# Patient Record
Sex: MALE | Race: WHITE | NOT HISPANIC OR LATINO | Employment: OTHER | URBAN - METROPOLITAN AREA
[De-identification: names, ages, dates, MRNs, and addresses within clinical notes are randomized per-mention and may not be internally consistent; named-entity substitution may affect disease eponyms.]

---

## 2017-01-03 ENCOUNTER — OFFICE VISIT (OUTPATIENT)
Dept: SLEEP MEDICINE | Facility: CLINIC | Age: 37
End: 2017-01-03
Payer: MEDICARE

## 2017-01-03 VITALS
DIASTOLIC BLOOD PRESSURE: 75 MMHG | OXYGEN SATURATION: 93 % | WEIGHT: 226 LBS | SYSTOLIC BLOOD PRESSURE: 113 MMHG | BODY MASS INDEX: 35.47 KG/M2 | HEIGHT: 67 IN | HEART RATE: 80 BPM

## 2017-01-03 DIAGNOSIS — R06.00 DYSPNEA AND RESPIRATORY ABNORMALITY: Primary | ICD-10-CM

## 2017-01-03 DIAGNOSIS — E66.09 OBESITY DUE TO EXCESS CALORIES, UNSPECIFIED OBESITY SEVERITY: ICD-10-CM

## 2017-01-03 DIAGNOSIS — G47.19 EXCESSIVE DAYTIME SLEEPINESS: ICD-10-CM

## 2017-01-03 DIAGNOSIS — Z72.820 LACK OF ADEQUATE SLEEP: ICD-10-CM

## 2017-01-03 DIAGNOSIS — R06.89 DYSPNEA AND RESPIRATORY ABNORMALITY: Primary | ICD-10-CM

## 2017-01-03 PROCEDURE — 99205 OFFICE O/P NEW HI 60 MIN: CPT | Performed by: PHYSICIAN ASSISTANT

## 2017-01-03 NOTE — PROGRESS NOTES
Sleep Consultation:    Date on this visit: 1/3/2017    Humberto Estrella  is sent by Carla Meyer for a sleep consultation.     Primary Physician: Carla Meyer     Chief Complaint   Patient presents with     Sleep Problem     Consult Carla Meyer CNP, snoring, witnessed apneas, daytime sleepiness       HPI: Humberto Estrella is a 36 year old male with medical history remarkable for trisomy 21, PTSD and obesity. He presents in clinic today for possible disordered breathing. He is here today with his caregiver Radha. History was obtained from both.     Humberto goes to sleep at 6:00 PM during the week. He wakes up at 5:30 AM with an alarm. He falls asleep in 10 minutes.  Humberto denies difficulty falling asleep.  He wakes up 1 times a night for 5 minutes before falling back to sleep.  Humberto wakes up to go to the bathroom.  On weekends, Humberto goes to sleep at 6:00 PM.  He wakes up at 10:00 AM with an alarm. He falls asleep in 10 minutes.  Patient gets an average of 12 hours of sleep per night. He does not feel refreshed.     Patient does not use electronics in bed, watch TV in bed and read in bed.     Hubmerto does not do shift work.      Humberto does snore every night and snoring is very loud. Patient does not have a regular bed partner. There is report of snoring and gasping.  He does not have witnessed apneas. Patient sleeps on his back and side. He has frequent morning dry mouth, denies morning headaches, morning confusion and restless legs. Humberto denies any bruxism, sleep walking, sleep talking, dream enactment, sleep paralysis, cataplexy and hypnogogic/hypnopompic hallucinations.    Humberto denies difficulty breathing through his nose, claustrophobia and reflux at night.      Humberto has gained 30 pounds in the last 2 years.  Patient describes themself as a morning person.  He would prefer to go to sleep at 8:00 PM and wake up at 5:00 AM.  Patient's [place] Sleepiness score 13/24 consistent with some daytime  sleepiness.      Humberto naps 7 times per week for 30 minutes, feels unrefreshed after naps. He takes some inadvertant naps.  He does not drive.  He uses 2 cups/day of coffee, 1 sodas/day. Last caffeine intake is usually before 3 PM.      Allergies:    Allergies   Allergen Reactions     Nkda [No Known Drug Allergies]        Medications:    Current Outpatient Prescriptions   Medication Sig Dispense Refill     levothyroxine (SYNTHROID, LEVOTHROID) 50 MCG tablet Take 1 tablet (50 mcg) by mouth daily 90 tablet 3     levocetirizine (XYZAL) 5 MG tablet Take 1 tablet (5 mg) by mouth every evening 30 tablet 3     esomeprazole (NEXIUM) 40 MG capsule Take 1 capsule (40 mg) by mouth every morning (before breakfast) Take 30-60 minutes before a eating. 30 capsule 1     OMEPRAZOLE PO Take 40 mg by mouth every morning       albuterol (ALBUTEROL) 108 (90 BASE) MCG/ACT inhaler Inhale 2 puffs into the lungs every 6 hours as needed for shortness of breath / dyspnea or wheezing or cough 1 Inhaler 3     UNABLE TO FIND MEDICATION NAME: pseudefed PE PRN not to exceed 10 capsules in 24 hours       divalproex (DEPAKOTE ER) 500 MG 24 hr tablet Take 1,500 mg by mouth every morning       venlafaxine (EFFEXOR-XR) 37.5 MG 24 hr capsule Take 37.5 mg by mouth daily 3 caps daily to equal 112.5mg       OLANZapine (ZYPREXA) 2.5 MG tablet Take 5 mg by mouth 3 times daily as needed  30 tablet 1     fluticasone (FLONASE) 50 MCG/ACT nasal spray Spray 1-2 sprays into both nostrils daily       alum & mag hydroxide-simethicone (MYLANTA/MAALOX) 200-200-20 MG/5ML SUSP Take 30 mLs by mouth every 4 hours as needed for indigestion (2 tsp for upset stomach)  0     magnesium hydroxide (MILK OF MAGNESIA) 400 MG/5ML suspension Take 30-60 mLs by mouth daily as needed for constipation or heartburn (If No Bowel Movement in 2 days.) 360 mL 1     acetaminophen (TYLENOL) 325 MG tablet Take 1-2 tablets (325-650 mg) by mouth every 4 hours as needed 100 tablet       risperiDONE (RISPERDAL) 1 MG tablet Take 1 tablet in the morning and 2 tablets at 5 pm 60 tablet      Phenylephrine-APAP-Guaifenesin 5-325-200 MG TABS Take 2 caplets every 4 hours as needed for decongestant 168 tablet      guaiFENesin-codeine (ROBITUSSIN AC) 100-10 MG/5ML SOLN Take 10 mLs by mouth every 4 hours as needed for cough 120 mL 0     ibuprofen (ADVIL,MOTRIN) 600 MG tablet Take 1 tablet (600 mg) by mouth every 6 hours as needed for moderate pain 60 tablet 0     triamcinolone (KENALOG) 0.1 % cream Apply  topically 3 times daily. Apply sparingly to affected area. (Patient taking differently: Apply topically 3 times daily as needed Apply sparingly to affected area.) 30 g 1       Problem List:  Patient Active Problem List    Diagnosis Date Noted     Subclinical hypothyroidism 12/12/2016     Priority: Medium     Obesity due to excess calories, unspecified obesity severity 06/13/2016     Priority: Medium     Nonallergic rhinitis      Priority: Medium     9/30/15 IgE tests all NEGATIVE for environmental allergens.        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 Provider to review       MYOPIA 06/21/2006     LATENT NYSTAGMUS 06/21/2006     Headache 06/21/2006     Problem list name updated by automated process. Provider to review       INTERMITT EXPLOSIVE DIS 06/21/2006     post traumatic stress disorder 06/21/2006        Past Medical/Surgical History:  Past Medical History   Diagnosis Date     Diagnostic skin and sensitization tests (aka ALLERGENS) 9/30/15 IgE tests all NEGATIVE for environmental allergens.      Nonallergic rhinitis      9/30/15 IgE tests all NEGATIVE for environmental allergens.      Past Surgical History   Procedure Laterality Date     Pe  tubes       Left     Phacoemulsification with standard intraocular lens implant  10/3/2013     Procedure: PHACOEMULSIFICATION WITH STANDARD INTRAOCULAR LENS IMPLANT;  Left [place] Phacoemulsification with Intraocular Lens Implant;  Surgeon: Luis Barajas MD;  Location: [facility]     Phacoemulsification with standard intraocular lens implant  5/1/2014     Procedure: PHACOEMULSIFICATION WITH STANDARD INTRAOCULAR LENS IMPLANT;  Surgeon: Luis Barajas MD;  Location: [facility]       Social History:  Social History     Social History     Marital Status: Single     Spouse Name: N/A     Number of Children: 0     Years of Education: 12     Occupational History     Cleaning Services      [employer]      Unemployed      Disabled     Social History Main Topics     Smoking status: Former Smoker     Quit date: 08/16/1998     Smokeless tobacco: Never Used     Alcohol Use: No     Drug Use: No     Sexual Activity: No     Other Topics Concern     Parent/Sibling W/ Cabg, Mi Or Angioplasty Before 65f 55m? No     Social History Narrative       Family History:  Family History   Problem Relation Age of Onset     Unknown/Adopted Mother        Review of Systems:  A complete review of systems reviewed by me is negative with the exeption of what has been mentioned in the history of present illness.  CONSTITUTIONAL:  POSITIVE for  weight gain  EYES: NEGATIVE for changes in vision, blind spots, double vision.  ENT: NEGATIVE for ear pain, sore throat, sinus pain, post-nasal drip, runny nose, bloody nose  CARDIAC:  POSITIVE for  chest pressure and breathlessness when lying flat  NEUROLOGIC:  POSITIVE for  headaches  DERMATOLOGIC: NEGATIVE for rashes, new moles or change in mole(s)  PULMONARY:  POSITIVE for  SOB at rest, SOB with activity and wheezing   GASTROINTESTINAL: NEGATIVE for nausea or vomitting, loose or watery stools, fat or grease in stools, constipation, abdominal pain, bowel movements black in color or blood  "noted.  GENITOURINARY: NEGATIVE for pain during urination, blood in urine, urinating more frequently than usual, irregular menstrual periods.  MUSCULOSKELETAL: NEGATIVE for muscle pain, bone or joint pain, swollen joints.  ENDOCRINE: NEGATIVE for increased thirst or urination, diabetes.  LYMPHATIC: NEGATIVE for swollen lymph nodes, lumps or bumps in the breasts or nipple discharge.    Physical Examination:  Vitals: Ht 1.702 m (5' 7\")  Wt 102.513 kg (226 lb)  BMI 35.39 kg/m2  BMI= Body mass index is 35.39 kg/(m^2).         [place] Total Score 1/3/2017   Total score - [place] 13       GENERAL APPEARANCE: alert and no distress  EYES: Eyes grossly normal to inspection, PERRL and conjunctivae and sclerae normal  HENT: ear canals and TM's normal, nose and mouth without ulcers or lesions, oropharynx crowded and tongue base enlarged  NECK: no adenopathy, no asymmetry, masses, or scars and thyroid normal to palpation  RESP: lungs clear to auscultation - no rales, rhonchi or wheezes  CV: regular rates and rhythm and normal S1 S2, no S3 or S4  LYMPHATICS: normal ant/post cervical and supraclavicular nodes  ABDOMEN: bowel sounds normal  MS: extremities normal- no gross deformities noted  NEURO: Normal strength and tone  PSYCH: mentation appears normal and affect normal/bright  Mallampati Class: IV.  Tonsillar Stage:     Last Basic Metabolic Panel:  NA      138   9/22/2016   POTASSIUM      4.3   9/22/2016  CHLORIDE      105   9/22/2016  CHELSEA      8.4   9/22/2016  CO2       29   9/22/2016  BUN       21   9/22/2016  CR     1.19   9/22/2016  GLC       95   9/22/2016    TSH     4.27   12/6/2016      ASSESSMENT:   Humberto Estrella is a 36 year old male with medical history remarkable for trisomy 21, PTSD and obesity. He presents in clinic today for possible disordered breathing    Pottential obstructive sleep apnea with coexisting hypoventilation based on BMI of 35, loud snoring, non-refreshing sleep, excessive daytime " "sleepiness(ESS 13), crowded oropharynx, neck circumference of 18\" and room air oxygen saturation of 93%.     Other potential reason for his daytime sleepiness is multiple sedating medications.     PLAN:    Recommend an in-lab polysomnogram with transcutaneous C02 monitoring and pre-study ABG to evaluate for obstructive sleep apnea, hypoventilation and hypoxemia.    Recommend weight management.     Literature provided regarding sleep apnea and sleep hygiene.      He will follow up with me in approximately two weeks after his sleep study has been competed to review the results and discuss plan of care.       Polysomnography reviewed.  Obstructive sleep apnea reviewed.  Complications of untreated sleep apnea were reviewed.    I have spent 60 minutes with this patient today in which greater than 50% of this time was spent in the counseling / coordination of care regarding VIDAL.     Jonny Rossi PA-C      CC: Carla Meyer          "

## 2017-01-03 NOTE — NURSING NOTE
"Chief Complaint   Patient presents with     Sleep Problem     Consult Carla Meyer CNP, snoring, witnessed apneas, daytime sleepiness       Initial /75 mmHg  Pulse 80  Ht 1.702 m (5' 7\")  Wt 102.513 kg (226 lb)  BMI 35.39 kg/m2  SpO2 93% Estimated body mass index is 35.39 kg/(m^2) as calculated from the following:    Height as of this encounter: 1.702 m (5' 7\").    Weight as of this encounter: 102.513 kg (226 lb).  BP completed using cuff size: large    "

## 2017-01-03 NOTE — MR AVS SNAPSHOT
After Visit Summary   1/3/2017    Humberto Estrella    MRN: [id number]           Patient Information     Date Of Birth          1980        Visit Information        Provider Department      1/3/2017 3:30 PM Jonny Rossi PA [facility]        Today's Diagnoses     Dyspnea and respiratory abnormality    -  1     Excessive daytime sleepiness         Obesity due to excess calories, unspecified obesity severity [E66.09]         Lack of adequate sleep           Care Instructions      Your BMI is Body mass index is 35.39 kg/(m^2).  Weight management is a personal decision.  If you are interested in exploring weight loss strategies, the following discussion covers the approaches that may be successful. Body mass index (BMI) is one way to tell whether you are at a healthy weight, overweight, or obese. It measures your weight in relation to your height.  A BMI of 18.5 to 24.9 is in the healthy range. A person with a BMI of 25 to 29.9 is considered overweight, and someone with a BMI of 30 or greater is considered obese. More than two-thirds of American adults are considered overweight or obese.  Being overweight or obese increases the risk for further weight gain. Excess weight may lead to heart disease and diabetes.  Creating and following plans for healthy eating and physical activity may help you improve your health.  Weight control is part of healthy lifestyle and includes exercise, emotional health, and healthy eating habits. Careful eating habits lifelong are the mainstay of weight control. Though there are significant health benefits from weight loss, long-term weight loss with diet alone may be very difficult to achieve- studies show long-term success with dietary management in less than 10% of people. Attaining a healthy weight may be especially difficult to achieve in those with severe obesity. In some cases, medications, devices and surgical management might be considered.  What  "can you do?  If you are overweight or obese and are interested in methods for weight loss, you should discuss this with your provider.     Consider reducing daily calorie intake by 500 calories.     Keep a food journal.     Avoiding skipping meals, consider cutting portions instead.    Diet combined with exercise helps maintain muscle while optimizing fat loss. Strength training is particularly important for building and maintaining muscle mass. Exercise helps reduce stress, increase energy, and improves fitness. Increasing exercise without diet control, however, may not burn enough calories to loose weight.       Start walking three days a week 10-20 minutes at a time    Work towards walking thirty minutes five days a week     Eventually, increase the speed of your walking for 1-2 minutes at time    In addition, we recommend that you review healthy lifestyles and methods for weight loss available through the National Institutes of Health patient information sites:  http://win.niddk.nih.gov/publications/index.htm    And look into health and wellness programs that may be available through your health insurance provider, employer, local community center, or pricilla club.    Weight management plan: Patient was referred to their PCP to discuss a diet and exercise plan.  Provider : Jonny Rossi  Contact Information: [facility] [phone]    Merritt Points:  1. What is Obstructive Sleep Apnea (VIDAL)? VIDAL is the most common type of sleep apnea. Apnea literally means, \"without breath.\" It is characterized by repetitive pauses in breathing, despite continued effort to breathe, and is usually associated with a reduction in blood oxygen saturation. Apneas can last 10 to over 60 seconds. It is caused by narrowing or collapse of the upper airway as muscles relax during sleep. A number of things can make apnea worse, including: sleeping on your back, having alcohol in the evening, smoking, asthma, allergies, nasal " congestion, and weight gain.  2. What are the consequences of VIDAL? Symptoms include: daytime sleepiness- possibly increasing the risk of falling asleep while driving, unrefreshing/restless sleep, snoring, insomnia, waking frequently to urinate, waking with heartburn or reflux, reduced concentration and memory, and morning headaches. Other health consequences may include development of high blood pressure. Untreated VIDAL also can contribute to heart disease, stroke and diabetes.   3. What are the treatment options? In most situations, sleep apnea is a lifelong disease that must be managed with daily therapy. Continuous Positive Airway (CPAP) is the most reliable treatment. A mouthguard to hold your jaw forward is usually the next most reliable option. Other options include postioning devices (to keep you off your back), nasal valves, tongue retaining device, weight loss, surgery. There is more detail about these options toward the end of this document.  4. What are the most important things to remember about using CPAP?     WHERE CAN I FIND MORE INFORMATION?    American Academy of Sleep Medicine Patient information on sleep disorders:  http://yoursleep.aasmnet.org    CPAP-  WHY AND HOW?                                 Continuous positive airway pressure, or CPAP, is the most effective treatment for obstructive sleep apnea. It works by using air pressure to hold your throat open. A decision to use CPAP is a major step forward in the pursuit of a healthier life. The successful use of CPAP will help you breathe easier, sleep better and live healthier. Using CPAP can be a positive experience if you keep these talbot points in mind:  1. Commitment  CPAP is not a quick fix for your problem. It involves a long-term commitment to improve your sleep and your health.    2. Communication  Stay in close communication with both your sleep doctor and your CPAP supplier. Ask lots of questions and seek help when you need  "it.    3. Consistency  Use CPAP all night, every night and for every nap. You will receive the maximum health benefits from CPAP when you use it every time that you sleep. This will also make it easier for your body to adjust to the treatment.    4. Correction  The first machine and mask that you try may not be the best ones for you. Work with your sleep doctor and your CPAP supplier to make corrections to your equipment selection. Ask about trying a different type of machine or mask if you have ongoing problems. Make sure that your mask is a good fit and learn to use your equipment properly.    5. Challenge  Tell a family member or close friend to ask you each morning if you used your CPAP the previous night. Have someone to challenge you to give it your best effort.    6. Connection   Your adjustment to CPAP will be easier if you are able to connect with others who use the same treatment. Ask your sleep doctor if there is a support group in your area for people who have sleep apnea, or look for one on the Internet.  7. Comfort   Increase your level of comfort by using a saline spray, decongestant or heated humidifier if CPAP irritates your nose, mouth or throat. Use your unit's \"ramp\" setting to slowly get used to the air pressure level. There may be soft pads you can buy that will fit over your mask straps. Look on www.CPAP.com for accessories such as these straps, a pillow contoured for side-sleeping with CPAP, longer hoses, hose covers to reduce condensation, or stands to keep the hose out of your way.                   8. Cleaning   Clean your mask, tubing and headgear on a regular basis. Put this time in your schedule so that you don't forget to do it. Check and replace the filters for your CPAP unit and humidifier.    9. Completion   Although you are never finished with CPAP therapy, you should reward yourself by celebrating the completion of your first month of treatment. Expect this first month to be your " hardest period of adjustment. It will involve some trial and error as you find the machine, mask and pressure settings that are right for you.    10. Continuation  After your first month of treatment, continue to make a daily commitment to use your CPAP all night, every night and for every nap.    CPAP-Tips to starting with success:  Begin using your CPAP for short periods of time during the day while you watch TV or read. This eliminates the pressure of trying to fall asleep with it when it is still a new sensation.    Use CPAP every night and for every nap. Using it less often reduces the health benefits and makes it harder for your body to get used to it.    Newer CPAP models are virtually silent; however, if you find the sound of your CPAP machine to be bothersome, place the unit under your bed to dampen the sound.     Make small adjustments to your mask, tubing, straps and headgear until you get the right fit. Tightening the mask may actually worsen the leak.  If it leaves significant marks on your face or irritates the bridge of your nose, it may not be the best mask for you.  Speak with the person who supplied the mask and consider trying other masks. Insurances will allow you to try different masks during the first month of starting CPAP.  Insurance also covers a new mask, hose and filter about every 3-6 months.    Use a saline nasal spray to ease mild nasal congestion. Neti-Pot or saline nasal rinses may also help. Nasal gel sprays can help reduce nasal dryness.  Biotene mouthwash can be helpful to protect your teeth if you experience frequent dry mouth.  Dry mouth may be a sign of air escaping out of your mouth or out of the mask in the case of a full face mask.  Speak with your provider if you expect that is the case.     Take a nasal decongestant to relieve more severe nasal or sinus congestion.  Do not use Afrin (oxymetazoline) nasal spray more than 3 days in a row.  Speak with your sleep doctor if your  nasal congestion is chronic.    Use a heated humidifier that fits your CPAP model to enhance your breathing comfort. Adjust the heat setting up if you get a dry nose or throat, down if you get condensation in the hose or mask.  Position the CPAP lower than you so that any condensation in the hose drains back into the machine rather than towards the mask.    Try a system that uses nasal pillows if traditional masks give you problems.    Clean your mask, tubing and headgear once a week. Make sure the equipment dries fully.    Regularly check and replace the filters for your CPAP unit and humidifier.    Work closely with your sleep doctor and your CPAP supplier to make sure that you have the machine, mask and air pressure setting that works best for you.    BESIDES CPAP, WHAT OTHER THERAPIES ARE THERE?    Postioning devices if you only have the snoring or apnea while on your back    Dental devices if your condition is mild    Nasal valves may be effective though experience is limited    Weight loss if you are overweight    Surgery in limited cases where devices are not acceptable or there are problems with structures in the nose and throat  If treated with one of these alternative options, further evaluation is necessary to ensure that the therapy is effective. This may require some form of repeat testing.    Healthy Lifestyle:  Healthy diet, exercise and limit alcohol: Not only will excessive alcohol increase your weight over time, but it irritates the throat tissues and make them swell, shrinking the airway and causing snoring. Drinking alcohol should be limited and stopped within 3-4 hours before going to bed.   Stop smoking: (Red swollen throat, heat, nicotine), also irritates and swells the airway, among numerous other negative health consequences.  Positioning Device  This example shows a pillow that straps around the waist. It may be appropriate for those whose sleep study shows milder sleep apnea that occurs  primarily when lying flat on one's back. Preliminary studies have shown benefit but effectiveness at home should be verified.                      Oral Appliance  These are examples of two of many custom-made devices that are more likely to work in mild sleep apnea                                                Oral appliances are dental mouth pieces that fit very much like a sports mouth guards or removable orthodontic retainers. They are used to treat snoring and obstructive sleep apnea . The device prevents the airway from collapsing by either holding the tongue or supporting the jaw in a forward position. Since oral appliances are non-invasive and easy to use, they may be considered as an early treatment option. Oral appliance therapy (OAT) involves the customization, selection, fabrication, fitting, adjustments and long-term follow-up care of specially designed oral devices, worn during sleep, which reposition the lower jaw and tongue base forward to maintain an open airway.  Custom made oral appliances are proven to be more effective than over-the-counter devices. Therefore, the over-the-counter devices are recommended not to be used as a screening tool nor as a therapeutic option.     Who gets a dental device?  Oral appliance therapy can be used as an alternative to CPAP therapy for the treatment of mild to moderate sleep apnea and for those patients who prefer OAT to CPAP. Oral appliance therapy is a first line therapy for the treatment of primary snoring. Additionally, OAT is an option for those that cannot tolerate CPAP as therapy or who have experienced insufficient surgical results.                 Possible side effects?  Frequent but minor side effects include: excessive salivation, dry mouth, discomfort of teeth and jaw and temporary changes in the patient s bite.  Potential complications include: jaw pain, permanent occlusal changes and TMJ symptoms.  The above mentioned side effects and complications  can be recognized and managed by dentists trained in dental sleep medicine.   Finding a dentist that practices dental sleep medicine  Specific training is available through the American Academy of Dental Sleep Medicine for dentists interested in working in the field of sleep. To find a dentist who is educated in the field of sleep and the use of oral appliances, near you, visit the Web site of the American Academy of Dental Sleep Medicine; also see http://www.accpstorage.org/newOrganization/patients/oralAppliances.pdf   To search for a dentist certified in these practices:  Http://aadsm.org/FindADentist.aspx?1  Http://www.accpstorage.org/newOrganization/patients/oralAppliances.pdf    Weight Loss:    Some patients may experience reduction or elimination of sleep apnea with weight loss.  Though there are significant health benefits from weight loss, long-term weight loss is very difficult to achieve- studies show success with dietary management in less that 10% of people.     If you are interested in dietary weight loss, you should review the options discussed at the National Institutes of Health patient information site:     Http:/www.health.nih.gov/topic/WeightLossDieting    Bariatric programs offer counseling in all methods of weight loss:    Http:/www.uofedicalcenter.org/Specialties/WeightLossSurgeryandMedicalMgmt/htm    Surgery:  There are a number of surgeries that have been attempted to treat apnea. In general, surgical options are usually reserved for cases in which there is a physical abnormality contributing to obstruction or other treatment options are ineffective or not tolerated. Most surgical options are either unreliable or quite invasive. One of the more common procedures is:  Uvulopalatopharyngoplasty: In this procedure, the uvula (the finger-like tissue that hangs in the back of the throat), part of the soft palate (the tissue that the uvula is attached to), and sometimes the tonsils or adenoids  "are removed. The efficacy of this surgery is around 30-50% .  After surgery, complications may include:  Sleepiness and sleep apnea related to post-surgery medication   Swelling, infection and bleeding   A sore throat and/or difficulty swallowing   Drainage of secretions into the nose and a nasal quality to the voice. English language speech does not seem to be affected by this surgery.   Narrowing of the airway in the nose and throat (hence constricting breathing) snoring and even iatrogenically caused sleep apnea. By cutting the tissues, excess scar tissue can \"tighten\" the airway and make it even smaller than it was before UPPP.  Patients who have had the uvula removed will become unable to correctly speak [place]serr[place]ian or any other language that has a uvular 'r' phoneme.    Surgeries to help resolve nasal congestion may help reduce the severity of apnea slightly. Nasal congestion does not cause apnea on its own, so these surgeries are usually not performed just for VIDAL.  They may be worth considering if the nasal congestion is significantly bothersome independent of apnea.            Follow-ups after your visit        Your next 10 appointments already scheduled     Jan 17, 2017  8:00 PM   Psg Split W/Tcm with SLEEP LAB, BED ONE   [facility] ([facility])    [address] [address]   [phone]            Jan 25, 2017  3:00 PM   Return Sleep Patient with DYLON Burger   [facility] ([facility])    [address] [address]   [phone]            Feb 20, 2017  1:00 PM   New Visit with Stanley Vega MD   [facility] ([facility]    [id number] [place] [id number] [phone]              Future tests that were ordered for you today     Open Future Orders        Priority Expected Expires Ordered    ARTERIAL PUNCTURE Routine  2/17/2017 1/3/2017    Blood " "gas arterial Routine  1/3/2018 1/3/2017    Comprehensive Sleep Study Routine  2017 1/3/2017            Who to contact     If you have questions or need follow up information about today's clinic visit or your schedule please contact [facility] directly at [phone].  Normal or non-critical lab and imaging results will be communicated to you by MyChart, letter or phone within 4 business days after the clinic has received the results. If you do not hear from us within 7 days, please contact the clinic through Flashnoteshart or phone. If you have a critical or abnormal lab result, we will notify you by phone as soon as possible.  Submit refill requests through [facility] or call your pharmacy and they will forward the refill request to us. Please allow 3 business days for your refill to be completed.          Additional Information About Your Visit        MyChart Information     [facility] lets you send messages to your doctor, view your test results, renew your prescriptions, schedule appointments and more. To sign up, go to [email or web address] . Click on \"Log in\" on the left side of the screen, which will take you to the Welcome page. Then click on \"Sign up Now\" on the right side of the page.     You will be asked to enter the access code listed below, as well as some personal information. Please follow the directions to create your username and password.     Your access code is: [email or web address]  Expires: 3/6/2017  4:03 PM     Your access code will  in 90 days. If you need help or a new code, please call your [facility] or [phone].        Care EveryWhere ID     This is your Care EveryWhere ID. This could be used by other organizations to access your [facility] medical records  [id number]        Your Vitals Were     Pulse Height BMI (Body Mass Index) Pulse Oximetry          80 1.702 m (5' 7\") 35.39 kg/m2 93%         Blood Pressure from Last 3 Encounters:   17 113/75   16 " 102/70   10/20/16 107/71    Weight from Last 3 Encounters:   01/03/17 102.513 kg (226 lb)   12/06/16 102.513 kg (226 lb)   11/08/16 103.59 kg (228 lb 6 oz)                 Today's Medication Changes          These changes are accurate as of: 1/3/17 11:59 PM.  If you have any questions, ask your nurse or doctor.               These medicines have changed or have updated prescriptions.        Dose/Directions    triamcinolone 0.1 % cream   Commonly known as:  KENALOG   This may have changed:    - when to take this  - reasons to take this  - additional instructions   Used for:  Eczema        Apply  topically 3 times daily. Apply sparingly to affected area.   Quantity:  30 g   Refills:  1                Primary Care Provider Office Phone # Fax #    Carla GM [facility] [phone]       [facility] [address] [place] [id number]        Thank you!     Thank you for choosing [facility]  for your care. Our goal is always to provide you with excellent care. Hearing back from our patients is one way we can continue to improve our services. Please take a few minutes to complete the written survey that you may receive in the mail after your visit with us. Thank you!             Your Updated Medication List - Protect others around you: Learn how to safely use, store and throw away your medicines at www.disposemymeds.org.          This list is accurate as of: 1/3/17 11:59 PM.  Always use your most recent med list.                   Brand Name Dispense Instructions for use    albuterol 108 (90 BASE) MCG/ACT Inhaler    albuterol    1 Inhaler    Inhale 2 puffs into the lungs every 6 hours as needed for shortness of breath / dyspnea or wheezing or cough       alum & mag hydroxide-simethicone 200-200-20 MG/5ML Susp suspension    MYLANTA/MAALOX     Take 30 mLs by mouth every 4 hours as needed for indigestion (2 tsp for upset stomach)       divalproex 500 MG 24 hr tablet    DEPAKOTE ER      Take 1,500 mg by mouth every morning       esomeprazole 40 MG CR capsule    nexIUM    30 capsule    Take 1 capsule (40 mg) by mouth every morning (before breakfast) Take 30-60 minutes before a eating.       fluticasone 50 MCG/ACT spray    FLONASE     Spray 1-2 sprays into both nostrils daily       guaiFENesin-codeine 100-10 MG/5ML Soln solution    ROBITUSSIN AC    120 mL    Take 10 mLs by mouth every 4 hours as needed for cough       ibuprofen 600 MG tablet    ADVIL/MOTRIN    60 tablet    Take 1 tablet (600 mg) by mouth every 6 hours as needed for moderate pain       levocetirizine 5 MG tablet    XYZAL    30 tablet    Take 1 tablet (5 mg) by mouth every evening       levothyroxine 50 MCG tablet    SYNTHROID/LEVOTHROID    90 tablet    Take 1 tablet (50 mcg) by mouth daily       MILK OF MAGNESIA 400 MG/5ML suspension   Generic drug:  magnesium hydroxide     360 mL    Take 30-60 mLs by mouth daily as needed for constipation or heartburn (If No Bowel Movement in 2 days.)       OLANZapine 2.5 MG tablet    zyPREXA    30 tablet    Take 5 mg by mouth 3 times daily as needed       OMEPRAZOLE PO      Take 40 mg by mouth every morning       Phenylephrine-APAP-Guaifenesin 5-325-200 MG Tabs     168 tablet    Take 2 caplets every 4 hours as needed for decongestant       risperDAL 1 MG tablet   Generic drug:  risperiDONE     60 tablet    Take 1 tablet in the morning and 2 tablets at 5 pm       triamcinolone 0.1 % cream    KENALOG    30 g    Apply  topically 3 times daily. Apply sparingly to affected area.       TYLENOL 325 MG tablet   Generic drug:  acetaminophen     100 tablet    Take 1-2 tablets (325-650 mg) by mouth every 4 hours as needed       UNABLE TO FIND      MEDICATION NAME: pseudefed PE PRN not to exceed 10 capsules in 24 hours       venlafaxine 37.5 MG 24 hr capsule    EFFEXOR-XR     Take 37.5 mg by mouth daily 3 caps daily to equal 112.5mg

## 2017-01-03 NOTE — PATIENT INSTRUCTIONS
Your BMI is Body mass index is 35.39 kg/(m^2).  Weight management is a personal decision.  If you are interested in exploring weight loss strategies, the following discussion covers the approaches that may be successful. Body mass index (BMI) is one way to tell whether you are at a healthy weight, overweight, or obese. It measures your weight in relation to your height.  A BMI of 18.5 to 24.9 is in the healthy range. A person with a BMI of 25 to 29.9 is considered overweight, and someone with a BMI of 30 or greater is considered obese. More than two-thirds of American adults are considered overweight or obese.  Being overweight or obese increases the risk for further weight gain. Excess weight may lead to heart disease and diabetes.  Creating and following plans for healthy eating and physical activity may help you improve your health.  Weight control is part of healthy lifestyle and includes exercise, emotional health, and healthy eating habits. Careful eating habits lifelong are the mainstay of weight control. Though there are significant health benefits from weight loss, long-term weight loss with diet alone may be very difficult to achieve- studies show long-term success with dietary management in less than 10% of people. Attaining a healthy weight may be especially difficult to achieve in those with severe obesity. In some cases, medications, devices and surgical management might be considered.  What can you do?  If you are overweight or obese and are interested in methods for weight loss, you should discuss this with your provider.     Consider reducing daily calorie intake by 500 calories.     Keep a food journal.     Avoiding skipping meals, consider cutting portions instead.    Diet combined with exercise helps maintain muscle while optimizing fat loss. Strength training is particularly important for building and maintaining muscle mass. Exercise helps reduce stress, increase energy, and improves fitness.  "Increasing exercise without diet control, however, may not burn enough calories to loose weight.       Start walking three days a week 10-20 minutes at a time    Work towards walking thirty minutes five days a week     Eventually, increase the speed of your walking for 1-2 minutes at time    In addition, we recommend that you review healthy lifestyles and methods for weight loss available through the National Institutes of Health patient information sites:  http://win.niddk.nih.gov/publications/index.htm    And look into health and wellness programs that may be available through your health insurance provider, employer, local community center, or pricilla club.    Weight management plan: Patient was referred to their PCP to discuss a diet and exercise plan.  Provider : Jonny Rossi  Contact Information: [facility] [phone]    Merritt Points:  1. What is Obstructive Sleep Apnea (VIDAL)? VIDAL is the most common type of sleep apnea. Apnea literally means, \"without breath.\" It is characterized by repetitive pauses in breathing, despite continued effort to breathe, and is usually associated with a reduction in blood oxygen saturation. Apneas can last 10 to over 60 seconds. It is caused by narrowing or collapse of the upper airway as muscles relax during sleep. A number of things can make apnea worse, including: sleeping on your back, having alcohol in the evening, smoking, asthma, allergies, nasal congestion, and weight gain.  2. What are the consequences of VIDAL? Symptoms include: daytime sleepiness- possibly increasing the risk of falling asleep while driving, unrefreshing/restless sleep, snoring, insomnia, waking frequently to urinate, waking with heartburn or reflux, reduced concentration and memory, and morning headaches. Other health consequences may include development of high blood pressure. Untreated VIDAL also can contribute to heart disease, stroke and diabetes.   3. What are the treatment options? " In most situations, sleep apnea is a lifelong disease that must be managed with daily therapy. Continuous Positive Airway (CPAP) is the most reliable treatment. A mouthguard to hold your jaw forward is usually the next most reliable option. Other options include postioning devices (to keep you off your back), nasal valves, tongue retaining device, weight loss, surgery. There is more detail about these options toward the end of this document.  4. What are the most important things to remember about using CPAP?     WHERE CAN I FIND MORE INFORMATION?    American Academy of Sleep Medicine Patient information on sleep disorders:  http://yoursleep.aasmnet.org    CPAP-  WHY AND HOW?                                 Continuous positive airway pressure, or CPAP, is the most effective treatment for obstructive sleep apnea. It works by using air pressure to hold your throat open. A decision to use CPAP is a major step forward in the pursuit of a healthier life. The successful use of CPAP will help you breathe easier, sleep better and live healthier. Using CPAP can be a positive experience if you keep these talbot points in mind:  1. Commitment  CPAP is not a quick fix for your problem. It involves a long-term commitment to improve your sleep and your health.    2. Communication  Stay in close communication with both your sleep doctor and your CPAP supplier. Ask lots of questions and seek help when you need it.    3. Consistency  Use CPAP all night, every night and for every nap. You will receive the maximum health benefits from CPAP when you use it every time that you sleep. This will also make it easier for your body to adjust to the treatment.    4. Correction  The first machine and mask that you try may not be the best ones for you. Work with your sleep doctor and your CPAP supplier to make corrections to your equipment selection. Ask about trying a different type of machine or mask if you have ongoing problems. Make sure that  "your mask is a good fit and learn to use your equipment properly.    5. Challenge  Tell a family member or close friend to ask you each morning if you used your CPAP the previous night. Have someone to challenge you to give it your best effort.    6. Connection   Your adjustment to CPAP will be easier if you are able to connect with others who use the same treatment. Ask your sleep doctor if there is a support group in your area for people who have sleep apnea, or look for one on the Internet.  7. Comfort   Increase your level of comfort by using a saline spray, decongestant or heated humidifier if CPAP irritates your nose, mouth or throat. Use your unit's \"ramp\" setting to slowly get used to the air pressure level. There may be soft pads you can buy that will fit over your mask straps. Look on www.CPAP.com for accessories such as these straps, a pillow contoured for side-sleeping with CPAP, longer hoses, hose covers to reduce condensation, or stands to keep the hose out of your way.                   8. Cleaning   Clean your mask, tubing and headgear on a regular basis. Put this time in your schedule so that you don't forget to do it. Check and replace the filters for your CPAP unit and humidifier.    9. Completion   Although you are never finished with CPAP therapy, you should reward yourself by celebrating the completion of your first month of treatment. Expect this first month to be your hardest period of adjustment. It will involve some trial and error as you find the machine, mask and pressure settings that are right for you.    10. Continuation  After your first month of treatment, continue to make a daily commitment to use your CPAP all night, every night and for every nap.    CPAP-Tips to starting with success:  Begin using your CPAP for short periods of time during the day while you watch TV or read. This eliminates the pressure of trying to fall asleep with it when it is still a new sensation.    Use CPAP " every night and for every nap. Using it less often reduces the health benefits and makes it harder for your body to get used to it.    Newer CPAP models are virtually silent; however, if you find the sound of your CPAP machine to be bothersome, place the unit under your bed to dampen the sound.     Make small adjustments to your mask, tubing, straps and headgear until you get the right fit. Tightening the mask may actually worsen the leak.  If it leaves significant marks on your face or irritates the bridge of your nose, it may not be the best mask for you.  Speak with the person who supplied the mask and consider trying other masks. Insurances will allow you to try different masks during the first month of starting CPAP.  Insurance also covers a new mask, hose and filter about every 3-6 months.    Use a saline nasal spray to ease mild nasal congestion. Neti-Pot or saline nasal rinses may also help. Nasal gel sprays can help reduce nasal dryness.  Biotene mouthwash can be helpful to protect your teeth if you experience frequent dry mouth.  Dry mouth may be a sign of air escaping out of your mouth or out of the mask in the case of a full face mask.  Speak with your provider if you expect that is the case.     Take a nasal decongestant to relieve more severe nasal or sinus congestion.  Do not use Afrin (oxymetazoline) nasal spray more than 3 days in a row.  Speak with your sleep doctor if your nasal congestion is chronic.    Use a heated humidifier that fits your CPAP model to enhance your breathing comfort. Adjust the heat setting up if you get a dry nose or throat, down if you get condensation in the hose or mask.  Position the CPAP lower than you so that any condensation in the hose drains back into the machine rather than towards the mask.    Try a system that uses nasal pillows if traditional masks give you problems.    Clean your mask, tubing and headgear once a week. Make sure the equipment dries  fully.    Regularly check and replace the filters for your CPAP unit and humidifier.    Work closely with your sleep doctor and your CPAP supplier to make sure that you have the machine, mask and air pressure setting that works best for you.    BESIDES CPAP, WHAT OTHER THERAPIES ARE THERE?    Postioning devices if you only have the snoring or apnea while on your back    Dental devices if your condition is mild    Nasal valves may be effective though experience is limited    Weight loss if you are overweight    Surgery in limited cases where devices are not acceptable or there are problems with structures in the nose and throat  If treated with one of these alternative options, further evaluation is necessary to ensure that the therapy is effective. This may require some form of repeat testing.    Healthy Lifestyle:  Healthy diet, exercise and limit alcohol: Not only will excessive alcohol increase your weight over time, but it irritates the throat tissues and make them swell, shrinking the airway and causing snoring. Drinking alcohol should be limited and stopped within 3-4 hours before going to bed.   Stop smoking: (Red swollen throat, heat, nicotine), also irritates and swells the airway, among numerous other negative health consequences.  Positioning Device  This example shows a pillow that straps around the waist. It may be appropriate for those whose sleep study shows milder sleep apnea that occurs primarily when lying flat on one's back. Preliminary studies have shown benefit but effectiveness at home should be verified.                      Oral Appliance  These are examples of two of many custom-made devices that are more likely to work in mild sleep apnea                                                Oral appliances are dental mouth pieces that fit very much like a sports mouth guards or removable orthodontic retainers. They are used to treat snoring and obstructive sleep apnea . The device prevents the  airway from collapsing by either holding the tongue or supporting the jaw in a forward position. Since oral appliances are non-invasive and easy to use, they may be considered as an early treatment option. Oral appliance therapy (OAT) involves the customization, selection, fabrication, fitting, adjustments and long-term follow-up care of specially designed oral devices, worn during sleep, which reposition the lower jaw and tongue base forward to maintain an open airway.  Custom made oral appliances are proven to be more effective than over-the-counter devices. Therefore, the over-the-counter devices are recommended not to be used as a screening tool nor as a therapeutic option.     Who gets a dental device?  Oral appliance therapy can be used as an alternative to CPAP therapy for the treatment of mild to moderate sleep apnea and for those patients who prefer OAT to CPAP. Oral appliance therapy is a first line therapy for the treatment of primary snoring. Additionally, OAT is an option for those that cannot tolerate CPAP as therapy or who have experienced insufficient surgical results.                 Possible side effects?  Frequent but minor side effects include: excessive salivation, dry mouth, discomfort of teeth and jaw and temporary changes in the patient s bite.  Potential complications include: jaw pain, permanent occlusal changes and TMJ symptoms.  The above mentioned side effects and complications can be recognized and managed by dentists trained in dental sleep medicine.   Finding a dentist that practices dental sleep medicine  Specific training is available through the American Academy of Dental Sleep Medicine for dentists interested in working in the field of sleep. To find a dentist who is educated in the field of sleep and the use of oral appliances, near you, visit the Web site of the American Academy of Dental Sleep Medicine; also see  http://www.accpstorage.org/newOrganization/patients/oralAppliances.pdf   To search for a dentist certified in these practices:  Http://aadsm.org/FindADentist.aspx?1  Http://www.accpstorage.org/newOrganization/patients/oralAppliances.pdf    Weight Loss:    Some patients may experience reduction or elimination of sleep apnea with weight loss.  Though there are significant health benefits from weight loss, long-term weight loss is very difficult to achieve- studies show success with dietary management in less that 10% of people.     If you are interested in dietary weight loss, you should review the options discussed at the National Institutes of Health patient information site:     Http:/www.health.nih.gov/topic/WeightLossDieting    Bariatric programs offer counseling in all methods of weight loss:    Http:/www.[facility]icalcenter.org/Specialties/WeightLossSurgeryandMedicalMgmt/htm    Surgery:  There are a number of surgeries that have been attempted to treat apnea. In general, surgical options are usually reserved for cases in which there is a physical abnormality contributing to obstruction or other treatment options are ineffective or not tolerated. Most surgical options are either unreliable or quite invasive. One of the more common procedures is:  Uvulopalatopharyngoplasty: In this procedure, the uvula (the finger-like tissue that hangs in the back of the throat), part of the soft palate (the tissue that the uvula is attached to), and sometimes the tonsils or adenoids are removed. The efficacy of this surgery is around 30-50% .  After surgery, complications may include:  Sleepiness and sleep apnea related to post-surgery medication   Swelling, infection and bleeding   A sore throat and/or difficulty swallowing   Drainage of secretions into the nose and a nasal quality to the voice. English language speech does not seem to be affected by this surgery.   Narrowing of the airway in the nose and throat (hence  "constricting breathing) snoring and even iatrogenically caused sleep apnea. By cutting the tissues, excess scar tissue can \"tighten\" the airway and make it even smaller than it was before UPPP.  Patients who have had the uvula removed will become unable to correctly speak [place] or any other language that has a uvular 'r' phoneme.    Surgeries to help resolve nasal congestion may help reduce the severity of apnea slightly. Nasal congestion does not cause apnea on its own, so these surgeries are usually not performed just for VIDAL.  They may be worth considering if the nasal congestion is significantly bothersome independent of apnea.      "

## 2017-01-16 ENCOUNTER — HOSPITAL ENCOUNTER (OUTPATIENT)
Dept: RESPIRATORY THERAPY | Facility: CLINIC | Age: 37
Discharge: HOME | End: 2017-01-16
Attending: INTERNAL MEDICINE | Admitting: INTERNAL MEDICINE
Payer: MEDICARE

## 2017-01-16 DIAGNOSIS — R06.89 DYSPNEA AND RESPIRATORY ABNORMALITY: ICD-10-CM

## 2017-01-16 DIAGNOSIS — E66.09 OBESITY DUE TO EXCESS CALORIES, UNSPECIFIED OBESITY SEVERITY: ICD-10-CM

## 2017-01-16 DIAGNOSIS — Z72.820 LACK OF ADEQUATE SLEEP: ICD-10-CM

## 2017-01-16 DIAGNOSIS — G47.19 EXCESSIVE DAYTIME SLEEPINESS: ICD-10-CM

## 2017-01-16 DIAGNOSIS — R06.00 DYSPNEA AND RESPIRATORY ABNORMALITY: ICD-10-CM

## 2017-01-16 LAB
BASE EXCESS BLDA CALC-SCNC: 2 MMOL/L
HCO3 BLD-SCNC: 27 MMOL/L (ref 21–28)
O2/TOTAL GAS SETTING VFR VENT: ABNORMAL %
PCO2 BLD: 43 MM HG (ref 35–45)
PH BLD: 7.41 PH (ref 7.35–7.45)
PO2 BLD: 63 MM HG (ref 80–105)

## 2017-01-16 PROCEDURE — 82803 BLOOD GASES ANY COMBINATION: CPT | Performed by: PHYSICIAN ASSISTANT

## 2017-01-16 PROCEDURE — 36600 WITHDRAWAL OF ARTERIAL BLOOD: CPT

## 2017-01-17 ENCOUNTER — THERAPY VISIT (OUTPATIENT)
Dept: SLEEP MEDICINE | Facility: CLINIC | Age: 37
End: 2017-01-17
Payer: MEDICARE

## 2017-01-17 ENCOUNTER — TELEPHONE (OUTPATIENT)
Dept: PULMONOLOGY | Facility: CLINIC | Age: 37
End: 2017-01-17

## 2017-01-17 DIAGNOSIS — R06.89 DYSPNEA AND RESPIRATORY ABNORMALITY: ICD-10-CM

## 2017-01-17 DIAGNOSIS — G47.19 EXCESSIVE DAYTIME SLEEPINESS: ICD-10-CM

## 2017-01-17 DIAGNOSIS — E66.09 OBESITY DUE TO EXCESS CALORIES, UNSPECIFIED OBESITY SEVERITY: ICD-10-CM

## 2017-01-17 DIAGNOSIS — Z72.820 LACK OF ADEQUATE SLEEP: ICD-10-CM

## 2017-01-17 DIAGNOSIS — R06.00 DYSPNEA AND RESPIRATORY ABNORMALITY: ICD-10-CM

## 2017-01-17 PROCEDURE — 95811 POLYSOM 6/>YRS CPAP 4/> PARM: CPT | Performed by: FAMILY MEDICINE

## 2017-01-17 NOTE — TELEPHONE ENCOUNTER
PULMONARY NEW PATIENT PRE-VISIT ASSESSMENT    Date Patient Contacted: 1/18/17- I spoke to the patient's group home as far as they know he has never seen a pulmonologist and has not had images outside the [facility] system       1. Patient is scheduled to see Dr Vega on 2/20/17  2. Reason for visit: Shortness of breath [R06.02]  - Primary   3. Referring provider Tavia Roy MD    4. Has patient seen previous specialist? No      5. Previous Imaging: In [facility]: Last CXR done: 9/22/16, Last Chest CT done: 7/15/16      Outside Imaging: Location/Date/Type/Status (Requested, Received, Patient will hand carry disc, Pushed to iSite, Disc will be mailed) no        6.  Pulmonary Function Tests: Scheduled at  None       Outside PFT Lab:  Location/Date/Status (Requested, Received, Patient will hand carry) no     Previsit review complete.  Patient will see provider at future scheduled appointment.     Patient Reminders Given:  Please, make sure you bring an updated list of your medications.   If you need to cancel or reschedule, please call [phone].

## 2017-01-18 NOTE — PROGRESS NOTES
Pt arrived at [facility] lab for sleep study.  Completed a split night PSG per provider order.    Preliminary AHI=31.  A final therapeutic PAP pressure  achieved.    Supine REM was seen on therapeutic pressure.    Patient reports feeling refreshed in AM.    MEDICATIONS: levothyroxine (SYNTHROID, LEVOTHROID) 50 MCG tablet     levocetirizine (XYZAL) 5 MG tablet    esomeprazole (NEXIUM) 40 MG capsule    OMEPRAZOLE PO    albuterol (ALBUTEROL) 108 (90 BASE) MCG/ACT inhaler    UNABLE TO FIND    divalproex (DEPAKOTE ER) 500 MG 24 hr tablet   venlafaxine (EFFEXOR-XR) 37.5 MG 24 hr capsule   OLANZapine (ZYPREXA) 2.5 MG tablet    fluticasone (FLONASE) 50 MCG/ACT nasal spray    alum & mag hydroxide-simethicone (MYLANTA/MAALOX) 200-200-20 MG/5ML SUSP    magnesium hydroxide (MILK OF MAGNESIA) 400 MG/5ML suspension    acetaminophen (TYLENOL) 325 MG tablet    risperiDONE (RISPERDAL) 1 MG tablet    Phenylephrine-APAP-Guaifenesin 5-325-200 MG TABS    guaiFENesin-codeine (ROBITUSSIN AC) 100-10 MG/5ML SOLN    ibuprofen (ADVIL,MOTRIN) 600 MG tablet   triamcinolone (KENALOG) 0.1 % cream    STUDY TYPE: NPSG  SLEEP AID: n/a  PAP ACCLIMATION: good, trialed wisp and ff f-10 medium (preferred)  RESPIRATORY EVENTS: obstructive apneas, hypopneas, central apneas, hypopneas and mixed apneas resulting an AHI=34  ECG: Baseline Heart Rate= 66 BPM/NSR  SPO2=baseline= 89%, Clifford=71%  SNORING: loud congested snore with snorts  TCO2 MONITORING: ABG=43mmHg, Baseline=48 mmHg, Zenith=52 mmHg, Treated=50mmHg  SUPPLEMENTAL 02=n/a   HOB ELEVATION=flat  TITRATION: starting with the ff f-10 medium mask at 4cm H20 and increasing for hypopneas, obstructions to 12cm H20. Rem supine observed. Humidity used. Centrals emerging out of rem at 12cm H20.   LEAK RATE=0-1  SLEEP STAGES: NREM prior to cpap. Rem supine with cpap  MOVEMENT: wnl

## 2017-01-19 ENCOUNTER — DOCUMENTATION ONLY (OUTPATIENT)
Dept: SLEEP MEDICINE | Facility: CLINIC | Age: 37
End: 2017-01-19

## 2017-01-19 NOTE — PROCEDURES
"SLEEP STUDY INTERPRETATION  SPLIT-NIGHT STUDY      Patient: NAT ELISE  YOB: 1980  Study Date: 1/17/2017  MRN: [id number]  Referring Provider: Carla Meyer CNP  Ordering Provider: Jonny Rossi PA-C    Indications for Polysomnography: The patient is a 36 y old Male who is 5'7  and weighs 226 lbs.  His BMI is 35.4, [place] sleepiness scale is 13.0 and neck size is 46 cm.  Relevant medical history includes trisomy 21, PTSD and obesity.  A diagnostic polysomnogram was performed to evaluate for potential obstructive sleep apnea with coexisting hypoventilation based on BMI of 35, loud snoring, non-refreshing sleep, excessive daytime sleepiness(ESS 13), crowded oropharynx, neck circumference of 18\" and room air oxygen saturation of 93%.  After 129.5 minutes of sleep time the patient exhibited sufficient respiratory events qualifying him for a CPAP trial which was then initiated.      Polysomnogram Data:  A full night polysomnogram recorded the standard physiologic parameters including EEG, EOG, EMG, ECG, nasal and oral airflow.  Respiratory parameters of chest and abdominal movements were recorded with respiratory inductance plethysmography.  Oxygen saturation was recorded by pulse oximetry.      Diagnostic PSG  Sleep Architecture:   The total recording time of the diagnostic portion of the study was 154.5 minutes.  The total sleep time was 129.5 minutes.  During the diagnostic portion of the study the sleep latency was decreased at 6.5 minutes without the use of a sleep aid.  REM latency was - minutes.  Arousal index was increased at 35.7 arousals per hour.  Sleep efficiency was normal at 83.8%.  Wake after sleep onset was 18.5 minutes.   The patient spent 5.4% of total sleep time in Stage N1, 42.5% in Stage N2, 52.1% in Stage N3 and 0.0% in REM.       Respiration: Moderate VIDAL with sustained hypoxemia (possibly artifact) and significant sleep-associated hypoxemia.  Pre-study ABG was consistent with " daytime hypoxemia and TCM was not suggestive of hypoventilation.    Events - During the diagnostic portion of the study, the polysomnogram revealed a presence of 5 obstructive, 10 central, and 6 mixed apneas resulting in an apnea index of 9.7 events per hour.  There were 36 hypopneas resulting in a hypopnea index of 16.7 events per hour.  The combined apnea/hypopnea index was 26.4 events per hour.  The REM AHI was - events per hour.  The supine AHI was 62.8 events per hour.  The RERA index was 9.3 events per hour.  The RDI was 35.7 events per hour.     Snoring - was reported as loud and strangulated.    Respiratory rate and pattern - was notable for normal respiratory rate and pattern.    Sustained Sleep Associated Hypoventilation - Transcutaneous carbon dioxide monitoring was used, however significant hypoventilation was not present with a maximum change of ~5 mmHg.  Pre-study ABG consistent with hypoxemia (pH 7.41, pCO2 43, pO2 63, HCO3 27).    Sleep Associated Hypoxemia - (Greater than 5 minutes O2 sat below 89%) was present.  Baseline oxygen saturation was 87.6%, with sustained hypoxemia in the mid-80 s in lateral NREM, though with some question of sensor placement given improvement with slight change of body position. Lowest oxygen saturation was 78.4%.  Time spent less than or equal to 88% was 128.9 minutes.  Time spent less than or equal to 89% was 153.2 minutes.  35.7 9.3 26.4     Treatment PSG  Sleep Architecture:   At 12:44:13 AM the patient was placed on CPAP treatment and was titrated at pressures ranging from 4 cmH2O up to 13 cmH2O.  The total recording time of the treatment portion of the study was 353.4 minutes.  The total sleep time was 345.5 minutes.  During the treatment portion of the study the sleep latency was 0.0 minutes.  REM latency was 46.0 minutes.  Arousal index was improved at 19.3 arousals per hour.  Sleep efficiency was normal at 97.8%.  Wake after sleep onset was 7.5 minutes.  The  patient spent 3.5% of total sleep time in Stage N1, 63.5% in Stage N2, 18.2% in Stage N3 and 14.8% in REM.       Respiration: CPAP at 11 and 12 cm H2O appeared effective in supine NREM and REM.  Initially seen to have some post-arousal centrals on these settings, have emergence of more frequent central events towards end of study on 12-13 cm H2O, potentially linked to some mild increase in mask leak.  Overall, the central events appear to be secondary to PAP treatment.  TCM remained stable, baseline SpO2 normalized.  The optimal pressure was 11 cmH2O with an AHI of 81.2 events per hour.  Time in REM supine on final pressure was 6.5 minutes.   This titration was considered adequate (residual AHII with 75% decrease, or above constraints without REM-supine sleep at final pressure).    Movement Activity: Nothing of note    Periodic Limb Movements  o During the diagnostic portion of the study, there were - PLMs recorded. The PLM index was - movements per hour.  The PLM Arousal Index was - per hour.  o During the treatment portion of the study, there were - PLMs recorded. The PLM index was - movements per hour.  The PLM Arousal Index was - per hour.    REM EMG Activity - Excessive transient / sustained muscle activity was not present.    Nocturnal Behavior - Abnormal sleep related behaviors were not noted during / arising out of NREM / REM sleep.    Bruxism - None apparent.    Cardiac Summary: Appears NSR  During the diagnostic portion of the study, the average pulse rate was 70.2 bpm.  The minimum pulse rate was 46.9 bpm while the maximum pulse rate was 118.0 bpm.    During the treatment portion of the study, the average pulse rate was 72.8 bpm.  The minimum pulse rate was 47.1 bpm while the maximum pulse rate was 115.1 bpm.     Arrhythmias were not noted.    Assessment:     Moderate VIDAL with sustained hypoxemia (possibly artifact) and significant sleep-associated hypoxemia.  Pre-study ABG was consistent with daytime  hypoxemia and TCM was not suggestive of hypoventilation.    CPAP at 11 and 12 cm H2O appeared effective in supine NREM and REM.  Initially seen to have some post-arousal centrals on these settings, have emergence of more frequent central events towards end of study on 12-13 cm H2O, potentially linked to some mild increase in mask leak.  Overall, the central events appear to be secondary to PAP treatment.  TCM remained stable, baseline SpO2 normalized.    Recommendations:    Treatment of VIDAL with CPAP at 11 cm H2O.  Recommend clinical follow up with sleep management team, for coaching and review of effectiveness and measures.    Could consider all-night PAP titration.    Patient may be a candidate for dental appliance through referral to Sleep Dentistry for the treatment of obstructive sleep apnea and/or socially disruptive snoring.    Weight management (if BMI > 30).    Diagnostic Codes:    Obstructive Sleep Apnea G47.33    Sleep Hypoxemia/Hypoventilation G47.36                  Table of Oximetry Distribution    Range(%) Time in range (min) Time in range (%) Time in or below range (min) Time in or below range (%)   0.0 - 88.0 128.9 25.4% 128.9 25.4%   0.0 - 89.0 153.2 30.2% 153.2 30.2%

## 2017-01-25 ENCOUNTER — OFFICE VISIT (OUTPATIENT)
Dept: SLEEP MEDICINE | Facility: CLINIC | Age: 37
End: 2017-01-25
Payer: MEDICARE

## 2017-01-25 VITALS
BODY MASS INDEX: 35.47 KG/M2 | WEIGHT: 226 LBS | OXYGEN SATURATION: 94 % | DIASTOLIC BLOOD PRESSURE: 68 MMHG | HEIGHT: 67 IN | SYSTOLIC BLOOD PRESSURE: 113 MMHG | HEART RATE: 62 BPM

## 2017-01-25 DIAGNOSIS — R09.02 HYPOXEMIA: ICD-10-CM

## 2017-01-25 DIAGNOSIS — G47.33 OBSTRUCTIVE SLEEP APNEA: Primary | ICD-10-CM

## 2017-01-25 DIAGNOSIS — R53.81 MALAISE AND FATIGUE: ICD-10-CM

## 2017-01-25 DIAGNOSIS — R53.83 MALAISE AND FATIGUE: ICD-10-CM

## 2017-01-25 PROCEDURE — 99214 OFFICE O/P EST MOD 30 MIN: CPT | Performed by: PHYSICIAN ASSISTANT

## 2017-01-25 NOTE — PROGRESS NOTES
"  Sleep Study Follow-Up Visit:    Date on this visit: 1/25/2017    Humberto Estrella comes in today for follow-up of his sleep study done on 1/17/2017 at the [facility] [place] for loud snoring, non-refreshing sleep, excessive daytime sleepiness(ESS 13), crowded oropharynx, neck circumference of 18\" and room air oxygen saturation of 93%. Sleep study was interpreted by Dr. Peralta.     Due to presence of respiratory events, this study was done in two parts (baseline followed by CPAP titration).  Diagnostic PSG: Sleep latency 6.5 minutes without Ambien.  REM not achieved.   REM latency - minutes.  Sleep efficiency 83.8%. Total sleep time 129.5 minutes.  Sleep architecture:  Stage 1, 5.4% (5%), stage 2, 42.5% (45-55%), stage 3, 52.1% (15-20%), stage REM, 0.0% (20-25%).  AHI was 26. Supine AHI was 62, RDI 35.  Periodic Limb Movement Index -/hour.       Sustained Sleep Associated Hypoventilation - Transcutaneous carbon dioxide monitoring was used, however significant hypoventilation was not present with a maximum change of ~5 mmHg. Pre-study ABG consistent with hypoxemia (pH 7.41, pCO2 43, pO2 63, HCO3 27).  Sleep Associated Hypoxemia - (Greater than 5 minutes O2 sat below 89%) was present.  Baseline oxygen saturation was 87.6%, with sustained hypoxemia in the mid-80 s in lateral NREM, though   with some question of sensor placement given improvement with slight change of body position. Lowest oxygen saturation was 78.4%.  Time spent less than or equal to 88% was 128.9 minutes.  Time spent less than or equal to 89% was 153.2 minutes.    CPAP titration:  Sleep latency 0.0 minutes.  REM latency 46 minutes.  Sleep efficiency 97.8%. Total sleep time 345.5 minutes. Sleep architecture:  Stage 1, 3.5% (5%), stage 2, 63.5% (45-55%), stage 3, 18.2% (15-20%), stage REM, 14.8% (20-25%).  CPAP was titrated to 11-12 cm.   Periodic Limb Movement Index -/hour.         Past medical/surgical history, family history, social " "history, medications and allergies were reviewed.      Problem List:  Patient Active Problem List    Diagnosis Date Noted     VIDAL (obstructive sleep apnea)-AHI 26 01/25/2017     Priority: Medium     1/17/2017(226#)-AHI 26, RDI 35, lowest oxygen saturation was 78%, CPAP 11 cm/H20.        Subclinical hypothyroidism 12/12/2016     Priority: Medium     Obesity due to excess calories, unspecified obesity severity 06/13/2016     Priority: Medium     Nonallergic rhinitis      Priority: Medium     9/30/15 IgE tests all NEGATIVE for environmental allergens.        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 Provider to review       MYOPIA 06/21/2006     Priority: Medium     LATENT NYSTAGMUS 06/21/2006     Priority: Medium     Headache 06/21/2006     Priority: Medium     Problem list name updated by automated process. Provider to review       INTERMITT EXPLOSIVE DIS 06/21/2006     Priority: Medium     post traumatic stress disorder 06/21/2006     Priority: Medium      /68 mmHg  Pulse 62  Ht 1.702 m (5' 7.01\")  Wt 102.513 kg (226 lb)  BMI 35.39 kg/m2  SpO2 94%    Impression/Plan:    1. Moderate VIDAL with sustained hypoxemia (possibly artifact) and significant sleep-associated hypoxemia.  Pre-study ABG was consistent with daytime hypoxemia and TCM was not suggestive of hypoventilation.  2. CPAP at 11 and 12 cm H2O appeared effective in supine NREM and REM.  Initially seen to have some post-arousal centrals on these settings, have emergence of more frequent central events towards end of study on 12-13 cm H2O, potentially linked to some mild increase in mask leak.  Overall, the central events appear to " be secondary to PAP treatment.  TCM remained stable, baseline SpO2 normalized    - Overnight polysomnogram was reviewed in detail with the patient and his caregiver Radha today and a copy given to him for his records. Treatment options reviewed.   - Will arrange treatment with CPAP 11 cm/H20  - 6 minute walk test to evaluate for possible need for supplemental oxygen during the day.     He will follow up with me in about 7 weeks, sooner if questions/concerns.     Twenty-five minutes spent with patient, all of which were spent face-to-face counseling, consulting, coordinating plan of care.      Jonny Rossi PA-C      CC: Carla Meyer

## 2017-01-25 NOTE — MR AVS SNAPSHOT
After Visit Summary   1/25/2017    Humberto Estrella    MRN: [id number]           Patient Information     Date Of Birth          1980        Visit Information        Provider Department      1/25/2017 3:00 PM Jonny Rossi PA [facility]        Today's Diagnoses     Obstructive sleep apnea    -  1     Malaise and fatigue         Hypoxemia           Care Instructions      Your BMI is Body mass index is 35.39 kg/(m^2).  Weight management is a personal decision.  If you are interested in exploring weight loss strategies, the following discussion covers the approaches that may be successful. Body mass index (BMI) is one way to tell whether you are at a healthy weight, overweight, or obese. It measures your weight in relation to your height.  A BMI of 18.5 to 24.9 is in the healthy range. A person with a BMI of 25 to 29.9 is considered overweight, and someone with a BMI of 30 or greater is considered obese. More than two-thirds of American adults are considered overweight or obese.  Being overweight or obese increases the risk for further weight gain. Excess weight may lead to heart disease and diabetes.  Creating and following plans for healthy eating and physical activity may help you improve your health.  Weight control is part of healthy lifestyle and includes exercise, emotional health, and healthy eating habits. Careful eating habits lifelong are the mainstay of weight control. Though there are significant health benefits from weight loss, long-term weight loss with diet alone may be very difficult to achieve- studies show long-term success with dietary management in less than 10% of people. Attaining a healthy weight may be especially difficult to achieve in those with severe obesity. In some cases, medications, devices and surgical management might be considered.  What can you do?  If you are overweight or obese and are interested in methods for weight loss, you should discuss  this with your provider.     Consider reducing daily calorie intake by 500 calories.     Keep a food journal.     Avoiding skipping meals, consider cutting portions instead.    Diet combined with exercise helps maintain muscle while optimizing fat loss. Strength training is particularly important for building and maintaining muscle mass. Exercise helps reduce stress, increase energy, and improves fitness. Increasing exercise without diet control, however, may not burn enough calories to loose weight.       Start walking three days a week 10-20 minutes at a time    Work towards walking thirty minutes five days a week     Eventually, increase the speed of your walking for 1-2 minutes at time    In addition, we recommend that you review healthy lifestyles and methods for weight loss available through the National Institutes of Health patient information sites:  http://win.niddk.nih.gov/publications/index.htm    And look into health and wellness programs that may be available through your health insurance provider, employer, local community center, or pricilla club.    Weight management plan: Patient was referred to their PCP to discuss a diet and exercise plan.  You will be provided with an auto-titrating CPAP with a pressure of 11 cm with heated humidity to limit nasal congestion. Adjust the heat level on humidifier to find a setting that prevents dry nose but does not cause condensation in the hose or mask. Use distilled water in the humidifier.  The CPAP has a ramp function that starts the pressure lower than your prescribed pressure and gradually increases it over a number of minutes.  This may make it easier to fall asleep.    Try to use the CPAP every-night, all night (minimum of 4 hours). Many insurances require that we prove you are using the CPAP at least 4 hours on at least 70% of nights over a 30 day period. We have 90 days to meet those criteria.            Discussed weight management and the impact of weight  gain on sleep apnea.  Let me know if you snore or feel the pressure is too high.    You can get new supplies (mask, hose and filter) for your CPAP every 3-6 months, covered by insurance. You do not need to get supplies that often, but they are available if you would like them.  You may exchange the mask once within the first month if you feel the initial mask does not fit well.  Contact your medical equipment provider for equipment issues.  Please let me know if you have any return of snoring, daytime sleepiness or poor sleep quality. We will want to make sure your CPAP is adequately treating your apnea.  There is a website called CPAP.com that has accessories that may make CPAP use easier. Please visit it at your convenience.  Our phone number is [phone]    Follow-up 1 month.  Bring your CPAP machine with you to the follow up appointment.        Follow-ups after your visit        Your next 10 appointments already scheduled     Feb 20, 2017  1:00 PM   New Visit with Stanley Vega MD   [facility]    [address] [place] [id number] [phone]              Future tests that were ordered for you today     Open Future Orders        Priority Expected Expires Ordered    6 minute walk test Routine  1/25/2018 1/25/2017            Who to contact     If you have questions or need follow up information about today's clinic visit or your schedule please contact [facility] directly at [phone].  Normal or non-critical lab and imaging results will be communicated to you by MyChart, letter or phone within 4 business days after the clinic has received the results. If you do not hear from us within 7 days, please contact the clinic through MyChart or phone. If you have a critical or abnormal lab result, we will notify you by phone as soon as possible.  Submit refill requests through [facility] or call your pharmacy and they will  "forward the refill request to us. Please allow 3 business days for your refill to be completed.          Additional Information About Your Visit        [employer] Information     [employer] lets you send messages to your doctor, view your test results, renew your prescriptions, schedule appointments and more. To sign up, go to [email or web address] . Click on \"Log in\" on the left side of the screen, which will take you to the Welcome page. Then click on \"Sign up Now\" on the right side of the page.     You will be asked to enter the access code listed below, as well as some personal information. Please follow the directions to create your username and password.     Your access code is: [email or web address]  Expires: 3/6/2017  4:03 PM     Your access code will  in 90 days. If you need help or a new code, please call your [place] clinic or [phone].        Care EveryWhere ID     This is your Care EveryWhere ID. This could be used by other organizations to access your [place] medical records  [id number]        Your Vitals Were     Pulse Height BMI (Body Mass Index) Pulse Oximetry          62 1.702 m (5' 7.01\") 35.39 kg/m2 94%         Blood Pressure from Last 3 Encounters:   17 113/68   17 113/75   16 102/70    Weight from Last 3 Encounters:   17 102.513 kg (226 lb)   17 102.513 kg (226 lb)   16 102.513 kg (226 lb)              We Performed the Following     Comprehensive DME          Today's Medication Changes          These changes are accurate as of: 17  3:19 PM.  If you have any questions, ask your nurse or doctor.               These medicines have changed or have updated prescriptions.        Dose/Directions    triamcinolone 0.1 % cream   Commonly known as:  KENALOG   This may have changed:    - when to take this  - reasons to take this  - additional instructions   Used for:  Eczema        Apply  topically 3 times daily. Apply sparingly to affected area.   Quantity:  30 g "   Refills:  1                Primary Care Provider Office Phone # Fax #    [facility]shaw [facility] [phone]       [facility] [address] [place] [phone]        Thank you!     Thank you for choosing [facility]  for your care. Our goal is always to provide you with excellent care. Hearing back from our patients is one way we can continue to improve our services. Please take a few minutes to complete the written survey that you may receive in the mail after your visit with us. Thank you!             Your Updated Medication List - Protect others around you: Learn how to safely use, store and throw away your medicines at www.disposemymeds.org.          This list is accurate as of: 1/25/17  3:19 PM.  Always use your most recent med list.                   Brand Name Dispense Instructions for use    albuterol 108 (90 BASE) MCG/ACT Inhaler    albuterol    1 Inhaler    Inhale 2 puffs into the lungs every 6 hours as needed for shortness of breath / dyspnea or wheezing or cough       alum & mag hydroxide-simethicone 200-200-20 MG/5ML Susp suspension    MYLANTA/MAALOX     Take 30 mLs by mouth every 4 hours as needed for indigestion (2 tsp for upset stomach)       divalproex 500 MG 24 hr tablet    DEPAKOTE ER     Take 1,500 mg by mouth every morning       esomeprazole 40 MG CR capsule    nexIUM    30 capsule    Take 1 capsule (40 mg) by mouth every morning (before breakfast) Take 30-60 minutes before a eating.       fluticasone 50 MCG/ACT spray    FLONASE     Spray 1-2 sprays into both nostrils daily       guaiFENesin-codeine 100-10 MG/5ML Soln solution    ROBITUSSIN AC    120 mL    Take 10 mLs by mouth every 4 hours as needed for cough       ibuprofen 600 MG tablet    ADVIL/MOTRIN    60 tablet    Take 1 tablet (600 mg) by mouth every 6 hours as needed for moderate pain       levocetirizine 5 MG tablet    XYZAL    30 tablet    Take 1 tablet (5 mg) by mouth every evening        levothyroxine 50 MCG tablet    SYNTHROID/LEVOTHROID    90 tablet    Take 1 tablet (50 mcg) by mouth daily       MILK OF MAGNESIA 400 MG/5ML suspension   Generic drug:  magnesium hydroxide     360 mL    Take 30-60 mLs by mouth daily as needed for constipation or heartburn (If No Bowel Movement in 2 days.)       OLANZapine 2.5 MG tablet    zyPREXA    30 tablet    Take 5 mg by mouth 3 times daily as needed       OMEPRAZOLE PO      Take 40 mg by mouth every morning       Phenylephrine-APAP-Guaifenesin 5-325-200 MG Tabs     168 tablet    Take 2 caplets every 4 hours as needed for decongestant       risperDAL 1 MG tablet   Generic drug:  risperiDONE     60 tablet    Take 1 tablet in the morning and 2 tablets at 5 pm       triamcinolone 0.1 % cream    KENALOG    30 g    Apply  topically 3 times daily. Apply sparingly to affected area.       TYLENOL 325 MG tablet   Generic drug:  acetaminophen     100 tablet    Take 1-2 tablets (325-650 mg) by mouth every 4 hours as needed       UNABLE TO FIND      MEDICATION NAME: pseudefed PE PRN not to exceed 10 capsules in 24 hours       venlafaxine 37.5 MG 24 hr capsule    EFFEXOR-XR     Take 37.5 mg by mouth daily 3 caps daily to equal 112.5mg

## 2017-01-25 NOTE — PATIENT INSTRUCTIONS
Your BMI is Body mass index is 35.39 kg/(m^2).  Weight management is a personal decision.  If you are interested in exploring weight loss strategies, the following discussion covers the approaches that may be successful. Body mass index (BMI) is one way to tell whether you are at a healthy weight, overweight, or obese. It measures your weight in relation to your height.  A BMI of 18.5 to 24.9 is in the healthy range. A person with a BMI of 25 to 29.9 is considered overweight, and someone with a BMI of 30 or greater is considered obese. More than two-thirds of American adults are considered overweight or obese.  Being overweight or obese increases the risk for further weight gain. Excess weight may lead to heart disease and diabetes.  Creating and following plans for healthy eating and physical activity may help you improve your health.  Weight control is part of healthy lifestyle and includes exercise, emotional health, and healthy eating habits. Careful eating habits lifelong are the mainstay of weight control. Though there are significant health benefits from weight loss, long-term weight loss with diet alone may be very difficult to achieve- studies show long-term success with dietary management in less than 10% of people. Attaining a healthy weight may be especially difficult to achieve in those with severe obesity. In some cases, medications, devices and surgical management might be considered.  What can you do?  If you are overweight or obese and are interested in methods for weight loss, you should discuss this with your provider.     Consider reducing daily calorie intake by 500 calories.     Keep a food journal.     Avoiding skipping meals, consider cutting portions instead.    Diet combined with exercise helps maintain muscle while optimizing fat loss. Strength training is particularly important for building and maintaining muscle mass. Exercise helps reduce stress, increase energy, and improves fitness.  Increasing exercise without diet control, however, may not burn enough calories to loose weight.       Start walking three days a week 10-20 minutes at a time    Work towards walking thirty minutes five days a week     Eventually, increase the speed of your walking for 1-2 minutes at time    In addition, we recommend that you review healthy lifestyles and methods for weight loss available through the National Institutes of Health patient information sites:  http://win.niddk.nih.gov/publications/index.htm    And look into health and wellness programs that may be available through your health insurance provider, employer, local community center, or pricilla club.    Weight management plan: Patient was referred to their PCP to discuss a diet and exercise plan.  You will be provided with an auto-titrating CPAP with a pressure of 11 cm with heated humidity to limit nasal congestion. Adjust the heat level on humidifier to find a setting that prevents dry nose but does not cause condensation in the hose or mask. Use distilled water in the humidifier.  The CPAP has a ramp function that starts the pressure lower than your prescribed pressure and gradually increases it over a number of minutes.  This may make it easier to fall asleep.    Try to use the CPAP every-night, all night (minimum of 4 hours). Many insurances require that we prove you are using the CPAP at least 4 hours on at least 70% of nights over a 30 day period. We have 90 days to meet those criteria.            Discussed weight management and the impact of weight gain on sleep apnea.  Let me know if you snore or feel the pressure is too high.    You can get new supplies (mask, hose and filter) for your CPAP every 3-6 months, covered by insurance. You do not need to get supplies that often, but they are available if you would like them.  You may exchange the mask once within the first month if you feel the initial mask does not fit well.  Contact your medical  equipment provider for equipment issues.  Please let me know if you have any return of snoring, daytime sleepiness or poor sleep quality. We will want to make sure your CPAP is adequately treating your apnea.  There is a website called CPAP.com that has accessories that may make CPAP use easier. Please visit it at your convenience.  Our phone number is [phone]    Follow-up 1 month.  Bring your CPAP machine with you to the follow up appointment.

## 2017-02-02 ENCOUNTER — HOSPITAL ENCOUNTER (OUTPATIENT)
Dept: RESPIRATORY THERAPY | Facility: CLINIC | Age: 37
Discharge: HOME | End: 2017-02-02
Attending: INTERNAL MEDICINE | Admitting: INTERNAL MEDICINE
Payer: MEDICARE

## 2017-02-02 DIAGNOSIS — R09.02 HYPOXEMIA: ICD-10-CM

## 2017-02-02 PROCEDURE — 94620 ZZHC SIX MINUTE WALK TEST: CPT

## 2017-02-06 ENCOUNTER — TELEPHONE (OUTPATIENT)
Dept: SLEEP MEDICINE | Facility: CLINIC | Age: 37
End: 2017-02-06

## 2017-02-06 ENCOUNTER — DOCUMENTATION ONLY (OUTPATIENT)
Dept: SLEEP MEDICINE | Facility: CLINIC | Age: 37
End: 2017-02-06

## 2017-02-06 DIAGNOSIS — R53.81 MALAISE AND FATIGUE: ICD-10-CM

## 2017-02-06 DIAGNOSIS — R09.02 HYPOXEMIA: ICD-10-CM

## 2017-02-06 DIAGNOSIS — G47.33 OBSTRUCTIVE SLEEP APNEA (ADULT) (PEDIATRIC): Primary | ICD-10-CM

## 2017-02-06 DIAGNOSIS — R53.83 MALAISE AND FATIGUE: ICD-10-CM

## 2017-02-06 NOTE — PROGRESS NOTES
Patient was offered choice of vendor and chose [facility].  Patient Humberto Estrella was set up at [employer]  on February 6, 2017. Patient received a Resmed AirSense 10 Auto. Pressures were set at 11 cm H2O.   Patient s ramp is 5 cm H2O for Auto and FLEX/EPR is 2.  Patient received a Resmed Mask name: AIRFIT F20  Full Face mask Size Medium, heated tubing and heated humidifier.  Patient is enrolled in the STM Program and does need to meet compliance. Patient has a follow up on 4/4/2017 with DYLON Figueroa.    Leanna Rosa

## 2017-02-07 ENCOUNTER — MEDICAL CORRESPONDENCE (OUTPATIENT)
Dept: HEALTH INFORMATION MANAGEMENT | Facility: CLINIC | Age: 37
End: 2017-02-07

## 2017-02-07 ENCOUNTER — TELEPHONE (OUTPATIENT)
Dept: FAMILY MEDICINE | Facility: CLINIC | Age: 37
End: 2017-02-07

## 2017-02-07 DIAGNOSIS — J30.1 SEASONAL ALLERGIC RHINITIS DUE TO POLLEN: Primary | ICD-10-CM

## 2017-02-07 RX ORDER — LEVOCETIRIZINE DIHYDROCHLORIDE 5 MG/1
5 TABLET, FILM COATED ORAL EVERY EVENING
Qty: 31 TABLET | Refills: 11 | Status: SHIPPED | OUTPATIENT
Start: 2017-02-07 | End: 2018-04-09

## 2017-02-07 NOTE — TELEPHONE ENCOUNTER
PCP signed paper reorder but will have RN send electronically for tracking   And send paper to scan     Maddison Burgess  [facility] [place]

## 2017-02-07 NOTE — TELEPHONE ENCOUNTER
PCP standing orders for group home were   Faxed back to [facility] and faxed to Janey at [phone]  Sent to scan     Maddison Burgess  [facility] [place]

## 2017-02-08 ENCOUNTER — MEDICAL CORRESPONDENCE (OUTPATIENT)
Dept: HEALTH INFORMATION MANAGEMENT | Facility: CLINIC | Age: 37
End: 2017-02-08

## 2017-02-09 ENCOUNTER — DOCUMENTATION ONLY (OUTPATIENT)
Dept: SLEEP MEDICINE | Facility: CLINIC | Age: 37
End: 2017-02-09

## 2017-02-09 NOTE — PROGRESS NOTES
3 DAY STM VISIT    Patient contacted for 3 day STM visit  Subjective measures:  Spoke with Janey from the group home. She stated things are going very good with CPAP. Patient wakes up on his own now in the morning.    Current settings:  EPAP Min Auto CPAP: 11 (The minimum allowable pressure in cmH2O)       EPAP Max Auto CPAP: 11 (The maximum allowable pressure in cmH2O)       Assessment:  Patient has 3 days of use.  Action plan: Pt to have f/u 14 day  STM visit.

## 2017-02-20 ENCOUNTER — OFFICE VISIT (OUTPATIENT)
Dept: PULMONOLOGY | Facility: CLINIC | Age: 37
End: 2017-02-20
Payer: MEDICARE

## 2017-02-20 VITALS
DIASTOLIC BLOOD PRESSURE: 72 MMHG | WEIGHT: 228.7 LBS | OXYGEN SATURATION: 96 % | BODY MASS INDEX: 35.81 KG/M2 | SYSTOLIC BLOOD PRESSURE: 108 MMHG | HEART RATE: 73 BPM

## 2017-02-20 DIAGNOSIS — R06.00 DYSPNEA, UNSPECIFIED TYPE: Primary | ICD-10-CM

## 2017-02-20 PROCEDURE — 99214 OFFICE O/P EST MOD 30 MIN: CPT | Performed by: INTERNAL MEDICINE

## 2017-02-20 NOTE — NURSING NOTE
"Humberto Estrella's goals for this visit include:   Chief Complaint   Patient presents with     Consult       He requests these members of his care team be copied on today's visit information: Yes    PCP: Carla Meyer    Referring Provider:  Tavia Roy MD  [facility]  [address] [place] [address]    Chief Complaint   Patient presents with     Consult       Initial /72 (BP Location: Left arm, Patient Position: Chair, Cuff Size: Adult Large)  Pulse 73  Wt 103.7 kg (228 lb 11.2 oz)  SpO2 96%  BMI 35.81 kg/m2 Estimated body mass index is 35.81 kg/(m^2) as calculated from the following:    Height as of 1/25/17: 1.702 m (5' 7.01\").    Weight as of this encounter: 103.7 kg (228 lb 11.2 oz).  Medication Reconciliation: complete    "

## 2017-02-20 NOTE — MR AVS SNAPSHOT
After Visit Summary   2/20/2017    Humberto Estrella    MRN: [id number]           Patient Information     Date Of Birth          1980        Visit Information        Provider Department      2/20/2017 1:00 PM Stanley Vega MD [facility]        Today's Diagnoses     Dyspnea, unspecified type    -  1       Follow-ups after your visit        Your next 10 appointments already scheduled     Feb 22, 2017  1:00 PM CST   Ech Complete with [id number] [facility] ([facility])    [address]  [facility] [id number] [phone]           1.  Please bring or wear a comfortable two-piece outfit. 2.  You may eat, drink and take your normal medicines. 3.  For any questions that cannot be answered, please contact the ordering physician            Apr 04, 2017  2:30 PM CDT   Return Sleep Patient with DYLON Burger   [facility] ([facility])    [address]  [facility] [id number] [phone]              Future tests that were ordered for you today     Open Future Orders        Priority Expected Expires Ordered    Echocardiogram Complete Routine  2/20/2018 2/20/2017            Who to contact     If you have questions or need follow up information about today's clinic visit or your schedule please contact [facility] directly at [phone].  Normal or non-critical lab and imaging results will be communicated to you by MyChart, letter or phone within 4 business days after the clinic has received the results. If you do not hear from us within 7 days, please contact the clinic through MyChart or phone. If you have a critical or abnormal lab result, we will notify you by phone as soon as possible.  Submit refill requests through [employer] or call your pharmacy and they will forward the refill request to us. Please allow 3 business days for your refill to be completed.           Additional Information About Your Visit        OM Latam Information     OM Latam is an electronic gateway that provides easy, online access to your medical records. With OM Latam, you can request a clinic appointment, read your test results, renew a prescription or communicate with your care team.     To sign up for OM Latam visit the website at [email or web address]   You will be asked to enter the access code listed below, as well as some personal information. Please follow the directions to create your username and password.     Your access code is: [email or web address]  Expires: 3/6/2017  4:03 PM     Your access code will  in 90 days. If you need help or a new code, please contact your [facility] or call [phone] for assistance.        Care EveryWhere ID     This is your Care EveryWhere ID. This could be used by other organizations to access your [facility] medical records  [id number]        Your Vitals Were     Pulse Pulse Oximetry BMI (Body Mass Index)             73 96% 35.81 kg/m2          Blood Pressure from Last 3 Encounters:   17 108/72   17 113/68   17 113/75    Weight from Last 3 Encounters:   17 103.7 kg (228 lb 11.2 oz)   17 102.5 kg (226 lb)   17 102.5 kg (226 lb)                 Today's Medication Changes          These changes are accurate as of: 17  1:32 PM.  If you have any questions, ask your nurse or doctor.               These medicines have changed or have updated prescriptions.        Dose/Directions    triamcinolone 0.1 % cream   Commonly known as:  KENALOG   This may have changed:    - when to take this  - reasons to take this  - additional instructions   Used for:  Eczema        Apply  topically 3 times daily. Apply sparingly to affected area.   Quantity:  30 g   Refills:  1                Primary Care Provider Office Phone # Fax #    CarlaGM Cunningham [phone]       [facility]  [address] [facility] [address]        Thank you!     Thank you for choosing [facility]  for your care. Our goal is always to provide you with excellent care. Hearing back from our patients is one way we can continue to improve our services. Please take a few minutes to complete the written survey that you may receive in the mail after your visit with us. Thank you!             Your Updated Medication List - Protect others around you: Learn how to safely use, store and throw away your medicines at www.disposemymeds.org.          This list is accurate as of: 2/20/17  1:32 PM.  Always use your most recent med list.                   Brand Name Dispense Instructions for use    albuterol 108 (90 BASE) MCG/ACT Inhaler    albuterol    1 Inhaler    Inhale 2 puffs into the lungs every 6 hours as needed for shortness of breath / dyspnea or wheezing or cough       alum & mag hydroxide-simethicone 200-200-20 MG/5ML Susp suspension    MYLANTA/MAALOX     Take 30 mLs by mouth every 4 hours as needed for indigestion (2 tsp for upset stomach)       divalproex 500 MG 24 hr tablet    DEPAKOTE ER     Take 1,500 mg by mouth every morning       esomeprazole 40 MG CR capsule    nexIUM    30 capsule    Take 1 capsule (40 mg) by mouth every morning (before breakfast) Take 30-60 minutes before a eating.       fluticasone 50 MCG/ACT spray    FLONASE     Spray 1-2 sprays into both nostrils daily       guaiFENesin-codeine 100-10 MG/5ML Soln solution    ROBITUSSIN AC    120 mL    Take 10 mLs by mouth every 4 hours as needed for cough       ibuprofen 600 MG tablet    ADVIL/MOTRIN    60 tablet    Take 1 tablet (600 mg) by mouth every 6 hours as needed for moderate pain       levocetirizine 5 MG tablet    XYZAL    31 tablet    Take 1 tablet (5 mg) by mouth every evening       levothyroxine 50 MCG tablet    SYNTHROID/LEVOTHROID    90 tablet    Take 1 tablet (50 mcg) by mouth daily       MILK OF MAGNESIA 400 MG/5ML suspension    Generic drug:  magnesium hydroxide     360 mL    Take 30-60 mLs by mouth daily as needed for constipation or heartburn (If No Bowel Movement in 2 days.)       OLANZapine 2.5 MG tablet    zyPREXA    30 tablet    Take 5 mg by mouth 3 times daily as needed       order for DME      Equipment being ordered: CPAP AIRSENSE 10 11 CM H20 AIRFIT F20 MEDIUM SN# [id number]  DN# [id number]       Phenylephrine-APAP-Guaifenesin 5-325-200 MG Tabs     168 tablet    Take 2 caplets every 4 hours as needed for decongestant       risperDAL 1 MG tablet   Generic drug:  risperiDONE     60 tablet    Take 1 tablet in the morning and 2 tablets at 5 pm       triamcinolone 0.1 % cream    KENALOG    30 g    Apply  topically 3 times daily. Apply sparingly to affected area.       TYLENOL 325 MG tablet   Generic drug:  acetaminophen     100 tablet    Take 1-2 tablets (325-650 mg) by mouth every 4 hours as needed       UNABLE TO FIND      MEDICATION NAME: pseudefed PE PRN not to exceed 10 capsules in 24 hours       venlafaxine 37.5 MG 24 hr capsule    EFFEXOR-XR     Take 37.5 mg by mouth daily 3 caps daily to equal 112.5mg

## 2017-02-21 ENCOUNTER — DOCUMENTATION ONLY (OUTPATIENT)
Dept: SLEEP MEDICINE | Facility: CLINIC | Age: 37
End: 2017-02-21

## 2017-02-21 NOTE — PROGRESS NOTES
14 DAY STM VISIT    Subjective measures:  Spoke with Janey from pt's group home and she states that things are going pretty well.  He is benefiting from therapy.  He is having a skin reaction from the mask and they've ordered mask liners for him to use.  If those don't work, they will let us know and we can try a different mask.    Assessment: Pt meeting objective benchmarks.  Patient meeting subjective benchmarks.   Action plan: Pt to have 30 day STM visit.   Device settings:    CPAP: [id number]      Objective measures: 14 day rolling measure      Compliance   (Goal >70%)  --% compliance greater than four hours rolling average 14 days: 100 %      Leak  (Goal < 24 lpm)  --95% OF Leak in litres Rolling Average 14 Days: 13.8 lpm last data upload         AHI  (Goal < 5)  --AHI Rolling Average 14 Day: 5.86  last data upload         Usage  (Goal >240)  --Time mask on face 14 day average: 691 min

## 2017-02-21 NOTE — PROGRESS NOTES
REFERRING PROVIDER:  Carla Meyer NP.      REASON FOR CONSULTATION:  Shortness of breath.      HISTORY OF PRESENT ILLNESS:  Humberto Estrella is a 36-year-old gentleman with trisomy 21 accompanied by group home representative today.  He has dyspnea on exertion since 2016.  He also describes a choking sensation at night and apneas.  He did have an overnight sleep study recently revealing AHI of 26 (total) and supine AHI of 62 events per hour.  He was also titrated for CPAP same night split-night study.  He has been using a full facemask since then with good response.  His recent CT scan of the chest revealed a 3 mm pulmonary nodule as well as Bochdalek hernia on the right side.  A 6-minute walk test revealed decline in pulse oximetry from 97% to 93%, but no desaturation.  He has normal heart problems in the past.  He does not have any other associated symptoms with his shortness of breath such as chest pain, palpitations.  He has a 30 pounds weight gain within the last year.  He has been seen by a nutritionist recently, but he did not want to follow up.  He is trying to exercise with treadmill.        SOCIAL HISTORY:  Quit smoking in 1998.      ALLERGIES:  No known medical allergies.      PAST SURGICAL HISTORY:  Intraocular lens implant.      FAMILY HISTORY:  Father with MI and  at age of 60s.      PAST MEDICAL HISTORY:  Trisomy 21, PTSD, obesity, hearing loss, myopia, headache, intermittent explosive disorder, subclinical hypothyroidism.      CURRENT MEDICATIONS:  levothyroxine, albuterol, Depakote, Effexor, Flonase, magnesium, risperidone.        IMMUNIZATIONS:  Influenza 2016.      REVIEW OF SYSTEMS:  Unremarkable except as mentioned in HPI.      PHYSICAL EXAMINATION:   GENERAL:  Alert, awake, oriented, not in distress.   VITAL SIGNS:  Weight 228 pounds, blood pressure 108/72, pulse 73, pulse ox 96% on room air at rest.   HEENT:  Neck supple, symmetrical, Mallampati score of 4.   LUNGS:  Clear to  auscultation.   CARDIOVASCULAR:  Normal S1, S2, no murmur.   ABDOMEN:  Bowel sounds present, obese.   EXTREMITIES:  No clubbing, cyanosis or edema.   NEUROLOGIC:  Alert, awake, oriented x3.   PSYCHIATRIC:  Mood and affect are appropriate during this clinic visit.      IMPRESSION AND PLAN:   1.  Severe obstructive sleep apnea (supine) well treated with full face mask and CPAP which he has been using.  I emphasized the importance of using CPAP night and daytime whenever he sleeps.   2.  Dyspnea on exertion, no clinically evident pulmonary disease except for restrictive lung disease due to his obesity which is mild, based on PFTs as well as right Bochdalek hernia which is small and would not cause any dyspnea.  We also discussed possible other etiologies of dyspnea on exertion such as heart problems which I will order an echocardiogram, but probably the most likely contributor in his case is recent weight gain (30 pounds within 1 year).  I indicated to him that weight loss will be very important in his care, especially as he has moderate to severe sleep apnea.  I will not see him on a regular basis, but he should followed by his primary care provider after echocardiogram is done.      Thank you for this consultation.         Torey ESCUDERO MD             D: 2017 13:30   T: 2017 08:21   MT:       Name:     NAT ELISE   MRN:      [id number]        Account:      [id number]   :      1980           Visit Date:   2017      Document: [id number]       cc: Carla Meyer NP

## 2017-02-22 ENCOUNTER — HOSPITAL ENCOUNTER (OUTPATIENT)
Dept: CARDIOLOGY | Facility: CLINIC | Age: 37
Discharge: HOME | End: 2017-02-22
Attending: INTERNAL MEDICINE | Admitting: INTERNAL MEDICINE
Payer: MEDICARE

## 2017-02-22 DIAGNOSIS — R06.00 DYSPNEA, UNSPECIFIED TYPE: ICD-10-CM

## 2017-02-22 PROCEDURE — 93306 TTE W/DOPPLER COMPLETE: CPT | Mod: 26 | Performed by: INTERNAL MEDICINE

## 2017-02-22 PROCEDURE — 93306 TTE W/DOPPLER COMPLETE: CPT

## 2017-02-28 ENCOUNTER — OFFICE VISIT (OUTPATIENT)
Dept: FAMILY MEDICINE | Facility: CLINIC | Age: 37
End: 2017-02-28
Payer: MEDICARE

## 2017-02-28 ENCOUNTER — TELEPHONE (OUTPATIENT)
Dept: FAMILY MEDICINE | Facility: CLINIC | Age: 37
End: 2017-02-28

## 2017-02-28 VITALS
HEART RATE: 77 BPM | BODY MASS INDEX: 36.17 KG/M2 | DIASTOLIC BLOOD PRESSURE: 72 MMHG | TEMPERATURE: 97.1 F | SYSTOLIC BLOOD PRESSURE: 120 MMHG | WEIGHT: 231 LBS

## 2017-02-28 DIAGNOSIS — R06.02 SOB (SHORTNESS OF BREATH) ON EXERTION: ICD-10-CM

## 2017-02-28 DIAGNOSIS — E66.09 OBESITY DUE TO EXCESS CALORIES, UNSPECIFIED OBESITY SEVERITY: Primary | Chronic | ICD-10-CM

## 2017-02-28 DIAGNOSIS — G47.33 OSA (OBSTRUCTIVE SLEEP APNEA): ICD-10-CM

## 2017-02-28 PROCEDURE — 99214 OFFICE O/P EST MOD 30 MIN: CPT | Performed by: NURSE PRACTITIONER

## 2017-02-28 NOTE — TELEPHONE ENCOUNTER
I called the group home and they have a standing order but he is not needing it currently. They checked with another staff, both didn't know why it was requested. [facility].  Kelsea Duncan RN

## 2017-02-28 NOTE — MR AVS SNAPSHOT
After Visit Summary   2/28/2017    Humberto Estrella    MRN: [id number]           Patient Information     Date Of Birth          1980        Visit Information        Provider Department      2/28/2017 3:40 PM Carla Meyer APRN CNP [facility]        Care Instructions          Thank you for choosing [facility].  You may be receiving a survey in the mail from Tan Sterling regarding your visit today.  Please take a few minutes to complete and return the survey to let us know how we are doing.      If you have questions or concerns, please contact us via [employer] or you can contact your care team at [phone].    Our Clinic hours are:  Monday 6:40 am  to 7:00 pm  Tuesday -Friday 6:40 am to 5:00 pm    The [place] outpatient lab hours are:  Monday - Friday 6:10 am to 4:45 pm  Saturdays 7:00 am to 11:00 am  Appointments are required, call [phone]    If you have clinical questions after hours or would like to schedule an appointment,  call the clinic at [phone].        Follow-ups after your visit        Your next 10 appointments already scheduled     Apr 04, 2017  2:30 PM CDT   [facility] DYLON Burger   [facility]    [address] [place] [address] [id number] [phone]              Who to contact     If you have questions or need follow up information about today's clinic visit or your schedule please contact [facility] directly at [phone].  Normal or non-critical lab and imaging results will be communicated to you by MyChart, letter or phone within 4 business days after the clinic has received the results. If you do not hear from us within 7 days, please contact the clinic through Timelyt or phone. If you have a critical or abnormal lab result, we will notify you by phone as soon as possible.  Submit refill requests through [employer] or call your pharmacy and they will forward the  "refill request to us. Please allow 3 business days for your refill to be completed.          Additional Information About Your Visit        [employer] Information     [facility] lets you send messages to your doctor, view your test results, renew your prescriptions, schedule appointments and more. To sign up, go to [email or web address] . Click on \"Log in\" on the left side of the screen, which will take you to the Welcome page. Then click on \"Sign up Now\" on the right side of the page.     You will be asked to enter the access code listed below, as well as some personal information. Please follow the directions to create your username and password.     Your access code is: [email or web address]  Expires: 3/6/2017  4:03 PM     Your access code will  in 90 days. If you need help or a new code, please call your [place] clinic or [phone].        Care EveryWhere ID     This is your Care EveryWhere ID. This could be used by other organizations to access your [place] medical records  [id number]        Your Vitals Were     Pulse Temperature BMI (Body Mass Index)             77 97.1  F (36.2  C) (Tympanic) 36.17 kg/m2          Blood Pressure from Last 3 Encounters:   17 120/72   17 108/72   17 113/68    Weight from Last 3 Encounters:   17 231 lb (104.8 kg)   17 228 lb 11.2 oz (103.7 kg)   17 226 lb (102.5 kg)              Today, you had the following     No orders found for display         Today's Medication Changes          These changes are accurate as of: 17  4:18 PM.  If you have any questions, ask your nurse or doctor.               These medicines have changed or have updated prescriptions.        Dose/Directions    triamcinolone 0.1 % cream   Commonly known as:  KENALOG   This may have changed:    - when to take this  - reasons to take this  - additional instructions   Used for:  Eczema        Apply  topically 3 times daily. Apply sparingly to affected area.   Quantity:  30 g "   Refills:  1                Primary Care Provider Office Phone # Fax #    GM Bradshaw [facility] [phone]       [facility] [address] [facility] [address]        Thank you!     Thank you for choosing [facility]  for your care. Our goal is always to provide you with excellent care. Hearing back from our patients is one way we can continue to improve our services. Please take a few minutes to complete the written survey that you may receive in the mail after your visit with us. Thank you!             Your Updated Medication List - Protect others around you: Learn how to safely use, store and throw away your medicines at www.disposemymeds.org.          This list is accurate as of: 2/28/17  4:18 PM.  Always use your most recent med list.                   Brand Name Dispense Instructions for use    albuterol 108 (90 BASE) MCG/ACT Inhaler    albuterol    1 Inhaler    Inhale 2 puffs into the lungs every 6 hours as needed for shortness of breath / dyspnea or wheezing or cough       alum & mag hydroxide-simethicone 200-200-20 MG/5ML Susp suspension    MYLANTA/MAALOX     Take 30 mLs by mouth every 4 hours as needed for indigestion (2 tsp for upset stomach)       divalproex 500 MG 24 hr tablet    DEPAKOTE ER     Take 1,500 mg by mouth every morning       esomeprazole 40 MG CR capsule    nexIUM    30 capsule    Take 1 capsule (40 mg) by mouth every morning (before breakfast) Take 30-60 minutes before a eating.       fluticasone 50 MCG/ACT spray    FLONASE     Spray 1-2 sprays into both nostrils daily       guaiFENesin-codeine 100-10 MG/5ML Soln solution    ROBITUSSIN AC    120 mL    Take 10 mLs by mouth every 4 hours as needed for cough       ibuprofen 600 MG tablet    ADVIL/MOTRIN    60 tablet    Take 1 tablet (600 mg) by mouth every 6 hours as needed for moderate pain       levocetirizine 5 MG tablet    XYZAL    31 tablet    Take 1 tablet (5 mg) by mouth every evening        levothyroxine 50 MCG tablet    SYNTHROID/LEVOTHROID    90 tablet    Take 1 tablet (50 mcg) by mouth daily       MILK OF MAGNESIA 400 MG/5ML suspension   Generic drug:  magnesium hydroxide     360 mL    Take 30-60 mLs by mouth daily as needed for constipation or heartburn (If No Bowel Movement in 2 days.)       OLANZapine 2.5 MG tablet    zyPREXA    30 tablet    Take 5 mg by mouth 3 times daily as needed       order for DME      Equipment being ordered: CPAP AIRSENSE 10 11 CM H20 AIRFIT F20 MEDIUM # [id number]  DN# [id number]       Phenylephrine-APAP-Guaifenesin 5-325-200 MG Tabs     168 tablet    Take 2 caplets every 4 hours as needed for decongestant       risperDAL 1 MG tablet   Generic drug:  risperiDONE     60 tablet    Take 1 tablet in the morning and 2 tablets at 5 pm       triamcinolone 0.1 % cream    KENALOG    30 g    Apply  topically 3 times daily. Apply sparingly to affected area.       TYLENOL 325 MG tablet   Generic drug:  acetaminophen     100 tablet    Take 1-2 tablets (325-650 mg) by mouth every 4 hours as needed       UNABLE TO FIND      MEDICATION NAME: pseudefed PE PRN not to exceed 10 capsules in 24 hours       venlafaxine 37.5 MG 24 hr capsule    EFFEXOR-XR     Take 37.5 mg by mouth daily 3 caps daily to equal 112.5mg

## 2017-02-28 NOTE — PATIENT INSTRUCTIONS
Thank you for choosing [facility].  You may be receiving a survey in the mail from Tan Sterling regarding your visit today.  Please take a few minutes to complete and return the survey to let us know how we are doing.      If you have questions or concerns, please contact us via Vizimax or you can contact your care team at [phone].    Our Clinic hours are:  Monday 6:40 am  to 7:00 pm  Tuesday -Friday 6:40 am to 5:00 pm    The [place] outpatient lab hours are:  Monday - Friday 6:10 am to 4:45 pm  Saturdays 7:00 am to 11:00 am  Appointments are required, call [phone]    If you have clinical questions after hours or would like to schedule an appointment,  call the clinic at [phone].

## 2017-02-28 NOTE — NURSING NOTE
"Chief Complaint   Patient presents with     RECHECK     go over breathing test results, testing done on 2/2/17       Initial /72 (BP Location: Left arm, Patient Position: Chair, Cuff Size: Adult Large)  Pulse 77  Temp 97.1  F (36.2  C) (Tympanic)  Wt 231 lb (104.8 kg)  BMI 36.17 kg/m2 Estimated body mass index is 36.17 kg/(m^2) as calculated from the following:    Height as of 1/25/17: 5' 7.01\" (1.702 m).    Weight as of this encounter: 231 lb (104.8 kg).  Medication Reconciliation: complete    "

## 2017-02-28 NOTE — PROGRESS NOTES
SUBJECTIVE:                                                    Humberto Estrella is a 36 year old male who presents to clinic today for the following health issues: presents with care taker    Follow up after Respiratory Evaluation- patient was seen for evaluation of shortness of breath on exertion. PFT's were reviewed.       Duration: Since September 2016, had Respiratory Evaluation done on 2/2/17    Description (location/character/radiation): shortness of breath    Intensity:  Depends on the environment and activity    Accompanying signs and symptoms: no    History (similar episodes/previous evaluation): None    Precipitating or alleviating factors: strong fragrance and environment, fresh air and air purifier help symptoms      Therapies tried and outcome: fresh air and air purifier     VIDAL; newly diagnosed- feels like CPAP is working well.       -------------------------------------    Problem list and histories reviewed & adjusted, as indicated.  Additional history: as documented    Patient Active Problem List   Diagnosis     TRISOMY 21 (DOWN SYNDROME)     Hearing loss     MYOPIA     LATENT NYSTAGMUS     Headache     INTERMITT EXPLOSIVE DIS     post traumatic stress disorder     CARDIOVASCULAR SCREENING; LDL GOAL LESS THAN 160     Cortical, lamellar, or zonular cataract, nonsenile     Nonallergic rhinitis     Obesity due to excess calories, unspecified obesity severity     Subclinical hypothyroidism     VIDAL (obstructive sleep apnea)-AHI 26     Past Surgical History   Procedure Laterality Date     Pe tubes       Left     Phacoemulsification with standard intraocular lens implant  10/3/2013     Procedure: PHACOEMULSIFICATION WITH STANDARD INTRAOCULAR LENS IMPLANT;  Left Kelman Phacoemulsification with Intraocular Lens Implant;  Surgeon: Luis Barajas MD;  Location: [facility]     Phacoemulsification with standard intraocular lens implant  5/1/2014     Procedure: PHACOEMULSIFICATION WITH STANDARD INTRAOCULAR LENS  IMPLANT;  Surgeon: Luis Barajas MD;  Location: [facility] OR       Social History   Substance Use Topics     Smoking status: Former Smoker     Quit date: 8/16/1998     Smokeless tobacco: Never Used     Alcohol use No     Family History   Problem Relation Age of Onset     Unknown/Adopted Mother            Reviewed and updated as needed this visit by clinical staff  Tobacco  Allergies  Med Hx  Surg Hx  Fam Hx  Soc Hx      Reviewed and updated as needed this visit by Provider         ROS:  Constitutional, HEENT, cardiovascular, pulmonary, GI, , musculoskeletal, neuro, skin, endocrine and psych systems are negative, except as otherwise noted.    OBJECTIVE:                                                    /72 (BP Location: Left arm, Patient Position: Chair, Cuff Size: Adult Large)  Pulse 77  Temp 97.1  F (36.2  C) (Tympanic)  Wt 231 lb (104.8 kg)  BMI 36.17 kg/m2  Body mass index is 36.17 kg/(m^2).  GENERAL: alert, no distress and over weight  NECK: no adenopathy, no asymmetry, masses, or scars and thyroid normal to palpation  RESP: lungs clear to auscultation - no rales, rhonchi or wheezes  CV: regular rate and rhythm, normal S1 S2, no S3 or S4, no murmur, click or rub, no peripheral edema  MS: no gross musculoskeletal defects noted, no edema    Diagnostic Test Results:  none      ASSESSMENT/PLAN:                                                        1. VIDAL (obstructive sleep apnea)-AHI 26  Follows with sleep study clinic- tolerating CPAP    2. Obesity due to excess calories, unspecified obesity severity  Recommended 15-30 min exercise 4-5 days week  Discussed healthy eating    3. SOB (shortness of breath) on exertion  Reviewed echo with patient and caregiver- normal  Pulmonary consult unremarkable  PFT showed some restrictive disease likely caused by obesity.   Avoid strong perfumes as this makes your symptoms worse          GM Bradshaw [facility]

## 2017-02-28 NOTE — TELEPHONE ENCOUNTER
Reason for Call:  Other orders    Detailed comments: [facility] is asking for orders to be on file for the group home where this patient lives so that they can deliver his medication. Sudafed 2 tabs every 4 hours as needed not to exceed 10 tabs in 24 hours.    Phone Number Madeline can be reached at: Other phone number:  [phone]  and fax is [phone]     Best Time: any    Can we leave a detailed message on this number? YES    Call taken on 2/28/2017 at 2:47 PM by Lidia Ernandez

## 2017-03-03 ENCOUNTER — TELEPHONE (OUTPATIENT)
Dept: FAMILY MEDICINE | Facility: CLINIC | Age: 37
End: 2017-03-03

## 2017-03-03 DIAGNOSIS — J30.1 ALLERGIC RHINITIS DUE TO POLLEN, UNSPECIFIED RHINITIS SEASONALITY: Primary | ICD-10-CM

## 2017-03-04 NOTE — TELEPHONE ENCOUNTER
Sukhdev from [facility] called asking for Sudafed for pt?  Need an order since pt is a group home pt.  Mikki Sloan RN

## 2017-03-06 RX ORDER — PSEUDOEPHEDRINE HCL 120 MG/1
120 TABLET, FILM COATED, EXTENDED RELEASE ORAL PRN
Qty: 28 TABLET | Refills: 0 | Status: SHIPPED | OUTPATIENT
Start: 2017-03-06 | End: 2018-03-14

## 2017-03-06 NOTE — TELEPHONE ENCOUNTER
[facility] pharmacy called in refill of sudafed for patient - his last RX  (according to chart was back in ).  Spoke with group home - he uses PRN for congestion - uses very rarely, but they need to have a current Rx and med on file.  His current sig group home has is - take 2 capsules every 4 hours PRN not to exceed 10 capsules in 24 hours.  Med (this is his old RX from 2012 this sig is different than above) and pharm ready.    Routing request to provider.    Riya MCCLOUD RN

## 2017-03-09 ENCOUNTER — DOCUMENTATION ONLY (OUTPATIENT)
Dept: SLEEP MEDICINE | Facility: CLINIC | Age: 37
End: 2017-03-09

## 2017-03-09 NOTE — PROGRESS NOTES
30 DAY STM VISIT    Subjective measures:  Spoke with caregiver at pt's group home and she states he's doing well and looks much better and is making it to work every day now.  She doesn't believe he has any issues or concerns.     Assessment: Pt meeting objective benchmarks.  Patient meeting subjective benchmarks.   Action plan: Pt to have 6 month STM visit  Patient has a follow up visit with DYLON Figueroa on 4/4/17.   Device settings:   11cm H20    Objective measures: 14 day rolling measures     Compliance   (Goal >70%)  % compliance greater than four hours rolling average 14 days: 100 %      Leak   (Goal < 24 lpm) 95% OF Leak in litres Rolling Average 14 Days: 26.8 lpm  last data upload      AHI  (Goal < 5)  AHI Rolling Average 14 Day: 4.31 last data upload        Usage  (Goal >240)  Time mask on face 14 day average: 671 min

## 2017-04-03 ENCOUNTER — OFFICE VISIT (OUTPATIENT)
Dept: FAMILY MEDICINE | Facility: CLINIC | Age: 37
End: 2017-04-03
Payer: MEDICARE

## 2017-04-03 VITALS
TEMPERATURE: 97.9 F | DIASTOLIC BLOOD PRESSURE: 67 MMHG | WEIGHT: 229 LBS | HEART RATE: 75 BPM | SYSTOLIC BLOOD PRESSURE: 125 MMHG | BODY MASS INDEX: 35.86 KG/M2

## 2017-04-03 DIAGNOSIS — R19.7 DIARRHEA, UNSPECIFIED TYPE: Primary | ICD-10-CM

## 2017-04-03 DIAGNOSIS — L72.3 SEBACEOUS CYST: ICD-10-CM

## 2017-04-03 DIAGNOSIS — H04.123 DRY EYES: ICD-10-CM

## 2017-04-03 DIAGNOSIS — L85.3 DRY SKIN: ICD-10-CM

## 2017-04-03 PROCEDURE — 99214 OFFICE O/P EST MOD 30 MIN: CPT | Performed by: NURSE PRACTITIONER

## 2017-04-03 NOTE — NURSING NOTE
"Initial /67 (BP Location: Left arm, Patient Position: Chair, Cuff Size: Adult Large)  Pulse 75  Temp 97.9  F (36.6  C) (Tympanic)  Wt 229 lb (103.9 kg)  BMI 35.86 kg/m2 Estimated body mass index is 35.86 kg/(m^2) as calculated from the following:    Height as of 1/25/17: 5' 7.01\" (1.702 m).    Weight as of this encounter: 229 lb (103.9 kg). .    Malia Arndt    "

## 2017-04-03 NOTE — PROGRESS NOTES
SUBJECTIVE:                                                    Humberto Estrella is a 36 year old male who presents to clinic today for the following health issues:      C/O painful lump on the right leg for 2 days- working out at gym- shorts rub on legs.       Mold spores in home being removed/cleaned up. Caretaker wanting to know if safe for patient to be staying at that home during cleanup. He does not have a history of asthma. He is not having any increase in shortness of breath. He has dry eyes and his eyes have been watering more.     C/o having stool leakage with flatulence. Has a soft BM daily.     Problem list and histories reviewed & adjusted, as indicated.  Additional history: as documented    Patient Active Problem List   Diagnosis     TRISOMY 21 (DOWN SYNDROME)     Hearing loss     MYOPIA     LATENT NYSTAGMUS     Headache     INTERMITT EXPLOSIVE DIS     post traumatic stress disorder     CARDIOVASCULAR SCREENING; LDL GOAL LESS THAN 160     Cortical, lamellar, or zonular cataract, nonsenile     Nonallergic rhinitis     Obesity due to excess calories, unspecified obesity severity     Subclinical hypothyroidism     VIDAL (obstructive sleep apnea)-AHI 26     Past Surgical History:   Procedure Laterality Date     PE TUBES      Left     PHACOEMULSIFICATION WITH STANDARD INTRAOCULAR LENS IMPLANT  10/3/2013    Procedure: PHACOEMULSIFICATION WITH STANDARD INTRAOCULAR LENS IMPLANT;  Left Kelman Phacoemulsification with Intraocular Lens Implant;  Surgeon: Luis Barajas MD;  Location: [facility] OR     PHACOEMULSIFICATION WITH STANDARD INTRAOCULAR LENS IMPLANT  5/1/2014    Procedure: PHACOEMULSIFICATION WITH STANDARD INTRAOCULAR LENS IMPLANT;  Surgeon: Luis Barajas MD;  Location: [facility] OR       Social History   Substance Use Topics     Smoking status: Former Smoker     Quit date: 8/16/1998     Smokeless tobacco: Never Used     Alcohol use No     Family History   Problem Relation Age of Onset     Unknown/Adopted  Mother            Reviewed and updated as needed this visit by clinical staff       Reviewed and updated as needed this visit by Provider         ROS:  Constitutional, HEENT, cardiovascular, pulmonary, GI, , musculoskeletal, neuro, skin, endocrine and psych systems are negative, except as otherwise noted.    OBJECTIVE:                                                    /67 (BP Location: Left arm, Patient Position: Chair, Cuff Size: Adult Large)  Pulse 75  Temp 97.9  F (36.6  C) (Tympanic)  Wt 229 lb (103.9 kg)  BMI 35.86 kg/m2  Body mass index is 35.86 kg/(m^2).  GENERAL: healthy, alert and no distress  EYES: tearful   NECK: no adenopathy, no asymmetry, masses, or scars and thyroid normal to palpation  RESP: lungs clear to auscultation - no rales, rhonchi or wheezes  CV: regular rate and rhythm, normal S1 S2, no S3 or S4, no murmur, click or rub, no peripheral edema and peripheral pulses strong  MS: no gross musculoskeletal defects noted, no edema  SKIN: small sebaceous cyst on inner right thigh  Excoriated skin on bilateral inner thighs    Diagnostic Test Results:  none      ASSESSMENT/PLAN:                                                      1. Diarrhea, unspecified type  Having bowel leakage with flatulence - recommend increasing fiber in diet to add bulk to diet  - psyllium (METAMUCIL MULTIHEALTH FIBER) 58.6 % POWD; Take 1 capful daily  Dispense: 1 Bottle; Refill: 3    2. Sebaceous cyst  Early start of cyst- recommend to apply warms soaks, wear loose fitting clothing. No concerns to do I&D or start antibiotics at this time    3. Dry skin  Apply Aquaphor/Eucerin cream in between legs    4. Dry eyes  Apply saline eye drops or artificial tears        GM Myrick [facility]

## 2017-04-03 NOTE — PATIENT INSTRUCTIONS
Thank you for choosing [facility].  You may be receiving a survey in the mail from Tan Sterling regarding your visit today.  Please take a few minutes to complete and return the survey to let us know how we are doing.      If you have questions or concerns, please contact us via Isowalk or you can contact your care team at [phone].    Our Clinic hours are:  Monday 6:40 am  to 7:00 pm  Tuesday -Friday 6:40 am to 5:00 pm    The [place] outpatient lab hours are:  Monday - Friday 6:10 am to 4:45 pm  Saturdays 7:00 am to 11:00 am  Appointments are required, call [phone]    If you have clinical questions after hours or would like to schedule an appointment,  call the clinic at [phone].  Sebaceous Cyst [No Infection]  A sebaceous cyst is a term that most commonly refers to 2 types of cysts:  epidermoid  (from skin), and  pilar  (from hair follicles). A few things about these cysts:    A cyst is a sac filled with material that is often cheesy, fatty, oily, or fibrous material. The material in them can be thick (like cottage cheese) or liquid.    They form slowly under the skin, and can be found on most parts of the body. They are most often found in hairier areas like the scalp, face, upper back, and genitals.    You can usually move them slightly if you try.    They can be smaller than a pea or as large as a couple of inches.    They are usually not painful, unless they become inflamed or infected.    The area around the cyst may smell bad, and if the cyst breaks open, the material inside it often smells bad as well.  Causes  Sebaceous cysts are often caused when skin (epidermal) cells move under the skin surface, or are covered over by it, but continue to multiply, like skin does normally. They then form a wall around themselves (cyst) and secrete normal skin fluids (keratin). This can happen for developmental reason, but is often from skin trauma.  Cysts are often found around hair follicles,  which in a sense are like cysts, but with openings, through which normal lubricating oils for your hair are excreted. The opening can become blocked or the site inflamed, causing a cyst. This often occurs when there is damage to the hair follicles by an abrasion or wound.  Symptoms  The following are symptoms of a sebaceous cyst:    Feeling a lump just beneath the skin.    It may or may not be painful.    The cyst may or may not smell bad.    The cyst may become inflamed or red.    The cyst may leak fluid or thick material.  Home care  Sebaceous cysts often go away without any treatment. If your sebaceous cyst doesn't, and is bothersome, it may be drained or removed. If the cyst drains on its own, it may return. Resist the temptation to squeeze, pop, stick a needle in it, or cut it open. This often leads to an infection and scarring. If it gets severely inflamed or infected, you should seek medical treatment. Be sure to clean the cyst area when bathing or showering. Watch for the signs of infection listed below.  Follow-up care  Follow up with your doctor or as advised by our staff.  When to seek medical care  Get prompt medical attention if any of the following occur:    Swelling, redness, or pain    Pus coming from the cyst    [id number] [employer]. [address] [place] [address], [place] [address]. All rights reserved. This information is not intended as a substitute for professional medical care. Always follow your healthcare professional's instructions.

## 2017-04-03 NOTE — MR AVS SNAPSHOT
After Visit Summary   4/3/2017    Humberto Estrella    MRN: [id number]           Patient Information     Date Of Birth          1980        Visit Information        Provider Department      4/3/2017 2:40 PM Carla Meyer APRN [facility]        Today's Diagnoses     Diarrhea, unspecified type    -  1      Care Instructions          Thank you for choosing [facility].  You may be receiving a survey in the mail from [employer] regarding your visit today.  Please take a few minutes to complete and return the survey to let us know how we are doing.      If you have questions or concerns, please contact us via [employer] or you can contact your care team at [phone].    Our Clinic hours are:  Monday 6:40 am  to 7:00 pm  Tuesday -Friday 6:40 am to 5:00 pm    The [place] outpatient lab hours are:  Monday - Friday 6:10 am to 4:45 pm  Saturdays 7:00 am to 11:00 am  Appointments are required, call [phone]    If you have clinical questions after hours or would like to schedule an appointment,  call the clinic at [phone].  Sebaceous Cyst [No Infection]  A sebaceous cyst is a term that most commonly refers to 2 types of cysts:  epidermoid  (from skin), and  pilar  (from hair follicles). A few things about these cysts:    A cyst is a sac filled with material that is often cheesy, fatty, oily, or fibrous material. The material in them can be thick (like cottage cheese) or liquid.    They form slowly under the skin, and can be found on most parts of the body. They are most often found in hairier areas like the scalp, face, upper back, and genitals.    You can usually move them slightly if you try.    They can be smaller than a pea or as large as a couple of inches.    They are usually not painful, unless they become inflamed or infected.    The area around the cyst may smell bad, and if the cyst breaks open, the material inside it often smells bad as well.  Causes  Sebaceous  cysts are often caused when skin (epidermal) cells move under the skin surface, or are covered over by it, but continue to multiply, like skin does normally. They then form a wall around themselves (cyst) and secrete normal skin fluids (keratin). This can happen for developmental reason, but is often from skin trauma.  Cysts are often found around hair follicles, which in a sense are like cysts, but with openings, through which normal lubricating oils for your hair are excreted. The opening can become blocked or the site inflamed, causing a cyst. This often occurs when there is damage to the hair follicles by an abrasion or wound.  Symptoms  The following are symptoms of a sebaceous cyst:    Feeling a lump just beneath the skin.    It may or may not be painful.    The cyst may or may not smell bad.    The cyst may become inflamed or red.    The cyst may leak fluid or thick material.  Home care  Sebaceous cysts often go away without any treatment. If your sebaceous cyst doesn't, and is bothersome, it may be drained or removed. If the cyst drains on its own, it may return. Resist the temptation to squeeze, pop, stick a needle in it, or cut it open. This often leads to an infection and scarring. If it gets severely inflamed or infected, you should seek medical treatment. Be sure to clean the cyst area when bathing or showering. Watch for the signs of infection listed below.  Follow-up care  Follow up with your doctor or as advised by our staff.  When to seek medical care  Get prompt medical attention if any of the following occur:    Swelling, redness, or pain    Pus coming from the cyst    6856-4664 [employer]. [address] [place] [address], [place] [address]. All rights reserved. This information is not intended as a substitute for professional medical care. Always follow your healthcare professional's instructions.              Follow-ups after your visit        Your next 10 appointments already scheduled   "   2017  2:30 PM CDT   Return Sleep Patient with DYLON Burger   [facility] ([facility])    [address]  [facility] [address] [phone]              Who to contact     If you have questions or need follow up information about today's clinic visit or your schedule please contact [facility] directly at [phone].  Normal or non-critical lab and imaging results will be communicated to you by [employer]hart, letter or phone within 4 business days after the clinic has received the results. If you do not hear from us within 7 days, please contact the clinic through [employer] or phone. If you have a critical or abnormal lab result, we will notify you by phone as soon as possible.  Submit refill requests through [employer] or call your pharmacy and they will forward the refill request to us. Please allow 3 business days for your refill to be completed.          Additional Information About Your Visit        [employer]le Information     [employer] lets you send messages to your doctor, view your test results, renew your prescriptions, schedule appointments and more. To sign up, go to [email or web address] . Click on \"Log in\" on the left side of the screen, which will take you to the Welcome page. Then click on \"Sign up Now\" on the right side of the page.     You will be asked to enter the access code listed below, as well as some personal information. Please follow the directions to create your username and password.     Your access code is: [email or web address]  Expires: 2017  3:08 PM     Your access code will  in 90 days. If you need help or a new code, please call your [facility] or [phone].        Care EveryWhere ID     This is your Care EveryWhere ID. This could be used by other organizations to access your [place] medical records  [id number]        Your Vitals Were     Pulse Temperature BMI (Body Mass Index)             75 97.9  F (36.6  C) " (Tympanic) 35.86 kg/m2          Blood Pressure from Last 3 Encounters:   04/03/17 125/67   02/28/17 120/72   02/20/17 108/72    Weight from Last 3 Encounters:   04/03/17 229 lb (103.9 kg)   02/28/17 231 lb (104.8 kg)   02/20/17 228 lb 11.2 oz (103.7 kg)              Today, you had the following     No orders found for display         Today's Medication Changes          These changes are accurate as of: 4/3/17  3:08 PM.  If you have any questions, ask your nurse or doctor.               Start taking these medicines.        Dose/Directions    psyllium 58.6 % Powd   Commonly known as:  METAMUCIL MULTIHEALTH FIBER   Used for:  Diarrhea, unspecified type        Take 1 capful daily   Quantity:  1 Bottle   Refills:  3         These medicines have changed or have updated prescriptions.        Dose/Directions    triamcinolone 0.1 % cream   Commonly known as:  KENALOG   This may have changed:    - when to take this  - reasons to take this  - additional instructions   Used for:  Eczema        Apply  topically 3 times daily. Apply sparingly to affected area.   Quantity:  30 g   Refills:  1            Where to get your medicines      These medications were sent to [facility] [address]  [address] [facility] [address]     Phone:  [phone]     psyllium 58.6 % Powd                Primary Care Provider Office Phone # Fax #    Carla GM Frazier [facility] [phone]       [facility] [address] [facility] [address]        Thank you!     Thank you for choosing [facility]  for your care. Our goal is always to provide you with excellent care. Hearing back from our patients is one way we can continue to improve our services. Please take a few minutes to complete the written survey that you may receive in the mail after your visit with us. Thank you!             Your Updated Medication List - Protect others around you: Learn how to safely  use, store and throw away your medicines at www.disposemymeds.org.          This list is accurate as of: 4/3/17  3:08 PM.  Always use your most recent med list.                   Brand Name Dispense Instructions for use    albuterol 108 (90 BASE) MCG/ACT Inhaler    albuterol    1 Inhaler    Inhale 2 puffs into the lungs every 6 hours as needed for shortness of breath / dyspnea or wheezing or cough       alum & mag hydroxide-simethicone 200-200-20 MG/5ML Susp suspension    MYLANTA/MAALOX     Take 30 mLs by mouth every 4 hours as needed for indigestion (2 tsp for upset stomach)       divalproex 500 MG 24 hr tablet    DEPAKOTE ER     Take 1,500 mg by mouth every morning       esomeprazole 40 MG CR capsule    nexIUM    30 capsule    Take 1 capsule (40 mg) by mouth every morning (before breakfast) Take 30-60 minutes before a eating.       fluticasone 50 MCG/ACT spray    FLONASE     Spray 1-2 sprays into both nostrils daily       guaiFENesin-codeine 100-10 MG/5ML Soln solution    ROBITUSSIN AC    120 mL    Take 10 mLs by mouth every 4 hours as needed for cough       ibuprofen 600 MG tablet    ADVIL/MOTRIN    60 tablet    Take 1 tablet (600 mg) by mouth every 6 hours as needed for moderate pain       levocetirizine 5 MG tablet    XYZAL    31 tablet    Take 1 tablet (5 mg) by mouth every evening       levothyroxine 50 MCG tablet    SYNTHROID/LEVOTHROID    90 tablet    Take 1 tablet (50 mcg) by mouth daily       MILK OF MAGNESIA 400 MG/5ML suspension   Generic drug:  magnesium hydroxide     360 mL    Take 30-60 mLs by mouth daily as needed for constipation or heartburn (If No Bowel Movement in 2 days.)       OLANZapine 2.5 MG tablet    zyPREXA    30 tablet    Take 5 mg by mouth 3 times daily as needed       order for DME      Equipment being ordered: CPAP AIRSENSE 10 11 CM H20 AIRFIT F20 MEDIUM SN# [id number]  DN# [id number]       Phenylephrine-APAP-Guaifenesin 5-325-200 MG Tabs     168 tablet    Take 2 caplets every 4 hours as  needed for decongestant       pseudoePHEDrine 120 MG 12 hr tablet    SUDAFED 12 HOUR    28 tablet    Take 1 tablet (120 mg) by mouth as needed       psyllium 58.6 % Powd    METAMUCIL MULTIHEALTH FIBER    1 Bottle    Take 1 capful daily       risperDAL 1 MG tablet   Generic drug:  risperiDONE     60 tablet    Take 1 tablet in the morning and 2 tablets at 5 pm       triamcinolone 0.1 % cream    KENALOG    30 g    Apply  topically 3 times daily. Apply sparingly to affected area.       TYLENOL 325 MG tablet   Generic drug:  acetaminophen     100 tablet    Take 1-2 tablets (325-650 mg) by mouth every 4 hours as needed       UNABLE TO FIND      MEDICATION NAME: pseudefed PE PRN not to exceed 10 capsules in 24 hours       venlafaxine 37.5 MG 24 hr capsule    EFFEXOR-XR     Take 37.5 mg by mouth daily 3 caps daily to equal 112.5mg

## 2017-04-04 ENCOUNTER — OFFICE VISIT (OUTPATIENT)
Dept: SLEEP MEDICINE | Facility: CLINIC | Age: 37
End: 2017-04-04
Payer: MEDICARE

## 2017-04-04 VITALS
HEART RATE: 72 BPM | OXYGEN SATURATION: 94 % | HEIGHT: 67 IN | BODY MASS INDEX: 36.1 KG/M2 | DIASTOLIC BLOOD PRESSURE: 73 MMHG | SYSTOLIC BLOOD PRESSURE: 109 MMHG | WEIGHT: 230 LBS

## 2017-04-04 DIAGNOSIS — G47.33 OBSTRUCTIVE SLEEP APNEA: Primary | ICD-10-CM

## 2017-04-04 PROCEDURE — 99214 OFFICE O/P EST MOD 30 MIN: CPT | Performed by: PHYSICIAN ASSISTANT

## 2017-04-04 NOTE — MR AVS SNAPSHOT
After Visit Summary   4/4/2017    Humberto Estrella    MRN: [id number]           Patient Information     Date Of Birth          1980        Visit Information        Provider Department      4/4/2017 2:30 PM Jonny Rossi PA [facility]        Today's Diagnoses     Obstructive sleep apnea    -  1      Care Instructions      Your BMI is Body mass index is 36.02 kg/(m^2).  Weight management is a personal decision.  If you are interested in exploring weight loss strategies, the following discussion covers the approaches that may be successful. Body mass index (BMI) is one way to tell whether you are at a healthy weight, overweight, or obese. It measures your weight in relation to your height.  A BMI of 18.5 to 24.9 is in the healthy range. A person with a BMI of 25 to 29.9 is considered overweight, and someone with a BMI of 30 or greater is considered obese. More than two-thirds of American adults are considered overweight or obese.  Being overweight or obese increases the risk for further weight gain. Excess weight may lead to heart disease and diabetes.  Creating and following plans for healthy eating and physical activity may help you improve your health.  Weight control is part of healthy lifestyle and includes exercise, emotional health, and healthy eating habits. Careful eating habits lifelong are the mainstay of weight control. Though there are significant health benefits from weight loss, long-term weight loss with diet alone may be very difficult to achieve- studies show long-term success with dietary management in less than 10% of people. Attaining a healthy weight may be especially difficult to achieve in those with severe obesity. In some cases, medications, devices and surgical management might be considered.  What can you do?  If you are overweight or obese and are interested in methods for weight loss, you should discuss this with your provider.     Consider reducing  daily calorie intake by 500 calories.     Keep a food journal.     Avoiding skipping meals, consider cutting portions instead.    Diet combined with exercise helps maintain muscle while optimizing fat loss. Strength training is particularly important for building and maintaining muscle mass. Exercise helps reduce stress, increase energy, and improves fitness. Increasing exercise without diet control, however, may not burn enough calories to loose weight.       Start walking three days a week 10-20 minutes at a time    Work towards walking thirty minutes five days a week     Eventually, increase the speed of your walking for 1-2 minutes at time    In addition, we recommend that you review healthy lifestyles and methods for weight loss available through the National Institutes of Health patient information sites:  http://win.niddk.nih.gov/publications/index.htm    And look into health and wellness programs that may be available through your health insurance provider, employer, local community center, or pricilla club.    Weight management plan: Patient was referred to their PCP to discuss a diet and exercise plan.          Follow-ups after your visit        Follow-up notes from your care team     Return in about 1 year (around 4/4/2018).      Your next 10 appointments already scheduled     Apr 05, 2017  4:40 PM CDT   New Visit with Timbo Delatorre MD   [facility] ([facility]    [address] [facility] [address] [phone]              Who to contact     If you have questions or need follow up information about today's clinic visit or your schedule please contact [facility] directly at [phone].  Normal or non-critical lab and imaging results will be communicated to you by MyChart, letter or phone within 4 business days after the clinic has received the results. If you do not hear from us within 7 days, please contact the clinic through MyChart or phone. If  "you have a critical or abnormal lab result, we will notify you by phone as soon as possible.  Submit refill requests through [employer] or call your pharmacy and they will forward the refill request to us. Please allow 3 business days for your refill to be completed.          Additional Information About Your Visit        [employer] Information     [employer] lets you send messages to your doctor, view your test results, renew your prescriptions, schedule appointments and more. To sign up, go to [email or web address] . Click on \"Log in\" on the left side of the screen, which will take you to the Welcome page. Then click on \"Sign up Now\" on the right side of the page.     You will be asked to enter the access code listed below, as well as some personal information. Please follow the directions to create your username and password.     Your access code is: [email or web address]  Expires: 2017  3:08 PM     Your access code will  in 90 days. If you need help or a new code, please call your [place] clinic or [phone].        Care EveryWhere ID     This is your Care EveryWhere ID. This could be used by other organizations to access your [place] medical records  [id number]        Your Vitals Were     Pulse Height Pulse Oximetry BMI (Body Mass Index)          72 1.702 m (5' 7\") 94% 36.02 kg/m2         Blood Pressure from Last 3 Encounters:   17 109/73   17 125/67   17 120/72    Weight from Last 3 Encounters:   17 104.3 kg (230 lb)   17 103.9 kg (229 lb)   17 104.8 kg (231 lb)              We Performed the Following     Comprehensive DME          Today's Medication Changes          These changes are accurate as of: 17  3:05 PM.  If you have any questions, ask your nurse or doctor.               These medicines have changed or have updated prescriptions.        Dose/Directions    triamcinolone 0.1 % cream   Commonly known as:  KENALOG   This may have changed:    - when to take this  - " reasons to take this  - additional instructions   Used for:  Eczema        Apply  topically 3 times daily. Apply sparingly to affected area.   Quantity:  30 g   Refills:  1                Primary Care Provider Office Phone # Fax #    GM Bradshaw KELSIE [phone]       [facility] [address] [facility] [address]        Thank you!     Thank you for choosing [facility]  for your care. Our goal is always to provide you with excellent care. Hearing back from our patients is one way we can continue to improve our services. Please take a few minutes to complete the written survey that you may receive in the mail after your visit with us. Thank you!             Your Updated Medication List - Protect others around you: Learn how to safely use, store and throw away your medicines at www.disposemymeds.org.          This list is accurate as of: 4/4/17  3:05 PM.  Always use your most recent med list.                   Brand Name Dispense Instructions for use    albuterol 108 (90 BASE) MCG/ACT Inhaler    albuterol    1 Inhaler    Inhale 2 puffs into the lungs every 6 hours as needed for shortness of breath / dyspnea or wheezing or cough       alum & mag hydroxide-simethicone 200-200-20 MG/5ML Susp suspension    MYLANTA/MAALOX     Take 30 mLs by mouth every 4 hours as needed for indigestion (2 tsp for upset stomach)       divalproex 500 MG 24 hr tablet    DEPAKOTE ER     Take 1,500 mg by mouth every morning       esomeprazole 40 MG CR capsule    nexIUM    30 capsule    Take 1 capsule (40 mg) by mouth every morning (before breakfast) Take 30-60 minutes before a eating.       fluticasone 50 MCG/ACT spray    FLONASE     Spray 1-2 sprays into both nostrils daily       guaiFENesin-codeine 100-10 MG/5ML Soln solution    ROBITUSSIN AC    120 mL    Take 10 mLs by mouth every 4 hours as needed for cough       ibuprofen 600 MG tablet    ADVIL/MOTRIN    60 tablet    Take 1 tablet (600  mg) by mouth every 6 hours as needed for moderate pain       levocetirizine 5 MG tablet    XYZAL    31 tablet    Take 1 tablet (5 mg) by mouth every evening       levothyroxine 50 MCG tablet    SYNTHROID/LEVOTHROID    90 tablet    Take 1 tablet (50 mcg) by mouth daily       MILK OF MAGNESIA 400 MG/5ML suspension   Generic drug:  magnesium hydroxide     360 mL    Take 30-60 mLs by mouth daily as needed for constipation or heartburn (If No Bowel Movement in 2 days.)       OLANZapine 2.5 MG tablet    zyPREXA    30 tablet    Take 5 mg by mouth 3 times daily as needed       order for DME      Equipment being ordered: CPAP AIRSENSE 10 11 CM H20 AIRFIT F20 MEDIUM SN# [id number]  DN# [id number]       Phenylephrine-APAP-Guaifenesin 5-325-200 MG Tabs     168 tablet    Take 2 caplets every 4 hours as needed for decongestant       pseudoePHEDrine 120 MG 12 hr tablet    SUDAFED 12 HOUR    28 tablet    Take 1 tablet (120 mg) by mouth as needed       psyllium 58.6 % Powd    METAMUCIL MULTIHEALTH FIBER    1 Bottle    Take 1 capful daily       risperDAL 1 MG tablet   Generic drug:  risperiDONE     60 tablet    Take 1 tablet in the morning and 2 tablets at 5 pm       triamcinolone 0.1 % cream    KENALOG    30 g    Apply  topically 3 times daily. Apply sparingly to affected area.       TYLENOL 325 MG tablet   Generic drug:  acetaminophen     100 tablet    Take 1-2 tablets (325-650 mg) by mouth every 4 hours as needed       UNABLE TO FIND      MEDICATION NAME: pseudefed PE PRN not to exceed 10 capsules in 24 hours       venlafaxine 37.5 MG 24 hr capsule    EFFEXOR-XR     Take 37.5 mg by mouth daily 3 caps daily to equal 112.5mg

## 2017-04-04 NOTE — PROGRESS NOTES
Obstructive Sleep Apnea- PAP Follow-Up Visit:    Chief Complaint   Patient presents with     Sleep Apnea     CPAP compliance follow up       Humberto Estrella comes in today for follow-up of their moderate sleep apnea, managed with CPAP. Initial concerns of loud snoring, non-refreshing sleep, excessive daytime sleepiness(ESS 13).   Sleep study date 1/17/2017(226#)-AHI 26, RDI 35, lowest oxygen saturation was 78%, CPAP 11 cm/H20.  Patient is here with group home representative.      Patient uses full-face mask.    Bedtime is typically 7 PM. Usually it takes about 5 minutes to fall asleep with the mask on. Wake time is typically 5 AM.  Patient is using PAP therapy 10-11 hours per night. The patient is usually getting 10-11 hours of sleep per night.    Patient does feel rested in the morning.    [place] Sleepiness Scale: 4/24    ResMed   CPAP 11 cmH2O download:  30 total days of use. 0 nonuse days. 100% days with >4 hours use.  Average use 11 hours 11 minutes per day. Median Leak 27.8 L/min. 95%ile Leak 93.3 L/min. AHI 4.4.     He reports daytime sleepiness has improved.     Past medical/surgical history, family history, social history, medications and allergies were reviewed.      Problem List:  Patient Active Problem List    Diagnosis Date Noted     VIDAL (obstructive sleep apnea)-AHI 26 01/25/2017     Priority: Medium     1/17/2017(226#)-AHI 26, RDI 35, lowest oxygen saturation was 78%, CPAP 11 cm/H20.        Subclinical hypothyroidism 12/12/2016     Priority: Medium     Obesity due to excess calories, unspecified obesity severity 06/13/2016     Priority: Medium     Nonallergic rhinitis      Priority: Medium     9/30/15 IgE tests all NEGATIVE for environmental allergens.        Cortical, lamellar, or zonular cataract, nonsenile 10/02/2013     Priority: Medium     CARDIOVASCULAR SCREENING; LDL GOAL LESS THAN 160 10/31/2010     Priority: Medium     TRISOMY 21 (DOWN SYNDROME) 06/21/2006     Priority: Medium     With mild  "mental retardation       Hearing loss 06/21/2006     Priority: Medium     Problem list name updated by automated process. Provider to review       MYOPIA 06/21/2006     Priority: Medium     LATENT NYSTAGMUS 06/21/2006     Priority: Medium     Headache 06/21/2006     Priority: Medium     Problem list name updated by automated process. Provider to review       INTERMITT EXPLOSIVE DIS 06/21/2006     Priority: Medium     post traumatic stress disorder 06/21/2006     Priority: Medium        /73  Pulse 72  Ht 1.702 m (5' 7\")  Wt 104.3 kg (230 lb)  SpO2 94%  BMI 36.02 kg/m2        Impression/Plan:    1. Moderate Sleep apnea.    Tolerating PAP well. Daytime symptoms are improved. Group home will assist him with putting on his mask to prevent high CPAP leaks.    Humberto Estrella will follow up in about 1 year, sooner if questions/concerns    Twenty-five minutes spent with patient, all of which were spent face-to-face counseling, consulting, coordinating plan of care.      Jonny Rossi PA-C      CC:  Carla Meyer  "

## 2017-04-04 NOTE — PATIENT INSTRUCTIONS

## 2017-04-04 NOTE — NURSING NOTE
"Chief Complaint   Patient presents with     Sleep Apnea     CPAP compliance follow up       Initial /73  Pulse 72  Ht 1.702 m (5' 7\")  Wt 104.3 kg (230 lb)  SpO2 94%  BMI 36.02 kg/m2 Estimated body mass index is 36.02 kg/(m^2) as calculated from the following:    Height as of this encounter: 1.702 m (5' 7\").    Weight as of this encounter: 104.3 kg (230 lb).  Medication Reconciliation: complete    "

## 2017-04-12 ENCOUNTER — OFFICE VISIT (OUTPATIENT)
Dept: ALLERGY | Facility: CLINIC | Age: 37
End: 2017-04-12
Payer: MEDICARE

## 2017-04-12 VITALS
DIASTOLIC BLOOD PRESSURE: 67 MMHG | BODY MASS INDEX: 40.26 KG/M2 | HEIGHT: 63 IN | WEIGHT: 227.2 LBS | HEART RATE: 91 BPM | OXYGEN SATURATION: 93 % | SYSTOLIC BLOOD PRESSURE: 120 MMHG

## 2017-04-12 DIAGNOSIS — Z77.120 MOLD EXPOSURE: ICD-10-CM

## 2017-04-12 DIAGNOSIS — J31.0 NONALLERGIC RHINITIS: Primary | ICD-10-CM

## 2017-04-12 PROCEDURE — 99213 OFFICE O/P EST LOW 20 MIN: CPT | Performed by: ALLERGY & IMMUNOLOGY

## 2017-04-12 RX ORDER — FLUTICASONE PROPIONATE 50 MCG
2 SPRAY, SUSPENSION (ML) NASAL DAILY
Qty: 1 BOTTLE | Refills: 11 | Status: SHIPPED | OUTPATIENT
Start: 2017-04-12 | End: 2019-02-14

## 2017-04-12 RX ORDER — ECHINACEA PURPUREA EXTRACT 125 MG
2 TABLET ORAL AT BEDTIME
Qty: 1 BOTTLE | Refills: 11 | Status: SHIPPED | OUTPATIENT
Start: 2017-04-12 | End: 2019-03-09

## 2017-04-12 NOTE — MR AVS SNAPSHOT
After Visit Summary   4/12/2017    Humberto Estrella    MRN: [id number]           Patient Information     Date Of Birth          1980        Visit Information        Provider Department      4/12/2017 5:00 PM Timbo Delatorre MD [facility]        Today's Diagnoses     Nonallergic rhinitis    -  1    Chronic rhinitis          Care Instructions    If you have any questions regarding your allergies, asthma, or what we discussed during your visit today please call the allergy clinic or contact us via Phizzlet.    [facility] ([place], [place]): [phone]    Humberto had complete allergy testing done on 9/30/2015 and testing was negative. He does not have evidence of seasonal or environmental allergies.    Symptoms of sneezing, runny nose, or stuffy nose are not always related to allergies. Many times irritants (smoke, perfumes, strong scents), changes in the weather, dust or other particles in the air, or other unknown triggers can cause similar symptoms.     Increase the flonase (fluticasone) nasal spray to 2 sprays in each nostril daily    Use the saline nasal spray, 2 sprays in each nostril at bedtime        Follow-ups after your visit        Who to contact     If you have questions or need follow up information about today's clinic visit or your schedule please contact [facility] directly at [phone].  Normal or non-critical lab and imaging results will be communicated to you by MyChart, letter or phone within 4 business days after the clinic has received the results. If you do not hear from us within 7 days, please contact the clinic through MyChart or phone. If you have a critical or abnormal lab result, we will notify you by phone as soon as possible.  Submit refill requests through [employer] or call your pharmacy and they will forward the refill request to us. Please allow 3 business days for your refill to be completed.          Additional Information About Your  "Visit        SkyWire Information     [employer]Wire lets you send messages to your doctor, view your test results, renew your prescriptions, schedule appointments and more. To sign up, go to [email or web address] . Click on \"Log in\" on the left side of the screen, which will take you to the Welcome page. Then click on \"Sign up Now\" on the right side of the page.     You will be asked to enter the access code listed below, as well as some personal information. Please follow the directions to create your username and password.     Your access code is: [email or web address]  Expires: 2017  3:08 PM     Your access code will  in 90 days. If you need help or a new code, please call your [facility] clinic or [phone].        Care EveryWhere ID     This is your Care EveryWhere ID. This could be used by other organizations to access your [facility] medical records  [id number]        Your Vitals Were     Pulse Height Pulse Oximetry BMI (Body Mass Index)          91 5' 3\" (1.6 m) 93% 40.25 kg/m2         Blood Pressure from Last 3 Encounters:   17 120/67   17 109/73   17 125/67    Weight from Last 3 Encounters:   17 227 lb 3.2 oz (103.1 kg)   17 230 lb (104.3 kg)   17 229 lb (103.9 kg)              Today, you had the following     No orders found for display         Today's Medication Changes          These changes are accurate as of: 17  5:47 PM.  If you have any questions, ask your nurse or doctor.               Start taking these medicines.        Dose/Directions    sodium chloride 0.65 % nasal spray   Commonly known as:  SALINE MIST   Used for:  Nonallergic rhinitis   Started by:  Timbo Delatorre MD        Dose:  2 spray   Spray 2 sprays into both nostrils At Bedtime   Quantity:  1 Bottle   Refills:  11         These medicines have changed or have updated prescriptions.        Dose/Directions    fluticasone 50 MCG/ACT spray   Commonly known as:  FLONASE   This may have changed:  how much " to take   Used for:  Nonallergic rhinitis   Changed by:  Timbo Delatorre MD        Dose:  2 spray   Spray 2 sprays into both nostrils daily   Quantity:  1 Bottle   Refills:  11            Where to get your medicines      These medications were sent to [facility]. - [place] - [address] [facility] [address] [facility] [address] [facility] [address]     Phone:  [phone]     fluticasone 50 MCG/ACT spray    sodium chloride 0.65 % nasal spray                Primary Care Provider Office Phone # Fax #    GM Bradshaw [phone]       [facility] [address] [facility] [address]        Thank you!     Thank you for choosing [facility]  for your care. Our goal is always to provide you with excellent care. Hearing back from our patients is one way we can continue to improve our services. Please take a few minutes to complete the written survey that you may receive in the mail after your visit with us. Thank you!             Your Updated Medication List - Protect others around you: Learn how to safely use, store and throw away your medicines at www.disposemymeds.org.          This list is accurate as of: 4/12/17  5:47 PM.  Always use your most recent med list.                   Brand Name Dispense Instructions for use    albuterol 108 (90 BASE) MCG/ACT Inhaler    albuterol    1 Inhaler    Inhale 2 puffs into the lungs every 6 hours as needed for shortness of breath / dyspnea or wheezing or cough       alum & mag hydroxide-simethicone 200-200-20 MG/5ML Susp suspension    MYLANTA/MAALOX     Take 30 mLs by mouth every 4 hours as needed for indigestion (2 tsp for upset stomach)       divalproex 500 MG 24 hr tablet    DEPAKOTE ER     Take 1,500 mg by mouth every morning       esomeprazole 40 MG CR capsule    nexIUM    30 capsule    Take 1 capsule (40 mg) by mouth every morning (before breakfast) Take 30-60 minutes before a eating.       fluticasone 50 MCG/ACT spray     FLONASE    1 Bottle    Spray 2 sprays into both nostrils daily       guaiFENesin-codeine 100-10 MG/5ML Soln solution    ROBITUSSIN AC    120 mL    Take 2 tsp. by mouth every 4 hours as needed for cough Reported on 4/12/2017       ibuprofen 600 MG tablet    ADVIL/MOTRIN    60 tablet    Take 1 tablet (600 mg) by mouth every 6 hours as needed for moderate pain       levocetirizine 5 MG tablet    XYZAL    31 tablet    Take 1 tablet (5 mg) by mouth every evening       levothyroxine 50 MCG tablet    SYNTHROID/LEVOTHROID    90 tablet    Take 1 tablet (50 mcg) by mouth daily       MILK OF MAGNESIA 400 MG/5ML suspension   Generic drug:  magnesium hydroxide     360 mL    Take 30-60 mLs by mouth daily as needed for constipation or heartburn (If No Bowel Movement in 2 days.) Reported on 4/12/2017       OLANZapine 2.5 MG tablet    zyPREXA    30 tablet    Take 5 mg by mouth 3 times daily as needed Reported on 4/12/2017       order for DME      Equipment being ordered: CPAP AIRSENSE 10 11 CM H20 AIRFIT F20 MEDIUM SN# [id number]  DN# [id number]       Phenylephrine-APAP-Guaifenesin 5-325-200 MG Tabs     168 tablet    Reported on 4/12/2017       pseudoePHEDrine 120 MG 12 hr tablet    SUDAFED 12 HOUR    28 tablet    Take 1 tablet (120 mg) by mouth as needed       psyllium 58.6 % Powd    METAMUCIL MULTIHEALTH FIBER    1 Bottle    Take 1 capful daily       risperDAL 1 MG tablet   Generic drug:  risperiDONE     60 tablet    Take 1 tablet in the morning and 2 tablets at 5 pm       sodium chloride 0.65 % nasal spray    SALINE MIST    1 Bottle    Spray 2 sprays into both nostrils At Bedtime       triamcinolone 0.1 % cream    KENALOG    30 g    Apply  topically 3 times daily. Apply sparingly to affected area.       TYLENOL 325 MG tablet   Generic drug:  acetaminophen     100 tablet    Take 325-650 mg by mouth every 4 hours as needed Reported on 4/12/2017       UNABLE TO FIND      MEDICATION NAME: pseudefed PE PRN not to exceed 10 capsules in 24  hours       venlafaxine 37.5 MG 24 hr capsule    EFFEXOR-XR     Take 37.5 mg by mouth daily 3 caps daily to equal 112.5mg

## 2017-04-12 NOTE — PATIENT INSTRUCTIONS
If you have any questions regarding your allergies, asthma, or what we discussed during your visit today please call the allergy clinic or contact us via [employer].    [facility] ([place], [place]): [phone]    Humberto had complete allergy testing done on 9/30/2015 and testing was negative. He does not have evidence of seasonal or environmental allergies.    Symptoms of sneezing, runny nose, or stuffy nose are not always related to allergies. Many times irritants (smoke, perfumes, strong scents), changes in the weather, dust or other particles in the air, or other unknown triggers can cause similar symptoms.     Increase the flonase (fluticasone) nasal spray to 2 sprays in each nostril daily    Use the saline nasal spray, 2 sprays in each nostril at bedtime

## 2017-04-12 NOTE — PROGRESS NOTES
Humberto Estrella was seen in the Allergy Clinic at [facility]. The following are my recommendations regarding his Nonallergic Rhinitis and Mold Exposure    1. Increase fluticasone nasal spray to 2 sprays in each nostril daily  2. May use saline nasal spray, 2 sprays in each nostril at bedtime as needed to help prevent nasal irritation  3. Continue albuterol HFA, 2-4 puffs every 4 hours as needed  4. Continue to follow-up with PCP, return to the allergy clinic as needed      Humberto Estrella is a 36 year old American male who is seen today for evaluation after mold exposure. Mold was found in the basement of the group home where Humberto lives. They were told that there were elevated levels of basidiospores, penicillium, and cladosporium. Humberto was brought to the allergy clinic for evaluation as he has had more breathing issues recently. He was diagnosed with obstructive sleep apnea and started on a CPAP machine at night. Since starting to use the CPAP machine regularly Humberto has had improvement in his breathing. His caregiver reports that he has had watery eyes, sneezing, coughing, and sore throat. Humberto has not been using his albuterol inhaler and is using only 1 spray of flonase in each nostril daily.    Previous in vitro IgE testing done in 2015 was negative for aeroallergen sensitization.      Component      Latest Ref Rng & Units 9/30/2015   Allergen Maple      <0.10 KU(A)/L <0.10 . . .   Allrgn D pteronyssin      <0.10 KU(A)/L <0.10 . . .   Allergen D farinae      <0.10 KU(A)/L <0.10 . . .   Allergen Marino Grass      <0.10 KU(A)/L <0.10 . . .   Allergen Niyah Grass      <0.10 KU(A)/L <0.10 . . .   Allergen Rye Grass      <0.10 KU(A)/L <0.10 . . .   Allergen Kuldip      <0.10 KU(A)/L <0.10 . . .   Allergen C herbarum      <0.10 KU(A)/L <0.10 . . .   Allergen P notatum      <0.10 KU(A)/L <0.10 . . .   Allergen A alternata      <0.10 KU(A)/L <0.10 . . .   Allergen A fumigatus      <0.10  KU(A)/L <0.10 . . .   Allergen Cat Dander      <0.10 KU(A)/L <0.10 . . .   Allergen Dog Dander      <0.10 KU(A)/L <0.10 . . .   Allergen Posey      <0.10 KU(A)/L <0.10 . . .   Allergen White Carroll      <0.10 KU(A)/L <0.10 . . .   Allergen Russian Thistle      <0.10 KU(A)/L <0.10 . . .   Allrgn Ragweed-Short      <0.10 KU(A)/L <0.10 . . .   IGE      0 - 114 KIU/L 13   Allergen Cannon(white)      <0.10 KU(A)/L <0.10 . . .   Allergn Silver Birch      <0.10 KU(A)/L <0.10 . . .   Allergen Giant Ragweed      <0.10 KU(A)/L <0.10 . . .   Allergen Lambs Qtrs      <0.10 KU(A)/L <0.10 . . .       REVIEW OF SYSTEMS:  General: negative for weight gain. negative for weight loss. negative for changes in sleep.   Eyes: negative for itching. negative for redness. positive  for tearing/watering.  Ears: negative for fullness. negative for hearing loss. negative for dizziness.   Nose: negative for snoring.negative for changes in smell. negative for drainage. positive for congestion.  Throat: negative for hoarseness. negative for sore throat. negative for trouble swallowing.   Lungs: negative for shortness of breath.negative for wheezing. negative for sputum production.   Cardiovascular: negative for chest pain. negative for swelling of ankles. negative for fast or irregular heartbeat.   Gastrointestinal: negative for nausea. negative for heartburn. negative for acid reflux.   Musculoskeletal: negative for joint pain. negative for joint stiffness. negative for joint swelling.   Neurologic: negative for seizures. negative for fainting. negative for weakness.   Psychiatric: negative for changes in mood. negative for anxiety.   Endocrine: negative for cold intolerance. negative for heat intolerance. negative for tremors.   Hematologic: negative for easy bruising. negative for easy bleeding.  Integumentary: negative for rash. negative for scaling. negative for nail changes.       Current Outpatient Prescriptions:      psyllium (METAMUCIL  MULTIHEALTH FIBER) 58.6 % POWD, Take 1 capful daily, Disp: 1 Bottle, Rfl: 3     pseudoePHEDrine (SUDAFED 12 HOUR) 120 MG 12 hr tablet, Take 1 tablet (120 mg) by mouth as needed, Disp: 28 tablet, Rfl: 0     levocetirizine (XYZAL) 5 MG tablet, Take 1 tablet (5 mg) by mouth every evening, Disp: 31 tablet, Rfl: 11     order for DME, Equipment being ordered: CPAP AIRSENSE 10 11 CM H20 AIRFIT F20 MEDIUM SN# [id number]  DN# [id number], Disp: , Rfl:      levothyroxine (SYNTHROID, LEVOTHROID) 50 MCG tablet, Take 1 tablet (50 mcg) by mouth daily, Disp: 90 tablet, Rfl: 3     esomeprazole (NEXIUM) 40 MG capsule, Take 1 capsule (40 mg) by mouth every morning (before breakfast) Take 30-60 minutes before a eating., Disp: 30 capsule, Rfl: 1     albuterol (ALBUTEROL) 108 (90 BASE) MCG/ACT inhaler, Inhale 2 puffs into the lungs every 6 hours as needed for shortness of breath / dyspnea or wheezing or cough, Disp: 1 Inhaler, Rfl: 3     UNABLE TO FIND, MEDICATION NAME: pseudefed PE PRN not to exceed 10 capsules in 24 hours, Disp: , Rfl:      divalproex (DEPAKOTE ER) 500 MG 24 hr tablet, Take 1,500 mg by mouth every morning, Disp: , Rfl:      venlafaxine (EFFEXOR-XR) 37.5 MG 24 hr capsule, Take 37.5 mg by mouth daily 3 caps daily to equal 112.5mg, Disp: , Rfl:      OLANZapine (ZYPREXA) 2.5 MG tablet, Take 5 mg by mouth 3 times daily as needed , Disp: 30 tablet, Rfl: 1     fluticasone (FLONASE) 50 MCG/ACT nasal spray, Spray 1-2 sprays into both nostrils daily, Disp: , Rfl:      alum & mag hydroxide-simethicone (MYLANTA/MAALOX) 200-200-20 MG/5ML SUSP, Take 30 mLs by mouth every 4 hours as needed for indigestion (2 tsp for upset stomach), Disp: , Rfl: 0     magnesium hydroxide (MILK OF MAGNESIA) 400 MG/5ML suspension, Take 30-60 mLs by mouth daily as needed for constipation or heartburn (If No Bowel Movement in 2 days.), Disp: 360 mL, Rfl: 1     acetaminophen (TYLENOL) 325 MG tablet, Take 1-2 tablets (325-650 mg) by mouth every 4 hours as needed,  "Disp: 100 tablet, Rfl:      risperiDONE (RISPERDAL) 1 MG tablet, Take 1 tablet in the morning and 2 tablets at 5 pm, Disp: 60 tablet, Rfl:      Phenylephrine-APAP-Guaifenesin 5-325-200 MG TABS, Take 2 caplets every 4 hours as needed for decongestant, Disp: 168 tablet, Rfl:      guaiFENesin-codeine (ROBITUSSIN AC) 100-10 MG/5ML SOLN, Take 10 mLs by mouth every 4 hours as needed for cough, Disp: 120 mL, Rfl: 0     ibuprofen (ADVIL,MOTRIN) 600 MG tablet, Take 1 tablet (600 mg) by mouth every 6 hours as needed for moderate pain, Disp: 60 tablet, Rfl: 0     triamcinolone (KENALOG) 0.1 % cream, Apply  topically 3 times daily. Apply sparingly to affected area. (Patient taking differently: Apply topically 3 times daily as needed Apply sparingly to affected area.), Disp: 30 g, Rfl: 1    EXAM:   /67 (BP Location: Left arm, Patient Position: Chair, Cuff Size: Adult Large)  Pulse 91  Ht 5' 3\" (1.6 m)  Wt 227 lb 3.2 oz (103.1 kg)  SpO2 93%  BMI 40.25 kg/m2  GENERAL APPEARANCE: alert, cooperative and not in distress  SKIN: no rashes, no lesions  HEAD: atraumatic, normocephalic  ENT: no scars or lesions, nasal exam showed no discharge, swelling or lesions noted, tongue midline and normal, soft palate, uvula, and tonsils normal  NECK: no asymmetry, masses, or scars, supple without significant adenopathy  LUNGS: unlabored respirations, no intercostal retractions or accessory muscle use, clear to auscultation without rales or wheezes  HEART: regular rate and rhythm without murmurs and normal S1 and S2  MUSCULOSKELETAL: no musculoskeletal defects are noted  NEURO: no focal deficits noted  PSYCH: does not appear depressed or anxious      WORKUP:  None    ASSESSMENT/PLAN:  Humberto Estrella is a 36 year old male here for evaluation after recent mold exposure. He is here today with a caregiver from the group home. She was counseled that Humberto was previously tested for allergic sensitization in 2015 and all of his testing was " negative. She was advised that although Humberto was found not to be allergic to seasonal or perennial aeroallergens he may still experience rhinitis symptoms due to irritants or non-allergic triggers. We discussed that if he is having symptoms of shortness of breath or chest tightness he should use his inhaler and if this is needed more than once per week he should return for evaluation. His flonase dose may also be increased for additional management of his rhinitis symptoms.    1. Increase fluticasone nasal spray to 2 sprays in each nostril daily  2. May use saline nasal spray, 2 sprays in each nostril at bedtime as needed to help prevent nasal irritation  3. Continue albuterol HFA, 2-4 puffs every 4 hours as needed  4. Continue to follow-up with PCP, return to the allergy clinic as needed      Timbo Delatorre MD  Allergy/Immunology  [facility] and [place], [place]      Chart documentation done in part with Dragon Voice Recognition Software. Although reviewed after completion, some word and grammatical errors may remain.

## 2017-04-12 NOTE — NURSING NOTE
"Chief Complaint   Patient presents with     Allergy Recheck     Exposed to 3 different types of mold, has since had several episodes of SOB and wheezing       Initial /67 (BP Location: Left arm, Patient Position: Chair, Cuff Size: Adult Large)  Pulse 91  Ht 5' 3\" (1.6 m)  Wt 227 lb 3.2 oz (103.1 kg)  SpO2 93%  BMI 40.25 kg/m2 Estimated body mass index is 40.25 kg/(m^2) as calculated from the following:    Height as of this encounter: 5' 3\" (1.6 m).    Weight as of this encounter: 227 lb 3.2 oz (103.1 kg).  Medication Reconciliation: complete    "

## 2017-05-02 ENCOUNTER — TELEPHONE (OUTPATIENT)
Dept: FAMILY MEDICINE | Facility: CLINIC | Age: 37
End: 2017-05-02

## 2017-05-02 NOTE — TELEPHONE ENCOUNTER
Reason for call:  Patient reporting a symptom    Symptom or request: Janey states Humberto has athletes foot on the L foot,     Duration (how long have symptoms been present): last couple of days    Have you been treated for this before? Did not ask    Additional comments: should the patient be seen or just get an RX fax #[phone] we can fax a script.    Phone Number patient can be reached at:  Home number on file [phone] (home)    Best Time:  any    Can we leave a detailed message on this number:  did not ask   Awilda Levy  [facility]      Call taken on 5/2/2017 at 2:23 PM by Awilda Levy

## 2017-05-02 NOTE — TELEPHONE ENCOUNTER
Message from [facility] below.  Pt was seen 4/3/17.  Routed to provider.  Treat by phone or be seen?  Thank you.  Mikki Sloan RN

## 2017-05-03 ENCOUNTER — OFFICE VISIT (OUTPATIENT)
Dept: FAMILY MEDICINE | Facility: CLINIC | Age: 37
End: 2017-05-03
Payer: MEDICARE

## 2017-05-03 VITALS
BODY MASS INDEX: 41.34 KG/M2 | WEIGHT: 233.3 LBS | HEIGHT: 63 IN | SYSTOLIC BLOOD PRESSURE: 121 MMHG | DIASTOLIC BLOOD PRESSURE: 72 MMHG | TEMPERATURE: 97.4 F | HEART RATE: 85 BPM

## 2017-05-03 DIAGNOSIS — L84 CALLUS OF FOOT: Primary | ICD-10-CM

## 2017-05-03 PROCEDURE — 99213 OFFICE O/P EST LOW 20 MIN: CPT | Performed by: NURSE PRACTITIONER

## 2017-05-03 NOTE — PATIENT INSTRUCTIONS
Keep feet dry and clean  May apply lotion on the top of your feet and bottom, but not between toes  For calluses may use Medi plast patch, apply one patch for 48 hours than take off and put new patch

## 2017-05-03 NOTE — NURSING NOTE
"Chief Complaint   Patient presents with     other     Left foot is scaly, doesn't itch and isn't painful.        Initial /72  Pulse 85  Temp 97.4  F (36.3  C) (Tympanic)  Ht 5' 3\" (1.6 m)  Wt 233 lb 4.8 oz (105.8 kg)  BMI 41.33 kg/m2 Estimated body mass index is 41.33 kg/(m^2) as calculated from the following:    Height as of this encounter: 5' 3\" (1.6 m).    Weight as of this encounter: 233 lb 4.8 oz (105.8 kg).  Medication Reconciliation: complete  "

## 2017-05-03 NOTE — PROGRESS NOTES
"  SUBJECTIVE:                                                    Humberto Estrella is a 36 year old male who presents to clinic today for the following health issues: scaly skin between toes, does not itch and its not painful.     Problem list and histories reviewed & adjusted, as indicated.  Additional history: as documented    Reviewed and updated as needed this visit by clinical staff  Tobacco  Allergies  Med Hx  Surg Hx  Fam Hx  Soc Hx      Reviewed and updated as needed this visit by Provider         ROS:  Constitutional, HEENT, cardiovascular, pulmonary, gi and gu systems are negative, except as otherwise noted.    OBJECTIVE:                                                    /72  Pulse 85  Temp 97.4  F (36.3  C) (Tympanic)  Ht 5' 3\" (1.6 m)  Wt 233 lb 4.8 oz (105.8 kg)  BMI 41.33 kg/m2  Body mass index is 41.33 kg/(m^2).  GENERAL: healthy, alert and no distress  SKIN: there is calluses on both feet on the bottom of the great toes, slightly scaling skin between toes, no erythema, discharge and inflammation     Diagnostic Test Results:  none      ASSESSMENT/PLAN:                                                      1. Callus of foot  Advised patient that slightly scaling skin between toes not due to fungal infection   Medi-plast patch, apply on the surface of the calluses, keep it on for 48 hours, keep skin dry and clean.  Remove the dead skin with pumice stone each time before replacing the patch   Keep skin dry between toes.     See Patient Instructions    GM Vargas [facility]  "

## 2017-05-03 NOTE — TELEPHONE ENCOUNTER
Group home notified.  Requesting appointment after 3pm so that patient doesn't miss work.  Appointment made for today at 4pm with Kay.    Corrine Loera RN

## 2017-05-03 NOTE — MR AVS SNAPSHOT
"              After Visit Summary   5/3/2017    Humberto Estrella    MRN: [id number]           Patient Information     Date Of Birth          1980        Visit Information        Provider Department      5/3/2017 4:00 PM Kay Horner APRN CNP [facility]        Today's Diagnoses     Callus of foot    -  1      Care Instructions    Keep feet dry and clean  May apply lotion on the top of your feet and bottom, but not between toes  For calluses may use Medi plast patch, apply one patch for 48 hours than take off and put new patch         Follow-ups after your visit        Who to contact     If you have questions or need follow up information about today's clinic visit or your schedule please contact [facility] directly at [phone].  Normal or non-critical lab and imaging results will be communicated to you by MyChart, letter or phone within 4 business days after the clinic has received the results. If you do not hear from us within 7 days, please contact the clinic through MyChart or phone. If you have a critical or abnormal lab result, we will notify you by phone as soon as possible.  Submit refill requests through [employer] or call your pharmacy and they will forward the refill request to us. Please allow 3 business days for your refill to be completed.          Additional Information About Your Visit        MyChart Information     Ommven lets you send messages to your doctor, view your test results, renew your prescriptions, schedule appointments and more. To sign up, go to [email or web address] . Click on \"Log in\" on the left side of the screen, which will take you to the Welcome page. Then click on \"Sign up Now\" on the right side of the page.     You will be asked to enter the access code listed below, as well as some personal information. Please follow the directions to create your username and password.     Your access code is: [email or web address]  Expires: 7/2/2017  3:08 " "PM     Your access code will  in 90 days. If you need help or a new code, please call your [facility] or [phone].        Care EveryWhere ID     This is your Care EveryWhere ID. This could be used by other organizations to access your [place] medical records  [id number]        Your Vitals Were     Pulse Temperature Height BMI (Body Mass Index)          85 97.4  F (36.3  C) (Tympanic) 5' 3\" (1.6 m) 41.33 kg/m2         Blood Pressure from Last 3 Encounters:   17 121/72   17 120/67   17 109/73    Weight from Last 3 Encounters:   17 233 lb 4.8 oz (105.8 kg)   17 227 lb 3.2 oz (103.1 kg)   17 230 lb (104.3 kg)              Today, you had the following     No orders found for display       Primary Care Provider Office Phone # Fax #    Carla GM Frazier [facility] [phone]       [facility] [address] [address]        Thank you!     Thank you for choosing [facility]  for your care. Our goal is always to provide you with excellent care. Hearing back from our patients is one way we can continue to improve our services. Please take a few minutes to complete the written survey that you may receive in the mail after your visit with us. Thank you!             Your Updated Medication List - Protect others around you: Learn how to safely use, store and throw away your medicines at www.disposemymeds.org.          This list is accurate as of: 5/3/17  4:23 PM.  Always use your most recent med list.                   Brand Name Dispense Instructions for use    albuterol 108 (90 BASE) MCG/ACT Inhaler    albuterol    1 Inhaler    Inhale 2 puffs into the lungs every 6 hours as needed for shortness of breath / dyspnea or wheezing or cough       alum & mag hydroxide-simethicone 200-200-20 MG/5ML Susp suspension    MYLANTA/MAALOX     Take 30 mLs by mouth every 4 hours as needed for indigestion (2 tsp for upset stomach)       divalproex " 500 MG 24 hr tablet    DEPAKOTE ER     Take 1,500 mg by mouth every morning       esomeprazole 40 MG CR capsule    nexIUM    30 capsule    Take 1 capsule (40 mg) by mouth every morning (before breakfast) Take 30-60 minutes before a eating.       fluticasone 50 MCG/ACT spray    FLONASE    1 Bottle    Spray 2 sprays into both nostrils daily       ibuprofen 600 MG tablet    ADVIL/MOTRIN    60 tablet    Take 1 tablet (600 mg) by mouth every 6 hours as needed for moderate pain       levocetirizine 5 MG tablet    XYZAL    31 tablet    Take 1 tablet (5 mg) by mouth every evening       levothyroxine 50 MCG tablet    SYNTHROID/LEVOTHROID    90 tablet    Take 1 tablet (50 mcg) by mouth daily       MILK OF MAGNESIA 400 MG/5ML suspension   Generic drug:  magnesium hydroxide     360 mL    Take 30-60 mLs by mouth daily as needed for constipation or heartburn (If No Bowel Movement in 2 days.) Reported on 4/12/2017       OLANZapine 2.5 MG tablet    zyPREXA    30 tablet    Take 5 mg by mouth 3 times daily as needed Reported on 4/12/2017       order for DME      Equipment being ordered: CPAP AIRSENSE 10 11 CM H20 AIRFIT F20 MEDIUM # [id number]  DN# [id number]       Phenylephrine-APAP-Guaifenesin 5-325-200 MG Tabs     168 tablet    Reported on 4/12/2017       pseudoePHEDrine 120 MG 12 hr tablet    SUDAFED 12 HOUR    28 tablet    Take 1 tablet (120 mg) by mouth as needed       psyllium 58.6 % Powd    METAMUCIL MULTIHEALTH FIBER    1 Bottle    Take 1 capful daily       risperDAL 1 MG tablet   Generic drug:  risperiDONE     60 tablet    Take 1 tablet in the morning and 2 tablets at 5 pm       sodium chloride 0.65 % nasal spray    SALINE MIST    1 Bottle    Spray 2 sprays into both nostrils At Bedtime       triamcinolone 0.1 % cream    KENALOG    30 g    Apply  topically 3 times daily. Apply sparingly to affected area.       TYLENOL 325 MG tablet   Generic drug:  acetaminophen     100 tablet    Take 325-650 mg by mouth every 4 hours as needed  Reported on 4/12/2017       UNABLE TO FIND      MEDICATION NAME: pseudefed PE PRN not to exceed 10 capsules in 24 hours       venlafaxine 37.5 MG 24 hr capsule    EFFEXOR-XR     Take 37.5 mg by mouth daily 3 caps daily to equal 112.5mg

## 2017-05-30 DIAGNOSIS — Z79.899 HIGH RISK MEDICATIONS (NOT ANTICOAGULANTS) LONG-TERM USE: Primary | ICD-10-CM

## 2017-05-30 LAB
ALBUMIN SERPL-MCNC: 3 G/DL (ref 3.4–5)
ALP SERPL-CCNC: 63 U/L (ref 40–150)
ALT SERPL W P-5'-P-CCNC: 48 U/L (ref 0–70)
AST SERPL W P-5'-P-CCNC: 34 U/L (ref 0–45)
BASOPHILS # BLD AUTO: 0 10E9/L (ref 0–0.2)
BASOPHILS NFR BLD AUTO: 0 %
BILIRUB DIRECT SERPL-MCNC: <0.1 MG/DL (ref 0–0.2)
BILIRUB SERPL-MCNC: 0.4 MG/DL (ref 0.2–1.3)
DIFFERENTIAL METHOD BLD: ABNORMAL
EOSINOPHIL # BLD AUTO: 0.1 10E9/L (ref 0–0.7)
EOSINOPHIL NFR BLD AUTO: 2 %
ERYTHROCYTE [DISTWIDTH] IN BLOOD BY AUTOMATED COUNT: 14.5 % (ref 10–15)
HCT VFR BLD AUTO: 43.9 % (ref 40–53)
HGB BLD-MCNC: 14.6 G/DL (ref 13.3–17.7)
LYMPHOCYTES # BLD AUTO: 1.4 10E9/L (ref 0.8–5.3)
LYMPHOCYTES NFR BLD AUTO: 40 %
MCH RBC QN AUTO: 31.7 PG (ref 26.5–33)
MCHC RBC AUTO-ENTMCNC: 33.3 G/DL (ref 31.5–36.5)
MCV RBC AUTO: 95 FL (ref 78–100)
MONOCYTES # BLD AUTO: 0.4 10E9/L (ref 0–1.3)
MONOCYTES NFR BLD AUTO: 12 %
NEUTROPHILS # BLD AUTO: 1.7 10E9/L (ref 1.6–8.3)
NEUTROPHILS NFR BLD AUTO: 46 %
PLATELET # BLD AUTO: 238 10E9/L (ref 150–450)
PROT SERPL-MCNC: 7.2 G/DL (ref 6.8–8.8)
RBC # BLD AUTO: 4.61 10E12/L (ref 4.4–5.9)
VALPROATE SERPL-MCNC: 20 MG/L (ref 50–100)
WBC # BLD AUTO: 3.6 10E9/L (ref 4–11)

## 2017-05-30 PROCEDURE — 85025 COMPLETE CBC W/AUTO DIFF WBC: CPT | Performed by: NURSE PRACTITIONER

## 2017-05-30 PROCEDURE — 80076 HEPATIC FUNCTION PANEL: CPT | Performed by: NURSE PRACTITIONER

## 2017-05-30 PROCEDURE — 80164 ASSAY DIPROPYLACETIC ACD TOT: CPT | Performed by: NURSE PRACTITIONER

## 2017-05-30 PROCEDURE — 36415 COLL VENOUS BLD VENIPUNCTURE: CPT | Performed by: NURSE PRACTITIONER

## 2017-07-26 ENCOUNTER — OFFICE VISIT (OUTPATIENT)
Dept: FAMILY MEDICINE | Facility: CLINIC | Age: 37
End: 2017-07-26
Payer: MEDICARE

## 2017-07-26 VITALS
BODY MASS INDEX: 41.85 KG/M2 | DIASTOLIC BLOOD PRESSURE: 57 MMHG | HEART RATE: 74 BPM | TEMPERATURE: 97.5 F | WEIGHT: 236.2 LBS | SYSTOLIC BLOOD PRESSURE: 104 MMHG | HEIGHT: 63 IN

## 2017-07-26 DIAGNOSIS — E66.09 OBESITY DUE TO EXCESS CALORIES, UNSPECIFIED OBESITY SEVERITY: Chronic | ICD-10-CM

## 2017-07-26 DIAGNOSIS — E03.8 SUBCLINICAL HYPOTHYROIDISM: ICD-10-CM

## 2017-07-26 DIAGNOSIS — Z00.00 ROUTINE GENERAL MEDICAL EXAMINATION AT A HEALTH CARE FACILITY: Primary | ICD-10-CM

## 2017-07-26 PROCEDURE — 99395 PREV VISIT EST AGE 18-39: CPT | Performed by: NURSE PRACTITIONER

## 2017-07-26 NOTE — MR AVS SNAPSHOT
After Visit Summary   7/26/2017    Humberto Estrella    MRN: [id number]           Patient Information     Date Of Birth          1980        Visit Information        Provider Department      7/26/2017 11:20 AM Carla Meyer APRN CNP [facility]        Care Instructions      Preventive Health Recommendations  Male Ages 26 - 39    Yearly exam:             See your health care provider every year in order to  o   Review health changes.   o   Discuss preventive care.    o   Review your medicines if your doctor has prescribed any.    You should be tested each year for STDs (sexually transmitted diseases), if you re at risk.     After age 35, talk to your provider about cholesterol testing. If you are at risk for heart disease, have your cholesterol tested at least every 5 years.     If you are at risk for diabetes, you should have a diabetes test (fasting glucose).  Shots: Get a flu shot each year. Get a tetanus shot every 10 years.     Nutrition:    Eat at least 5 servings of fruits and vegetables daily.     Eat whole-grain bread, whole-wheat pasta and brown rice instead of white grains and rice.     Talk to your provider about Calcium and Vitamin D.     Lifestyle    Exercise for at least 150 minutes a week (30 minutes a day, 5 days a week). This will help you control your weight and prevent disease.     Limit alcohol to one drink per day.     No smoking.     Wear sunscreen to prevent skin cancer.     See your dentist every six months for an exam and cleaning.             Follow-ups after your visit        Who to contact     If you have questions or need follow up information about today's clinic visit or your schedule please contact [facility] directly at [phone].  Normal or non-critical lab and imaging results will be communicated to you by MyChart, letter or phone within 4 business days after the clinic has received the results. If you do not hear from us within 7  "days, please contact the clinic through Greenl[employer] or phone. If you have a critical or abnormal lab result, we will notify you by phone as soon as possible.  Submit refill requests through Greenlots or call your pharmacy and they will forward the refill request to us. Please allow 3 business days for your refill to be completed.          Additional Information About Your Visit        Greenlots Information     Greenlots lets you send messages to your doctor, view your test results, renew your prescriptions, schedule appointments and more. To sign up, go to [email or web address] . Click on \"Log in\" on the left side of the screen, which will take you to the Welcome page. Then click on \"Sign up Now\" on the right side of the page.     You will be asked to enter the access code listed below, as well as some personal information. Please follow the directions to create your username and password.     Your access code is: [email or web address]  Expires: 10/24/2017 11:53 AM     Your access code will  in 90 days. If you need help or a new code, please call your [place] clinic or [phone].        Care EveryWhere ID     This is your Care EveryWhere ID. This could be used by other organizations to access your [place] medical records  [id number]        Your Vitals Were     Pulse Temperature Height BMI (Body Mass Index)          74 97.5  F (36.4  C) (Tympanic) 5' 3\" (1.6 m) 41.84 kg/m2         Blood Pressure from Last 3 Encounters:   17 104/57   17 121/72   17 120/67    Weight from Last 3 Encounters:   17 236 lb 3.2 oz (107.1 kg)   17 233 lb 4.8 oz (105.8 kg)   17 227 lb 3.2 oz (103.1 kg)              Today, you had the following     No orders found for display         Today's Medication Changes          These changes are accurate as of: 17 11:53 AM.  If you have any questions, ask your nurse or doctor.               These medicines have changed or have updated prescriptions.        " Dose/Directions    triamcinolone 0.1 % cream   Commonly known as:  KENALOG   This may have changed:  additional instructions   Used for:  Eczema        Apply  topically 3 times daily. Apply sparingly to affected area.   Quantity:  30 g   Refills:  1                Primary Care Provider Office Phone # Fax #    GM Bradshaw [phone]       [facility] [address] [facility] [address]        Equal Access to Services     RAFIQ LOZANO : Hadii aad ku hadasho Soomaali, waaxda luqadaha, qaybta kaalmada adeegyada, waxay idiin hayaan adeeg kharajoel ladu . So [place] [phone].    ATENCIÓN: Si tonila español, tiene a ragland disposición servicios gratuitos de asistencia lingüística. [facility] [phone].    We comply with applicable federal civil rights laws and [place] laws. We do not discriminate on the basis of race, color, national origin, age, disability sex, sexual orientation or gender identity.            Thank you!     Thank you for choosing [facility]  for your care. Our goal is always to provide you with excellent care. Hearing back from our patients is one way we can continue to improve our services. Please take a few minutes to complete the written survey that you may receive in the mail after your visit with us. Thank you!             Your Updated Medication List - Protect others around you: Learn how to safely use, store and throw away your medicines at www.disposemymeds.org.          This list is accurate as of: 7/26/17 11:53 AM.  Always use your most recent med list.                   Brand Name Dispense Instructions for use Diagnosis    albuterol 108 (90 BASE) MCG/ACT Inhaler    albuterol    1 Inhaler    Inhale 2 puffs into the lungs every 6 hours as needed for shortness of breath / dyspnea or wheezing or cough    SOB (shortness of breath)       alum & mag hydroxide-simethicone 200-200-20 MG/5ML Susp suspension    MYLANTA/MAALOX     Take 30 mLs by mouth  every 4 hours as needed for indigestion (2 tsp for upset stomach)        divalproex 500 MG 24 hr tablet    DEPAKOTE ER     Take 1,500 mg by mouth every morning        esomeprazole 40 MG CR capsule    nexIUM    30 capsule    Take 1 capsule (40 mg) by mouth every morning (before breakfast) Take 30-60 minutes before a eating.    Gastroesophageal reflux disease without esophagitis       fluticasone 50 MCG/ACT spray    FLONASE    1 Bottle    Spray 2 sprays into both nostrils daily    Nonallergic rhinitis       ibuprofen 600 MG tablet    ADVIL/MOTRIN    60 tablet    Take 1 tablet (600 mg) by mouth every 6 hours as needed for moderate pain    Hip pain, unspecified laterality       levocetirizine 5 MG tablet    XYZAL    31 tablet    Take 1 tablet (5 mg) by mouth every evening    Seasonal allergic rhinitis due to pollen       levothyroxine 50 MCG tablet    SYNTHROID/LEVOTHROID    90 tablet    Take 1 tablet (50 mcg) by mouth daily    Subclinical hypothyroidism       MILK OF MAGNESIA 400 MG/5ML suspension   Generic drug:  magnesium hydroxide     360 mL    Take 30-60 mLs by mouth daily as needed for constipation or heartburn (If No Bowel Movement in 2 days.) Reported on 4/12/2017        OLANZapine 2.5 MG tablet    zyPREXA    30 tablet    Take 5 mg by mouth 3 times daily as needed Reported on 4/12/2017        order for DME      Equipment being ordered: CPAP AIRSENSE 10 11 CM H20 AIRFIT F20 MEDIUM SN# [id number]  DN# [id number]        pseudoePHEDrine 120 MG 12 hr tablet    SUDAFED 12 HOUR    28 tablet    Take 1 tablet (120 mg) by mouth as needed    Allergic rhinitis due to pollen, unspecified rhinitis seasonality       psyllium 58.6 % Powd    METAMUCIL MULTIHEALTH FIBER    1 Bottle    Take 1 capful daily    Diarrhea, unspecified type       risperDAL 1 MG tablet   Generic drug:  risperiDONE     60 tablet    Take 1 tablet in the morning and 2 tablets at 5 pm        ROBITUSSIN A-C OR      100/5ml, take 2 tsp full by mouth every 4 hours as  needed for cough        sodium chloride 0.65 % nasal spray    SALINE MIST    1 Bottle    Spray 2 sprays into both nostrils At Bedtime    Nonallergic rhinitis       triamcinolone 0.1 % cream    KENALOG    30 g    Apply  topically 3 times daily. Apply sparingly to affected area.    Eczema       TYLENOL 325 MG tablet   Generic drug:  acetaminophen     100 tablet    Take 325-650 mg by mouth every 4 hours as needed Reported on 4/12/2017        UNABLE TO FIND      MEDICATION NAME: pseudefed PE PRN not to exceed 10 capsules in 24 hours        venlafaxine 37.5 MG 24 hr capsule    EFFEXOR-XR     Take 37.5 mg by mouth daily 3 caps daily to equal 112.5mg

## 2017-07-26 NOTE — NURSING NOTE
"Chief Complaint   Patient presents with     Physical       Initial /57 (BP Location: Left arm, Patient Position: Chair, Cuff Size: Adult Large)  Pulse 74  Temp 97.5  F (36.4  C) (Tympanic)  Ht 5' 3\" (1.6 m)  Wt 236 lb 3.2 oz (107.1 kg)  BMI 41.84 kg/m2 Estimated body mass index is 41.84 kg/(m^2) as calculated from the following:    Height as of this encounter: 5' 3\" (1.6 m).    Weight as of this encounter: 236 lb 3.2 oz (107.1 kg).  Medication Reconciliation: complete    "

## 2017-07-26 NOTE — PROGRESS NOTES
SUBJECTIVE:   CC: Humberto Estrella is an 37 year old male who presents for preventative health visit.     Healthy Habits:    Do you get at least three servings of calcium containing foods daily (dairy, green leafy vegetables, etc.)? yes    Amount of exercise or daily activities, outside of work: 1 day(s) per week    Problems taking medications regularly No    Medication side effects: No    Have you had an eye exam in the past two years? yes    Do you see a dentist twice per year? yes    Do you have sleep apnea, excessive snoring or daytime drowsiness?yes has cpap           -------------------------------------    Today's PHQ-2 Score:   PHQ-2 ( 1999 Pf[employer]) 7/19/2016 6/7/2016   Q1: Little interest or pleasure in doing things 0 0   Q2: Feeling down, depressed or hopeless 0 0   PHQ-2 Score 0 0       Abuse: Current or Past(Physical, Sexual or Emotional)- No  Do you feel safe in your environment - Yes    Social History   Substance Use Topics     Smoking status: Former Smoker     Quit date: 8/16/1998     Smokeless tobacco: Never Used     Alcohol use No     The patient does not drink >3 drinks per day nor >7 drinks per week.    Last PSA: No results found for: PSA    Reviewed orders with patient. Reviewed health maintenance and updated orders accordingly - Yes  Patient Active Problem List   Diagnosis     TRISOMY 21 (DOWN SYNDROME)     Hearing loss     MYOPIA     LATENT NYSTAGMUS     Headache     INTERMITT EXPLOSIVE DIS     post traumatic stress disorder     CARDIOVASCULAR SCREENING; LDL GOAL LESS THAN 160     Cortical, lamellar, or zonular cataract, nonsenile     Nonallergic rhinitis     Obesity due to excess calories, unspecified obesity severity     Subclinical hypothyroidism     VIDAL (obstructive sleep apnea)-AHI 26     Past Surgical History:   Procedure Laterality Date     PE TUBES      Left     PHACOEMULSIFICATION WITH STANDARD INTRAOCULAR LENS IMPLANT  10/3/2013    Procedure: PHACOEMULSIFICATION WITH STANDARD  "INTRAOCULAR LENS IMPLANT;  Left Kelman Phacoemulsification with Intraocular Lens Implant;  Surgeon: Luis Barajas MD;  Location: [facility] OR     PHACOEMULSIFICATION WITH STANDARD INTRAOCULAR LENS IMPLANT  5/1/2014    Procedure: PHACOEMULSIFICATION WITH STANDARD INTRAOCULAR LENS IMPLANT;  Surgeon: Luis Barajas MD;  Location: [facility] OR       Social History   Substance Use Topics     Smoking status: Former Smoker     Quit date: 8/16/1998     Smokeless tobacco: Never Used     Alcohol use No     Family History   Problem Relation Age of Onset     Unknown/Adopted Mother              Reviewed and updated as needed this visit by clinical staffTobacco  Allergies  Meds  Med Hx  Surg Hx  Fam Hx  Soc Hx        Reviewed and updated as needed this visit by Provider              ROS:  C: NEGATIVE for fever, chills, change in weight  I: NEGATIVE for worrisome rashes, moles or lesions  E: NEGATIVE for vision changes or irritation  ENT: NEGATIVE for ear, mouth and throat problems  R: NEGATIVE for significant cough or SOB  CV: NEGATIVE for chest pain, palpitations or peripheral edema  GI: NEGATIVE for nausea, abdominal pain, heartburn, or change in bowel habits   male: negative for dysuria, hematuria, decreased urinary stream, erectile dysfunction, urethral discharge  M: NEGATIVE for significant arthralgias or myalgia  N: NEGATIVE for weakness, dizziness or paresthesias  P: NEGATIVE for changes in mood or affect    OBJECTIVE:   /57 (BP Location: Left arm, Patient Position: Chair, Cuff Size: Adult Large)  Pulse 74  Temp 97.5  F (36.4  C) (Tympanic)  Ht 5' 3\" (1.6 m)  Wt 236 lb 3.2 oz (107.1 kg)  BMI 41.84 kg/m2  EXAM:  GENERAL: healthy, alert and no distress  EYES: Eyes grossly normal to inspection, PERRL and conjunctivae and sclerae normal  HENT: ear canals and TM's normal, nose and mouth without ulcers or lesions  NECK: no adenopathy, no asymmetry, masses, or scars and thyroid normal to palpation  RESP: lungs " "clear to auscultation - no rales, rhonchi or wheezes  CV: regular rate and rhythm, normal S1 S2, no S3 or S4, no murmur, click or rub, no peripheral edema and peripheral pulses strong  ABDOMEN: soft, nontender, no hepatosplenomegaly, no masses and bowel sounds normal   (male): normal male genitalia without lesions or urethral discharge, no hernia  MS: no gross musculoskeletal defects noted, no edema  SKIN: no suspicious lesions or rashes  NEURO: Normal strength and tone, mentation intact and speech normal  PSYCH: mentation appears normal, affect normal/bright    ASSESSMENT/PLAN:   1. Routine general medical examination at a health care facility      2. Obesity due to excess calories, unspecified obesity severity  counseled on diet and exercise    3. Subclinical hypothyroidism  Stable on current dose of Levothyroxine       COUNSELING:  Reviewed preventive health counseling, as reflected in patient instructions       Regular exercise       Healthy diet/nutrition     reports that he quit smoking about 18 years ago. He has never used smokeless tobacco.      Estimated body mass index is 41.84 kg/(m^2) as calculated from the following:    Height as of this encounter: 5' 3\" (1.6 m).    Weight as of this encounter: 236 lb 3.2 oz (107.1 kg).   Weight management plan: going to [facility] 3 X/week    Counseling Resources:  ATP IV Guidelines  Pooled Cohorts Equation Calculator  FRAX Risk Assessment  ICSI Preventive Guidelines  Dietary Guidelines for Americans, 2010  USDA's MyPlate  ASA Prophylaxis  Lung CA Screening    GM [facility]  "

## 2017-08-14 ENCOUNTER — TELEPHONE (OUTPATIENT)
Dept: FAMILY MEDICINE | Facility: CLINIC | Age: 37
End: 2017-08-14

## 2017-08-14 DIAGNOSIS — Z00.00 HEALTH CARE MAINTENANCE: Primary | ICD-10-CM

## 2017-08-14 NOTE — TELEPHONE ENCOUNTER
Group home staff report continued weight gain with controlled diet and exercise. Pt goes to [facility] three times a week. Pt walks daily. They report an average weight gain of approx 5# per month. Pt is due for lab tests and is wondering if this can be added on.     Please advise.   Thank you,  Irma Sky RN

## 2017-08-14 NOTE — TELEPHONE ENCOUNTER
Reason for Call: Request for an order or referral:    Order or referral being requested: lab draw    Date needed: as soon as possible    Has the patient been seen by the PCP for this problem? NO    Additional comments: Group home calling wondering if they can get a TSH drawn on pt. She states he's been gaining weight and he doesn't have access to food other than their meals that are prepared for them. She would like to be notified after order is in as they want to take him to [facility] and have it done there. He works in [facility].    Phone number Patient can be reached at:  Home number on file [phone] (home)    Best Time:  any    Can we leave a detailed message on this number?  YES    Call taken on 8/14/2017 at 4:18 PM by Steffanie Orellana

## 2017-08-15 NOTE — TELEPHONE ENCOUNTER
Patient is also due for Lipid panel, both Lipid and TSH labs ordered. Have called the [facility] and spoke with Natali who was notified patient has the labs ordered, appointment for lab draw tomorrow morning and told Natali to have patient be fasting for Lipid. Natali also has an order from Psych MD to have Depakote level drawn, told Natali to bring a copy of the order at lab appointment. ETHAN Patrick

## 2017-08-16 DIAGNOSIS — Z00.00 HEALTH CARE MAINTENANCE: ICD-10-CM

## 2017-08-16 DIAGNOSIS — Z79.899 ENCOUNTER FOR LONG-TERM (CURRENT) USE OF MEDICATIONS: Primary | ICD-10-CM

## 2017-08-16 DIAGNOSIS — E03.8 SUBCLINICAL HYPOTHYROIDISM: ICD-10-CM

## 2017-08-16 LAB
CHOLEST SERPL-MCNC: 191 MG/DL
HDLC SERPL-MCNC: 33 MG/DL
LDLC SERPL CALC-MCNC: 116 MG/DL
NONHDLC SERPL-MCNC: 158 MG/DL
T4 FREE SERPL-MCNC: 0.8 NG/DL (ref 0.76–1.46)
TRIGL SERPL-MCNC: 208 MG/DL
TSH SERPL DL<=0.005 MIU/L-ACNC: 11.52 MU/L (ref 0.4–4)
VALPROATE SERPL-MCNC: 44 MG/L (ref 50–100)

## 2017-08-16 PROCEDURE — 84439 ASSAY OF FREE THYROXINE: CPT | Performed by: NURSE PRACTITIONER

## 2017-08-16 PROCEDURE — 80061 LIPID PANEL: CPT | Performed by: NURSE PRACTITIONER

## 2017-08-16 PROCEDURE — 36415 COLL VENOUS BLD VENIPUNCTURE: CPT | Performed by: NURSE PRACTITIONER

## 2017-08-16 PROCEDURE — 80164 ASSAY DIPROPYLACETIC ACD TOT: CPT

## 2017-08-16 PROCEDURE — 84443 ASSAY THYROID STIM HORMONE: CPT | Performed by: NURSE PRACTITIONER

## 2017-08-17 DIAGNOSIS — E03.9 ACQUIRED HYPOTHYROIDISM: Primary | ICD-10-CM

## 2017-08-17 RX ORDER — LEVOTHYROXINE SODIUM 75 UG/1
75 TABLET ORAL DAILY
Qty: 90 TABLET | Refills: 3 | Status: SHIPPED | OUTPATIENT
Start: 2017-08-17 | End: 2017-11-12

## 2017-08-21 ENCOUNTER — OFFICE VISIT (OUTPATIENT)
Dept: AUDIOLOGY | Facility: CLINIC | Age: 37
End: 2017-08-21
Payer: MEDICARE

## 2017-08-21 ENCOUNTER — TELEPHONE (OUTPATIENT)
Dept: FAMILY MEDICINE | Facility: CLINIC | Age: 37
End: 2017-08-21

## 2017-08-21 DIAGNOSIS — Z01.10 ENCOUNTER FOR HEARING TEST: Primary | ICD-10-CM

## 2017-08-21 DIAGNOSIS — H90.3 BILATERAL HIGH FREQUENCY SENSORINEURAL HEARING LOSS: Primary | ICD-10-CM

## 2017-08-21 PROCEDURE — 92567 TYMPANOMETRY: CPT | Performed by: AUDIOLOGIST

## 2017-08-21 PROCEDURE — 99207 ZZC NO CHARGE LOS: CPT | Performed by: AUDIOLOGIST

## 2017-08-21 PROCEDURE — 92557 COMPREHENSIVE HEARING TEST: CPT | Performed by: AUDIOLOGIST

## 2017-08-21 NOTE — TELEPHONE ENCOUNTER
Lidia from [facility] notified of referral sent by provider today  She verbalized understanding    Corazon MCCLOUD Rn

## 2017-08-21 NOTE — TELEPHONE ENCOUNTER
Please see note below  Please advise  Appt with audiology today at 3pm    Routing to provider.    Corazon MCCLOUD Rn

## 2017-08-21 NOTE — PROGRESS NOTES
AUDIOLOGY REPORT    SUBJECTIVE:  Humberto Estrella is a 37 year old male who was seen in the Audiology Clinic at [facility] for an audiologic evaluation, referred by Carla Meyer CNP.  He is being seen with his group home staff member for his annual hearing evaluation. The patient has been seen previously in this clinic for assessment and results indicated a mild to moderate sensorineural hearing loss bilaterally.The patient reports no noted change in his hearing. The patient denies bilateral tinnitus, bilateral otalgia and bilateral drainage.     OBJECTIVE:  Otoscopic exam indicates ears are clear of cerumen bilaterally     Pure Tone Thresholds assessed using conventional audiometry with good  reliability from 250-8000 Hz bilaterally using circumaural headphones     RIGHT:  borderline-normal and moderate sensorineural hearing loss    LEFT:    borderline-normal and moderate sensorineural hearing loss    Tympanogram:    RIGHT: restricted eardrum mobility , (Type As)    LEFT:   restricted eardrum mobility , (Type As)    Speech Reception Threshold:    RIGHT: 20 dB HL    LEFT:   25 dB HL  Word Recognition Score:     RIGHT: 92% at 60 dB HL using W22 recorded word list.    LEFT:   88% at 65 dB HL using W22 recorded word list.      ASSESSMENT:   Normal to moderate sensorineural hearing loss bilaterally.     Compared to patient's previous audiogram dated 8/11/2016, hearing has remained stable bilaterally. Today s results were discussed with the patient and his groups home staff memeber in detail. A copy of the audiogram was given for his home medical records.    PLAN: It is recommended that the patient return for annual hearing evaluations. Patient was counseled regarding hearing loss and impact on communication.    Please call this clinic with questions regarding these results or recommendations.        Veronika Ramírez M.A. ROGELIO-AAA  Clinical audiologist [place] [facility] [id number]  8/21/2017

## 2017-08-21 NOTE — MR AVS SNAPSHOT
After Visit Summary   8/21/2017    Humberto Estrella    MRN: [id number]           Patient Information     Date Of Birth          1980        Visit Information        Provider Department      8/21/2017 3:00 PM Veronika Ramírez [facility]        Today's Diagnoses     Bilateral high frequency sensorineural hearing loss    -  1       Follow-ups after your visit        Your next 10 appointments already scheduled     Nov 10, 2017  7:30 AM CST   [facility] [place] [facility]    [address] [address]   [phone]           Patient must bring picture ID. Patient should be prepared to give a urine specimen  Please do not eat 10-12 hours before your appointment if you are coming in fasting for labs on lipids, cholesterol, or glucose (sugar). Pregnant women should follow their Care Team instructions. Water with medications is okay. Do not drink coffee or other fluids. If you have concerns about taking  your medications, please ask at office or if scheduling via Ginger Software, send a message by clicking on Secure Messaging, Message Your Care Team.              Who to contact     If you have questions or need follow up information about today's clinic visit or your schedule please contact [facility] directly at [phone].  Normal or non-critical lab and imaging results will be communicated to you by Bambecohart, letter or phone within 4 business days after the clinic has received the results. If you do not hear from us within 7 days, please contact the clinic through Bambecohart or phone. If you have a critical or abnormal lab result, we will notify you by phone as soon as possible.  Submit refill requests through [employer]inger Software or call your pharmacy and they will forward the refill request to us. Please allow 3 business days for your refill to be completed.          Additional Information About Your Visit        MyChart Information   "   Panelfly lets you send messages to your doctor, view your test results, renew your prescriptions, schedule appointments and more. To sign up, go to [email or web address] . Click on \"Log in\" on the left side of the screen, which will take you to the Welcome page. Then click on \"Sign up Now\" on the right side of the page.     You will be asked to enter the access code listed below, as well as some personal information. Please follow the directions to create your username and password.     Your access code is: [email or web address]  Expires: 10/24/2017 11:53 AM     Your access code will  in 90 days. If you need help or a new code, please call your [place] clinic or [phone].        Care EveryWhere ID     This is your Care EveryWhere ID. This could be used by other organizations to access your [place] medical records  [id number]         Blood Pressure from Last 3 Encounters:   17 104/57   17 121/72   17 120/67    Weight from Last 3 Encounters:   17 236 lb 3.2 oz (107.1 kg)   17 233 lb 4.8 oz (105.8 kg)   17 227 lb 3.2 oz (103.1 kg)              We Performed the Following     AUDIOGRAM/TYMPANOGRAM - INTERFACE     COMPREHENSIVE HEARING TEST     TYMPANOMETRY          Today's Medication Changes          These changes are accurate as of: 17  4:45 PM.  If you have any questions, ask your nurse or doctor.               These medicines have changed or have updated prescriptions.        Dose/Directions    triamcinolone 0.1 % cream   Commonly known as:  KENALOG   This may have changed:  additional instructions   Used for:  Eczema        Apply  topically 3 times daily. Apply sparingly to affected area.   Quantity:  30 g   Refills:  1                Primary Care Provider Office Phone # Fax #    CarlaGM Cunningham [facility] [phone] [address] [address]        Equal Access to Services     RAFIQ LOZANO AH: polly Osuna " roderick annejesus guido. So [facility] [phone].    ATENCIÓN: Si marissa lópez, tiene a ragland disposición servicios gratuitos de asistencia lingüística. Sd al [phone].    We comply with applicable federal civil rights laws and [place] laws. We do not discriminate on the basis of race, color, national origin, age, disability sex, sexual orientation or gender identity.            Thank you!     Thank you for choosing [facility]  for your care. Our goal is always to provide you with excellent care. Hearing back from our patients is one way we can continue to improve our services. Please take a few minutes to complete the written survey that you may receive in the mail after your visit with us. Thank you!             Your Updated Medication List - Protect others around you: Learn how to safely use, store and throw away your medicines at www.disposemymeds.org.          This list is accurate as of: 8/21/17  4:45 PM.  Always use your most recent med list.                   Brand Name Dispense Instructions for use Diagnosis    albuterol 108 (90 BASE) MCG/ACT Inhaler    albuterol    1 Inhaler    Inhale 2 puffs into the lungs every 6 hours as needed for shortness of breath / dyspnea or wheezing or cough    SOB (shortness of breath)       alum & mag hydroxide-simethicone 200-200-20 MG/5ML Susp suspension    MYLANTA/MAALOX     Take 30 mLs by mouth every 4 hours as needed for indigestion (2 tsp for upset stomach)        divalproex 500 MG 24 hr tablet    DEPAKOTE ER     Take 1,500 mg by mouth every morning        esomeprazole 40 MG CR capsule    nexIUM    30 capsule    Take 1 capsule (40 mg) by mouth every morning (before breakfast) Take 30-60 minutes before a eating.    Gastroesophageal reflux disease without esophagitis       fluticasone 50 MCG/ACT spray    FLONASE    1 Bottle    Spray 2 sprays into both nostrils daily    Nonallergic rhinitis        ibuprofen 600 MG tablet    ADVIL/MOTRIN    60 tablet    Take 1 tablet (600 mg) by mouth every 6 hours as needed for moderate pain    Hip pain, unspecified laterality       levocetirizine 5 MG tablet    XYZAL    31 tablet    Take 1 tablet (5 mg) by mouth every evening    Seasonal allergic rhinitis due to pollen       levothyroxine 75 MCG tablet    SYNTHROID/LEVOTHROID    90 tablet    Take 1 tablet (75 mcg) by mouth daily    Acquired hypothyroidism       MILK OF MAGNESIA 400 MG/5ML suspension   Generic drug:  magnesium hydroxide     360 mL    Take 30-60 mLs by mouth daily as needed for constipation or heartburn (If No Bowel Movement in 2 days.) Reported on 4/12/2017        OLANZapine 2.5 MG tablet    zyPREXA    30 tablet    Take 5 mg by mouth 3 times daily as needed Reported on 4/12/2017        order for DME      Equipment being ordered: CPAP AIRSENSE 10 11 CM H20 AIRFIT F20 MEDIUM SN# [id number]  DN# [id number]        pseudoePHEDrine 120 MG 12 hr tablet    SUDAFED 12 HOUR    28 tablet    Take 1 tablet (120 mg) by mouth as needed    Allergic rhinitis due to pollen, unspecified rhinitis seasonality       psyllium 58.6 % Powd    METAMUCIL MULTIHEALTH FIBER    1 Bottle    Take 1 capful daily    Diarrhea, unspecified type       risperDAL 1 MG tablet   Generic drug:  risperiDONE     60 tablet    Take 1 tablet in the morning and 2 tablets at 5 pm        ROBITUSSIN A-C OR      100/5ml, take 2 tsp full by mouth every 4 hours as needed for cough        sodium chloride 0.65 % nasal spray    SALINE MIST    1 Bottle    Spray 2 sprays into both nostrils At Bedtime    Nonallergic rhinitis       triamcinolone 0.1 % cream    KENALOG    30 g    Apply  topically 3 times daily. Apply sparingly to affected area.    Eczema       TYLENOL 325 MG tablet   Generic drug:  acetaminophen     100 tablet    Take 325-650 mg by mouth every 4 hours as needed Reported on 4/12/2017        UNABLE TO FIND      MEDICATION NAME: pseudefed PE PRN not to exceed 10  capsules in 24 hours        venlafaxine 37.5 MG 24 hr capsule    EFFEXOR-XR     Take 37.5 mg by mouth daily 3 caps daily to equal 112.5mg

## 2017-08-21 NOTE — TELEPHONE ENCOUNTER
Patients client is calling and stating that she is suppose to take this patient to a hearing screening test today and they called and said that they need an order for this in order to be covered by insurance. Please call Natali @[phone].  Awilda [facility]  Clinic Station  Flex

## 2017-09-27 ENCOUNTER — ALLIED HEALTH/NURSE VISIT (OUTPATIENT)
Dept: FAMILY MEDICINE | Facility: CLINIC | Age: 37
End: 2017-09-27
Payer: MEDICARE

## 2017-09-27 DIAGNOSIS — Z23 NEED FOR PROPHYLACTIC VACCINATION AND INOCULATION AGAINST INFLUENZA: Primary | ICD-10-CM

## 2017-09-27 PROCEDURE — 90686 IIV4 VACC NO PRSV 0.5 ML IM: CPT

## 2017-09-27 PROCEDURE — G0008 ADMIN INFLUENZA VIRUS VAC: HCPCS

## 2017-09-27 PROCEDURE — 99207 ZZC NO CHARGE NURSE ONLY: CPT

## 2017-09-27 NOTE — PROGRESS NOTES
Injectable Influenza Immunization Documentation    1.  Is the person to be vaccinated sick today?   No    2. Does the person to be vaccinated have an allergy to a component   of the vaccine?   No    3. Has the person to be vaccinated ever had a serious reaction   to influenza vaccine in the past?   No    4. Has the person to be vaccinated ever had Guillain-Barré syndrome?   No    Form completed by Raffi MCCLOUD CMA

## 2017-09-27 NOTE — MR AVS SNAPSHOT
After Visit Summary   9/27/2017    Humberto Estrella    MRN: [id number]           Patient Information     Date Of Birth          1980        Visit Information        Provider Department      9/27/2017 4:00 PM [facility]OT [facility]IC [facility]        Today's Diagnoses     Need for prophylactic vaccination and inoculation against influenza    -  1       Follow-ups after your visit        Your next 10 appointments already scheduled     Nov 10, 2017  7:30 AM [facility]   LAB with [place] [facility]   [facility]    [address] [address]   [phone]           Patient must bring picture ID. Patient should be prepared to give a urine specimen  Please do not eat 10-12 hours before your appointment if you are coming in fasting for labs on lipids, cholesterol, or glucose (sugar). Pregnant women should follow their Care Team instructions. Water with medications is okay. Do not drink coffee or other fluids. If you have concerns about taking  your medications, please ask at office or if scheduling via Artify It, send a message by clicking on Secure Messaging, Message Your Care Team.              Who to contact     If you have questions or need follow up information about today's clinic visit or your schedule please contact [facility] directly at [phone].  Normal or non-critical lab and imaging results will be communicated to you by MyChart, letter or phone within 4 business days after the clinic has received the results. If you do not hear from us within 7 days, please contact the clinic through InSound Medicalhart or phone. If you have a critical or abnormal lab result, we will notify you by phone as soon as possible.  Submit refill requests through Artify It or call your pharmacy and they will forward the refill request to us. Please allow 3 business days for your refill to be completed.          Additional Information About Your Visit       "  MyChart Information     Web Wonks lets you send messages to your doctor, view your test results, renew your prescriptions, schedule appointments and more. To sign up, go to [email or web address] . Click on \"Log in\" on the left side of the screen, which will take you to the Welcome page. Then click on \"Sign up Now\" on the right side of the page.     You will be asked to enter the access code listed below, as well as some personal information. Please follow the directions to create your username and password.     Your access code is: [email or web address]  Expires: 10/24/2017 11:53 AM     Your access code will  in 90 days. If you need help or a new code, please call your [place] clinic or [phone].        Care EveryWhere ID     This is your Care EveryWhere ID. This could be used by other organizations to access your [place] medical records  [id number]         Blood Pressure from Last 3 Encounters:   17 104/57   17 121/72   17 120/67    Weight from Last 3 Encounters:   17 236 lb 3.2 oz (107.1 kg)   17 233 lb 4.8 oz (105.8 kg)   17 227 lb 3.2 oz (103.1 kg)              We Performed the Following     ADMIN INFLUENZA (For MEDICARE Patients ONLY) []     FLU VAC, SPLIT VIRUS IM > 3 YO (QUADRIVALENT) [90461]          Today's Medication Changes          These changes are accurate as of: 17  4:10 PM.  If you have any questions, ask your nurse or doctor.               These medicines have changed or have updated prescriptions.        Dose/Directions    triamcinolone 0.1 % cream   Commonly known as:  KENALOG   This may have changed:  additional instructions   Used for:  Eczema        Apply  topically 3 times daily. Apply sparingly to affected area.   Quantity:  30 g   Refills:  1                Primary Care Provider Office Phone # Fax #    GM Bradshaw [phone] [address] [address]        Equal Access to Services     [facility] " GAAR : Hadii aad ku hadrobero Soulicesali, waaxda luqadaha, qaybta kaalmada adetiffanie, jseus chantalin hayaan zhannaalfredito toledo manoj . So [facility] [phone].    ATENCIÓN: Si habla español, tiene a ragland disposición servicios gratuitos de asistencia lingüística. Llame al [phone].    We comply with applicable federal civil rights laws and [place] laws. We do not discriminate on the basis of race, color, national origin, age, disability sex, sexual orientation or gender identity.            Thank you!     Thank you for choosing [facility]  for your care. Our goal is always to provide you with excellent care. Hearing back from our patients is one way we can continue to improve our services. Please take a few minutes to complete the written survey that you may receive in the mail after your visit with us. Thank you!             Your Updated Medication List - Protect others around you: Learn how to safely use, store and throw away your medicines at www.disposemymeds.org.          This list is accurate as of: 9/27/17  4:10 PM.  Always use your most recent med list.                   Brand Name Dispense Instructions for use Diagnosis    albuterol 108 (90 BASE) MCG/ACT Inhaler    PROAIR HFA    1 Inhaler    Inhale 2 puffs into the lungs every 6 hours as needed for shortness of breath / dyspnea or wheezing or cough    SOB (shortness of breath)       alum & mag hydroxide-simethicone 200-200-20 MG/5ML Susp suspension    MYLANTA/MAALOX     Take 30 mLs by mouth every 4 hours as needed for indigestion (2 tsp for upset stomach)        divalproex 500 MG 24 hr tablet    DEPAKOTE ER     Take 1,500 mg by mouth every morning        esomeprazole 40 MG CR capsule    nexIUM    30 capsule    Take 1 capsule (40 mg) by mouth every morning (before breakfast) Take 30-60 minutes before a eating.    Gastroesophageal reflux disease without esophagitis       fluticasone 50 MCG/ACT spray    FLONASE    1 Bottle    Spray 2 sprays into both  nostrils daily    Nonallergic rhinitis       ibuprofen 600 MG tablet    ADVIL/MOTRIN    60 tablet    Take 1 tablet (600 mg) by mouth every 6 hours as needed for moderate pain    Hip pain, unspecified laterality       levocetirizine 5 MG tablet    XYZAL    31 tablet    Take 1 tablet (5 mg) by mouth every evening    Seasonal allergic rhinitis due to pollen       levothyroxine 75 MCG tablet    SYNTHROID/LEVOTHROID    90 tablet    Take 1 tablet (75 mcg) by mouth daily    Acquired hypothyroidism       MILK OF MAGNESIA 400 MG/5ML suspension   Generic drug:  magnesium hydroxide     360 mL    Take 30-60 mLs by mouth daily as needed for constipation or heartburn (If No Bowel Movement in 2 days.) Reported on 4/12/2017        OLANZapine 2.5 MG tablet    zyPREXA    30 tablet    Take 5 mg by mouth 3 times daily as needed Reported on 4/12/2017        order for DME      Equipment being ordered: CPAP AIRSENSE 10 11 CM H20 AIRFIT F20 MEDIUM SN# [id number]  DN# [id number]        pseudoePHEDrine 120 MG 12 hr tablet    SUDAFED 12 HOUR    28 tablet    Take 1 tablet (120 mg) by mouth as needed    Allergic rhinitis due to pollen, unspecified rhinitis seasonality       psyllium 58.6 % Powd    METAMUCIL MULTIHEALTH FIBER    1 Bottle    Take 1 capful daily    Diarrhea, unspecified type       risperDAL 1 MG tablet   Generic drug:  risperiDONE     60 tablet    Take 1 tablet in the morning and 2 tablets at 5 pm        ROBITUSSIN A-C OR      100/5ml, take 2 tsp full by mouth every 4 hours as needed for cough        sodium chloride 0.65 % nasal spray    SALINE MIST    1 Bottle    Spray 2 sprays into both nostrils At Bedtime    Nonallergic rhinitis       triamcinolone 0.1 % cream    KENALOG    30 g    Apply  topically 3 times daily. Apply sparingly to affected area.    Eczema       TYLENOL 325 MG tablet   Generic drug:  acetaminophen     100 tablet    Take 325-650 mg by mouth every 4 hours as needed Reported on 4/12/2017        UNABLE TO FIND       MEDICATION NAME: pseudefed PE PRN not to exceed 10 capsules in 24 hours        venlafaxine 37.5 MG 24 hr capsule    EFFEXOR-XR     Take 37.5 mg by mouth daily 3 caps daily to equal 112.5mg

## 2017-10-17 ENCOUNTER — OFFICE VISIT (OUTPATIENT)
Dept: FAMILY MEDICINE | Facility: CLINIC | Age: 37
End: 2017-10-17
Payer: MEDICARE

## 2017-10-17 VITALS
DIASTOLIC BLOOD PRESSURE: 66 MMHG | HEART RATE: 102 BPM | SYSTOLIC BLOOD PRESSURE: 126 MMHG | HEIGHT: 63 IN | TEMPERATURE: 98.3 F | WEIGHT: 243 LBS | BODY MASS INDEX: 43.05 KG/M2

## 2017-10-17 DIAGNOSIS — E66.09 OBESITY DUE TO EXCESS CALORIES, UNSPECIFIED OBESITY SEVERITY: Chronic | ICD-10-CM

## 2017-10-17 DIAGNOSIS — E03.8 SUBCLINICAL HYPOTHYROIDISM: ICD-10-CM

## 2017-10-17 DIAGNOSIS — M54.50 ACUTE BILATERAL LOW BACK PAIN WITHOUT SCIATICA: Primary | ICD-10-CM

## 2017-10-17 PROCEDURE — 99214 OFFICE O/P EST MOD 30 MIN: CPT | Performed by: NURSE PRACTITIONER

## 2017-10-17 NOTE — PROGRESS NOTES
SUBJECTIVE:   Humberto Estrella is a 37 year old male who presents to clinic today for the following health issues:    Patient accompanied by care taker from his group home.       Back Pain       Duration: one week        Specific cause: none    Description:   Location of pain: low back bilateral  Character of pain: sharp  Pain radiation:none  New numbness or weakness in legs, not attributed to pain:  no     Intensity: Currently 5/10    History:   Pain interferes with job: Not applicable,   History of back problems: no prior back problems  Any previous MRI or X-rays: None  Sees a specialist for back pain:  No  Therapies tried without relief: ibuprofen, ice packs    Alleviating factors:   Improved by: nothing      Precipitating factors:  Worsened by: Bending    Functional and Psychosocial Screen (Heidy STarT Back):      Not performed today          Accompanying Signs & Symptoms:  Risk of Fracture:  None  Risk of Cauda Equina:  None  Risk of Infection:  None  Risk of Cancer:  None  Risk of Ankylosing Spondylitis:  Onset at age <35, male, AND morning back stiffness. no     Patient tells me that he is having bilateral lower back pain that started a week ago- denies injury or fall or heavy lifting. Pain does not radiate or cause numbness, tingling or weakness in extremity.     Obesity: on review of chart patient has gradually gained weight from 226 lbs in 1/2017- 243 lbs 10/2017. He states that he goes for 2 30 minute walks/day.     Hypothyroidism: elevated TSh on 8/2017;   Lab Results   Component Value Date    TSH 11.52 08/16/2017         -------------------------------------    Problem list and histories reviewed & adjusted, as indicated.  Additional history: as documented    Patient Active Problem List   Diagnosis     TRISOMY 21 (DOWN SYNDROME)     Hearing loss     MYOPIA     LATENT NYSTAGMUS     Headache     INTERMITT EXPLOSIVE DIS     post traumatic stress disorder     CARDIOVASCULAR SCREENING; LDL GOAL LESS THAN  "160     Cortical, lamellar, or zonular cataract, nonsenile     Nonallergic rhinitis     Obesity due to excess calories, unspecified obesity severity     Subclinical hypothyroidism     VIDAL (obstructive sleep apnea)-AHI 26     Health care maintenance     Past Surgical History:   Procedure Laterality Date     PE TUBES      Left     PHACOEMULSIFICATION WITH STANDARD INTRAOCULAR LENS IMPLANT  10/3/2013    Procedure: PHACOEMULSIFICATION WITH STANDARD INTRAOCULAR LENS IMPLANT;  Left Kelman Phacoemulsification with Intraocular Lens Implant;  Surgeon: Luis Barajas MD;  Location: [facility]     PHACOEMULSIFICATION WITH STANDARD INTRAOCULAR LENS IMPLANT  5/1/2014    Procedure: PHACOEMULSIFICATION WITH STANDARD INTRAOCULAR LENS IMPLANT;  Surgeon: Luis Barajas MD;  Location: [facility]       Social History   Substance Use Topics     Smoking status: Former Smoker     Quit date: 8/16/1998     Smokeless tobacco: Never Used     Alcohol use No     Family History   Problem Relation Age of Onset     Unknown/Adopted Mother              Reviewed and updated as needed this visit by clinical staff       Reviewed and updated as needed this visit by Provider         ROS:  Constitutional, HEENT, cardiovascular, pulmonary, gi and gu systems are negative, except as otherwise noted.      OBJECTIVE:   /66 (BP Location: Left arm, Patient Position: Chair, Cuff Size: Adult Large)  Pulse 102  Temp 98.3  F (36.8  C) (Tympanic)  Ht 5' 3\" (1.6 m)  Wt 243 lb (110.2 kg)  BMI 43.05 kg/m2  Body mass index is 43.05 kg/(m^2).  GENERAL APPEARANCE: alert, no distress and over weight  ORTHO: Lumber/Thoracic Spine Exam: Tender:  left para lumbar muscles, right para lumbar muscles  Non-tender:  thoracic spinous processes  Range of Motion:  lumbar flexion  full, lumbar extension  decreased, left lateral lumbar bending  decreased, right lateral lumbar bending  decreased  Strength:  able to heel walk, able to toe walk  Special tests:  negative straight " leg raises      Diagnostic Test Results:  none     ASSESSMENT/PLAN:       1. Acute bilateral low back pain without sciatica  Likely MSK - vs nreve root compression given short duration of symptoms / gradual onset/  no red flags of radiculopathy   - patient is overweight- dicussed weight loss to help prevent back pain  - handout given to patient on exercises/ stretching - group home requesting referral to chiropractor.   - CHIROPRACTIC REFERRAL  - dicussed ICE/Heat prn/ tylenol alternating with NSAIDS prn.       2. Obesity due to excess calories, unspecified obesity severity  Can be attributed to elevated TSH level- needs to recheck TSH level  Discussed importance of weight loss/ exercise/healthy eating       3. Subclinical hypothyroidism  Patient needs to recheck TSH level- can be contributing to his weight gain.           GM Bradshaw [facility]

## 2017-10-17 NOTE — MR AVS SNAPSHOT
After Visit Summary   10/17/2017    Humberto Estrella    MRN: [id number]           Patient Information     Date Of Birth          1980        Visit Information        Provider Department      10/17/2017 1:20 PM Carla Meyer APRN [facility]        Today's Diagnoses     Acute bilateral low back pain without sciatica    -  1      Care Instructions    1.     Exercises to Strengthen Your Lower Back  Strong lower back and abdominal muscles work together to support your spine. The exercises below will help strengthen the lower back. It is important that you begin exercising slowly and increase levels gradually.  Always begin any exercise program with stretching. If you feel pain while doing any of these exercises, stop and talk to your doctor about a more specific exercise program that better suits your condition.   Low back stretch  The point of stretching is to make you more flexible and increase your range of motion. Stretch only as much as you are able. Stretch slowly. Do not push your stretch to the limit. If at any point you feel pain while stretching, this is your (temporary) limit.    Lie on your back with your knees bent and both feet on the ground.    Slowly raise your left knee to your chest as you flatten your lower back against the floor. Hold for 5 seconds.    Relax and repeat the exercise with your right knee.    Do 10 of these exercises for each leg.    Repeat hugging both knees to your chest at the same time.  Building lower back strength  Start your exercise routine with 10 to 30 minutes a day, 1 to 3 times a day.  Initial exercises  Lying on your back:  1. Ankle pumps: Move your foot up and down, towards your head, and then away. Repeat 10 times with each foot.  2. Heel slides: Slowly bend your knee, drawing the heel of your foot towards you. Then slide your heel/foot from you, straightening your knee. Do not lift your foot off the floor (this is not a leg  lift).  3. Abdominal contraction: Bend your knees and put your hands on your stomach. Tighten your stomach muscles. Hold for 5 seconds, then relax. Repeat 10 times.  4. Straight leg raise: Bend one leg at the knee and keep the other leg straight. Tighten your stomach muscles. Slowly lift your straight leg 6 to 12 inches off the floor and hold for up to 5 seconds. Repeat 10 times on each side.  Standin. Wall squats: Stand with your back against the wall. Move your feet about 12 inches away from the wall. Tighten your stomach muscles, and slowly bend your knees until they are at about a 45 degree angle. Do not go down too far. Hold about 5 seconds. Then slowly return to your starting position. Repeat 10 times.  2. Heel raises: Stand facing the wall. Slowly raise the heels of your feet up and down, while keeping your toes on the floor. If you have trouble balancing, you can touch the wall with your hands. Repeat 10 times.  More advanced exercises  When you feel comfortable enough, try these exercises.  1. Kneeling lumbar extension: Begin on your hands and knees. At the same time, raise and straighten your right arm and left leg until they are parallel to the ground. Hold for 2 seconds and come back slowly to a starting position. Repeat with left arm and right leg, alternating 10 times.  2. Prone lumbar extension: Lie face down, arms extended overhead, palms on the floor. At the same time, raise your right arm and left leg as high as comfortably possible. Hold for 10 seconds and slowly return to start. Repeat with left arm and right leg, alternating 10 times. Gradually build up to 20 times. (Advanced: Repeat this exercise raising both arms and both legs a few inches off the floor at the same time. Hold for 5 seconds and release.)  3. Pelvic tilt: Lie on the floor on your back with your knees bent at 90 degrees. Your feet should be flat on the floor. Inhale, exhale, then slowly contract your abdominal muscles  bringing your navel toward your spine. Let your pelvis rock back until your lower back is flat on the floor. Hold for 10 seconds while breathing smoothly.  4. Abdominal crunch: Perform a pelvic tilt (above) flattening your lower back against the floor. Holding the tension in your abdominal muscles, take another breath and raise your shoulder blades off the ground (this is not a full sit-up). Keep your head in line with your body (don t bend your neck forward). Hold for 2 seconds, then slowly lower.  Date Last Reviewed: 6/1/2016 2000-2017 [employer]. [address] [place] [address]. All rights reserved. This information is not intended as a substitute for professional medical care. Always follow your healthcare professional's instructions.        Back Exercises: Back Press    Do this exercise on your hands and knees. Keep your knees under your hips and your hands under your shoulders. Keep your spine in a neutral position (not arched or sagging). Be sure to maintain your neck s natural curve:    Tighten your stomach and buttock muscles to press your back upward. Let your head drop slightly.    Hold for 5 seconds. Return to starting position.    Repeat 5 times.  Date Last Reviewed: 10/11/2015    1099-5570 [employer]. [address] [place] [address]. All rights reserved. This information is not intended as a substitute for professional medical care. Always follow your healthcare professional's instructions.        Back Exercises: Back Release  Do this exercise on your hands and knees. Keep your knees under your hips and your hands under your shoulders.        Relax your abdominal and buttocks muscles, lift your head, and let your back sag. Be sure to keep your weight evenly distributed. Don t sit back on your hips.     Hold for 5 seconds.    Return to starting position.    Tuck your head and lift (arch) your back.    Hold for 5 seconds    Return to starting  position.    Repeat 5 times.  Date Last Reviewed: 8/16/2015 2000-2017 [employer]. [address] [place] [address]. All rights reserved. This information is not intended as a substitute for professional medical care. Always follow your healthcare professional's instructions.        Back Exercises: Knee Lift         To start, lie on your back with your knees bent and feet flat on the floor. Don t press your neck or lower back to the floor. Breathe deeply. You should feel comfortable and relaxed in this position:    Start by tightening your abdominal muscles.    Lift one bent knee off the floor 2 to 4 inches.    Hold for 10 seconds. Return to start position.    Repeat 3 times.    Switch legs.  Date Last Reviewed: 8/16/2015 2000-2017 [employer]. [address] [place] [address]. All rights reserved. This information is not intended as a substitute for professional medical care. Always follow your healthcare professional's instructions.        Back Exercises: Leg Pull    To start, lie on your back with your knees bent and feet flat on the floor. Don t press your neck or lower back to the floor. Breathe deeply. You should feel comfortable and relaxed in this position.    Pull one knee to your chest.    Hold for 30 to 60 seconds. Return to starting position.    Repeat 2 times.    Switch legs.    For a double leg pull, pull both legs to your chest at the same time. Repeat 2 times.  For your safety, check with your healthcare provider before starting an exercise program.   Date Last Reviewed: 8/16/2015 2000-2017 [employer]. [address] [place] [address]. All rights reserved. This information is not intended as a substitute for professional medical care. Always follow your healthcare professional's instructions.                Follow-ups after your visit        Additional Services     CHIROPRACTIC REFERRAL       Your provider has referred you  to: patient's preference     Please be aware that coverage of these services is subject to the terms and limitations of your health insurance plan.  Call member services at your health plan with any benefit or coverage questions.      Please bring the following to your appointment:    >>   Any x-rays, CTs or MRIs which have been performed.  Contact the facility where they were done to arrange for  prior to your scheduled appointment.    >>   List of current medications   >>   This referral request   >>   Any documents/labs given to you for this referral                  Your next 10 appointments already scheduled     Nov 10, 2017  7:30 AM CST   LAB with [facility] [facility]   [facility]    [address] [address]   [phone]           Patient must bring picture ID. Patient should be prepared to give a urine specimen  Please do not eat 10-12 hours before your appointment if you are coming in fasting for labs on lipids, cholesterol, or glucose (sugar). Pregnant women should follow their Care Team instructions. Water with medications is okay. Do not drink coffee or other fluids. If you have concerns about taking  your medications, please ask at office or if scheduling via [employer], send a message by clicking on Secure Messaging, Message Your Care Team.              Who to contact     If you have questions or need follow up information about today's clinic visit or your schedule please contact [facility] directly at [phone].  Normal or non-critical lab and imaging results will be communicated to you by MyChart, letter or phone within 4 business days after the clinic has received the results. If you do not hear from us within 7 days, please contact the clinic through [employer] or phone. If you have a critical or abnormal lab result, we will notify you by phone as soon as possible.  Submit refill requests through [employer] or call your pharmacy and  "they will forward the refill request to us. Please allow 3 business days for your refill to be completed.          Additional Information About Your Visit        MyChart Information     [email or web address] lets you send messages to your doctor, view your test results, renew your prescriptions, schedule appointments and more. To sign up, go to [email or web address] . Click on \"Log in\" on the left side of the screen, which will take you to the Welcome page. Then click on \"Sign up Now\" on the right side of the page.     You will be asked to enter the access code listed below, as well as some personal information. Please follow the directions to create your username and password.     Your access code is: [email or web address]  Expires: 10/24/2017 11:53 AM     Your access code will  in 90 days. If you need help or a new code, please call your [facility] clinic or [phone].        Care EveryWhere ID     This is your [facility] EveryWhere ID. This could be used by other organizations to access your [facility] medical records  [id number]        Your Vitals Were     Pulse Temperature Height BMI (Body Mass Index)          102 98.3  F (36.8  C) (Tympanic) 5' 3\" (1.6 m) 43.05 kg/m2         Blood Pressure from Last 3 Encounters:   10/17/17 126/66   17 104/57   17 121/72    Weight from Last 3 Encounters:   10/17/17 243 lb (110.2 kg)   17 236 lb 3.2 oz (107.1 kg)   17 233 lb 4.8 oz (105.8 kg)              We Performed the Following     CHIROPRACTIC REFERRAL        Primary Care Provider Office Phone # Fax #    Carla GM [facility] [phone] [address] [facility] [address]        Equal Access to Services     RAFIQ LOZANO : Liban Elizalde, polly anne, roderick singleton, jesus eugene. [facility] [phone].    ATENCIÓN: Si habla español, tiene a ragland disposición servicios gratuitos de asistencia lingüística. Llame al [phone].    We " comply with applicable federal civil rights laws and [place] laws. We do not discriminate on the basis of race, color, national origin, age, disability, sex, sexual orientation, or gender identity.            Thank you!     Thank you for choosing [facility]  for your care. Our goal is always to provide you with excellent care. Hearing back from our patients is one way we can continue to improve our services. Please take a few minutes to complete the written survey that you may receive in the mail after your visit with us. Thank you!             Your Updated Medication List - Protect others around you: Learn how to safely use, store and throw away your medicines at www.disposemymeds.org.          This list is accurate as of: 10/17/17  1:29 PM.  Always use your most recent med list.                   Brand Name Dispense Instructions for use Diagnosis    albuterol 108 (90 BASE) MCG/ACT Inhaler    PROAIR HFA    1 Inhaler    Inhale 2 puffs into the lungs every 6 hours as needed for shortness of breath / dyspnea or wheezing or cough    SOB (shortness of breath)       alum & mag hydroxide-simethicone 200-200-20 MG/5ML Susp suspension    MYLANTA/MAALOX     Take 30 mLs by mouth every 4 hours as needed for indigestion (2 tsp for upset stomach)        divalproex 500 MG 24 hr tablet    DEPAKOTE ER     Take 1,500 mg by mouth every morning        esomeprazole 40 MG CR capsule    nexIUM    30 capsule    Take 1 capsule (40 mg) by mouth every morning (before breakfast) Take 30-60 minutes before a eating.    Gastroesophageal reflux disease without esophagitis       fluticasone 50 MCG/ACT spray    FLONASE    1 Bottle    Spray 2 sprays into both nostrils daily    Nonallergic rhinitis       ibuprofen 600 MG tablet    ADVIL/MOTRIN    60 tablet    Take 1 tablet (600 mg) by mouth every 6 hours as needed for moderate pain    Hip pain, unspecified laterality       levocetirizine 5 MG tablet    XYZAL    31 tablet    Take 1  tablet (5 mg) by mouth every evening    Seasonal allergic rhinitis due to pollen       levothyroxine 75 MCG tablet    SYNTHROID/LEVOTHROID    90 tablet    Take 1 tablet (75 mcg) by mouth daily    Acquired hypothyroidism       MILK OF MAGNESIA 400 MG/5ML suspension   Generic drug:  magnesium hydroxide     360 mL    Take 30-60 mLs by mouth daily as needed for constipation or heartburn (If No Bowel Movement in 2 days.) Reported on 4/12/2017        OLANZapine 2.5 MG tablet    zyPREXA    30 tablet    Take 5 mg by mouth 3 times daily as needed Reported on 4/12/2017        order for DME      Equipment being ordered: CPAP AIRSENSE 10 11 CM H20 AIRFIT F20 MEDIUM SN# [id number]  DN# [id number]        pseudoePHEDrine 120 MG 12 hr tablet    SUDAFED 12 HOUR    28 tablet    Take 1 tablet (120 mg) by mouth as needed    Allergic rhinitis due to pollen, unspecified rhinitis seasonality       psyllium 58.6 % Powd    METAMUCIL MULTIHEALTH FIBER    1 Bottle    Take 1 capful daily    Diarrhea, unspecified type       risperDAL 1 MG tablet   Generic drug:  risperiDONE     60 tablet    Take 1 tablet in the morning and 2 tablets at 5 pm        ROBITUSSIN A-C OR      100/5ml, take 2 tsp full by mouth every 4 hours as needed for cough        sodium chloride 0.65 % nasal spray    SALINE MIST    1 Bottle    Spray 2 sprays into both nostrils At Bedtime    Nonallergic rhinitis       triamcinolone 0.1 % cream    KENALOG    30 g    Apply  topically 3 times daily. Apply sparingly to affected area.    Eczema       TYLENOL 325 MG tablet   Generic drug:  acetaminophen     100 tablet    Take 325-650 mg by mouth every 4 hours as needed Reported on 4/12/2017        UNABLE TO FIND      MEDICATION NAME: pseudefed PE PRN not to exceed 10 capsules in 24 hours        venlafaxine 37.5 MG 24 hr capsule    EFFEXOR-XR     Take 37.5 mg by mouth daily 3 caps daily to equal 112.5mg

## 2017-10-17 NOTE — PATIENT INSTRUCTIONS
1.     Exercises to Strengthen Your Lower Back  Strong lower back and abdominal muscles work together to support your spine. The exercises below will help strengthen the lower back. It is important that you begin exercising slowly and increase levels gradually.  Always begin any exercise program with stretching. If you feel pain while doing any of these exercises, stop and talk to your doctor about a more specific exercise program that better suits your condition.   Low back stretch  The point of stretching is to make you more flexible and increase your range of motion. Stretch only as much as you are able. Stretch slowly. Do not push your stretch to the limit. If at any point you feel pain while stretching, this is your (temporary) limit.    Lie on your back with your knees bent and both feet on the ground.    Slowly raise your left knee to your chest as you flatten your lower back against the floor. Hold for 5 seconds.    Relax and repeat the exercise with your right knee.    Do 10 of these exercises for each leg.    Repeat hugging both knees to your chest at the same time.  Building lower back strength  Start your exercise routine with 10 to 30 minutes a day, 1 to 3 times a day.  Initial exercises  Lying on your back:  1. Ankle pumps: Move your foot up and down, towards your head, and then away. Repeat 10 times with each foot.  2. Heel slides: Slowly bend your knee, drawing the heel of your foot towards you. Then slide your heel/foot from you, straightening your knee. Do not lift your foot off the floor (this is not a leg lift).  3. Abdominal contraction: Bend your knees and put your hands on your stomach. Tighten your stomach muscles. Hold for 5 seconds, then relax. Repeat 10 times.  4. Straight leg raise: Bend one leg at the knee and keep the other leg straight. Tighten your stomach muscles. Slowly lift your straight leg 6 to 12 inches off the floor and hold for up to 5 seconds. Repeat 10 times on each  side.  Standin. Wall squats: Stand with your back against the wall. Move your feet about 12 inches away from the wall. Tighten your stomach muscles, and slowly bend your knees until they are at about a 45 degree angle. Do not go down too far. Hold about 5 seconds. Then slowly return to your starting position. Repeat 10 times.  2. Heel raises: Stand facing the wall. Slowly raise the heels of your feet up and down, while keeping your toes on the floor. If you have trouble balancing, you can touch the wall with your hands. Repeat 10 times.  More advanced exercises  When you feel comfortable enough, try these exercises.  1. Kneeling lumbar extension: Begin on your hands and knees. At the same time, raise and straighten your right arm and left leg until they are parallel to the ground. Hold for 2 seconds and come back slowly to a starting position. Repeat with left arm and right leg, alternating 10 times.  2. Prone lumbar extension: Lie face down, arms extended overhead, palms on the floor. At the same time, raise your right arm and left leg as high as comfortably possible. Hold for 10 seconds and slowly return to start. Repeat with left arm and right leg, alternating 10 times. Gradually build up to 20 times. (Advanced: Repeat this exercise raising both arms and both legs a few inches off the floor at the same time. Hold for 5 seconds and release.)  3. Pelvic tilt: Lie on the floor on your back with your knees bent at 90 degrees. Your feet should be flat on the floor. Inhale, exhale, then slowly contract your abdominal muscles bringing your navel toward your spine. Let your pelvis rock back until your lower back is flat on the floor. Hold for 10 seconds while breathing smoothly.  4. Abdominal crunch: Perform a pelvic tilt (above) flattening your lower back against the floor. Holding the tension in your abdominal muscles, take another breath and raise your shoulder blades off the ground (this is not a full sit-up).  Keep your head in line with your body (don t bend your neck forward). Hold for 2 seconds, then slowly lower.  Date Last Reviewed: 6/1/2016 2000-2017 [employer]. [address] [place] [address]. All rights reserved. This information is not intended as a substitute for professional medical care. Always follow your healthcare professional's instructions.        Back Exercises: Back Press    Do this exercise on your hands and knees. Keep your knees under your hips and your hands under your shoulders. Keep your spine in a neutral position (not arched or sagging). Be sure to maintain your neck s natural curve:    Tighten your stomach and buttock muscles to press your back upward. Let your head drop slightly.    Hold for 5 seconds. Return to starting position.    Repeat 5 times.  Date Last Reviewed: 10/11/2015    3153-9075 [employer]. [address] [place] [address]. All rights reserved. This information is not intended as a substitute for professional medical care. Always follow your healthcare professional's instructions.        Back Exercises: Back Release  Do this exercise on your hands and knees. Keep your knees under your hips and your hands under your shoulders.        Relax your abdominal and buttocks muscles, lift your head, and let your back sag. Be sure to keep your weight evenly distributed. Don t sit back on your hips.     Hold for 5 seconds.    Return to starting position.    Tuck your head and lift (arch) your back.    Hold for 5 seconds    Return to starting position.    Repeat 5 times.  Date Last Reviewed: 8/16/2015 2000-2017 [employer]. [address] [place] [address]. All rights reserved. This information is not intended as a substitute for professional medical care. Always follow your healthcare professional's instructions.        Back Exercises: Knee Lift         To start, lie on your back with your knees bent and feet  flat on the floor. Don t press your neck or lower back to the floor. Breathe deeply. You should feel comfortable and relaxed in this position:    Start by tightening your abdominal muscles.    Lift one bent knee off the floor 2 to 4 inches.    Hold for 10 seconds. Return to start position.    Repeat 3 times.    Switch legs.  Date Last Reviewed: 8/16/2015 2000-2017 [employer]. [address] [place] [address]. All rights reserved. This information is not intended as a substitute for professional medical care. Always follow your healthcare professional's instructions.        Back Exercises: Leg Pull    To start, lie on your back with your knees bent and feet flat on the floor. Don t press your neck or lower back to the floor. Breathe deeply. You should feel comfortable and relaxed in this position.    Pull one knee to your chest.    Hold for 30 to 60 seconds. Return to starting position.    Repeat 2 times.    Switch legs.    For a double leg pull, pull both legs to your chest at the same time. Repeat 2 times.  For your safety, check with your healthcare provider before starting an exercise program.   Date Last Reviewed: 8/16/2015 2000-2017 [employer]. [address] [place] [address]. All rights reserved. This information is not intended as a substitute for professional medical care. Always follow your healthcare professional's instructions.

## 2017-11-10 DIAGNOSIS — E03.8 SUBCLINICAL HYPOTHYROIDISM: ICD-10-CM

## 2017-11-10 LAB
T4 FREE SERPL-MCNC: 0.92 NG/DL (ref 0.76–1.46)
TSH SERPL DL<=0.005 MIU/L-ACNC: 5.62 MU/L (ref 0.4–4)

## 2017-11-10 PROCEDURE — 84443 ASSAY THYROID STIM HORMONE: CPT | Performed by: NURSE PRACTITIONER

## 2017-11-10 PROCEDURE — 84439 ASSAY OF FREE THYROXINE: CPT | Performed by: NURSE PRACTITIONER

## 2017-11-10 PROCEDURE — 36415 COLL VENOUS BLD VENIPUNCTURE: CPT | Performed by: NURSE PRACTITIONER

## 2017-11-12 DIAGNOSIS — E03.9 ACQUIRED HYPOTHYROIDISM: Primary | ICD-10-CM

## 2017-11-12 RX ORDER — LEVOTHYROXINE SODIUM 88 UG/1
88 TABLET ORAL DAILY
Qty: 90 TABLET | Refills: 3 | Status: SHIPPED | OUTPATIENT
Start: 2017-11-12 | End: 2018-02-19 | Stop reason: DRUGHIGH

## 2017-11-13 ENCOUNTER — DOCUMENTATION ONLY (OUTPATIENT)
Dept: SLEEP MEDICINE | Facility: CLINIC | Age: 37
End: 2017-11-13

## 2017-11-13 ENCOUNTER — TELEPHONE (OUTPATIENT)
Dept: PEDIATRICS | Facility: CLINIC | Age: 37
End: 2017-11-13

## 2017-11-13 NOTE — PROGRESS NOTES
NAT'S MASK WAS BROKEN AND MICHAEL FROM HIS [facility] HOME CALLED TO SEE IF SHE COULD COME  A NEW MASK FOR HIM. I TOLD HER SHE COULD COME IN AND GET THAT TODAY. SHE PICKED UP THE AIRFIT F20 MEDIUM TODAY FOR PATIENT.

## 2017-12-13 DIAGNOSIS — R19.7 DIARRHEA, UNSPECIFIED TYPE: ICD-10-CM

## 2017-12-15 RX ORDER — PSYLLIUM HUSK 3 G/5.8 G
POWDER (GRAM) ORAL
Qty: 426 G | Refills: 11 | Status: SHIPPED | OUTPATIENT
Start: 2017-12-15 | End: 2019-03-09

## 2017-12-15 NOTE — TELEPHONE ENCOUNTER
Routing refill request to provider for review/approval because:  Drug not active on patient's medication list  Nii Koroma RN

## 2018-01-24 ENCOUNTER — TELEPHONE (OUTPATIENT)
Dept: FAMILY MEDICINE | Facility: CLINIC | Age: 38
End: 2018-01-24

## 2018-01-24 ENCOUNTER — OFFICE VISIT (OUTPATIENT)
Dept: FAMILY MEDICINE | Facility: CLINIC | Age: 38
End: 2018-01-24
Payer: MEDICARE

## 2018-01-24 VITALS
TEMPERATURE: 97.4 F | SYSTOLIC BLOOD PRESSURE: 120 MMHG | HEART RATE: 85 BPM | BODY MASS INDEX: 43.73 KG/M2 | DIASTOLIC BLOOD PRESSURE: 69 MMHG | WEIGHT: 246.8 LBS | HEIGHT: 63 IN

## 2018-01-24 DIAGNOSIS — L71.0 PERIORAL DERMATITIS: Primary | ICD-10-CM

## 2018-01-24 PROCEDURE — 99213 OFFICE O/P EST LOW 20 MIN: CPT | Performed by: NURSE PRACTITIONER

## 2018-01-24 RX ORDER — DOXYCYCLINE 100 MG/1
100 CAPSULE ORAL DAILY
Qty: 90 CAPSULE | Refills: 0 | Status: SHIPPED | OUTPATIENT
Start: 2018-01-24 | End: 2018-04-24

## 2018-01-24 NOTE — PATIENT INSTRUCTIONS
Thank you for choosing [facility].  You may be receiving a survey in the mail from [employer] regarding your visit today.  Please take a few minutes to complete and return the survey to let us know how we are doing.      If you have questions or concerns, please contact us via [employer] or you can contact your care team at [phone].    Our Clinic hours are:  Monday 6:40 am  to 7:00 pm  Tuesday -Friday 6:40 am to 5:00 pm    The [place] outpatient lab hours are:  Monday - Friday 6:10 am to 4:45 pm  Saturdays 7:00 am to 11:00 am  Appointments are required, call [phone]    If you have clinical questions after hours or would like to schedule an appointment,  call the clinic at [phone].

## 2018-01-24 NOTE — PROGRESS NOTES
SUBJECTIVE:   Humberto Estrella is a 37 year old male who presents to clinic today for the following health issues:      Rash  Onset: ongoing, not getting better     Description:   Location: Face - around mouth and in between eyes  Character: blotchy, red  Itching (Pruritis): no     Progression of Symptoms:  same    Accompanying Signs & Symptoms:  Fever: no   Body aches or joint pain: no   Sore throat symptoms: no   Recent cold symptoms: no     History:   Previous similar rash: YES    Precipitating factors:   Exposure to similar rash: no   New exposures: None   Recent travel: no     Alleviating factors:  None    Therapies Tried and outcome: Triamcinolone 0.1% cream- helps on and off   Patient does wear c pap machine mask, unsure if this is due to it .      Problem list and histories reviewed & adjusted, as indicated.  Additional history: as documented    Patient Active Problem List   Diagnosis     TRISOMY 21 (DOWN SYNDROME)     Hearing loss     MYOPIA     LATENT NYSTAGMUS     Headache     INTERMITT EXPLOSIVE DIS     post traumatic stress disorder     CARDIOVASCULAR SCREENING; LDL GOAL LESS THAN 160     Cortical, lamellar, or zonular cataract, nonsenile     Nonallergic rhinitis     Obesity due to excess calories, unspecified obesity severity     Subclinical hypothyroidism     VIDAL (obstructive sleep apnea)-AHI 26     Health care maintenance     Past Surgical History:   Procedure Laterality Date     PE TUBES      Left     PHACOEMULSIFICATION WITH STANDARD INTRAOCULAR LENS IMPLANT  10/3/2013    Procedure: PHACOEMULSIFICATION WITH STANDARD INTRAOCULAR LENS IMPLANT;  Left Kelman Phacoemulsification with Intraocular Lens Implant;  Surgeon: Luis Barajas MD;  Location: [facility]     PHACOEMULSIFICATION WITH STANDARD INTRAOCULAR LENS IMPLANT  5/1/2014    Procedure: PHACOEMULSIFICATION WITH STANDARD INTRAOCULAR LENS IMPLANT;  Surgeon: Luis Barajas MD;  Location: [facility] OR       Social History   Substance Use Topics      "Smoking status: Former Smoker     Quit date: 8/16/1998     Smokeless tobacco: Never Used     Alcohol use No     Family History   Problem Relation Age of Onset     Unknown/Adopted Mother            Reviewed and updated as needed this visit by clinical staff  Tobacco  Allergies  Med Hx  Surg Hx  Fam Hx  Soc Hx      Reviewed and updated as needed this visit by Provider         ROS:  Constitutional, HEENT, cardiovascular, pulmonary, gi and gu systems are negative, except as otherwise noted.    OBJECTIVE:     /69 (BP Location: Right arm, Patient Position: Chair, Cuff Size: Adult Large)  Pulse 85  Temp 97.4  F (36.3  C) (Tympanic)  Ht 5' 3\" (1.6 m)  Wt 246 lb 12.8 oz (111.9 kg)  BMI 43.72 kg/m2  Body mass index is 43.72 kg/(m^2).  GENERAL: healthy, alert and no distress  EYES: Eyes grossly normal to inspection, PERRL and conjunctivae and sclerae normal  SKIN: maculopapular rash around mouth and in between eyes    Diagnostic Test Results:  none     ASSESSMENT/PLAN:       ICD-10-CM    1. Perioral dermatitis L71.0 doxycycline (VIBRAMYCIN) 100 MG capsule       FUTURE APPOINTMENTS:       - Follow-up visit in 2-3 weeks for persistent symptoms, sooner PRN new or worsening symptoms. Antibiotic for 3 months if effective and then stop.    GM Kebede [facility]  "

## 2018-01-24 NOTE — TELEPHONE ENCOUNTER
Form from [place] Dept of Human Services-Physician's Orders. Orders were signed, faxed and sent to [facility].    [facility] [place]

## 2018-01-24 NOTE — NURSING NOTE
"Chief Complaint   Patient presents with     Derm Problem     Rash On Face       Initial /69 (BP Location: Right arm, Patient Position: Chair, Cuff Size: Adult Large)  Pulse 85  Temp 97.4  F (36.3  C) (Tympanic)  Ht 5' 3\" (1.6 m)  Wt 246 lb 12.8 oz (111.9 kg)  BMI 43.72 kg/m2 Estimated body mass index is 43.72 kg/(m^2) as calculated from the following:    Height as of this encounter: 5' 3\" (1.6 m).    Weight as of this encounter: 246 lb 12.8 oz (111.9 kg).  Medication Reconciliation: complete    "

## 2018-01-24 NOTE — MR AVS SNAPSHOT
After Visit Summary   1/24/2018    Humberto Estrella    MRN: [id number]           Patient Information     Date Of Birth          1980        Visit Information        Provider Department      1/24/2018 2:40 PM Carolynn Thompson APRN CNP [facility]        Today's Diagnoses     Perioral dermatitis    -  1      Care Instructions          Thank you for choosing [facility].  You may be receiving a survey in the mail from [facility] regarding your visit today.  Please take a few minutes to complete and return the survey to let us know how we are doing.      If you have questions or concerns, please contact us via "[employer]" or you can contact your care team at [phone].    Our Clinic hours are:  Monday 6:40 am  to 7:00 pm  Tuesday -Friday 6:40 am to 5:00 pm    The [place] outpatient lab hours are:  Monday - Friday 6:10 am to 4:45 pm  Saturdays 7:00 am to 11:00 am  Appointments are required, call [phone]    If you have clinical questions after hours or would like to schedule an appointment,  call the clinic at [phone].            Follow-ups after your visit        Who to contact     If you have questions or need follow up information about today's clinic visit or your schedule please contact [facility] directly at [phone].  Normal or non-critical lab and imaging results will be communicated to you by [employer], letter or phone within 4 business days after the clinic has received the results. If you do not hear from us within 7 days, please contact the clinic through [employer] or phone. If you have a critical or abnormal lab result, we will notify you by phone as soon as possible.  Submit refill requests through "[employer]" or call your pharmacy and they will forward the refill request to us. Please allow 3 business days for your refill to be completed.          Additional Information About Your Visit        [facility] Information     "[employer]" lets you send messages  "to your doctor, view your test results, renew your prescriptions, schedule appointments and more. To sign up, go to [email or web address] . Click on \"Log in\" on the left side of the screen, which will take you to the Welcome page. Then click on \"Sign up Now\" on the right side of the page.     You will be asked to enter the access code listed below, as well as some personal information. Please follow the directions to create your username and password.     Your access code is: [email or web address]  Expires: 2018  3:36 PM     Your access code will  in 90 days. If you need help or a new code, please call your [place] clinic or [phone].        Care EveryWhere ID     This is your Care EveryWhere ID. This could be used by other organizations to access your [place] medical records  [id number]        Your Vitals Were     Pulse Temperature Height BMI (Body Mass Index)          85 97.4  F (36.3  C) (Tympanic) 5' 3\" (1.6 m) 43.72 kg/m2         Blood Pressure from Last 3 Encounters:   18 120/69   10/17/17 126/66   17 104/57    Weight from Last 3 Encounters:   18 246 lb 12.8 oz (111.9 kg)   10/17/17 243 lb (110.2 kg)   17 236 lb 3.2 oz (107.1 kg)              Today, you had the following     No orders found for display         Today's Medication Changes          These changes are accurate as of 18  3:36 PM.  If you have any questions, ask your nurse or doctor.               Start taking these medicines.        Dose/Directions    doxycycline 100 MG capsule   Commonly known as:  VIBRAMYCIN   Used for:  Perioral dermatitis   Started by:  Carolynn Thompson APRN CNP        Dose:  100 mg   Take 1 capsule (100 mg) by mouth daily   Quantity:  90 capsule   Refills:  0            Where to get your medicines      These medications were sent to [facility] - [place], [place] - [address]  [address] [facility] [address]     Phone:  [phone]     doxycycline " 100 MG capsule                Primary Care Provider Office Phone # Fax #    [facility] [phone] [address] [address]        Equal Access to Services     RAFIQ LOZANO : Hadii robert ku hadrobero Soomaali, waaxda luqadaha, qaybta kaalmada adeegyada, waxay idiin hayshanikan zhannaalfredito toledo manoj eugene. So [facility] [phone].    ATENCIÓN: Si habla español, tiene a ragland disposición servicios gratuitos de asistencia lingüística. Llame al [phone].    We comply with applicable federal civil rights laws and [place] laws. We do not discriminate on the basis of race, color, national origin, age, disability, sex, sexual orientation, or gender identity.            Thank you!     Thank you for choosing [facility]  for your care. Our goal is always to provide you with excellent care. Hearing back from our patients is one way we can continue to improve our services. Please take a few minutes to complete the written survey that you may receive in the mail after your visit with us. Thank you!             Your Updated Medication List - Protect others around you: Learn how to safely use, store and throw away your medicines at www.disposemymeds.org.          This list is accurate as of 1/24/18  3:36 PM.  Always use your most recent med list.                   Brand Name Dispense Instructions for use Diagnosis    albuterol 108 (90 BASE) MCG/ACT Inhaler    PROAIR HFA    1 Inhaler    Inhale 2 puffs into the lungs every 6 hours as needed for shortness of breath / dyspnea or wheezing or cough    SOB (shortness of breath)       alum & mag hydroxide-simethicone 200-200-20 MG/5ML Susp suspension    MYLANTA/MAALOX     Take 30 mLs by mouth every 4 hours as needed for indigestion (2 tsp for upset stomach)        CHLORASEPTIC 1.4 % Liqd spray   Generic drug:  phenol      Take 1 spray by mouth every hour as needed for sore throat Use as directed for sore throt        divalproex 500 MG 24 hr  tablet    DEPAKOTE ER     Take 1,500 mg by mouth every morning        doxycycline 100 MG capsule    VIBRAMYCIN    90 capsule    Take 1 capsule (100 mg) by mouth daily    Perioral dermatitis       esomeprazole 40 MG CR capsule    nexIUM    30 capsule    Take 1 capsule (40 mg) by mouth every morning (before breakfast) Take 30-60 minutes before a eating.    Gastroesophageal reflux disease without esophagitis       fluticasone 50 MCG/ACT spray    FLONASE    1 Bottle    Spray 2 sprays into both nostrils daily    Nonallergic rhinitis       ibuprofen 600 MG tablet    ADVIL/MOTRIN    60 tablet    Take 1 tablet (600 mg) by mouth every 6 hours as needed for moderate pain    Hip pain, unspecified laterality       levocetirizine 5 MG tablet    XYZAL    31 tablet    Take 1 tablet (5 mg) by mouth every evening    Seasonal allergic rhinitis due to pollen       levothyroxine 88 MCG tablet    SYNTHROID/LEVOTHROID    90 tablet    Take 1 tablet (88 mcg) by mouth daily    Acquired hypothyroidism       MILK OF MAGNESIA 400 MG/5ML suspension   Generic drug:  magnesium hydroxide     360 mL    Take 30-60 mLs by mouth daily as needed for constipation or heartburn (If No Bowel Movement in 2 days.) Reported on 4/12/2017        OLANZapine 2.5 MG tablet    zyPREXA    30 tablet    Take 5 mg by mouth 3 times daily as needed Reported on 4/12/2017        order for DME      Equipment being ordered: CPAP AIRSENSE 10 11 CM H20 AIRFIT F20 MEDIUM SN# [id number]  DN# [id number]        pseudoePHEDrine 120 MG 12 hr tablet    SUDAFED 12 HOUR    28 tablet    Take 1 tablet (120 mg) by mouth as needed    Allergic rhinitis due to pollen, unspecified rhinitis seasonality       REGULOID 58.6 % Powd   Generic drug:  psyllium     426 g    MIX 1 TEASPOONFUL WITH LIQUID AND DRINK ONCE DAILY    Diarrhea, unspecified type       risperDAL 1 MG tablet   Generic drug:  risperiDONE     60 tablet    Take 1 tablet in the morning and 2 tablets at 5 pm        ROBITUSSIN A-C OR       100/5ml, take 2 tsp full by mouth every 4 hours as needed for cough        sodium chloride 0.65 % nasal spray    SALINE MIST    1 Bottle    Spray 2 sprays into both nostrils At Bedtime    Nonallergic rhinitis       SYSTANE OP      1-2 drops in eye as needed up to 5 times per day for watery eyes.        triamcinolone 0.1 % cream    KENALOG    30 g    Apply  topically 3 times daily. Apply sparingly to affected area.    Eczema       TYLENOL 325 MG tablet   Generic drug:  acetaminophen     100 tablet    Take 325-650 mg by mouth every 4 hours as needed Reported on 4/12/2017        UNABLE TO FIND      MEDICATION NAME: pseudefed PE PRN not to exceed 10 capsules in 24 hours        venlafaxine 37.5 MG 24 hr capsule    EFFEXOR-XR     Take 37.5 mg by mouth daily 3 caps daily to equal 112.5mg

## 2018-02-12 ENCOUNTER — TELEPHONE (OUTPATIENT)
Dept: PEDIATRICS | Facility: CLINIC | Age: 38
End: 2018-02-12

## 2018-02-12 DIAGNOSIS — Z20.828 EXPOSURE TO INFLUENZA: Primary | ICD-10-CM

## 2018-02-12 RX ORDER — OSELTAMIVIR PHOSPHATE 75 MG/1
75 CAPSULE ORAL DAILY
Qty: 10 CAPSULE | Refills: 0 | Status: SHIPPED | OUTPATIENT
Start: 2018-02-12 | End: 2019-01-09

## 2018-02-12 NOTE — TELEPHONE ENCOUNTER
Tami:  Received a call from pt's group home this AM.  Pt is in [place] on vacation and has been exposed to confirmed case of Influenza B in a staff member.  Pt is currently asymptomatic, but facility is concerned about his compromised condition and that he lives in a home with 3 other compromised patients.  Pt will return to [place] tomorrow at 4:00.  Prophylactic dosing of Tamiflu?  Rachelle SANTIAGO RN      Influenza-Like Illness (ARLETTE) Protocol    Humberto Estrella      Age: 37 year old     YOB: 1980    Are you currently sick or have you had close contact with someone who is currently sick?   This patient is not currently sick but has had close contact with someone who was.      Evaluation for Possible Influenza Exposure  (ADULTS)    Below are conditions which place adults at increased risk for the more severe complications of influenza.    Does this patient have ANY of the following conditions?  Chronic pulmonary disease such as asthma or COPD No   Heart disease (CHF, CAD, anticoag due to arrhythmia) No   Liver disease (hepatitis, liver failure, cirrhosis) No   Kidney disease (renal failure, insufficiency or dialysis) No   Metabolic disorder (e.g. diabetes) No   Neuromuscular disorder (MS, MD AR, myasthenia gravis) No   Compromised ability to handle respiratory secretions No   Hematologic disorder (e.g. sickle cell disease) No   HIV / AIDS No   Chemotherapy or radiation within the last 3 months No   Received an organ or a bone marrow transplant No   Taking Prednisone in excess of 20mg daily No   Is age 65 years or older No   Is pregnant, thinks she may be pregnant or is within two weeks after delivery No   Is a resident of a chronic care facility Yes   Is patient  or [place] native  No     Nursing Plan  Does this patient have ANY of the above conditions?    Yes.  Plan: Huddled with provider      Provided home care instructions    If further questions/concerns or if new symptoms develop, call  your PCP or [facility] as soon as possible.    When to seek medical attention    Contact your health care provider right away if you experience:    A painful sore throat accompanied by fever persists for more than 48 hours    Ear pain, sinus pain, persistent vomiting and/or diarrhea    Oral temperature greater than 104  Fahrenheit (40  Celsius)    Dehydration (e.g., mouth feeling dry, dizzy when sitting/standing, decreased urine output)    Severe or persistent vomiting; unable to keep fluids down    Improvement in flu-like symptoms (fever and cough or sore throat) but then return of fever and worse cough or sore throat    Not drinking enough fluid    Any other concerns not stated above      Additional educational resources include:    http://www.[employer].com    http://www.cdc.gov/flu/  RASHAD MELTON

## 2018-02-12 NOTE — TELEPHONE ENCOUNTER
Spoke with Janey and provided this information.  She states understanding.  Instructed to call clinic back of pt becomes symptomatic upon return home from [place].    Rachelle SANTIAGO RN

## 2018-02-12 NOTE — TELEPHONE ENCOUNTER
Tamiflu is not found to be that affective for influenza B- only Influenza A-   I can send in prophylactic dose however it perhaps may not be that affective.

## 2018-02-12 NOTE — TELEPHONE ENCOUNTER
[facility], during his vacation patient had been exposed to influenza b; patient currently has no symtomps, but lives at group home with other vulnerable adults. Patient does use a cpap. Requesting tamiflu.     Ph[facility]  [facility]

## 2018-02-16 DIAGNOSIS — E03.8 SUBCLINICAL HYPOTHYROIDISM: ICD-10-CM

## 2018-02-16 LAB
T4 FREE SERPL-MCNC: 0.95 NG/DL (ref 0.76–1.46)
TSH SERPL DL<=0.005 MIU/L-ACNC: 5.87 MU/L (ref 0.4–4)

## 2018-02-16 PROCEDURE — 36415 COLL VENOUS BLD VENIPUNCTURE: CPT | Performed by: NURSE PRACTITIONER

## 2018-02-16 PROCEDURE — 84443 ASSAY THYROID STIM HORMONE: CPT | Performed by: NURSE PRACTITIONER

## 2018-02-16 PROCEDURE — 84439 ASSAY OF FREE THYROXINE: CPT | Performed by: NURSE PRACTITIONER

## 2018-02-16 NOTE — LETTER
[facility]  [address] [address]  Phone: [phone]  Fax: [phone]      2/19/2018     Humberto Estrella  [address] [facility] [place] [facility] [address]      To whom it concerns,    [place] TSH was slightly high (5.87 ) verses normal is 4.00. T4 was normal   I will increase the dose of his levothyroxine  Please recheck the TSH in 12 weeks    Results for orders placed or performed in visit on 02/16/18   TSH with free T4 reflex   Result Value Ref Range    TSH 5.87 (H) 0.40 - 4.00 mU/L   T4 free   Result Value Ref Range    T4 Free 0.95 0.76 - 1.46 ng/dL     Thank you for choosing [facility]. As a result, please continue with the treatment plan discussed in the office. Return as discussed or sooner if symptoms worsen or fail to improve. If you have any further questions or concerns, please do not hesitate to contact us.      Sincerely,        Tami Meyer/Ada Gibbons CMA

## 2018-02-19 ENCOUNTER — TELEPHONE (OUTPATIENT)
Dept: FAMILY MEDICINE | Facility: CLINIC | Age: 38
End: 2018-02-19

## 2018-02-19 DIAGNOSIS — E03.9 HYPOTHYROIDISM, UNSPECIFIED TYPE: Primary | ICD-10-CM

## 2018-02-19 RX ORDER — LEVOTHYROXINE SODIUM 100 UG/1
100 TABLET ORAL DAILY
Qty: 90 TABLET | Refills: 3 | Status: SHIPPED | OUTPATIENT
Start: 2018-02-19 | End: 2019-08-01

## 2018-02-19 NOTE — TELEPHONE ENCOUNTER
Reason for Call:  TSH results    Detailed comments: Janey from [facility] is asking for results on this patient from his TSH last week. Can we please fax to [phone]    Awilda Levy  Clinic Station  Flex    Call taken on 2/19/2018 at 1:01 PM by Awilda Levy

## 2018-03-06 DIAGNOSIS — Z79.899 HIGH RISK MEDICATION USE: Primary | ICD-10-CM

## 2018-03-06 LAB
ALBUMIN SERPL-MCNC: 3 G/DL (ref 3.4–5)
ALP SERPL-CCNC: 66 U/L (ref 40–150)
ALT SERPL W P-5'-P-CCNC: 60 U/L (ref 0–70)
AST SERPL W P-5'-P-CCNC: 38 U/L (ref 0–45)
BILIRUB DIRECT SERPL-MCNC: <0.1 MG/DL (ref 0–0.2)
BILIRUB SERPL-MCNC: 0.4 MG/DL (ref 0.2–1.3)
ERYTHROCYTE [DISTWIDTH] IN BLOOD BY AUTOMATED COUNT: 15.4 % (ref 10–15)
HCT VFR BLD AUTO: 45.3 % (ref 40–53)
HGB BLD-MCNC: 15.3 G/DL (ref 13.3–17.7)
MCH RBC QN AUTO: 34.4 PG (ref 26.5–33)
MCHC RBC AUTO-ENTMCNC: 33.8 G/DL (ref 31.5–36.5)
MCV RBC AUTO: 102 FL (ref 78–100)
PLATELET # BLD AUTO: 193 10E9/L (ref 150–450)
PROT SERPL-MCNC: 7.3 G/DL (ref 6.8–8.8)
RBC # BLD AUTO: 4.45 10E12/L (ref 4.4–5.9)
VALPROATE SERPL-MCNC: 56 MG/L (ref 50–100)
WBC # BLD AUTO: 3.8 10E9/L (ref 4–11)

## 2018-03-06 PROCEDURE — 80164 ASSAY DIPROPYLACETIC ACD TOT: CPT | Performed by: NURSE PRACTITIONER

## 2018-03-06 PROCEDURE — 80076 HEPATIC FUNCTION PANEL: CPT | Performed by: NURSE PRACTITIONER

## 2018-03-06 PROCEDURE — 36415 COLL VENOUS BLD VENIPUNCTURE: CPT | Performed by: NURSE PRACTITIONER

## 2018-03-06 PROCEDURE — 85027 COMPLETE CBC AUTOMATED: CPT | Performed by: NURSE PRACTITIONER

## 2018-03-14 DIAGNOSIS — J30.2 SEASONAL ALLERGIC RHINITIS, UNSPECIFIED CHRONICITY, UNSPECIFIED TRIGGER: Primary | ICD-10-CM

## 2018-03-14 DIAGNOSIS — J30.1 ALLERGIC RHINITIS DUE TO POLLEN: ICD-10-CM

## 2018-03-14 NOTE — TELEPHONE ENCOUNTER
Requested Prescriptions   Pending Prescriptions Disp Refills     SUDOGEST 12 HOUR 120 MG 12 hr tablet [Pharmacy Med Name: SUDOGEST  Last Written Prescription Date:  03/06/17  Last Fill Quantity: 28,  # refills: 0   Last office visit: 1/24/2018 with prescribing provider:  01/24/18   Future Office Visit:     120MG ER 12 HR TABLET]  11     Sig: TAKE 1 TABLET BY MOUTH ONCE DAILY AS NEEDED. *1 TOTAL FILL*    There is no refill protocol information for this order

## 2018-03-15 NOTE — TELEPHONE ENCOUNTER
Routing refill request to provider for review/approval because:  Drug not on the [facility] refill protocol     Deana Stubbs RN

## 2018-03-19 RX ORDER — PSEUDOEPHEDRINE HCL 120 MG/1
TABLET, FILM COATED, EXTENDED RELEASE ORAL
Qty: 20 TABLET | Refills: 11 | Status: SHIPPED | OUTPATIENT
Start: 2018-03-19 | End: 2019-04-04

## 2018-04-04 ENCOUNTER — TELEPHONE (OUTPATIENT)
Dept: FAMILY MEDICINE | Facility: CLINIC | Age: 38
End: 2018-04-04

## 2018-04-04 DIAGNOSIS — E03.9 HYPOTHYROIDISM, UNSPECIFIED TYPE: ICD-10-CM

## 2018-04-04 DIAGNOSIS — R53.83 FATIGUE, UNSPECIFIED TYPE: Primary | ICD-10-CM

## 2018-04-04 NOTE — TELEPHONE ENCOUNTER
Reason for Call: Request for an order or referral:    Order or referral being requested: order    Date needed: as soon as possible    Has the patient been seen by the PCP for this problem? NO    Additional comments: Shannon calling from the group home asking to get an order to check pt's Vit D level.    Phone number Patient can be reached at:  Home number on file [phone] (home)    Best Time:  any    Can we leave a detailed message on this number?  YES    Call taken on 4/4/2018 at 11:42 AM by Steffanie Orellana

## 2018-04-04 NOTE — TELEPHONE ENCOUNTER
Ok to place order for Vit D level - however patient may need to pay out of pocket as this is level is not covered under fatigue or hypothyroidism diagnosis.

## 2018-04-04 NOTE — TELEPHONE ENCOUNTER
Called Janey at Group home  Notified her of provider's recommendations  Janey ok to order Vit D level    Unable to sign due to reason for Vit D lab  It is cued please see the order validation when trying to sign    Corazon MCCLOUD Rn

## 2018-04-04 NOTE — TELEPHONE ENCOUNTER
Janey from [facility] is calling clinic  She reports:  Patient has been sleepy, no energy, does not want be active the last week or two  Denies illness or exposed to illness  Requesting Vitamin D level get checked  Please advise if you would like to see patient in clinic or have lab?  LOV 1/24/18    Routing to provider.    Corazon MCCLOUD Rn

## 2018-04-09 DIAGNOSIS — L71.0 PERIORAL DERMATITIS: ICD-10-CM

## 2018-04-09 DIAGNOSIS — J30.1 SEASONAL ALLERGIC RHINITIS DUE TO POLLEN: ICD-10-CM

## 2018-04-09 NOTE — TELEPHONE ENCOUNTER
Requested Prescriptions   Pending Prescriptions Disp Refills     doxycycline (VIBRAMYCIN) 100 MG capsule [Pharmacy Med Name: DOXYCYCLINE HYCLATE 100MG CAP]  Last Written Prescription Date:  01/24/2018  Last Fill Quantity: 90,  # refills: 0   Last office visit: 1/24/2018 with prescribing provider:  Jay   Future Office Visit:      10     Sig: TAKE 1 CAPSULE BY MOUTH ONCE DAILY    There is no refill protocol information for this order        Nav YANEZ)

## 2018-04-09 NOTE — TELEPHONE ENCOUNTER
"Requested Prescriptions   Pending Prescriptions Disp Refills     levocetirizine (XYZAL) 5 MG tablet [Pharmacy Med Name: LEVOCETIRIZINE 5MG TAB]  Last Written Prescription Date:  02/07/2017  Last Fill Quantity: 31,  # refills: 11   Last office visit: 1/24/2018 with prescribing provider:  Jay   Future Office Visit:      10     Sig: TAKE 1 TABLET BY MOUTH EVERY EVENING (MAKE SURE TO PUT A EVENING STICKER ON IT)    Antihistamines Protocol Passed    4/9/2018  2:52 PM       Passed - Patient is 3-64 years of age    Apply weight-based dosing for peds patients age 3 - 12 years of age.    Forward request to provider for patients under the age of 3 or over the age of 64.         Passed - Recent (12 mo) or future (30 days) visit within the authorizing provider's specialty    Patient had office visit in the last 12 months or has a visit in the next 30 days with authorizing provider or within the authorizing provider's specialty.  See \"Patient Info\" tab in inbasket, or \"Choose Columns\" in Meds & Orders section of the refill encounter.            Nav Ledesma RT (R)    "

## 2018-04-11 RX ORDER — DOXYCYCLINE 100 MG/1
CAPSULE ORAL
Refills: 10 | OUTPATIENT
Start: 2018-04-11

## 2018-04-11 RX ORDER — LEVOCETIRIZINE DIHYDROCHLORIDE 5 MG/1
TABLET, FILM COATED ORAL
Qty: 30 TABLET | Refills: 8 | Status: SHIPPED | OUTPATIENT
Start: 2018-04-11 | End: 2019-01-03

## 2018-04-11 NOTE — TELEPHONE ENCOUNTER
Plan was to take this med for 3 months and then stop. He needs follow up. He can make OV or telephone visit. Thank you. GM Ramirez CNP

## 2018-04-11 NOTE — TELEPHONE ENCOUNTER
I let both pharmacy and  know patient was to take Doxycyline for 3 months then stop and if they feel he needs it, can either schedule office or telephone visit.    Shagufta Ramírez RN

## 2018-04-23 DIAGNOSIS — L71.0 PERIORAL DERMATITIS: ICD-10-CM

## 2018-04-23 NOTE — TELEPHONE ENCOUNTER
Requested Prescriptions   Pending Prescriptions Disp Refills     doxycycline (VIBRAMYCIN) 100 MG capsule [Pharmacy Med Name: DOXYCYCLINE HYCLATE 100MG CAP]  11     Sig: TAKE 1 CAPSULE BY MOUTH ONCE DAILY    There is no refill protocol information for this order        Last Written Prescription Date:  1/24/18  Last Fill Quantity: 90,  # refills: 0   Last office visit: 1/24/2018 with prescribing provider:  KISHORE   Future Office Visit:

## 2018-04-24 ENCOUNTER — OFFICE VISIT (OUTPATIENT)
Dept: SLEEP MEDICINE | Facility: CLINIC | Age: 38
End: 2018-04-24
Payer: MEDICARE

## 2018-04-24 VITALS
TEMPERATURE: 97.8 F | HEART RATE: 69 BPM | OXYGEN SATURATION: 100 % | WEIGHT: 252.2 LBS | SYSTOLIC BLOOD PRESSURE: 128 MMHG | BODY MASS INDEX: 44.69 KG/M2 | DIASTOLIC BLOOD PRESSURE: 78 MMHG | HEIGHT: 63 IN

## 2018-04-24 DIAGNOSIS — R53.83 FATIGUE, UNSPECIFIED TYPE: ICD-10-CM

## 2018-04-24 DIAGNOSIS — G47.33 OSA (OBSTRUCTIVE SLEEP APNEA): Primary | ICD-10-CM

## 2018-04-24 PROCEDURE — 36415 COLL VENOUS BLD VENIPUNCTURE: CPT | Performed by: NURSE PRACTITIONER

## 2018-04-24 PROCEDURE — 82306 VITAMIN D 25 HYDROXY: CPT | Performed by: NURSE PRACTITIONER

## 2018-04-24 PROCEDURE — 99213 OFFICE O/P EST LOW 20 MIN: CPT | Performed by: PHYSICIAN ASSISTANT

## 2018-04-24 NOTE — MR AVS SNAPSHOT
After Visit Summary   4/24/2018    Humberto Estrella    MRN: [id number]           Patient Information     Date Of Birth          1980        Visit Information        Provider Department      4/24/2018 2:30 PM Jonny Rossi [facility]        Today's Diagnoses     VIDAL (obstructive sleep apnea)    -  1      Care Instructions      Your BMI is Body mass index is 44.68 kg/(m^2).  Weight management is a personal decision.  If you are interested in exploring weight loss strategies, the following discussion covers the approaches that may be successful. Body mass index (BMI) is one way to tell whether you are at a healthy weight, overweight, or obese. It measures your weight in relation to your height.  A BMI of 18.5 to 24.9 is in the healthy range. A person with a BMI of 25 to 29.9 is considered overweight, and someone with a BMI of 30 or greater is considered obese. More than two-thirds of American adults are considered overweight or obese.  Being overweight or obese increases the risk for further weight gain. Excess weight may lead to heart disease and diabetes.  Creating and following plans for healthy eating and physical activity may help you improve your health.  Weight control is part of healthy lifestyle and includes exercise, emotional health, and healthy eating habits. Careful eating habits lifelong are the mainstay of weight control. Though there are significant health benefits from weight loss, long-term weight loss with diet alone may be very difficult to achieve- studies show long-term success with dietary management in less than 10% of people. Attaining a healthy weight may be especially difficult to achieve in those with severe obesity. In some cases, medications, devices and surgical management might be considered.  What can you do?  If you are overweight or obese and are interested in methods for weight loss, you should discuss this with your provider.     Consider  reducing daily calorie intake by 500 calories.     Keep a food journal.     Avoiding skipping meals, consider cutting portions instead.    Diet combined with exercise helps maintain muscle while optimizing fat loss. Strength training is particularly important for building and maintaining muscle mass. Exercise helps reduce stress, increase energy, and improves fitness. Increasing exercise without diet control, however, may not burn enough calories to loose weight.       Start walking three days a week 10-20 minutes at a time    Work towards walking thirty minutes five days a week     Eventually, increase the speed of your walking for 1-2 minutes at time    In addition, we recommend that you review healthy lifestyles and methods for weight loss available through the National Institutes of Health patient information sites:  http://win.niddk.nih.gov/publications/index.htm    And look into health and wellness programs that may be available through your health insurance provider, employer, local community center, or pricilla club.    Weight management plan: Patient was referred to their PCP to discuss a diet and exercise plan.          Your Body mass index is 44.68 kg/(m^2).  Weight management is a personal decision.  If you are interested in exploring weight loss strategies, the following discussion covers the approaches that may be successful. Body mass index (BMI) is one way to tell whether you are at a healthy weight, overweight, or obese. It measures your weight in relation to your height.  A BMI of 18.5 to 24.9 is in the healthy range. A person with a BMI of 25 to 29.9 is considered overweight, and someone with a BMI of 30 or greater is considered obese. More than two-thirds of American adults are considered overweight or obese.  Being overweight or obese increases the risk for further weight gain. Excess weight may lead to heart disease and diabetes.  Creating and following plans for healthy eating and physical  activity may help you improve your health.  Weight control is part of healthy lifestyle and includes exercise, emotional health, and healthy eating habits. Careful eating habits lifelong are the mainstay of weight control. Though there are significant health benefits from weight loss, long-term weight loss with diet alone may be very difficult to achieve- studies show long-term success with dietary management in less than 10% of people. Attaining a healthy weight may be especially difficult to achieve in those with severe obesity. In some cases, medications, devices and surgical management might be considered.  What can you do?  If you are overweight or obese and are interested in methods for weight loss, you should discuss this with your provider.     Consider reducing daily calorie intake by 500 calories.     Keep a food journal.     Avoiding skipping meals, consider cutting portions instead.    Diet combined with exercise helps maintain muscle while optimizing fat loss. Strength training is particularly important for building and maintaining muscle mass. Exercise helps reduce stress, increase energy, and improves fitness. Increasing exercise without diet control, however, may not burn enough calories to loose weight.       Start walking three days a week 10-20 minutes at a time    Work towards walking thirty minutes five days a week     Eventually, increase the speed of your walking for 1-2 minutes at time    In addition, we recommend that you review healthy lifestyles and methods for weight loss available through the National Institutes of Health patient information sites:  http://win.niddk.nih.gov/publications/index.htm    And look into health and wellness programs that may be available through your health insurance provider, employer, local community center, or pricilla club.    Weight management plan: Patient was referred to their PCP to discuss a diet and exercise plan.          Follow-ups after your visit       "  Follow-up notes from your care team     Return in 1 year (on 4/24/2019) for PAP follow up.      Your next 10 appointments already scheduled     Apr 24, 2018  4:30 PM CDT   [facility]   [facility]    [address]  [facility] [address]   [phone]           Please do not eat 10-12 hours before your appointment if you are coming in fasting for labs on lipids, cholesterol, or glucose (sugar). This does not apply to pregnant women. Water, hot tea and black coffee (with nothing added) are okay. Do not drink other fluids, diet soda or chew gum.              Who to contact     If you have questions or need follow up information about today's clinic visit or your schedule please contact [facility] directly at [phone].  Normal or non-critical lab and imaging results will be communicated to you by MyChart, letter or phone within 4 business days after the clinic has received the results. If you do not hear from us within 7 days, please contact the clinic through MyChart or phone. If you have a critical or abnormal lab result, we will notify you by phone as soon as possible.  Submit refill requests through [email or web address] or call your pharmacy and they will forward the refill request to us. Please allow 3 business days for your refill to be completed.          Additional Information About Your Visit        MyChart Information     [email or web address] lets you send messages to your doctor, view your test results, renew your prescriptions, schedule appointments and more. To sign up, go to [email or web address] . Click on \"Log in\" on the left side of the screen, which will take you to the Welcome page. Then click on \"Sign up Now\" on the right side of the page.     You will be asked to enter the access code listed below, as well as some personal information. Please follow the directions to create your username and password.     Your access code is: " "[email or web address]  Expires: 2018  4:36 PM     Your access code will  in 90 days. If you need help or a new code, please call your [place] clinic or [phone].        Care EveryWhere ID     This is your Care EveryWhere ID. This could be used by other organizations to access your [place] medical records  [id number]        Your Vitals Were     Pulse Temperature Height Pulse Oximetry BMI (Body Mass Index)       69 97.8  F (36.6  C) (Tympanic) 1.6 m (5' 3\") 100% 44.68 kg/m2        Blood Pressure from Last 3 Encounters:   18 128/78   18 120/69   10/17/17 126/66    Weight from Last 3 Encounters:   18 114.4 kg (252 lb 3.2 oz)   18 111.9 kg (246 lb 12.8 oz)   10/17/17 110.2 kg (243 lb)              We Performed the Following     Sleep Comprehensive DME        Primary Care Provider Office Phone # Fax #    Carla Barront, APRN [facility] [phone] [address] [address]        Equal Access to Services     RAFIQ LOZANO AH: Hadii robert njo Soulicesali, waaxda luqadaha, qaybta kaalmada adeegyada, jesus eugene. So [facility] [phone].    ATENCIÓN: Si habla español, tiene a ragland disposición servicios gratuitos de asistencia lingüística. Llame al [phone].    We comply with applicable federal civil rights laws and [place] laws. We do not discriminate on the basis of race, color, national origin, age, disability, sex, sexual orientation, or gender identity.            Thank you!     Thank you for choosing [facility]  for your care. Our goal is always to provide you with excellent care. Hearing back from our patients is one way we can continue to improve our services. Please take a few minutes to complete the written survey that you may receive in the mail after your visit with us. Thank you!             Your Updated Medication List - Protect others around you: Learn how to safely use, store and throw away your " medicines at www.disposemymeds.org.          This list is accurate as of 4/24/18  2:36 PM.  Always use your most recent med list.                   Brand Name Dispense Instructions for use Diagnosis    albuterol 108 (90 Base) MCG/ACT Inhaler    PROAIR HFA    1 Inhaler    Inhale 2 puffs into the lungs every 6 hours as needed for shortness of breath / dyspnea or wheezing or cough    SOB (shortness of breath)       alum & mag hydroxide-simethicone 200-200-20 MG/5ML Susp suspension    MYLANTA/MAALOX     Take 30 mLs by mouth every 4 hours as needed for indigestion (2 tsp for upset stomach)        CHLORASEPTIC 1.4 % Liqd spray   Generic drug:  phenol      Take 1 spray by mouth every hour as needed for sore throat Use as directed for sore throt        divalproex sodium extended-release 500 MG 24 hr tablet    DEPAKOTE ER     Take 1,500 mg by mouth every morning        doxycycline 100 MG capsule    VIBRAMYCIN    90 capsule    Take 1 capsule (100 mg) by mouth daily    Perioral dermatitis       esomeprazole 40 MG CR capsule    nexIUM    30 capsule    Take 1 capsule (40 mg) by mouth every morning (before breakfast) Take 30-60 minutes before a eating.    Gastroesophageal reflux disease without esophagitis       fluticasone 50 MCG/ACT spray    FLONASE    1 Bottle    Spray 2 sprays into both nostrils daily    Nonallergic rhinitis       ibuprofen 600 MG tablet    ADVIL/MOTRIN    60 tablet    Take 1 tablet (600 mg) by mouth every 6 hours as needed for moderate pain    Hip pain, unspecified laterality       levocetirizine 5 MG tablet    XYZAL    30 tablet    TAKE 1 TABLET BY MOUTH EVERY EVENING (MAKE SURE TO PUT A EVENING STICKER ON IT)    Seasonal allergic rhinitis due to pollen       levothyroxine 100 MCG tablet    SYNTHROID/LEVOTHROID    90 tablet    Take 1 tablet (100 mcg) by mouth daily    Hypothyroidism, unspecified type       MILK OF MAGNESIA 400 MG/5ML suspension   Generic drug:  magnesium hydroxide     360 mL    Take 30-60  mLs by mouth daily as needed for constipation or heartburn (If No Bowel Movement in 2 days.) Reported on 4/12/2017        OLANZapine 2.5 MG tablet    zyPREXA    30 tablet    Take 5 mg by mouth 3 times daily as needed Reported on 4/12/2017        order for DME      Equipment being ordered: CPAP AIRSENSE 10 11 CM H20 AIRFIT F20 MEDIUM SN# [id number]  DN# [id number]        oseltamivir 75 MG capsule    TAMIFLU    10 capsule    Take 1 capsule (75 mg) by mouth daily    Exposure to influenza       REGULOID 58.6 % Powd   Generic drug:  psyllium     426 g    MIX 1 TEASPOONFUL WITH LIQUID AND DRINK ONCE DAILY    Diarrhea, unspecified type       risperDAL 1 MG tablet   Generic drug:  risperiDONE     60 tablet    Take 1 tablet in the morning and 2 tablets at 5 pm        ROBITUSSIN A-C OR      100/5ml, take 2 tsp full by mouth every 4 hours as needed for cough        sodium chloride 0.65 % nasal spray    SALINE MIST    1 Bottle    Spray 2 sprays into both nostrils At Bedtime    Nonallergic rhinitis       SUDOGEST 12 HOUR 120 MG 12 hr tablet   Generic drug:  pseudoePHEDrine     20 tablet    TAKE 1 TABLET BY MOUTH ONCE DAILY AS NEEDED. *1 TOTAL FILL*    Seasonal allergic rhinitis, unspecified chronicity, unspecified trigger       SYSTANE OP      1-2 drops in eye as needed up to 5 times per day for watery eyes.        triamcinolone 0.1 % cream    KENALOG    30 g    Apply  topically 3 times daily. Apply sparingly to affected area.    Eczema       TYLENOL 325 MG tablet   Generic drug:  acetaminophen     100 tablet    Take 325-650 mg by mouth every 4 hours as needed Reported on 4/12/2017        UNABLE TO FIND      MEDICATION NAME: pseudefed PE PRN not to exceed 10 capsules in 24 hours        venlafaxine 37.5 MG 24 hr capsule    EFFEXOR-XR     Take 37.5 mg by mouth daily 3 caps daily to equal 112.5mg

## 2018-04-24 NOTE — PATIENT INSTRUCTIONS

## 2018-04-24 NOTE — PROGRESS NOTES
Obstructive Sleep Apnea- PAP Follow-Up Visit:    Chief Complaint   Patient presents with     CPAP Follow Up     tolerating well       Humberto Estrella comes in today for follow-up of their moderate sleep apnea, managed with CPAP. Initial concerns of loud snoring, non-refreshing sleep, excessive daytime sleepiness(ESS 13).   Sleep study date 1/17/2017(226#)-AHI 26, RDI 35, lowest oxygen saturation was 78%, CPAP 11 cm/H20.     He is here with the group home representative.     Overall, the patient rates his experience with PAP as 9 (0 poor, 10 great). The mask is comfortable. The mask is not leaking.  He is not snoring with the mask on. He is not having gasp arousals. He is not having any oral or nasal dryness. The pressure settings are comfortable.    His PAP interface is Full Face Mask.    Bedtime is typically 7. Usually it takes about 10 minutes to fall asleep with the mask on. Wake time is typically 6:30.  Patient is using PAP therapy 10-11 hours per night. The patient is usually getting 10  hours of sleep per night.    He does feel rested in the morning.    Total score - [place]: 2 (4/24/2018  2:00 PM)      [facility]   CPAP 11.0 cmH2O 30 day usage data:  97% of days with > 4 hours of use. 1/30 days with no use. Average use 11 hours and 10 minutes per day.   95%ile Leak 17.87 L/min.   AHI 5 events per hour.     He reports CPAP is going well. No concerns today.       Past medical/surgical history, family history, social history, medications and allergies were reviewed.      Problem List:  Patient Active Problem List    Diagnosis Date Noted     Health care maintenance 08/16/2017     Priority: Medium     VIDAL (obstructive sleep apnea)-AHI 26 01/25/2017     Priority: Medium     1/17/2017(226#)-AHI 26, RDI 35, lowest oxygen saturation was 78%, CPAP 11 cm/H20.        Subclinical hypothyroidism 12/12/2016     Priority: Medium     Obesity due to excess calories, unspecified obesity severity 06/13/2016     Priority: Medium      "Nonallergic rhinitis      Priority: Medium     9/30/15 IgE tests all NEGATIVE for environmental allergens.        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 Provider to review       MYOPIA 06/21/2006     Priority: Medium     LATENT NYSTAGMUS 06/21/2006     Priority: Medium     Headache 06/21/2006     Priority: Medium     Problem list name updated by automated process. Provider to review       INTERMITT EXPLOSIVE DIS 06/21/2006     Priority: Medium     post traumatic stress disorder 06/21/2006     Priority: Medium        /78  Pulse 69  Temp 97.8  F (36.6  C) (Tympanic)  Ht 1.6 m (5' 3\")  Wt 114.4 kg (252 lb 3.2 oz)  SpO2 100%  BMI 44.68 kg/m2        Impression/Plan:  1. Moderate Sleep apnea-  Tolerating PAP well. Daytime symptoms are improved.  Continue current treatment.   Comprehensive DME.     Humberto Estrella will follow up in about a year, sooner if questions/concerns.     Fifteen minutes spent with patient, all of which were spent face-to-face counseling, consulting, coordinating plan of care.      Jonny Rossi PA-C      CC:  Carla Meyer  "

## 2018-04-24 NOTE — NURSING NOTE
"Chief Complaint   Patient presents with     CPAP Follow Up     tolerating well       Initial /78  Pulse 69  Temp 97.8  F (36.6  C) (Tympanic)  Ht 1.6 m (5' 3\")  Wt 114.4 kg (252 lb 3.2 oz)  SpO2 100%  BMI 44.68 kg/m2 Estimated body mass index is 44.68 kg/(m^2) as calculated from the following:    Height as of this encounter: 1.6 m (5' 3\").    Weight as of this encounter: 114.4 kg (252 lb 3.2 oz).  Medication Reconciliation: complete   Ada Gibbons MA    "

## 2018-04-25 DIAGNOSIS — E55.9 VITAMIN D DEFICIENCY: Primary | ICD-10-CM

## 2018-04-25 LAB — DEPRECATED CALCIDIOL+CALCIFEROL SERPL-MC: 13 UG/L (ref 20–75)

## 2018-04-25 RX ORDER — DOXYCYCLINE 100 MG/1
CAPSULE ORAL
Refills: 11 | OUTPATIENT
Start: 2018-04-25

## 2018-04-25 RX ORDER — ERGOCALCIFEROL 1.25 MG/1
50000 CAPSULE, LIQUID FILLED ORAL
Qty: 8 CAPSULE | Refills: 0 | Status: SHIPPED | OUTPATIENT
Start: 2018-04-25 | End: 2018-06-14

## 2018-04-25 NOTE — TELEPHONE ENCOUNTER
Writer spoke to staff. Patient has completed the therapy for the rash. Rash has improved. If patient needs medication they will schedule an appointment.    Valarie GRAY RN

## 2018-04-30 ENCOUNTER — TELEPHONE (OUTPATIENT)
Dept: FAMILY MEDICINE | Facility: CLINIC | Age: 38
End: 2018-04-30

## 2018-04-30 DIAGNOSIS — E55.9 VITAMIN D DEFICIENCY: ICD-10-CM

## 2018-04-30 RX ORDER — ERGOCALCIFEROL 1.25 MG/1
50000 CAPSULE, LIQUID FILLED ORAL
Qty: 8 CAPSULE | Refills: 0 | Status: CANCELLED | OUTPATIENT
Start: 2018-04-30

## 2018-04-30 NOTE — TELEPHONE ENCOUNTER
Janey stats patient is currently taking 50,000 u of VIt D as recommended by PCP.  After completing 8 weeks at this dose it was recommended he continue with OTC Vit D.  Janey is requesting an order for the OTC dose.    Routing to provider.  Riya MCCLOUD RN      Vit D order can be faxed to Janey at [phone].

## 2018-04-30 NOTE — TELEPHONE ENCOUNTER
Vit D 1000 unit daily is still ready.  Is this the dose you wanted added to chart?    Routing to provider.  Riya MCCLOUD RN

## 2018-04-30 NOTE — TELEPHONE ENCOUNTER
Reason for Call:  order    Detailed comments: Janey from the group home is calling and stating she will need an order for the Vitamin D supplement for this patient, as was stated in the letter they got from us regarding the Vit D result.    Phone Number Patient can be reached at: Other phone number:  [phone] is Janey  Best Time: any    Can we leave a detailed message on this number? YES   Awilda Levy  Clinic Station  Flex      Call taken on 4/30/2018 at 12:50 PM by Awilda Levy

## 2018-04-30 NOTE — TELEPHONE ENCOUNTER
Shannon calling again asking for better directions on the Vitamin D.She needs dose , and directions please.  What dose is next after the 8 weeks of the higher dose. She cannot just go buy any dose.  She needs an order.

## 2018-05-11 DIAGNOSIS — E03.8 SUBCLINICAL HYPOTHYROIDISM: ICD-10-CM

## 2018-05-11 LAB
T4 FREE SERPL-MCNC: 0.93 NG/DL (ref 0.76–1.46)
TSH SERPL DL<=0.005 MIU/L-ACNC: 4.65 MU/L (ref 0.4–4)

## 2018-05-11 PROCEDURE — 36415 COLL VENOUS BLD VENIPUNCTURE: CPT | Performed by: NURSE PRACTITIONER

## 2018-05-11 PROCEDURE — 84439 ASSAY OF FREE THYROXINE: CPT | Performed by: NURSE PRACTITIONER

## 2018-05-11 PROCEDURE — 84443 ASSAY THYROID STIM HORMONE: CPT | Performed by: NURSE PRACTITIONER

## 2018-06-04 ENCOUNTER — TELEPHONE (OUTPATIENT)
Dept: FAMILY MEDICINE | Facility: CLINIC | Age: 38
End: 2018-06-04

## 2018-06-04 NOTE — TELEPHONE ENCOUNTER
Reason for Call:  Other call back    Detailed comments: Janey from Group Home calling stating they have an order for an EKG 12 lead from patient's psych provider. Can we schedule using this order? Would PCP need to re-order? Team MA unavailable to ask question. Please call Janey back to let her know how we need to proceed.    Phone Number Patient can be reached at: Other phone number:  Janey [phone]    Best Time: any    Can we leave a detailed message on this number? YES    Call taken on 6/4/2018 at 8:27 AM by Angelique Torres

## 2018-06-05 ENCOUNTER — OFFICE VISIT (OUTPATIENT)
Dept: FAMILY MEDICINE | Facility: CLINIC | Age: 38
End: 2018-06-05
Payer: MEDICARE

## 2018-06-05 VITALS
TEMPERATURE: 96.8 F | SYSTOLIC BLOOD PRESSURE: 113 MMHG | OXYGEN SATURATION: 94 % | BODY MASS INDEX: 43.97 KG/M2 | DIASTOLIC BLOOD PRESSURE: 56 MMHG | HEART RATE: 65 BPM | WEIGHT: 248.2 LBS

## 2018-06-05 DIAGNOSIS — Z79.899 MEDICATION MANAGEMENT: Primary | ICD-10-CM

## 2018-06-05 PROCEDURE — 99213 OFFICE O/P EST LOW 20 MIN: CPT | Performed by: NURSE PRACTITIONER

## 2018-06-05 PROCEDURE — 93000 ELECTROCARDIOGRAM COMPLETE: CPT | Performed by: NURSE PRACTITIONER

## 2018-06-05 NOTE — PATIENT INSTRUCTIONS
Thank you for choosing [facility].  You may be receiving a survey in the mail from [facility] regarding your visit today.  Please take a few minutes to complete and return the survey to let us know how we are doing.      If you have questions or concerns, please contact us via [employer] or you can contact your care team at [phone].    Our Clinic hours are:  Monday 6:40 am  to 7:00 pm  Tuesday -Friday 6:40 am to 5:00 pm    The [place] outpatient lab hours are:  Monday - Friday 6:10 am to 4:45 pm  Saturdays 7:00 am to 11:00 am  Appointments are required, call [phone]    If you have clinical questions after hours or would like to schedule an appointment,  call the clinic at [phone].

## 2018-06-05 NOTE — PROGRESS NOTES
SUBJECTIVE:   Humberto Estrella is a 38 year old male who presents to clinic today for the following health issues:      Patient is here for an EKG as requested by Mental [facility]. Fax [phone]. Patient sees psychiatrist Dr. Joel and she is requesting EKG due to medications that she is prescribing. Patient does not have any cardiac history or side effects from taking medications . Denies shortness of breath, chest pain, palpitations, dizziness.     -------------------------------------    Problem list and histories reviewed & adjusted, as indicated.  Additional history: as documented    Patient Active Problem List   Diagnosis     TRISOMY 21 (DOWN SYNDROME)     Hearing loss     MYOPIA     LATENT NYSTAGMUS     Headache     INTERMITT EXPLOSIVE DIS     post traumatic stress disorder     CARDIOVASCULAR SCREENING; LDL GOAL LESS THAN 160     Cortical, lamellar, or zonular cataract, nonsenile     Nonallergic rhinitis     Obesity due to excess calories, unspecified obesity severity     Subclinical hypothyroidism     VIDAL (obstructive sleep apnea)-AHI 26     Health care maintenance     Past Surgical History:   Procedure Laterality Date     PE TUBES      Left     PHACOEMULSIFICATION WITH STANDARD INTRAOCULAR LENS IMPLANT  10/3/2013    Procedure: PHACOEMULSIFICATION WITH STANDARD INTRAOCULAR LENS IMPLANT;  Left Kelman Phacoemulsification with Intraocular Lens Implant;  Surgeon: Luis Barajas MD;  Location: [facility]     PHACOEMULSIFICATION WITH STANDARD INTRAOCULAR LENS IMPLANT  5/1/2014    Procedure: PHACOEMULSIFICATION WITH STANDARD INTRAOCULAR LENS IMPLANT;  Surgeon: Luis Barajas MD;  Location: [facility] OR       Social History   Substance Use Topics     Smoking status: Former Smoker     Quit date: 8/16/1998     Smokeless tobacco: Never Used     Alcohol use No     Family History   Problem Relation Age of Onset     Unknown/Adopted Mother            Reviewed and updated as  needed this visit by clinical staff       Reviewed and updated as needed this visit by Provider         ROS:  Constitutional, HEENT, cardiovascular, pulmonary, GI, , musculoskeletal, neuro, skin, endocrine and psych systems are negative, except as otherwise noted.    OBJECTIVE:     /56 (BP Location: Left arm, Patient Position: Chair, Cuff Size: Adult Large)  Pulse 65  Temp 96.8  F (36  C) (Tympanic)  Wt 248 lb 3.2 oz (112.6 kg)  SpO2 94%  BMI 43.97 kg/m2  Body mass index is 43.97 kg/(m^2).  GENERAL: alert, no distress and over weight  RESP: lungs clear to auscultation - no rales, rhonchi or wheezes  CV: regular rate and rhythm, normal S1 S2, no S3 or S4, no murmur, click or rub, no peripheral edema and peripheral pulses strong  MS: no gross musculoskeletal defects noted, no edema    Diagnostic Test Results:  none     ASSESSMENT/PLAN:       1. Medication management  Patient is here today to have an EKG done as ordered by his Psychiatrist Dr. Joel . Patient does not have a cardiac history. He does not have any cardiac related symptoms/concerns.   - EKG 12-lead complete w/read - Clinics      GM Bradshaw [facility]

## 2018-06-05 NOTE — NURSING NOTE
"Initial /56 (BP Location: Left arm, Patient Position: Chair, Cuff Size: Adult Large)  Pulse 65  Temp 96.8  F (36  C) (Tympanic)  Wt 248 lb 3.2 oz (112.6 kg)  SpO2 94%  BMI 43.97 kg/m2 Estimated body mass index is 43.97 kg/(m^2) as calculated from the following:    Height as of 4/24/18: 5' 3\" (1.6 m).    Weight as of this encounter: 248 lb 3.2 oz (112.6 kg). .    Malia Arndt    "

## 2018-06-05 NOTE — MR AVS SNAPSHOT
After Visit Summary   6/5/2018    Humberto Estrella    MRN: [id number]           Patient Information     Date Of Birth          1980        Visit Information        Provider Department      6/5/2018 4:00 PM Carla Meyer APRN CNP [facility]        Today's Diagnoses     Medication management    -  1      Care Instructions          Thank you for choosing [facility].  You may be receiving a survey in the mail from [facility] your visit today.  Please take a few minutes to complete and return the survey to let us know how we are doing.      If you have questions or concerns, please contact us via WorkSimple or you can contact your care team at [phone].    Our Clinic hours are:  Monday 6:40 am  to 7:00 pm  Tuesday -Friday 6:40 am to 5:00 pm    The [place] outpatient lab hours are:  Monday - Friday 6:10 am to 4:45 pm  Saturdays 7:00 am to 11:00 am  Appointments are required, call [phone]    If you have clinical questions after hours or would like to schedule an appointment,  call the clinic at [phone].          Follow-ups after your visit        Who to contact     If you have questions or need follow up information about today's clinic visit or your schedule please contact [facility] directly at [phone].  Normal or non-critical lab and imaging results will be communicated to you by Playlorehart, letter or phone within 4 business days after the clinic has received the results. If you do not hear from us within 7 days, please contact the clinic through Playlorehart or phone. If you have a critical or abnormal lab result, we will notify you by phone as soon as possible.  Submit refill requests through WorkSimple or call your pharmacy and they will forward the refill request to us. Please allow 3 business days for your refill to be completed.          Additional Information About Your Visit        Care EveryWhere ID     This is your Care EveryWhere ID.  This could be used by other organizations to access your [facility] medical records  [id number]        Your Vitals Were     Pulse Temperature Pulse Oximetry BMI (Body Mass Index)          65 96.8  F (36  C) (Tympanic) 94% 43.97 kg/m2         Blood Pressure from Last 3 Encounters:   06/05/18 113/56   04/24/18 128/78   01/24/18 120/69    Weight from Last 3 Encounters:   06/05/18 248 lb 3.2 oz (112.6 kg)   04/24/18 252 lb 3.2 oz (114.4 kg)   01/24/18 246 lb 12.8 oz (111.9 kg)              We Performed the Following     EKG 12-lead complete w/read - Clinics        Primary Care Provider Office Phone # Fax #    GM Bradshaw [phone] [address] [facility] [address]        Equal Access to Services     RAFIQ LOZANO : Hadii aad ku hadasho Soomaali, waaxda luqadaha, qaybta kaalmada adeegyada, waxay idiin hayjessica aranda . So [facility] [phone].    ATENCIÓN: Si habla español, tiene a ragland disposición servicios gratuitos de asistencia lingüística. Sd al [phone].    We comply with applicable federal civil rights laws and [place] laws. We do not discriminate on the basis of race, color, national origin, age, disability, sex, sexual orientation, or gender identity.            Thank you!     Thank you for choosing [facility]  for your care. Our goal is always to provide you with excellent care. Hearing back from our patients is one way we can continue to improve our services. Please take a few minutes to complete the written survey that you may receive in the mail after your visit with us. Thank you!             Your Updated Medication List - Protect others around you: Learn how to safely use, store and throw away your medicines at www.disposemymeds.org.          This list is accurate as of 6/5/18  4:07 PM.  Always use your most recent med list.                   Brand Name Dispense Instructions for use Diagnosis    albuterol 108 (90 Base) MCG/ACT Inhaler     PROAIR HFA    1 Inhaler    Inhale 2 puffs into the lungs every 6 hours as needed for shortness of breath / dyspnea or wheezing or cough    SOB (shortness of breath)       alum & mag hydroxide-simethicone 200-200-20 MG/5ML Susp suspension    MYLANTA/MAALOX     Take 30 mLs by mouth every 4 hours as needed for indigestion (2 tsp for upset stomach)        CHLORASEPTIC 1.4 % Liqd spray   Generic drug:  phenol      Take 1 spray by mouth every hour as needed for sore throat Use as directed for sore throt        cholecalciferol 1000 UNIT tablet    vitamin D3    30 tablet    Take 1 tablet (1,000 Units) by mouth daily    Vitamin D deficiency       divalproex sodium extended-release 500 MG 24 hr tablet    DEPAKOTE ER     Take 1,500 mg by mouth every morning        esomeprazole 40 MG CR capsule    nexIUM    30 capsule    Take 1 capsule (40 mg) by mouth every morning (before breakfast) Take 30-60 minutes before a eating.    Gastroesophageal reflux disease without esophagitis       fluticasone 50 MCG/ACT spray    FLONASE    1 Bottle    Spray 2 sprays into both nostrils daily    Nonallergic rhinitis       ibuprofen 600 MG tablet    ADVIL/MOTRIN    60 tablet    Take 1 tablet (600 mg) by mouth every 6 hours as needed for moderate pain    Hip pain, unspecified laterality       levocetirizine 5 MG tablet    XYZAL    30 tablet    TAKE 1 TABLET BY MOUTH EVERY EVENING (MAKE SURE TO PUT A EVENING STICKER ON IT)    Seasonal allergic rhinitis due to pollen       levothyroxine 100 MCG tablet    SYNTHROID/LEVOTHROID    90 tablet    Take 1 tablet (100 mcg) by mouth daily    Hypothyroidism, unspecified type       MILK OF MAGNESIA 400 MG/5ML suspension   Generic drug:  magnesium hydroxide     360 mL    Take 30-60 mLs by mouth daily as needed for constipation or heartburn (If No Bowel Movement in 2 days.) Reported on 4/12/2017        OLANZapine 2.5 MG tablet    zyPREXA    30 tablet    Take 5 mg by mouth 3 times daily as needed Reported on  4/12/2017        order for DME      Equipment being ordered: CPAP AIRSENSE 10 11 CM H20 AIRFIT F20 MEDIUM SN# [id number]  DN# [id number]        oseltamivir 75 MG capsule    TAMIFLU    10 capsule    Take 1 capsule (75 mg) by mouth daily    Exposure to influenza       REGULOID 58.6 % Powd   Generic drug:  psyllium     426 g    MIX 1 TEASPOONFUL WITH LIQUID AND DRINK ONCE DAILY    Diarrhea, unspecified type       risperDAL 1 MG tablet   Generic drug:  risperiDONE     60 tablet    Take 1 tablet in the morning and 2 tablets at 5 pm        ROBITUSSIN A-C OR      100/5ml, take 2 tsp full by mouth every 4 hours as needed for cough        sodium chloride 0.65 % nasal spray    SALINE MIST    1 Bottle    Spray 2 sprays into both nostrils At Bedtime    Nonallergic rhinitis       SUDOGEST 12 HOUR 120 MG 12 hr tablet   Generic drug:  pseudoePHEDrine     20 tablet    TAKE 1 TABLET BY MOUTH ONCE DAILY AS NEEDED. *1 TOTAL FILL*    Seasonal allergic rhinitis, unspecified chronicity, unspecified trigger       SYSTANE OP      1-2 drops in eye as needed up to 5 times per day for watery eyes.        triamcinolone 0.1 % cream    KENALOG    30 g    Apply  topically 3 times daily. Apply sparingly to affected area.    Eczema       TYLENOL 325 MG tablet   Generic drug:  acetaminophen     100 tablet    Take 325-650 mg by mouth every 4 hours as needed Reported on 4/12/2017        UNABLE TO FIND      MEDICATION NAME: pseudefed PE PRN not to exceed 10 capsules in 24 hours        venlafaxine 37.5 MG 24 hr capsule    EFFEXOR-XR     Take 37.5 mg by mouth daily 3 caps daily to equal 112.5mg        vitamin D 38363 UNIT capsule    ERGOCALCIFEROL    8 capsule    Take 1 capsule (50,000 Units) by mouth every 7 days for 8 doses    Vitamin D deficiency

## 2018-06-06 ENCOUNTER — TELEPHONE (OUTPATIENT)
Dept: FAMILY MEDICINE | Facility: CLINIC | Age: 38
End: 2018-06-06

## 2018-06-06 NOTE — TELEPHONE ENCOUNTER
Patient saw R Betsy for an EKG to interpret/review per and outside order from an ordering Provider at [facility]. Result and copy of the EKG faxed to [phone]. A copy of the order has been stamped and sent for scanning in to the medical record  Malia Arndt

## 2018-06-11 DIAGNOSIS — L71.0 PERIORAL DERMATITIS: ICD-10-CM

## 2018-06-12 RX ORDER — DOXYCYCLINE 100 MG/1
CAPSULE ORAL
Refills: 10 | OUTPATIENT
Start: 2018-06-12

## 2018-06-12 NOTE — TELEPHONE ENCOUNTER
"He had this in January for  Perioral dermatitis [L71.0]  - Primary     I called the group home spoke with Radha, \"that was only for three months, not sure why they ordered it, no, we don't need it, we stopped it, thanks for calling. \"      Carolynn, do you want to deny and discontinue? Thanks!     Sandra Deras RNC              "

## 2018-06-12 NOTE — TELEPHONE ENCOUNTER
Requested Prescriptions   Pending Prescriptions Disp Refills     doxycycline (VIBRAMYCIN) 100 MG capsule [Pharmacy Med Name: DOXYCYCLINE HYCLATE 100MG CAPS]  10     Sig: TAKE 1 CAPSULE BY MOUTH ONCE DAILY    There is no refill protocol information for this order        doxycycline      Last Written Prescription Date:    Last Fill Quantity: ,   # refills:   Last Office Visit: 6/5/2018  Future Office visit:       Routing refill request to provider for review/approval because:  Drug not active on patient's medication list

## 2018-06-13 NOTE — TELEPHONE ENCOUNTER
Radha is correct, this was only supposed to be for 3 months and then stop. I believe this is the second pharmacy generated refill request I have refused. Thanks. GM Ramirez CNP

## 2018-06-14 ENCOUNTER — TELEPHONE (OUTPATIENT)
Dept: FAMILY MEDICINE | Facility: CLINIC | Age: 38
End: 2018-06-14

## 2018-06-14 DIAGNOSIS — E55.9 VITAMIN D DEFICIENCY: ICD-10-CM

## 2018-06-14 NOTE — TELEPHONE ENCOUNTER
Requested Prescriptions   Pending Prescriptions Disp Refills     vitamin D (ERGOCALCIFEROL) 16159 UNIT capsule [Pharmacy Med Name:  Last Written Prescription Date:  04/25/18  Ended: 06/14/18  Last Fill Quantity: 8,  # refills: 0   Last office visit: 6/5/2018 with prescribing provider:  06/05/18   Future Office Visit:     VITAMIN D2 50,000 IU CAPS]  13     Sig: TAKE 1 CAPSULE BY MOUTH ONCE A WEEK FOR 8 WEEKS ON THURSDAY **2 TOTAL FILLS* (ORDER WHEN LOW)    There is no refill protocol information for this order

## 2018-06-19 NOTE — TELEPHONE ENCOUNTER
I previously sent a note on this 4/26/18- unsure if patient is taking OTC Vit. D or not- please order a vit. D level so we can see where he is at and then decide about refill prescription Vit. D vs taking OTC supplement daily

## 2018-06-20 NOTE — TELEPHONE ENCOUNTER
Fax to group home Fax: [phone]- the order was faxed and they will complete this.    Debby Funez, NARENN, RN

## 2018-06-22 DIAGNOSIS — E55.9 VITAMIN D DEFICIENCY: ICD-10-CM

## 2018-06-22 PROCEDURE — 82306 VITAMIN D 25 HYDROXY: CPT | Performed by: NURSE PRACTITIONER

## 2018-06-22 PROCEDURE — 36415 COLL VENOUS BLD VENIPUNCTURE: CPT | Performed by: NURSE PRACTITIONER

## 2018-06-23 LAB — DEPRECATED CALCIDIOL+CALCIFEROL SERPL-MC: 21 UG/L (ref 20–75)

## 2018-06-25 DIAGNOSIS — E55.9 VITAMIN D DEFICIENCY: Primary | ICD-10-CM

## 2018-06-27 RX ORDER — ERGOCALCIFEROL 1.25 MG/1
CAPSULE, LIQUID FILLED ORAL
Refills: 13 | OUTPATIENT
Start: 2018-06-27

## 2018-07-27 ENCOUNTER — OFFICE VISIT (OUTPATIENT)
Dept: FAMILY MEDICINE | Facility: CLINIC | Age: 38
End: 2018-07-27
Payer: MEDICARE

## 2018-07-27 VITALS
SYSTOLIC BLOOD PRESSURE: 112 MMHG | OXYGEN SATURATION: 92 % | TEMPERATURE: 98 F | BODY MASS INDEX: 45.3 KG/M2 | WEIGHT: 255.7 LBS | RESPIRATION RATE: 16 BRPM | DIASTOLIC BLOOD PRESSURE: 72 MMHG | HEART RATE: 70 BPM

## 2018-07-27 DIAGNOSIS — M25.512 CHRONIC PAIN OF BOTH SHOULDERS: ICD-10-CM

## 2018-07-27 DIAGNOSIS — G89.29 CHRONIC BILATERAL LOW BACK PAIN WITHOUT SCIATICA: ICD-10-CM

## 2018-07-27 DIAGNOSIS — E66.01 MORBID OBESITY (H): ICD-10-CM

## 2018-07-27 DIAGNOSIS — E03.9 ACQUIRED HYPOTHYROIDISM: ICD-10-CM

## 2018-07-27 DIAGNOSIS — M25.511 CHRONIC PAIN OF BOTH SHOULDERS: ICD-10-CM

## 2018-07-27 DIAGNOSIS — R33.9 URINARY RETENTION: ICD-10-CM

## 2018-07-27 DIAGNOSIS — Z00.00 ROUTINE HEALTH MAINTENANCE: Primary | ICD-10-CM

## 2018-07-27 DIAGNOSIS — E55.9 HYPOVITAMINOSIS D: ICD-10-CM

## 2018-07-27 DIAGNOSIS — M54.50 CHRONIC BILATERAL LOW BACK PAIN WITHOUT SCIATICA: ICD-10-CM

## 2018-07-27 DIAGNOSIS — E78.2 MIXED HYPERLIPIDEMIA: ICD-10-CM

## 2018-07-27 DIAGNOSIS — G47.33 OSA (OBSTRUCTIVE SLEEP APNEA): ICD-10-CM

## 2018-07-27 DIAGNOSIS — Z12.5 ENCOUNTER FOR SCREENING FOR MALIGNANT NEOPLASM OF PROSTATE: ICD-10-CM

## 2018-07-27 DIAGNOSIS — G89.29 CHRONIC PAIN OF BOTH SHOULDERS: ICD-10-CM

## 2018-07-27 PROCEDURE — 99395 PREV VISIT EST AGE 18-39: CPT | Performed by: INTERNAL MEDICINE

## 2018-07-27 PROCEDURE — 99214 OFFICE O/P EST MOD 30 MIN: CPT | Mod: 25 | Performed by: INTERNAL MEDICINE

## 2018-07-27 RX ORDER — IBUPROFEN 600 MG/1
600 TABLET, FILM COATED ORAL EVERY 6 HOURS PRN
Qty: 60 TABLET | Refills: 3 | Status: SHIPPED | OUTPATIENT
Start: 2018-07-27 | End: 2019-03-09

## 2018-07-27 NOTE — MR AVS SNAPSHOT
After Visit Summary   7/27/2018    Humberto Estrella    MRN: [id number]           Patient Information     Date Of Birth          1980        Visit Information        Provider Department      7/27/2018 3:20 PM Alistair Beaver MD [facility]        Today's Diagnoses     Routine health maintenance    -  1    Encounter for therapeutic drug monitoring        Morbid obesity (H)        Chronic pain of both shoulders        Urinary retention        Chronic bilateral low back pain without sciatica        Acquired hypothyroidism        Hypovitaminosis D        Screening cholesterol level        Mixed hyperlipidemia        Encounter for screening for malignant neoplasm of prostate           Care Instructions    Weight loss is the best thing to try to get sleep apnea to go away.  Some people will have surgery for this, but that's not very common.  Talk to your sleep doctor about this.    Make an appointment for a lab draw after you have been fasting (no food or drink except water and black coffee) for at least 8 hours.       If you are having more bladder or bowel accidents, you are getting pain radiating down the legs or numbness/tingling/weakness in the legs, then we should do an MRI of the spine.  Please let me know if this happens.       Preventive Health Recommendations  Male Ages 26 - 39    Yearly exam:             See your health care provider every year in order to  o   Review health changes.   o   Discuss preventive care.    o   Review your medicines if your doctor has prescribed any.    You should be tested each year for STDs (sexually transmitted diseases), if you re at risk.     After age 35, talk to your provider about cholesterol testing. If you are at risk for heart disease, have your cholesterol tested at least every 5 years.     If you are at risk for diabetes, you should have a diabetes test (fasting glucose).  Shots: Get a flu shot each year. Get a tetanus shot every 10 years.      Nutrition:    Eat at least 5 servings of fruits and vegetables daily.     Eat whole-grain bread, whole-wheat pasta and brown rice instead of white grains and rice.     Get adequate Calcium and Vitamin D.     Lifestyle    Exercise for at least 150 minutes a week (30 minutes a day, 5 days a week). This will help you control your weight and prevent disease.     Limit alcohol to one drink per day.     No smoking.     Wear sunscreen to prevent skin cancer.     See your dentist every six months for an exam and cleaning.             Follow-ups after your visit        Future tests that were ordered for you today     Open Future Orders        Priority Expected Expires Ordered    Lipid panel reflex to direct LDL Fasting Routine  7/27/2019 7/27/2018    Vitamin D Deficiency Routine  7/27/2019 7/27/2018    TSH with free T4 reflex Routine  7/27/2019 7/27/2018    Prostate spec antigen screen Routine  7/27/2019 7/27/2018            Who to contact     If you have questions or need follow up information about today's clinic visit or your schedule please contact [facility] directly at [phone].  Normal or non-critical lab and imaging results will be communicated to you by MyChart, letter or phone within 4 business days after the clinic has received the results. If you do not hear from us within 7 days, please contact the clinic through MyChart or phone. If you have a critical or abnormal lab result, we will notify you by phone as soon as possible.  Submit refill requests through Image Insight or call your pharmacy and they will forward the refill request to us. Please allow 3 business days for your refill to be completed.          Additional Information About Your Visit        Care EveryWhere ID     This is your Care EveryWhere ID. This could be used by other organizations to access your [facility] medical records  [id number]        Your Vitals Were     Pulse Temperature Respirations Pulse Oximetry BMI (Body Mass  Index)       70 98  F (36.7  C) (Tympanic) 16 92% 45.3 kg/m2        Blood Pressure from Last 3 Encounters:   07/27/18 112/72   06/05/18 113/56   04/24/18 128/78    Weight from Last 3 Encounters:   07/27/18 255 lb 11.2 oz (116 kg)   06/05/18 248 lb 3.2 oz (112.6 kg)   04/24/18 252 lb 3.2 oz (114.4 kg)                 Today's Medication Changes          These changes are accurate as of 7/27/18  4:05 PM.  If you have any questions, ask your nurse or doctor.               These medicines have changed or have updated prescriptions.        Dose/Directions    * ibuprofen 600 MG tablet   Commonly known as:  ADVIL/MOTRIN   This may have changed:  Another medication with the same name was added. Make sure you understand how and when to take each.   Used for:  Hip pain, unspecified laterality   Changed by:  Alistair Beaver MD        Dose:  600 mg   Take 1 tablet (600 mg) by mouth every 6 hours as needed for moderate pain   Quantity:  60 tablet   Refills:  0       * ibuprofen 600 MG tablet   Commonly known as:  ADVIL/MOTRIN   This may have changed:  You were already taking a medication with the same name, and this prescription was added. Make sure you understand how and when to take each.   Used for:  Chronic pain of both shoulders, Chronic bilateral low back pain without sciatica   Changed by:  Alistair Beaver MD        Dose:  600 mg   Take 1 tablet (600 mg) by mouth every 6 hours as needed for moderate pain   Quantity:  60 tablet   Refills:  3       * Notice:  This list has 2 medication(s) that are the same as other medications prescribed for you. Read the directions carefully, and ask your doctor or other care provider to review them with you.         Where to get your medicines      These medications were sent to [facility] - [place], [place] - [address]  [address] [facility] [address]     Phone:  [phone]     ibuprofen 600 MG tablet                Primary Care Provider Office Phone #  Fax #    GM Bradshaw [phone] [address] [facility] [address]        Equal Access to Services     RAFIQ LOZANO : Hadii aad ku hadrobero Soulicesali, waaxda luqadaha, qaybta kaalmada adeegyada, jesus pateyamilex chaoalfredito toledo manoj eugene. So [facility] [phone].    ATENCIÓN: Si habla español, tiene a ragland disposición servicios gratuitos de asistencia lingüística. Llame al [phone].    We comply with applicable federal civil rights laws and [place] laws. We do not discriminate on the basis of race, color, national origin, age, disability, sex, sexual orientation, or gender identity.            Thank you!     Thank you for choosing [facility]  for your care. Our goal is always to provide you with excellent care. Hearing back from our patients is one way we can continue to improve our services. Please take a few minutes to complete the written survey that you may receive in the mail after your visit with us. Thank you!             Your Updated Medication List - Protect others around you: Learn how to safely use, store and throw away your medicines at www.disposemymeds.org.          This list is accurate as of 7/27/18  4:05 PM.  Always use your most recent med list.                   Brand Name Dispense Instructions for use Diagnosis    albuterol 108 (90 Base) MCG/ACT Inhaler    PROAIR HFA    1 Inhaler    Inhale 2 puffs into the lungs every 6 hours as needed for shortness of breath / dyspnea or wheezing or cough    SOB (shortness of breath)       alum & mag hydroxide-simethicone 200-200-20 MG/5ML Susp suspension    MYLANTA/MAALOX     Take 30 mLs by mouth every 4 hours as needed for indigestion (2 tsp for upset stomach)        CHLORASEPTIC 1.4 % Liqd spray   Generic drug:  phenol      Take 1 spray by mouth every hour as needed for sore throat Use as directed for sore throt        * cholecalciferol 1000 UNIT tablet    vitamin D3    30 tablet    Take 1 tablet (1,000 Units)  by mouth daily    Vitamin D deficiency       * cholecalciferol 1000 UNIT tablet    vitamin D3    100 tablet    Take 1 tablet (1,000 Units) by mouth daily    Vitamin D deficiency       divalproex sodium extended-release 500 MG 24 hr tablet    DEPAKOTE ER     Take 1,500 mg by mouth every morning        esomeprazole 40 MG CR capsule    nexIUM    30 capsule    Take 1 capsule (40 mg) by mouth every morning (before breakfast) Take 30-60 minutes before a eating.    Gastroesophageal reflux disease without esophagitis       fluticasone 50 MCG/ACT spray    FLONASE    1 Bottle    Spray 2 sprays into both nostrils daily    Nonallergic rhinitis       * ibuprofen 600 MG tablet    ADVIL/MOTRIN    60 tablet    Take 1 tablet (600 mg) by mouth every 6 hours as needed for moderate pain    Hip pain, unspecified laterality       * ibuprofen 600 MG tablet    ADVIL/MOTRIN    60 tablet    Take 1 tablet (600 mg) by mouth every 6 hours as needed for moderate pain    Chronic pain of both shoulders, Chronic bilateral low back pain without sciatica       levocetirizine 5 MG tablet    XYZAL    30 tablet    TAKE 1 TABLET BY MOUTH EVERY EVENING (MAKE SURE TO PUT A EVENING STICKER ON IT)    Seasonal allergic rhinitis due to pollen       levothyroxine 100 MCG tablet    SYNTHROID/LEVOTHROID    90 tablet    Take 1 tablet (100 mcg) by mouth daily    Hypothyroidism, unspecified type       MILK OF MAGNESIA 400 MG/5ML suspension   Generic drug:  magnesium hydroxide     360 mL    Take 30-60 mLs by mouth daily as needed for constipation or heartburn (If No Bowel Movement in 2 days.) Reported on 4/12/2017        OLANZapine 2.5 MG tablet    zyPREXA    30 tablet    Take 5 mg by mouth 3 times daily as needed Reported on 4/12/2017        order for DME      Equipment being ordered: CPAP AIRSENSE 10 11 CM H20 AIRFIT F20 MEDIUM # [id number]  DN# [id number]        oseltamivir 75 MG capsule    TAMIFLU    10 capsule    Take 1 capsule (75 mg) by mouth daily    Exposure to  influenza       REGULOID 58.6 % Powd   Generic drug:  psyllium     426 g    MIX 1 TEASPOONFUL WITH LIQUID AND DRINK ONCE DAILY    Diarrhea, unspecified type       risperDAL 1 MG tablet   Generic drug:  risperiDONE     60 tablet    Take 1 tablet in the morning and 2 tablets at 5 pm        ROBITUSSIN A-C OR      100/5ml, take 2 tsp full by mouth every 4 hours as needed for cough        sodium chloride 0.65 % nasal spray    SALINE MIST    1 Bottle    Spray 2 sprays into both nostrils At Bedtime    Nonallergic rhinitis       SUDOGEST 12 HOUR 120 MG 12 hr tablet   Generic drug:  pseudoePHEDrine     20 tablet    TAKE 1 TABLET BY MOUTH ONCE DAILY AS NEEDED. *1 TOTAL FILL*    Seasonal allergic rhinitis, unspecified chronicity, unspecified trigger       SYSTANE OP      1-2 drops in eye as needed up to 5 times per day for watery eyes.        triamcinolone 0.1 % cream    KENALOG    30 g    Apply  topically 3 times daily. Apply sparingly to affected area.    Eczema       TYLENOL 325 MG tablet   Generic drug:  acetaminophen     100 tablet    Take 325-650 mg by mouth every 4 hours as needed Reported on 4/12/2017        UNABLE TO FIND      MEDICATION NAME: pseudefed PE PRN not to exceed 10 capsules in 24 hours        venlafaxine 37.5 MG 24 hr capsule    EFFEXOR-XR     Take 37.5 mg by mouth daily 3 caps daily to equal 112.5mg        * Notice:  This list has 4 medication(s) that are the same as other medications prescribed for you. Read the directions carefully, and ask your doctor or other care provider to review them with you.

## 2018-07-27 NOTE — PATIENT INSTRUCTIONS
Weight loss is the best thing to try to get sleep apnea to go away.  Some people will have surgery for this, but that's not very common.  Talk to your sleep doctor about this.    Make an appointment for a lab draw after you have been fasting (no food or drink except water and black coffee) for at least 8 hours.       If you are having more bladder or bowel accidents, you are getting pain radiating down the legs or numbness/tingling/weakness in the legs, then we should do an MRI of the spine.  Please let me know if this happens.       Preventive Health Recommendations  Male Ages 26 - 39    Yearly exam:             See your health care provider every year in order to  o   Review health changes.   o   Discuss preventive care.    o   Review your medicines if your doctor has prescribed any.    You should be tested each year for STDs (sexually transmitted diseases), if you re at risk.     After age 35, talk to your provider about cholesterol testing. If you are at risk for heart disease, have your cholesterol tested at least every 5 years.     If you are at risk for diabetes, you should have a diabetes test (fasting glucose).  Shots: Get a flu shot each year. Get a tetanus shot every 10 years.     Nutrition:    Eat at least 5 servings of fruits and vegetables daily.     Eat whole-grain bread, whole-wheat pasta and brown rice instead of white grains and rice.     Get adequate Calcium and Vitamin D.     Lifestyle    Exercise for at least 150 minutes a week (30 minutes a day, 5 days a week). This will help you control your weight and prevent disease.     Limit alcohol to one drink per day.     No smoking.     Wear sunscreen to prevent skin cancer.     See your dentist every six months for an exam and cleaning.

## 2018-07-27 NOTE — NURSING NOTE
"Chief Complaint   Patient presents with     Physical       Initial /72 (BP Location: Right arm, Patient Position: Chair, Cuff Size: Adult Large)  Pulse 70  Temp 98  F (36.7  C) (Tympanic)  Resp 16  Wt 255 lb 11.2 oz (116 kg)  SpO2 92%  BMI 45.3 kg/m2 Estimated body mass index is 45.3 kg/(m^2) as calculated from the following:    Height as of 4/24/18: 5' 3\" (1.6 m).    Weight as of this encounter: 255 lb 11.2 oz (116 kg).    Medication Reconciliation: complete  Ada Gibbons MA  "

## 2018-07-27 NOTE — PROGRESS NOTES
SUBJECTIVE:   CC: Humberto Estrella is an 38 year old male who presents for preventative health visit.     Healthy Habits:    Do you get at least three servings of calcium containing foods daily (dairy, green leafy vegetables, etc.)? yes    Amount of exercise or daily activities, outside of work: 4 day(s) per week    Problems taking medications regularly No    Medication side effects: No    Have you had an eye exam in the past two years? no    Do you see a dentist twice per year? yes    Do you have sleep apnea, excessive snoring or daytime drowsiness?YES Dx with sleep apnea and using cpap every night.     Humberto reports having some shoulder pain and was seeing chiropractor.  Has had this for a long time, both sides.  Also has some back pain with walking, sitting, and lifting.  Back pain is in the middle and low back.  Advil has helped in the past and he would like to resume this.  No radiation, numbness, tingling.  He is having some urinary and stool leakage when standing, can be in large amounts.  This has been going on a couple weeks but only a couple of episodes.  No blood.  No pain.  May be having some urinary retention.  He had this issue a few years ago and it went away on its own.  No work-up at that point.      He wants to know how to cure his VIDAL.      Today's PHQ-2 Score:   PHQ-2 ( 1999 Pf[employer]) 7/27/2018 7/19/2016   Q1: Little interest or pleasure in doing things 0 0   Q2: Feeling down, depressed or hopeless 0 0   PHQ-2 Score 0 0     Abuse: Current or Past(Physical, Sexual or Emotional)- No  Do you feel safe in your environment - Yes    Social History   Substance Use Topics     Smoking status: Former Smoker     Quit date: 8/16/1998     Smokeless tobacco: Never Used     Alcohol use No      If you drink alcohol do you typically have >3 drinks per day or >7 drinks per week? No                      Last PSA: No results found for: PSA    Reviewed orders with patient. Reviewed health maintenance and updated  orders accordingly - Yes  Patient Active Problem List   Diagnosis     TRISOMY 21 (DOWN SYNDROME)     Hearing loss     MYOPIA     LATENT NYSTAGMUS     Headache     INTERMITT EXPLOSIVE DIS     post traumatic stress disorder     CARDIOVASCULAR SCREENING; LDL GOAL LESS THAN 160     Cortical, lamellar, or zonular cataract, nonsenile     Nonallergic rhinitis     Obesity due to excess calories, unspecified obesity severity     Subclinical hypothyroidism     VIDAL (obstructive sleep apnea)-AHI 26     Health care maintenance     Morbid obesity (H)     Past Surgical History:   Procedure Laterality Date     PE TUBES      Left     PHACOEMULSIFICATION WITH STANDARD INTRAOCULAR LENS IMPLANT  10/3/2013    Procedure: PHACOEMULSIFICATION WITH STANDARD INTRAOCULAR LENS IMPLANT;  Left Kelman Phacoemulsification with Intraocular Lens Implant;  Surgeon: Luis Barajas MD;  Location: [facility]     PHACOEMULSIFICATION WITH STANDARD INTRAOCULAR LENS IMPLANT  5/1/2014    Procedure: PHACOEMULSIFICATION WITH STANDARD INTRAOCULAR LENS IMPLANT;  Surgeon: Luis Barajas MD;  Location: [facility] OR       Social History   Substance Use Topics     Smoking status: Former Smoker     Quit date: 8/16/1998     Smokeless tobacco: Never Used     Alcohol use No     Family History   Problem Relation Age of Onset     Unknown/Adopted Mother          Current Outpatient Prescriptions   Medication Sig Dispense Refill     ibuprofen (ADVIL/MOTRIN) 600 MG tablet Take 1 tablet (600 mg) by mouth every 6 hours as needed for moderate pain 60 tablet 3     acetaminophen (TYLENOL) 325 MG tablet Take 325-650 mg by mouth every 4 hours as needed Reported on 4/12/2017 100 tablet      albuterol (ALBUTEROL) 108 (90 BASE) MCG/ACT inhaler Inhale 2 puffs into the lungs every 6 hours as needed for shortness of breath / dyspnea or wheezing or cough 1 Inhaler 3     alum & mag hydroxide-simethicone (MYLANTA/MAALOX) 200-200-20 MG/5ML SUSP Take 30 mLs by mouth every 4 hours as needed for  indigestion (2 tsp for upset stomach)  0     cholecalciferol (VITAMIN D3) 1000 UNIT tablet Take 1 tablet (1,000 Units) by mouth daily 100 tablet 3     cholecalciferol (VITAMIN D3) 1000 UNIT tablet Take 1 tablet (1,000 Units) by mouth daily 30 tablet 11     divalproex (DEPAKOTE ER) 500 MG 24 hr tablet Take 1,500 mg by mouth every morning       esomeprazole (NEXIUM) 40 MG capsule Take 1 capsule (40 mg) by mouth every morning (before breakfast) Take 30-60 minutes before a eating. 30 capsule 1     fluticasone (FLONASE) 50 MCG/ACT spray Spray 2 sprays into both nostrils daily 1 Bottle 11     Guaifenesin-Codeine (ROBITUSSIN A-C OR) 100/5ml, take 2 tsp full by mouth every 4 hours as needed for cough       ibuprofen (ADVIL,MOTRIN) 600 MG tablet Take 1 tablet (600 mg) by mouth every 6 hours as needed for moderate pain 60 tablet 0     levocetirizine (XYZAL) 5 MG tablet TAKE 1 TABLET BY MOUTH EVERY EVENING (MAKE SURE TO PUT A EVENING STICKER ON IT) 30 tablet 8     levothyroxine (SYNTHROID/LEVOTHROID) 100 MCG tablet Take 1 tablet (100 mcg) by mouth daily 90 tablet 3     magnesium hydroxide (MILK OF MAGNESIA) 400 MG/5ML suspension Take 30-60 mLs by mouth daily as needed for constipation or heartburn (If No Bowel Movement in 2 days.) Reported on 4/12/2017 360 mL 1     OLANZapine (ZYPREXA) 2.5 MG tablet Take 5 mg by mouth 3 times daily as needed Reported on 4/12/2017 30 tablet 1     order for DME Equipment being ordered: CPAP  AIRSENSE 10  11 CM H20  AIRFIT F20 MEDIUM  # [id number]  DN# [id number]       oseltamivir (TAMIFLU) 75 MG capsule Take 1 capsule (75 mg) by mouth daily (Patient not taking: Reported on 4/24/2018) 10 capsule 0     phenol (CHLORASEPTIC) 1.4 % LIQD spray Take 1 spray by mouth every hour as needed for sore throat Use as directed for sore throt       Polyethyl Glycol-Propyl Glycol (SYSTANE OP) 1-2 drops in eye as needed up to 5 times per day for watery eyes.       REGULOID 58.6 % POWD MIX 1 TEASPOONFUL WITH LIQUID  AND DRINK ONCE DAILY 426 g 11     risperiDONE (RISPERDAL) 1 MG tablet Take 1 tablet in the morning and 2 tablets at 5 pm 60 tablet      sodium chloride (SALINE MIST) 0.65 % nasal spray Spray 2 sprays into both nostrils At Bedtime 1 Bottle 11     SUDOGEST 12 HOUR 120 MG 12 hr tablet TAKE 1 TABLET BY MOUTH ONCE DAILY AS NEEDED. *1 TOTAL FILL* 20 tablet 11     triamcinolone (KENALOG) 0.1 % cream Apply  topically 3 times daily. Apply sparingly to affected area. 30 g 1     UNABLE TO FIND MEDICATION NAME: pseudefed PE PRN not to exceed 10 capsules in 24 hours       venlafaxine (EFFEXOR-XR) 37.5 MG 24 hr capsule Take 37.5 mg by mouth daily 3 caps daily to equal 112.5mg       Allergies   Allergen Reactions     Nkda [No Known Drug Allergies]        Reviewed and updated as needed this visit by clinical staff  Tobacco         Reviewed and updated as needed this visit by Provider            ROS:    10 point ROS of systems including Constitutional, Eyes, Respiratory, Cardiovascular, Gastroenterology, Genitourinary, Integumentary, Muscularskeletal, Psychiatric were all negative except for pertinent positives noted in my HPI.     OBJECTIVE:   /72 (BP Location: Right arm, Patient Position: Chair, Cuff Size: Adult Large)  Pulse 70  Temp 98  F (36.7  C) (Tympanic)  Resp 16  Wt 255 lb 11.2 oz (116 kg)  SpO2 92%  BMI 45.3 kg/m2  EXAM:  GENERAL: healthy, alert and no distress  EYES: Eyes grossly normal to inspection, PERRL and conjunctivae and sclerae normal  HENT: ear canals and TM's normal, nose and mouth without ulcers or lesions  NECK: no adenopathy, no asymmetry, masses, or scars and thyroid normal to palpation  RESP: lungs clear to auscultation - no rales, rhonchi or wheezes  CV: regular rate and rhythm, normal S1 S2, no S3 or S4, no murmur, click or rub, no peripheral edema and peripheral pulses strong  ABDOMEN: soft, nontender, no hepatosplenomegaly, no masses and bowel sounds normal  MS: upper shoulder tenderness  along the traps, midline low thoracic and lumbar back pain, bilateral paraspinal tenderness in the low back  SKIN: no suspicious lesions or rashes  NEURO: Normal strength and tone, mentation intact and speech normal  PSYCH: mentation appears normal, affect normal/bright      ASSESSMENT/PLAN:   1. Routine health maintenance        Immunizations: UTD    Lab Studies: due cholesterol    - Lipid panel reflex to direct LDL Fasting; Future    2. VIDAL (obstructive sleep apnea)-AHI 26  3. Morbid obesity (H)    Well managed with CPAP, but he would like to no longer need CPAP and be cured of his VIDAL.  Advised that weight loss would be the most effective treatment for this.  Discussed there are oral appliances for VIDAL as well and rarely people with have surgery for it but that tends not to be a great solution for more people.  Advised him to talk to his sleep doctor further if he wants to explore other options.      4. Chronic pain of both shoulders  5. Chronic bilateral low back pain without sciatica    Exam most consistent with muscular etiology of shoulder and back pain.  He does report a couple of episodes of bladder and bowel incontinence but is not having any other radicular symptoms, so I do not have a high suspicion of cauda equina syndrome.  Discussed that if these symptoms worsen, he may need an MRI.      He no longer wishes to follow with the chiropractor for the back and shoulders and wants to try ibuprofen instead.  Suggested PT, but he would just like to use the ibuprofen for now.     - ibuprofen (ADVIL/MOTRIN) 600 MG tablet; Take 1 tablet (600 mg) by mouth every 6 hours as needed for moderate pain  Dispense: 60 tablet; Refill: 3    6. Urinary retention  7. Encounter for screening for malignant neoplasm of prostate     Will get PSA due to reported urinary retention, but seems unlikely given his age.  Could be med side effect.     - Prostate spec antigen screen; Future    8. Acquired hypothyroidism    Last TSH was a  "bit high, will recheck.      - TSH with free T4 reflex; Future    9. Hypovitaminosis D    Last Vit D level was improved, but still low normal, so will recheck.     - Vitamin D Deficiency; Future    10. Mixed hyperlipidemia    - Lipid panel reflex to direct LDL Fasting; Future    COUNSELING:  Reviewed preventive health counseling, as reflected in patient instructions    BP Readings from Last 1 Encounters:   07/27/18 112/72     Estimated body mass index is 45.3 kg/(m^2) as calculated from the following:    Height as of 4/24/18: 5' 3\" (1.6 m).    Weight as of this encounter: 255 lb 11.2 oz (116 kg).      Weight management plan: Discussed healthy diet and exercise guidelines and patient will follow up in 12 months in clinic to re-evaluate.     reports that he quit smoking about 19 years ago. He has never used smokeless tobacco.      Alistair Beaver MD  [facility]  "

## 2018-08-07 DIAGNOSIS — Z00.00 ROUTINE HEALTH MAINTENANCE: ICD-10-CM

## 2018-08-07 DIAGNOSIS — E03.9 ACQUIRED HYPOTHYROIDISM: ICD-10-CM

## 2018-08-07 DIAGNOSIS — Z12.5 ENCOUNTER FOR SCREENING FOR MALIGNANT NEOPLASM OF PROSTATE: ICD-10-CM

## 2018-08-07 DIAGNOSIS — E55.9 HYPOVITAMINOSIS D: ICD-10-CM

## 2018-08-07 DIAGNOSIS — E78.2 MIXED HYPERLIPIDEMIA: ICD-10-CM

## 2018-08-07 DIAGNOSIS — R33.9 URINARY RETENTION: ICD-10-CM

## 2018-08-07 LAB
CHOLEST SERPL-MCNC: 176 MG/DL
HDLC SERPL-MCNC: 30 MG/DL
LDLC SERPL CALC-MCNC: 104 MG/DL
NONHDLC SERPL-MCNC: 146 MG/DL
PSA SERPL-ACNC: 0.07 UG/L (ref 0–4)
T4 FREE SERPL-MCNC: 1.02 NG/DL (ref 0.76–1.46)
TRIGL SERPL-MCNC: 208 MG/DL
TSH SERPL DL<=0.005 MIU/L-ACNC: 7 MU/L (ref 0.4–4)

## 2018-08-07 PROCEDURE — 84443 ASSAY THYROID STIM HORMONE: CPT | Performed by: INTERNAL MEDICINE

## 2018-08-07 PROCEDURE — 84439 ASSAY OF FREE THYROXINE: CPT | Performed by: INTERNAL MEDICINE

## 2018-08-07 PROCEDURE — G0103 PSA SCREENING: HCPCS | Performed by: INTERNAL MEDICINE

## 2018-08-07 PROCEDURE — 82306 VITAMIN D 25 HYDROXY: CPT | Performed by: INTERNAL MEDICINE

## 2018-08-07 PROCEDURE — 80061 LIPID PANEL: CPT | Performed by: INTERNAL MEDICINE

## 2018-08-07 PROCEDURE — 36415 COLL VENOUS BLD VENIPUNCTURE: CPT | Performed by: INTERNAL MEDICINE

## 2018-08-08 LAB — DEPRECATED CALCIDIOL+CALCIFEROL SERPL-MC: 25 UG/L (ref 20–75)

## 2018-08-21 ENCOUNTER — OFFICE VISIT (OUTPATIENT)
Dept: AUDIOLOGY | Facility: CLINIC | Age: 38
End: 2018-08-21
Payer: MEDICARE

## 2018-08-21 ENCOUNTER — TELEPHONE (OUTPATIENT)
Dept: FAMILY MEDICINE | Facility: CLINIC | Age: 38
End: 2018-08-21

## 2018-08-21 DIAGNOSIS — H90.3 SENSORINEURAL HEARING LOSS, BILATERAL: Primary | ICD-10-CM

## 2018-08-21 DIAGNOSIS — H91.90 HEARING LOSS, UNSPECIFIED HEARING LOSS TYPE, UNSPECIFIED LATERALITY: Primary | ICD-10-CM

## 2018-08-21 PROCEDURE — 99207 ZZC NO CHARGE LOS: CPT | Performed by: AUDIOLOGIST

## 2018-08-21 PROCEDURE — 92557 COMPREHENSIVE HEARING TEST: CPT | Performed by: AUDIOLOGIST

## 2018-08-21 PROCEDURE — 92567 TYMPANOMETRY: CPT | Performed by: AUDIOLOGIST

## 2018-08-21 NOTE — TELEPHONE ENCOUNTER
His last hearing test was last year. Hearing loss is on problem list. Please review and sign pended referral if appropriate.     NAREN FloresN, RN

## 2018-08-21 NOTE — PROGRESS NOTES
AUDIOLOGY REPORT    SUBJECTIVE:  Humberto Estrella, a 38 year old male, was seen in the Audiology Clinic at [facility] today for audiologic evaluation, referred by Alistair Beaver MD. The patient has been seen previously in this clinic on 8/21/2017 for assessment and results indicated mild-moderate high-frequency sensorineural hearing loss bilaterally. The patient reports occasional bilateral tinnitus. He also reports that loud sounds sometimes hurt his ears. The patient denies  bilateral otalgia, bilateral drainage and changes in hearing. The patient notes difficulty with communication in a variety of listening situations. They were accompanied today by their group home attendant, Radha.    OBJECTIVE:    Otoscopic exam indicates ears are clear of cerumen bilaterally     Pure Tone Thresholds assessed using conventional audiometry with good  reliability from 250-8000 Hz bilaterally using insert earphones and circumaural headphones     RIGHT:  normal and mild-moderate sensorineural hearing loss    LEFT:    normal and mild-moderate sensorineural hearing loss    Tympanogram:    RIGHT: restricted eardrum mobility     LEFT:   restricted eardrum mobility   Tympanograms comparable to those obtained at previous evaluations.     Speech Reception Threshold:    RIGHT: 20 dB HL    LEFT:   20 dB HL  These results are in agreement with patient's pure tone average.    Word Recognition Score:     RIGHT: 96% at 60 dB HL using NU-6 recorded word list.    LEFT:   96% at 60 dB HL using NU-6 recorded word list.      ASSESSMENT:   Bilateral sensorineural hearing loss     Compared to patient's previous audiogram dated 8/21/2017, hearing has remained stable bilaterally. Today s results were discussed with the patient in detail.     PLAN:  Patient was counseled regarding hearing loss and impact on communication. Group home paperwork completed. It is recommended that the patient return in one year for annual hearing  evaluation, or sooner should changes in hearing arise. Please call this clinic with questions regarding these results or recommendations.        Yaima Tripathi, CCC-A  Licensed Audiologist #[id number]  8/21/2018

## 2018-08-21 NOTE — TELEPHONE ENCOUNTER
Reason for Call:  Other orders    Detailed comments: Patient has a 4 pm appointment with the audiologist.  He needs an order asap. Appointment is today.    Phone Number Patient can be reached at: Home number on file [phone] (home)    Best Time: asap    Can we leave a detailed message on this number? YES    Call taken on 8/21/2018 at 2:02 PM by Lidia Ernandez

## 2018-08-21 NOTE — MR AVS SNAPSHOT
After Visit Summary   8/21/2018    Humberto Estrella    MRN: [id number]           Patient Information     Date Of Birth          1980        Visit Information        Provider Department      8/21/2018 4:00 PM Saige Billingsley [facility]        Today's Diagnoses     Sensorineural hearing loss, bilateral    -  1       Follow-ups after your visit        Who to contact     If you have questions or need follow up information about today's clinic visit or your schedule please contact [facility] directly at [phone].  Normal or non-critical lab and imaging results will be communicated to you by MyChart, letter or phone within 4 business days after the clinic has received the results. If you do not hear from us within 7 days, please contact the clinic through MyChart or phone. If you have a critical or abnormal lab result, we will notify you by phone as soon as possible.  Submit refill requests through [employer] or call your pharmacy and they will forward the refill request to us. Please allow 3 business days for your refill to be completed.          Additional Information About Your Visit        Care EveryWhere ID     This is your Care EveryWhere ID. This could be used by other organizations to access your [place] medical records  [id number]         Blood Pressure from Last 3 Encounters:   07/27/18 112/72   06/05/18 113/56   04/24/18 128/78    Weight from Last 3 Encounters:   07/27/18 255 lb 11.2 oz (116 kg)   06/05/18 248 lb 3.2 oz (112.6 kg)   04/24/18 252 lb 3.2 oz (114.4 kg)              We Performed the Following     AUDIOGRAM/TYMPANOGRAM - INTERFACE     COMPREHENSIVE HEARING TEST     TYMPANOMETRY        Primary Care Provider Office Phone # Fax #    GM Bradshaw [facility] [phone] [address] [address]        Equal Access to Services     RAFIQ LOZANO : Liban Elizalde, polly anne, roderick alexis  jesus singletonalfredito hillaan ah. So [facility] [phone].    ATENCIÓN: Si habla rene, tiene a ragland disposición servicios gratuitos de asistencia lingüística. Sd al [phone].    We comply with applicable federal civil rights laws and [place] laws. We do not discriminate on the basis of race, color, national origin, age, disability, sex, sexual orientation, or gender identity.            Thank you!     Thank you for choosing [facility]  for your care. Our goal is always to provide you with excellent care. Hearing back from our patients is one way we can continue to improve our services. Please take a few minutes to complete the written survey that you may receive in the mail after your visit with us. Thank you!             Your Updated Medication List - Protect others around you: Learn how to safely use, store and throw away your medicines at www.disposemymeds.org.          This list is accurate as of 8/21/18  5:04 PM.  Always use your most recent med list.                   Brand Name Dispense Instructions for use Diagnosis    albuterol 108 (90 Base) MCG/ACT inhaler    PROAIR HFA    1 Inhaler    Inhale 2 puffs into the lungs every 6 hours as needed for shortness of breath / dyspnea or wheezing or cough    SOB (shortness of breath)       alum & mag hydroxide-simethicone 200-200-20 MG/5ML Susp suspension    MYLANTA/MAALOX     Take 30 mLs by mouth every 4 hours as needed for indigestion (2 tsp for upset stomach)        CHLORASEPTIC 1.4 % Liqd spray   Generic drug:  phenol      Take 1 spray by mouth every hour as needed for sore throat Use as directed for sore throt        * cholecalciferol 1000 UNIT tablet    vitamin D3    30 tablet    Take 1 tablet (1,000 Units) by mouth daily    Vitamin D deficiency       * cholecalciferol 1000 UNIT tablet    vitamin D3    100 tablet    Take 1 tablet (1,000 Units) by mouth daily    Vitamin D deficiency       divalproex sodium extended-release  500 MG 24 hr tablet    DEPAKOTE ER     Take 1,500 mg by mouth every morning        esomeprazole 40 MG CR capsule    nexIUM    30 capsule    Take 1 capsule (40 mg) by mouth every morning (before breakfast) Take 30-60 minutes before a eating.    Gastroesophageal reflux disease without esophagitis       fluticasone 50 MCG/ACT spray    FLONASE    1 Bottle    Spray 2 sprays into both nostrils daily    Nonallergic rhinitis       * ibuprofen 600 MG tablet    ADVIL/MOTRIN    60 tablet    Take 1 tablet (600 mg) by mouth every 6 hours as needed for moderate pain    Hip pain, unspecified laterality       * ibuprofen 600 MG tablet    ADVIL/MOTRIN    60 tablet    Take 1 tablet (600 mg) by mouth every 6 hours as needed for moderate pain    Chronic pain of both shoulders, Chronic bilateral low back pain without sciatica       levocetirizine 5 MG tablet    XYZAL    30 tablet    TAKE 1 TABLET BY MOUTH EVERY EVENING (MAKE SURE TO PUT A EVENING STICKER ON IT)    Seasonal allergic rhinitis due to pollen       levothyroxine 100 MCG tablet    SYNTHROID/LEVOTHROID    90 tablet    Take 1 tablet (100 mcg) by mouth daily    Hypothyroidism, unspecified type       MILK OF MAGNESIA 400 MG/5ML suspension   Generic drug:  magnesium hydroxide     360 mL    Take 30-60 mLs by mouth daily as needed for constipation or heartburn (If No Bowel Movement in 2 days.) Reported on 4/12/2017        OLANZapine 2.5 MG tablet    zyPREXA    30 tablet    Take 5 mg by mouth 3 times daily as needed Reported on 4/12/2017        order for DME      Equipment being ordered: CPAP AIRSENSE 10 11 CM H20 AIRFIT F20 MEDIUM SN# [id number]  DN# [id number]        oseltamivir 75 MG capsule    TAMIFLU    10 capsule    Take 1 capsule (75 mg) by mouth daily    Exposure to influenza       REGULOID 58.6 % Powd   Generic drug:  psyllium     426 g    MIX 1 TEASPOONFUL WITH LIQUID AND DRINK ONCE DAILY    Diarrhea, unspecified type       risperDAL 1 MG tablet   Generic drug:  risperiDONE      60 tablet    Take 1 tablet in the morning and 2 tablets at 5 pm        ROBITUSSIN A-C OR      100/5ml, take 2 tsp full by mouth every 4 hours as needed for cough        sodium chloride 0.65 % nasal spray    SALINE MIST    1 Bottle    Spray 2 sprays into both nostrils At Bedtime    Nonallergic rhinitis       SUDOGEST 12 HOUR 120 MG 12 hr tablet   Generic drug:  pseudoePHEDrine     20 tablet    TAKE 1 TABLET BY MOUTH ONCE DAILY AS NEEDED. *1 TOTAL FILL*    Seasonal allergic rhinitis, unspecified chronicity, unspecified trigger       SYSTANE OP      1-2 drops in eye as needed up to 5 times per day for watery eyes.        triamcinolone 0.1 % cream    KENALOG    30 g    Apply  topically 3 times daily. Apply sparingly to affected area.    Eczema       TYLENOL 325 MG tablet   Generic drug:  acetaminophen     100 tablet    Take 325-650 mg by mouth every 4 hours as needed Reported on 4/12/2017        UNABLE TO FIND      MEDICATION NAME: pseudefed PE PRN not to exceed 10 capsules in 24 hours        venlafaxine 37.5 MG 24 hr capsule    EFFEXOR-XR     Take 37.5 mg by mouth daily 3 caps daily to equal 112.5mg        * Notice:  This list has 4 medication(s) that are the same as other medications prescribed for you. Read the directions carefully, and ask your doctor or other care provider to review them with you.

## 2018-08-22 NOTE — TELEPHONE ENCOUNTER
Patient's appt was yesterday and the [facility] home is called and told of the referral being placed. Jacquie COVARRUBIAS RN

## 2018-09-10 ENCOUNTER — ALLIED HEALTH/NURSE VISIT (OUTPATIENT)
Dept: FAMILY MEDICINE | Facility: CLINIC | Age: 38
End: 2018-09-10
Payer: MEDICARE

## 2018-09-10 DIAGNOSIS — Z23 NEED FOR PROPHYLACTIC VACCINATION AND INOCULATION AGAINST INFLUENZA: Primary | ICD-10-CM

## 2018-09-10 PROCEDURE — 90686 IIV4 VACC NO PRSV 0.5 ML IM: CPT

## 2018-09-10 PROCEDURE — 99207 ZZC NO CHARGE NURSE ONLY: CPT

## 2018-09-10 PROCEDURE — G0008 ADMIN INFLUENZA VIRUS VAC: HCPCS

## 2018-09-10 NOTE — PROGRESS NOTES
Injectable Influenza Immunization Documentation    1.  Is the person to be vaccinated sick today?   No    2. Does the person to be vaccinated have an allergy to a component   of the vaccine?   No  Egg Allergy Algorithm Link    3. Has the person to be vaccinated ever had a serious reaction   to influenza vaccine in the past?   No    4. Has the person to be vaccinated ever had Guillain-Barré syndrome?   No    Form completed by Lanny Booker CMA ([facility]) 4:14 PM 9/10/2018

## 2018-09-10 NOTE — MR AVS SNAPSHOT
After Visit Summary   9/10/2018    Humberto Estrella    MRN: [id number]           Patient Information     Date Of Birth          1980        Visit Information        Provider Department      9/10/2018 4:05 PM [facility] FLU SHOT CLINIC [facility]        Today's Diagnoses     Need for prophylactic vaccination and inoculation against influenza    -  1       Follow-ups after your visit        Who to contact     If you have questions or need follow up information about today's clinic visit or your schedule please contact [facility] directly at [phone].  Normal or non-critical lab and imaging results will be communicated to you by MyChart, letter or phone within 4 business days after the clinic has received the results. If you do not hear from us within 7 days, please contact the clinic through MyChart or phone. If you have a critical or abnormal lab result, we will notify you by phone as soon as possible.  Submit refill requests through [employer] or call your pharmacy and they will forward the refill request to us. Please allow 3 business days for your refill to be completed.          Additional Information About Your Visit        Care EveryWhere ID     This is your Care EveryWhere ID. This could be used by other organizations to access your [place] medical records  [id number]         Blood Pressure from Last 3 Encounters:   07/27/18 112/72   06/05/18 113/56   04/24/18 128/78    Weight from Last 3 Encounters:   07/27/18 255 lb 11.2 oz (116 kg)   06/05/18 248 lb 3.2 oz (112.6 kg)   04/24/18 252 lb 3.2 oz (114.4 kg)              We Performed the Following     ADMIN INFLUENZA (For MEDICARE Patients ONLY) []     FLU VACCINE, SPLIT VIRUS, IM (QUADRIVALENT) [40320]- >3 YRS        Primary Care Provider Office Phone # Fax #    Carla GM Frazier [facility] [phone] [address] [facility] [address]        Equal Access to Services     RAFIQ HERNANDEZ: Liban  robert Elizalde, wasahilda luqadaha, qaybta kaalmada zhannatiffanie, waxrachel soledad marieolegariojoel aranda valorie. So [facility] [phone].    ATENCIÓN: Si habla español, tiene a ragland disposición servicios gratuitos de asistencia lingüística. Sd al [phone].    We comply with applicable federal civil rights laws and [place] laws. We do not discriminate on the basis of race, color, national origin, age, disability, sex, sexual orientation, or gender identity.            Thank you!     Thank you for choosing [facility]  for your care. Our goal is always to provide you with excellent care. Hearing back from our patients is one way we can continue to improve our services. Please take a few minutes to complete the written survey that you may receive in the mail after your visit with us. Thank you!             Your Updated Medication List - Protect others around you: Learn how to safely use, store and throw away your medicines at www.disposemymeds.org.          This list is accurate as of 9/10/18  4:16 PM.  Always use your most recent med list.                   Brand Name Dispense Instructions for use Diagnosis    albuterol 108 (90 Base) MCG/ACT inhaler    PROAIR HFA    1 Inhaler    Inhale 2 puffs into the lungs every 6 hours as needed for shortness of breath / dyspnea or wheezing or cough    SOB (shortness of breath)       alum & mag hydroxide-simethicone 200-200-20 MG/5ML Susp suspension    MYLANTA/MAALOX     Take 30 mLs by mouth every 4 hours as needed for indigestion (2 tsp for upset stomach)        CHLORASEPTIC 1.4 % Liqd spray   Generic drug:  phenol      Take 1 spray by mouth every hour as needed for sore throat Use as directed for sore throt        * cholecalciferol 1000 UNIT tablet    vitamin D3    30 tablet    Take 1 tablet (1,000 Units) by mouth daily    Vitamin D deficiency       * cholecalciferol 1000 UNIT tablet    vitamin D3    100 tablet    Take 1 tablet (1,000 Units) by mouth daily     Vitamin D deficiency       divalproex sodium extended-release 500 MG 24 hr tablet    DEPAKOTE ER     Take 1,500 mg by mouth every morning        esomeprazole 40 MG CR capsule    nexIUM    30 capsule    Take 1 capsule (40 mg) by mouth every morning (before breakfast) Take 30-60 minutes before a eating.    Gastroesophageal reflux disease without esophagitis       fluticasone 50 MCG/ACT spray    FLONASE    1 Bottle    Spray 2 sprays into both nostrils daily    Nonallergic rhinitis       * ibuprofen 600 MG tablet    ADVIL/MOTRIN    60 tablet    Take 1 tablet (600 mg) by mouth every 6 hours as needed for moderate pain    Hip pain, unspecified laterality       * ibuprofen 600 MG tablet    ADVIL/MOTRIN    60 tablet    Take 1 tablet (600 mg) by mouth every 6 hours as needed for moderate pain    Chronic pain of both shoulders, Chronic bilateral low back pain without sciatica       levocetirizine 5 MG tablet    XYZAL    30 tablet    TAKE 1 TABLET BY MOUTH EVERY EVENING (MAKE SURE TO PUT A EVENING STICKER ON IT)    Seasonal allergic rhinitis due to pollen       levothyroxine 100 MCG tablet    SYNTHROID/LEVOTHROID    90 tablet    Take 1 tablet (100 mcg) by mouth daily    Hypothyroidism, unspecified type       MILK OF MAGNESIA 400 MG/5ML suspension   Generic drug:  magnesium hydroxide     360 mL    Take 30-60 mLs by mouth daily as needed for constipation or heartburn (If No Bowel Movement in 2 days.) Reported on 4/12/2017        OLANZapine 2.5 MG tablet    zyPREXA    30 tablet    Take 5 mg by mouth 3 times daily as needed Reported on 4/12/2017        order for DME      Equipment being ordered: CPAP AIRSENSE 10 11 CM H20 AIRFIT F20 MEDIUM SN# [id number]  DN# [id number]        oseltamivir 75 MG capsule    TAMIFLU    10 capsule    Take 1 capsule (75 mg) by mouth daily    Exposure to influenza       REGULOID 58.6 % Powd   Generic drug:  psyllium     426 g    MIX 1 TEASPOONFUL WITH LIQUID AND DRINK ONCE DAILY    Diarrhea, unspecified type        risperDAL 1 MG tablet   Generic drug:  risperiDONE     60 tablet    Take 1 tablet in the morning and 2 tablets at 5 pm        ROBITUSSIN A-C OR      100/5ml, take 2 tsp full by mouth every 4 hours as needed for cough        sodium chloride 0.65 % nasal spray    SALINE MIST    1 Bottle    Spray 2 sprays into both nostrils At Bedtime    Nonallergic rhinitis       SUDOGEST 12 HOUR 120 MG 12 hr tablet   Generic drug:  pseudoePHEDrine     20 tablet    TAKE 1 TABLET BY MOUTH ONCE DAILY AS NEEDED. *1 TOTAL FILL*    Seasonal allergic rhinitis, unspecified chronicity, unspecified trigger       SYSTANE OP      1-2 drops in eye as needed up to 5 times per day for watery eyes.        triamcinolone 0.1 % cream    KENALOG    30 g    Apply  topically 3 times daily. Apply sparingly to affected area.    Eczema       TYLENOL 325 MG tablet   Generic drug:  acetaminophen     100 tablet    Take 325-650 mg by mouth every 4 hours as needed Reported on 4/12/2017        UNABLE TO FIND      MEDICATION NAME: pseudefed PE PRN not to exceed 10 capsules in 24 hours        venlafaxine 37.5 MG 24 hr capsule    EFFEXOR-XR     Take 37.5 mg by mouth daily 3 caps daily to equal 112.5mg        * Notice:  This list has 4 medication(s) that are the same as other medications prescribed for you. Read the directions carefully, and ask your doctor or other care provider to review them with you.

## 2018-11-02 ENCOUNTER — TELEPHONE (OUTPATIENT)
Dept: FAMILY MEDICINE | Facility: CLINIC | Age: 38
End: 2018-11-02

## 2018-11-02 NOTE — TELEPHONE ENCOUNTER
Spoke with Group [place] and advised an Office visit due to last Office visit 7/27/18. Group home states he is 258 lbs. And is exercising a lot but is still not losing weight. Patient is on vacation now, Scheduled for Office visit 11/27/18 at 0940.

## 2018-11-02 NOTE — TELEPHONE ENCOUNTER
Reason for Call: Request for an order or referral:    Order or referral being requested: order for thyroid labs    Date needed: as soon as possible    Has the patient been seen by the PCP for this problem? YES    Additional comments: Janey from group home calling asking for thyroid labs orders. She states pt has been exercising quite a bit and is still gaining weight. She would like to chesk his tsh and t4 free.    Phone number Patient can be reached at:  Home number on file [phone] (home)    Best Time:  any    Can we leave a detailed message on this number?  YES    Call taken on 11/2/2018 at 11:00 AM by Steffanie Orellana

## 2018-11-27 ENCOUNTER — OFFICE VISIT (OUTPATIENT)
Dept: FAMILY MEDICINE | Facility: CLINIC | Age: 38
End: 2018-11-27
Payer: MEDICARE

## 2018-11-27 ENCOUNTER — TELEPHONE (OUTPATIENT)
Dept: ENDOCRINOLOGY | Facility: CLINIC | Age: 38
End: 2018-11-27

## 2018-11-27 VITALS
OXYGEN SATURATION: 97 % | RESPIRATION RATE: 12 BRPM | DIASTOLIC BLOOD PRESSURE: 70 MMHG | BODY MASS INDEX: 45.88 KG/M2 | SYSTOLIC BLOOD PRESSURE: 112 MMHG | HEART RATE: 81 BPM | WEIGHT: 259 LBS | TEMPERATURE: 97.3 F

## 2018-11-27 DIAGNOSIS — E03.8 SUBCLINICAL HYPOTHYROIDISM: Primary | ICD-10-CM

## 2018-11-27 DIAGNOSIS — E66.01 MORBID OBESITY (H): ICD-10-CM

## 2018-11-27 DIAGNOSIS — F41.9 ANXIETY: ICD-10-CM

## 2018-11-27 DIAGNOSIS — F60.3 EXPLOSIVE PERSONALITY DISORDER (H): ICD-10-CM

## 2018-11-27 DIAGNOSIS — R63.5 ABNORMAL WEIGHT GAIN: ICD-10-CM

## 2018-11-27 LAB
T4 FREE SERPL-MCNC: 1.01 NG/DL (ref 0.76–1.46)
TSH SERPL DL<=0.005 MIU/L-ACNC: 5.4 MU/L (ref 0.4–4)

## 2018-11-27 PROCEDURE — 84439 ASSAY OF FREE THYROXINE: CPT | Performed by: NURSE PRACTITIONER

## 2018-11-27 PROCEDURE — 99214 OFFICE O/P EST MOD 30 MIN: CPT | Performed by: NURSE PRACTITIONER

## 2018-11-27 PROCEDURE — 36415 COLL VENOUS BLD VENIPUNCTURE: CPT | Performed by: NURSE PRACTITIONER

## 2018-11-27 PROCEDURE — 84443 ASSAY THYROID STIM HORMONE: CPT | Performed by: NURSE PRACTITIONER

## 2018-11-27 NOTE — MR AVS SNAPSHOT
After Visit Summary   11/27/2018    Humberto Estrella    MRN: [id number]           Patient Information     Date Of Birth          1980        Visit Information        Provider Department      11/27/2018 9:40 AM Carla Meyer APRN [facility]        Today's Diagnoses     Subclinical hypothyroidism    -  1    Abnormal weight gain        Morbid obesity (H)        Anxiety        Explosive personality disorder (H)          Care Instructions    1. Labs today  2. Schedule an appointment with Endocrinology          Thank you for choosing [facility].  You may be receiving a survey in the mail from [employer] regarding your visit today.  Please take a few minutes to complete and return the survey to let us know how we are doing.      If you have questions or concerns, please contact us via [employer] or you can contact your care team at [phone].    Our Clinic hours are:  Monday 6:40 am  to 7:00 pm  Tuesday -Friday 6:40 am to 5:00 pm    The [place] outpatient lab hours are:  Monday - Friday 6:10 am to 4:45 pm  Saturdays 7:00 am to 11:00 am  Appointments are required, call [phone]    If you have clinical questions after hours or would like to schedule an appointment,  call the clinic at [phone].          Follow-ups after your visit        Additional Services     ENDOCRINOLOGY ADULT REFERRAL       Your provider has referred you to: [facility]:  [facility] [phone]  [email or web address]: Endocrinology and Diabetes Clinic [facility] [phone]   [email or web address]  [facility]: [facility] [phone]   [email or web address]  N: Endocrinology Clinic [facility] [phone]   [email or web address]      Please be aware that coverage of these services is subject to the  terms and limitations of your health insurance plan.  Call member services at your health plan with any benefit or coverage questions.      Please bring the following to your appointment:    >>   Any x-rays, CTs or MRIs which have been performed.  Contact the facility where they were done to arrange for  prior to your scheduled appointment.    >>   List of current medications   >>   This referral request   >>   Any documents/labs given to you for this referral                  Who to contact     If you have questions or need follow up information about today's clinic visit or your schedule please contact [facility] directly at [phone].  Normal or non-critical lab and imaging results will be communicated to you by MyChart, letter or phone within 4 business days after the clinic has received the results. If you do not hear from us within 7 days, please contact the clinic through MyChart or phone. If you have a critical or abnormal lab result, we will notify you by phone as soon as possible.  Submit refill requests through [employer] or call your pharmacy and they will forward the refill request to us. Please allow 3 business days for your refill to be completed.          Additional Information About Your Visit        Care EveryWhere ID     This is your Care EveryWhere ID. This could be used by other organizations to access your [place] medical records  [id number]        Your Vitals Were     Pulse Temperature Respirations Pulse Oximetry BMI (Body Mass Index)       81 97.3  F (36.3  C) (Tympanic) 12 97% 45.88 kg/m2        Blood Pressure from Last 3 Encounters:   11/27/18 112/70   07/27/18 112/72   06/05/18 113/56    Weight from Last 3 Encounters:   11/27/18 259 lb (117.5 kg)   07/27/18 255 lb 11.2 oz (116 kg)   06/05/18 248 lb 3.2 oz (112.6 kg)              We Performed the Following     ENDOCRINOLOGY ADULT REFERRAL     TSH with free T4 reflex        Primary Care Provider Office Phone # Fax #     Carlakavon Barront, APRN [phone]       [address] [facility] [address]        Equal Access to Services     RAFIQ LOZANO : Hadii robert ku hadrobero Soomaali, waaxda luqadaha, qaybta kaalmada adeegyada, jesus paten zhannaalfredito [place] manoj eugene. So [facility] [phone].    ATENCIÓN: Si habla español, tiene a ragland disposición servicios gratuitos de asistencia lingüística. Llame al [phone].    We comply with applicable federal civil rights laws and [place] laws. We do not discriminate on the basis of race, color, national origin, age, disability, sex, sexual orientation, or gender identity.            Thank you!     Thank you for choosing [facility]  for your care. Our goal is always to provide you with excellent care. Hearing back from our patients is one way we can continue to improve our services. Please take a few minutes to complete the written survey that you may receive in the mail after your visit with us. Thank you!             Your Updated Medication List - Protect others around you: Learn how to safely use, store and throw away your medicines at www.disposemymeds.org.          This list is accurate as of 11/27/18 10:13 AM.  Always use your most recent med list.                   Brand Name Dispense Instructions for use Diagnosis    albuterol 108 (90 Base) MCG/ACT inhaler    PROAIR HFA    1 Inhaler    Inhale 2 puffs into the lungs every 6 hours as needed for shortness of breath / dyspnea or wheezing or cough    SOB (shortness of breath)       alum & mag hydroxide-simethicone 200-200-20 MG/5ML Susp suspension    MYLANTA/MAALOX     Take 30 mLs by mouth every 4 hours as needed for indigestion (2 tsp for upset stomach)        CHLORASEPTIC 1.4 % spray   Generic drug:  phenol      Take 1 spray by mouth every hour as needed for sore throat Use as directed for sore throt        divalproex sodium extended-release 500 MG 24 hr tablet    DEPAKOTE ER     Take 1,500 mg by mouth every  morning        esomeprazole 40 MG DR capsule    nexIUM    30 capsule    Take 1 capsule (40 mg) by mouth every morning (before breakfast) Take 30-60 minutes before a eating.    Gastroesophageal reflux disease without esophagitis       fluticasone 50 MCG/ACT nasal spray    FLONASE    1 Bottle    Spray 2 sprays into both nostrils daily    Nonallergic rhinitis       * ibuprofen 600 MG tablet    ADVIL/MOTRIN    60 tablet    Take 1 tablet (600 mg) by mouth every 6 hours as needed for moderate pain    Hip pain, unspecified laterality       * ibuprofen 600 MG tablet    ADVIL/MOTRIN    60 tablet    Take 1 tablet (600 mg) by mouth every 6 hours as needed for moderate pain    Chronic pain of both shoulders, Chronic bilateral low back pain without sciatica       levocetirizine 5 MG tablet    XYZAL    30 tablet    TAKE 1 TABLET BY MOUTH EVERY EVENING (MAKE SURE TO PUT A EVENING STICKER ON IT)    Seasonal allergic rhinitis due to pollen       levothyroxine 100 MCG tablet    SYNTHROID/LEVOTHROID    90 tablet    Take 1 tablet (100 mcg) by mouth daily    Hypothyroidism, unspecified type       MILK OF MAGNESIA 400 MG/5ML suspension   Generic drug:  magnesium hydroxide     360 mL    Take 30-60 mLs by mouth daily as needed for constipation or heartburn (If No Bowel Movement in 2 days.) Reported on 4/12/2017        OLANZapine 2.5 MG tablet    zyPREXA    30 tablet    Take 5 mg by mouth 3 times daily as needed Reported on 4/12/2017        order for DME      Equipment being ordered: CPAP AIRSENSE 10 11 CM H20 AIRFIT F20 MEDIUM SN# [id number]  DN# [id number]        oseltamivir 75 MG capsule    TAMIFLU    10 capsule    Take 1 capsule (75 mg) by mouth daily    Exposure to influenza       REGULOID 58.6 % Powd   Generic drug:  psyllium     426 g    MIX 1 TEASPOONFUL WITH LIQUID AND DRINK ONCE DAILY    Diarrhea, unspecified type       risperDAL 1 MG tablet   Generic drug:  risperiDONE     60 tablet    Take 1 tablet in the morning and 2 tablets at 5 pm         ROBITUSSIN A-C OR      100/5ml, take 2 tsp full by mouth every 4 hours as needed for cough        sodium chloride 0.65 % nasal spray    SALINE MIST    1 Bottle    Spray 2 sprays into both nostrils At Bedtime    Nonallergic rhinitis       SUDOGEST 12 HOUR 120 MG 12 hr tablet   Generic drug:  pseudoePHEDrine     20 tablet    TAKE 1 TABLET BY MOUTH ONCE DAILY AS NEEDED. *1 TOTAL FILL*    Seasonal allergic rhinitis, unspecified chronicity, unspecified trigger       SYSTANE OP      1-2 drops in eye as needed up to 5 times per day for watery eyes.        triamcinolone 0.1 % cream    KENALOG    30 g    Apply  topically 3 times daily. Apply sparingly to affected area.    Eczema       TYLENOL 325 MG tablet   Generic drug:  acetaminophen     100 tablet    Take 325-650 mg by mouth every 4 hours as needed Reported on 4/12/2017        UNABLE TO FIND      MEDICATION NAME: pseudefed PE PRN not to exceed 10 capsules in 24 hours        venlafaxine 37.5 MG 24 hr capsule    EFFEXOR-XR     Take 37.5 mg by mouth daily 3 caps daily to equal 112.5mg        * vitamin D3 1000 UNIT tablet    CHOLECALCIFEROL    30 tablet    Take 1 tablet (1,000 Units) by mouth daily    Vitamin D deficiency       * vitamin D3 1000 UNIT tablet    CHOLECALCIFEROL    100 tablet    Take 1 tablet (1,000 Units) by mouth daily    Vitamin D deficiency       * Notice:  This list has 4 medication(s) that are the same as other medications prescribed for you. Read the directions carefully, and ask your doctor or other care provider to review them with you.

## 2018-11-27 NOTE — NURSING NOTE
"Initial /70 (BP Location: Left arm, Patient Position: Chair, Cuff Size: Adult Large)  Pulse 81  Temp 97.3  F (36.3  C) (Tympanic)  Resp 12  Wt 259 lb (117.5 kg)  SpO2 97%  BMI 45.88 kg/m2 Estimated body mass index is 45.88 kg/(m^2) as calculated from the following:    Height as of 4/24/18: 5' 3\" (1.6 m).    Weight as of this encounter: 259 lb (117.5 kg). .    Malia Arndt    "

## 2018-11-27 NOTE — PROGRESS NOTES
SUBJECTIVE:   Humberto Estrella is a 38 year old male who presents to clinic today for the following health issues:    Patient with history of PTSD, trisomy 21, behavior disorder, hypothyroidism, VIDAL. Patient lives in group home and presents to the clinic with his care taker.       C/O weight gain with no known explanation. Has been exercising/ walking in treadmill three times a day for 20-30 minute  and eating a healthy diet- has reduced portion control. Concerned about his Thyroid. On chart review- patient has gained 15 lbs in past 1  year and 40 lbs total in 2 yrs.          -------------------------------------    Problem list and histories reviewed & adjusted, as indicated.  Additional history: as documented    Patient Active Problem List   Diagnosis     TRISOMY 21 (DOWN SYNDROME)     Hearing loss     MYOPIA     LATENT NYSTAGMUS     Headache     INTERMITT EXPLOSIVE DIS     post traumatic stress disorder     CARDIOVASCULAR SCREENING; LDL GOAL LESS THAN 160     Cortical, lamellar, or zonular cataract, nonsenile     Nonallergic rhinitis     Obesity due to excess calories, unspecified obesity severity     Subclinical hypothyroidism     VIDAL (obstructive sleep apnea)-AHI 26     Health care maintenance     Morbid obesity (H)     Anxiety     Explosive personality disorder (H)     Past Surgical History:   Procedure Laterality Date     PE TUBES      Left     PHACOEMULSIFICATION WITH STANDARD INTRAOCULAR LENS IMPLANT  10/3/2013    Procedure: PHACOEMULSIFICATION WITH STANDARD INTRAOCULAR LENS IMPLANT;  Left Kelman Phacoemulsification with Intraocular Lens Implant;  Surgeon: Luis Barajas MD;  Location: [facility] OR     PHACOEMULSIFICATION WITH STANDARD INTRAOCULAR LENS IMPLANT  5/1/2014    Procedure: PHACOEMULSIFICATION WITH STANDARD INTRAOCULAR LENS IMPLANT;  Surgeon: Luis Barajas MD;  Location: [facility] OR       Social History   Substance Use Topics     Smoking status: Former Smoker     Quit date: 8/16/1998     Smokeless  tobacco: Never Used     Alcohol use No     Family History   Problem Relation Age of Onset     Unknown/Adopted Mother            Reviewed and updated as needed this visit by clinical staff       Reviewed and updated as needed this visit by Provider         ROS:  Constitutional, HEENT, cardiovascular, pulmonary, GI, , musculoskeletal, neuro, skin, endocrine and psych systems are negative, except as otherwise noted.    OBJECTIVE:     /70 (BP Location: Left arm, Patient Position: Chair, Cuff Size: Adult Large)  Pulse 81  Temp 97.3  F (36.3  C) (Tympanic)  Resp 12  Wt 259 lb (117.5 kg)  SpO2 97%  BMI 45.88 kg/m2  Body mass index is 45.88 kg/(m^2).  GENERAL: healthy, alert and no distress  RESP: lungs clear to auscultation - no rales, rhonchi or wheezes  CV: regular rate and rhythm, normal S1 S2, no S3 or S4, no murmur, click or rub,   MS: no gross musculoskeletal defects noted, no edema    Diagnostic Test Results:  none     ASSESSMENT/PLAN:     1. Subclinical hypothyroidism  ? Thyroid vs side effects from medications- specifically Risperidone   - ENDOCRINOLOGY ADULT REFERRAL  - TSH with free T4 reflex  - T4 free  - T4 free    2. Abnormal weight gain  ? Thyroid vs side effects from medications- specifically Risperidone   - ENDOCRINOLOGY ADULT REFERRAL  - TSH with free T4 reflex    3. Morbid obesity (H)  - continue following healthy diet plan and exercising   - TSH with free T4 reflex    4. Anxiety      5. Explosive personality disorder (H)  Stable on current medications  Follows with Psychiatry           [facility]

## 2018-11-27 NOTE — TELEPHONE ENCOUNTER
To schedulers: Please schedule  for lst available endocrine    Ernestina Kim MD  Endocrine triage      [facility]    Phone Message    May a detailed message be left on voicemail: yes    Reason for Call: Other: Pt has a referral for hyperthyroidsm - Anyone at the group home can help schedule appt     Action Taken: Message routed to:  [facility] Endo

## 2018-11-27 NOTE — PATIENT INSTRUCTIONS
1. Labs today  2. Schedule an appointment with Endocrinology          Thank you for choosing [facility]  You may be receiving a survey in the mail from [employer] regarding your visit today.  Please take a few minutes to complete and return the survey to let us know how we are doing.      If you have questions or concerns, please contact us via [email or web address] or you can contact your care team at [phone].    Our Clinic hours are:  Monday 6:40 am  to 7:00 pm  Tuesday -Friday 6:40 am to 5:00 pm    The [place] outpatient lab hours are:  Monday - Friday 6:10 am to 4:45 pm  Saturdays 7:00 am to 11:00 am  Appointments are required, call [phone]    If you have clinical questions after hours or would like to schedule an appointment,  call the clinic at [phone].

## 2019-01-03 DIAGNOSIS — J30.1 SEASONAL ALLERGIC RHINITIS DUE TO POLLEN: ICD-10-CM

## 2019-01-03 NOTE — TELEPHONE ENCOUNTER
"Requested Prescriptions   Pending Prescriptions Disp Refills     levocetirizine (XYZAL) 5 MG tablet [Pharmacy Med Name: LEVOCETIRIZINE 5MG TAB]  Last Written Prescription Date:  4/11/2018  Last Fill Quantity: 30,  # refills: 8   Last office visit: 11/27/2018 with prescribing provider:  Betsy   Future Office Visit:      11     Sig: TAKE 1 TABLET BY MOUTH EVERY EVENING (MAKE SURE TO PUT AN EVENING STICKER ON IT)    Antihistamines Protocol Passed - 1/3/2019  1:25 PM       Passed - Patient is 3-64 years of age    Apply weight-based dosing for peds patients age 3 - 12 years of age.    Forward request to provider for patients under the age of 3 or over the age of 64.         Passed - Recent (12 mo) or future (30 days) visit within the authorizing provider's specialty    Patient had office visit in the last 12 months or has a visit in the next 30 days with authorizing provider or within the authorizing provider's specialty.  See \"Patient Info\" tab in inbasket, or \"Choose Columns\" in Meds & Orders section of the refill encounter.                "

## 2019-01-04 RX ORDER — LEVOCETIRIZINE DIHYDROCHLORIDE 5 MG/1
TABLET, FILM COATED ORAL
Qty: 30 TABLET | Refills: 5 | Status: SHIPPED | OUTPATIENT
Start: 2019-01-04 | End: 2019-07-29

## 2019-01-09 ENCOUNTER — OFFICE VISIT (OUTPATIENT)
Dept: ENDOCRINOLOGY | Facility: CLINIC | Age: 39
End: 2019-01-09
Payer: MEDICARE

## 2019-01-09 VITALS
HEART RATE: 68 BPM | WEIGHT: 263.9 LBS | SYSTOLIC BLOOD PRESSURE: 112 MMHG | DIASTOLIC BLOOD PRESSURE: 75 MMHG | BODY MASS INDEX: 45.05 KG/M2 | HEIGHT: 64 IN

## 2019-01-09 DIAGNOSIS — E66.09 OBESITY DUE TO EXCESS CALORIES, UNSPECIFIED OBESITY SEVERITY: Primary | Chronic | ICD-10-CM

## 2019-01-09 DIAGNOSIS — E03.8 SUBCLINICAL HYPOTHYROIDISM: ICD-10-CM

## 2019-01-09 ASSESSMENT — MIFFLIN-ST. JEOR: SCORE: 2022.98

## 2019-01-09 NOTE — NURSING NOTE
Chief Complaint   Patient presents with     RECHECK     Subclinical Hypothyroidism     Annabel Hdz MA

## 2019-01-09 NOTE — LETTER
1/9/2019       RE: Humberto Estrella  [address]  [place] [address]     Dear Colleague,    Thank you for referring your patient, Humberto Estrella, to the [facility] ENDOCRINOLOGY at [facility]. Please see a copy of my visit note below.    Endocrine Clinic Consult:     Reason for consult: Subclinical Hypothyroidism  Date: 01/09/2019  Referring Physician: Carla Meyer    HPI:   Humberto Estrella is a 38 year old male who is seen for initial consultation.   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     Hypothyroidism:   Abnormal labs date back to 7/2015.   LT4 started 10/2016, TSH > 10 on one occasion.   Dose gradually increased to current dose of 100 mcg daily.   Takes it in the morning     Symptoms Details   Temp intolerance No   Palpitations No   SOB no   Appetite Increased since Risperidal per care giver Janey.    Weight changes Rapid gain since May 2017   Change in BM No   Diet Increased portion size.   Did not follow dietician recs.      Exercise No, but motivated to do so.   Energy Levels Good   Sleep On CPAP/    Mental status change No,   Skin or hair changes No  Shaves daily   Extremity No edema   PSY Needing more Zyprexa.   Risperidal 3 mg/ day       Compressive symptoms  No neck pain   No problems with swallowing.    FH Unknown.     Radiation No     Weight gain:   Accelerated gain in 2017.   Moderate sleep apnea  Intermittent steroid use.   Hunger disorder.       Other ROS:   No eye symptoms  No headache, change in vision, loss of peripheral vision  Complete ROS that were obtained were negative.     Past Medical History:   Diagnosis Date     Diagnostic skin and sensitization tests (aka ALLERGENS) 9/30/15 IgE tests all NEGATIVE for environmental allergens.      Nonallergic rhinitis     9/30/15 IgE tests all NEGATIVE for environmental allergens.      Past Surgical History:   Procedure Laterality Date     PE TUBES      Left      PHACOEMULSIFICATION WITH STANDARD INTRAOCULAR LENS IMPLANT  10/3/2013    Procedure: PHACOEMULSIFICATION WITH STANDARD INTRAOCULAR LENS IMPLANT;  Left Kelman Phacoemulsification with Intraocular Lens Implant;  Surgeon: Luis Barajas MD;  Location: [facility]     PHACOEMULSIFICATION WITH STANDARD INTRAOCULAR LENS IMPLANT  2014    Procedure: PHACOEMULSIFICATION WITH STANDARD INTRAOCULAR LENS IMPLANT;  Surgeon: Luis Barajas MD;  Location: [facility] OR     Family History   Problem Relation Age of Onset     Unknown/Adopted Mother      Social History     Socioeconomic History     Marital status: Single     Spouse name: Not on file     Number of children: 0     Years of education: 12     Highest education level: Not on file   Social Needs     Financial resource strain: Not on file     Food insecurity - worry: Not on file     Food insecurity - inability: Not on file     Transportation needs - medical: Not on file     Transportation needs - non-medical: Not on file   Occupational History     Occupation: Cleaning Services     Comment: [employer]     Employer: UNEMPLOYED     Employer: DISABLED   Tobacco Use     Smoking status: Former Smoker     Last attempt to quit: 1998     Years since quittin.4     Smokeless tobacco: Never Used   Substance and Sexual Activity     Alcohol use: No     Alcohol/week: 0.0 oz     Drug use: No     Sexual activity: No   Other Topics Concern     Parent/sibling w/ CABG, MI or angioplasty before 65F 55M? No   Social History Narrative     Not on file        Allergies   Allergen Reactions     Nkda [No Known Drug Allergies]      Current Outpatient Medications   Medication     acetaminophen (TYLENOL) 325 MG tablet     albuterol (ALBUTEROL) 108 (90 BASE) MCG/ACT inhaler     alum & mag hydroxide-simethicone (MYLANTA/MAALOX) 200-200-20 MG/5ML SUSP     cholecalciferol (VITAMIN D3) 1000 UNIT tablet     cholecalciferol (VITAMIN D3) 1000 UNIT tablet     divalproex (DEPAKOTE ER)  "500 MG 24 hr tablet     esomeprazole (NEXIUM) 40 MG capsule     fluticasone (FLONASE) 50 MCG/ACT spray     Guaifenesin-Codeine (ROBITUSSIN A-C OR)     ibuprofen (ADVIL,MOTRIN) 600 MG tablet     ibuprofen (ADVIL/MOTRIN) 600 MG tablet     levocetirizine (XYZAL) 5 MG tablet     levothyroxine (SYNTHROID/LEVOTHROID) 100 MCG tablet     magnesium hydroxide (MILK OF MAGNESIA) 400 MG/5ML suspension     OLANZapine (ZYPREXA) 2.5 MG tablet     order for DME     oseltamivir (TAMIFLU) 75 MG capsule     phenol (CHLORASEPTIC) 1.4 % LIQD spray     Polyethyl Glycol-Propyl Glycol (SYSTANE OP)     REGULOID 58.6 % POWD     risperiDONE (RISPERDAL) 1 MG tablet     sodium chloride (SALINE MIST) 0.65 % nasal spray     SUDOGEST 12 HOUR 120 MG 12 hr tablet     triamcinolone (KENALOG) 0.1 % cream     UNABLE TO FIND     venlafaxine (EFFEXOR-XR) 37.5 MG 24 hr capsule     No current facility-administered medications for this visit.            Exam:  /75   Pulse 68   Ht 1.617 m (5' 3.68\")   Wt 119.7 kg (263 lb 14.4 oz)   BMI 45.75 kg/m      Constitutional: obese.   Head: Normocephalic. No masses, lesions, tenderness or abnormalities  Neck: Neck supple. No adenopathy. Thyroid no large goiter, Carotids without bruits.  ENT: No throat congestion, no neck nodes or sinus tenderness  Cardiovascular: regular rhythm, no tachycardia  Respiratory: Lungs clear  Gastrointestinal: Abdomen soft, non-tender. BS normal. Pink striae  Musculoskeletal: gait normal and normal muscle tone  Neurologic: Normal speech, reflexes normal.   Psychiatric: mentation appears normal       TSH   Date Value Ref Range Status   11/27/2018 5.40 (H) 0.40 - 4.00 mU/L Final   08/07/2018 7.00 (H) 0.40 - 4.00 mU/L Final   05/11/2018 4.65 (H) 0.40 - 4.00 mU/L Final   02/16/2018 5.87 (H) 0.40 - 4.00 mU/L Final   11/10/2017 5.62 (H) 0.40 - 4.00 mU/L Final       T4 Free   Date Value Ref Range Status   11/27/2018 1.01 0.76 - 1.46 ng/dL Final   08/07/2018 1.02 0.76 - 1.46 ng/dL Final "   05/11/2018 0.93 0.76 - 1.46 ng/dL Final   02/16/2018 0.95 0.76 - 1.46 ng/dL Final   11/10/2017 0.92 0.76 - 1.46 ng/dL Final   ]    CBC RESULTS:   Recent Labs   Lab Test 03/06/18  0654   WBC 3.8*   RBC 4.45   HGB 15.3   HCT 45.3   *   MCH 34.4*   MCHC 33.8   RDW 15.4*        Recent Labs   Lab Test 09/22/16  1055 06/28/16  0627    143   POTASSIUM 4.3 4.4   CHLORIDE 105 110*   CO2 29 27   ANIONGAP 4 6   GLC 95 86   BUN 21 15   CR 1.19 1.11   CHELSEA 8.4* 8.0*         Assessment     Morbid obesity with rapid weight gain since 5/2017 xochilt after starting Risperidal for behavioral issues.     Hunger disorder.  Fluticasone nasal spray use on a daily basis for many years.  Pinkish to purplish abdominal striae without any other symptoms of Cushing syndrome.    Subclinical hypothyroidism on levothyroxine 100 mcg daily.  TSH was over 10 on one occasion.  Clinically euthyroid.    Obstructive sleep apnea, on CPAP: Motivated to get off CPAP.     Plan:  Obtain ACTH, cortisol in the morning  Urine for Fluticasone levels.   TSH and Free T4 levels. Adjust dose as needed.   Diet and exercise discussed today.   Recommend follow up with Psychiatry to see if the medications can be adjusted. If not, and if work up is negative, then plan for Metformin and / or Topiramate (he will need to see someone in weight management clinic thereafter.)      Linda Boss MD  [id number]  Endocrinology Service

## 2019-01-09 NOTE — PROGRESS NOTES
Endocrine Clinic Consult:     Reason for consult: Subclinical Hypothyroidism  Date: 01/09/2019  Referring Physician: Carla Meyer    HPI:   Humberto Estrella is a 38 year old male who is seen for initial consultation.   Group home resident (Trisomy 21) works as a , IDL.     Hypothyroidism:   Abnormal labs date back to 7/2015.   LT4 started 10/2016, TSH > 10 on one occasion.   Dose gradually increased to current dose of 100 mcg daily.   Takes it in the morning     Symptoms Details   Temp intolerance No   Palpitations No   SOB no   Appetite Increased since Risperidal per care giver Janey.    Weight changes Rapid gain since May 2017   Change in BM No   Diet Increased portion size.   Did not follow dietician recs.      Exercise No, but motivated to do so.   Energy Levels Good   Sleep On CPAP/    Mental status change No,   Skin or hair changes No  Shaves daily   Extremity No edema   PSY Needing more Zyprexa.   Risperidal 3 mg/ day       Compressive symptoms  No neck pain   No problems with swallowing.    FH Unknown.     Radiation No     Weight gain:   Accelerated gain in 2017.   Moderate sleep apnea  Intermittent steroid use.   Hunger disorder.       Other ROS:   No eye symptoms  No headache, change in vision, loss of peripheral vision  Complete ROS that were obtained were negative.     Past Medical History:   Diagnosis Date     Diagnostic skin and sensitization tests (aka ALLERGENS) 9/30/15 IgE tests all NEGATIVE for environmental allergens.      Nonallergic rhinitis     9/30/15 IgE tests all NEGATIVE for environmental allergens.      Past Surgical History:   Procedure Laterality Date     PE TUBES      Left     PHACOEMULSIFICATION WITH STANDARD INTRAOCULAR LENS IMPLANT  10/3/2013    Procedure: PHACOEMULSIFICATION WITH STANDARD INTRAOCULAR LENS IMPLANT;  Left Kelman Phacoemulsification with Intraocular Lens Implant;  Surgeon: Luis Barajas MD;  Location: [facility] OR     PHACOEMULSIFICATION WITH STANDARD  INTRAOCULAR LENS IMPLANT  2014    Procedure: PHACOEMULSIFICATION WITH STANDARD INTRAOCULAR LENS IMPLANT;  Surgeon: Luis Barajas MD;  Location: [facility] OR     Family History   Problem Relation Age of Onset     Unknown/Adopted Mother      Social History     Socioeconomic History     Marital status: Single     Spouse name: Not on file     Number of children: 0     Years of education: 12     Highest education level: Not on file   Social Needs     Financial resource strain: Not on file     Food insecurity - worry: Not on file     Food insecurity - inability: Not on file     Transportation needs - medical: Not on file     Transportation needs - non-medical: Not on file   Occupational History     Occupation: Cleaning Services     Comment: [employer]     Employer: UNEMPLOYED     Employer: DISABLED   Tobacco Use     Smoking status: Former Smoker     Last attempt to quit: 1998     Years since quittin.4     Smokeless tobacco: Never Used   Substance and Sexual Activity     Alcohol use: No     Alcohol/week: 0.0 oz     Drug use: No     Sexual activity: No   Other Topics Concern     Parent/sibling w/ CABG, MI or angioplasty before 65F 55M? No   Social History Narrative     Not on file        Allergies   Allergen Reactions     Nkda [No Known Drug Allergies]      Current Outpatient Medications   Medication     acetaminophen (TYLENOL) 325 MG tablet     albuterol (ALBUTEROL) 108 (90 BASE) MCG/ACT inhaler     alum & mag hydroxide-simethicone (MYLANTA/MAALOX) 200-200-20 MG/5ML SUSP     cholecalciferol (VITAMIN D3) 1000 UNIT tablet     cholecalciferol (VITAMIN D3) 1000 UNIT tablet     divalproex (DEPAKOTE ER) 500 MG 24 hr tablet     esomeprazole (NEXIUM) 40 MG capsule     fluticasone (FLONASE) 50 MCG/ACT spray     Guaifenesin-Codeine (ROBITUSSIN A-C OR)     ibuprofen (ADVIL,MOTRIN) 600 MG tablet     ibuprofen (ADVIL/MOTRIN) 600 MG tablet     levocetirizine (XYZAL) 5 MG tablet     levothyroxine  "(SYNTHROID/LEVOTHROID) 100 MCG tablet     magnesium hydroxide (MILK OF MAGNESIA) 400 MG/5ML suspension     OLANZapine (ZYPREXA) 2.5 MG tablet     order for DME     oseltamivir (TAMIFLU) 75 MG capsule     phenol (CHLORASEPTIC) 1.4 % LIQD spray     Polyethyl Glycol-Propyl Glycol (SYSTANE OP)     REGULOID 58.6 % POWD     risperiDONE (RISPERDAL) 1 MG tablet     sodium chloride (SALINE MIST) 0.65 % nasal spray     SUDOGEST 12 HOUR 120 MG 12 hr tablet     triamcinolone (KENALOG) 0.1 % cream     UNABLE TO FIND     venlafaxine (EFFEXOR-XR) 37.5 MG 24 hr capsule     No current facility-administered medications for this visit.            Exam:  /75   Pulse 68   Ht 1.617 m (5' 3.68\")   Wt 119.7 kg (263 lb 14.4 oz)   BMI 45.75 kg/m     Constitutional: obese.   Head: Normocephalic. No masses, lesions, tenderness or abnormalities  Neck: Neck supple. No adenopathy. Thyroid no large goiter, Carotids without bruits.  ENT: No throat congestion, no neck nodes or sinus tenderness  Cardiovascular: regular rhythm, no tachycardia  Respiratory: Lungs clear  Gastrointestinal: Abdomen soft, non-tender. BS normal. Pink striae  Musculoskeletal: gait normal and normal muscle tone  Neurologic: Normal speech, reflexes normal.   Psychiatric: mentation appears normal       TSH   Date Value Ref Range Status   11/27/2018 5.40 (H) 0.40 - 4.00 mU/L Final   08/07/2018 7.00 (H) 0.40 - 4.00 mU/L Final   05/11/2018 4.65 (H) 0.40 - 4.00 mU/L Final   02/16/2018 5.87 (H) 0.40 - 4.00 mU/L Final   11/10/2017 5.62 (H) 0.40 - 4.00 mU/L Final       T4 Free   Date Value Ref Range Status   11/27/2018 1.01 0.76 - 1.46 ng/dL Final   08/07/2018 1.02 0.76 - 1.46 ng/dL Final   05/11/2018 0.93 0.76 - 1.46 ng/dL Final   02/16/2018 0.95 0.76 - 1.46 ng/dL Final   11/10/2017 0.92 0.76 - 1.46 ng/dL Final   ]    CBC RESULTS:   Recent Labs   Lab Test 03/06/18  0654   WBC 3.8*   RBC 4.45   HGB 15.3   HCT 45.3   *   MCH 34.4*   MCHC 33.8   RDW 15.4*    "     Recent Labs   Lab Test 09/22/16  1055 06/28/16  0627    143   POTASSIUM 4.3 4.4   CHLORIDE 105 110*   CO2 29 27   ANIONGAP 4 6   GLC 95 86   BUN 21 15   CR 1.19 1.11   CHELSEA 8.4* 8.0*         Assessment     Morbid obesity with rapid weight gain since 5/2017 xochilt after starting Risperidal for behavioral issues.     Hunger disorder.  Fluticasone nasal spray use on a daily basis for many years.  Pinkish to purplish abdominal striae without any other symptoms of Cushing syndrome.    Subclinical hypothyroidism on levothyroxine 100 mcg daily.  TSH was over 10 on one occasion.  Clinically euthyroid.    Obstructive sleep apnea, on CPAP: Motivated to get off CPAP.     Plan:  Obtain ACTH, cortisol in the morning  Urine for Fluticasone levels.   TSH and Free T4 levels. Adjust dose as needed.   Diet and exercise discussed today.   Recommend follow up with Psychiatry to see if the medications can be adjusted. If not, and if work up is negative, then plan for Metformin (drug induced weight gain) and / or Topiramate (he will need to see someone in weight management clinic thereafter.)      Linda Boss MD  [id number]  Endocrinology Service

## 2019-01-09 NOTE — PATIENT INSTRUCTIONS
Labs in the morning. We will report once the results are available   Diet and exercise as we discussed today.

## 2019-01-10 ENCOUNTER — MEDICAL CORRESPONDENCE (OUTPATIENT)
Dept: HEALTH INFORMATION MANAGEMENT | Facility: CLINIC | Age: 39
End: 2019-01-10

## 2019-01-10 ENCOUNTER — TELEPHONE (OUTPATIENT)
Dept: FAMILY MEDICINE | Facility: CLINIC | Age: 39
End: 2019-01-10

## 2019-01-11 DIAGNOSIS — E03.8 SUBCLINICAL HYPOTHYROIDISM: ICD-10-CM

## 2019-01-11 DIAGNOSIS — E66.09 OBESITY DUE TO EXCESS CALORIES, UNSPECIFIED OBESITY SEVERITY: Chronic | ICD-10-CM

## 2019-01-11 LAB
CORTIS SERPL-MCNC: 4.6 UG/DL (ref 4–22)
MISCELLANEOUS TEST: NORMAL
T4 FREE SERPL-MCNC: 1.06 NG/DL (ref 0.76–1.46)
TSH SERPL DL<=0.005 MIU/L-ACNC: 3.78 MU/L (ref 0.4–4)

## 2019-01-11 PROCEDURE — 84439 ASSAY OF FREE THYROXINE: CPT | Performed by: INTERNAL MEDICINE

## 2019-01-11 PROCEDURE — 84305 ASSAY OF SOMATOMEDIN: CPT | Performed by: INTERNAL MEDICINE

## 2019-01-11 PROCEDURE — 84443 ASSAY THYROID STIM HORMONE: CPT | Performed by: INTERNAL MEDICINE

## 2019-01-11 PROCEDURE — 86800 THYROGLOBULIN ANTIBODY: CPT | Performed by: INTERNAL MEDICINE

## 2019-01-11 PROCEDURE — 82533 TOTAL CORTISOL: CPT | Performed by: INTERNAL MEDICINE

## 2019-01-11 PROCEDURE — 86376 MICROSOMAL ANTIBODY EACH: CPT | Performed by: INTERNAL MEDICINE

## 2019-01-11 PROCEDURE — 82024 ASSAY OF ACTH: CPT | Performed by: INTERNAL MEDICINE

## 2019-01-11 PROCEDURE — 36415 COLL VENOUS BLD VENIPUNCTURE: CPT | Performed by: INTERNAL MEDICINE

## 2019-01-12 LAB
THYROGLOB AB SERPL IA-ACNC: <20 IU/ML (ref 0–40)
THYROPEROXIDASE AB SERPL-ACNC: <10 IU/ML

## 2019-01-14 LAB — ACTH PLAS-MCNC: 27 PG/ML

## 2019-01-15 LAB — IGF-I BLD-MCNC: 132 NG/ML (ref 83–238)

## 2019-01-18 LAB
RESULT: NORMAL
SEND OUTS MISC TEST CODE: NORMAL
SEND OUTS MISC TEST SPECIMEN: NORMAL
TEST NAME: NORMAL

## 2019-01-21 ENCOUNTER — TELEPHONE (OUTPATIENT)
Dept: ENDOCRINOLOGY | Facility: CLINIC | Age: 39
End: 2019-01-21

## 2019-01-21 NOTE — TELEPHONE ENCOUNTER
Health Call Center    Phone Message    May a detailed message be left on voicemail: yes    Reason for Call: Requesting Results   Name/type of test: LABS DONE ON 01/11  Date of test: 01/11/19  Was test done at a location other than [facility] (Please fill in the location if not [facility])?: No, WAS DONE AT [facility] ON 01/11     REQUEST FOR RESULTS SO IT CAN BE ADDED TO PT'S MEDICAL BOOK.   PLEASE FAX TO GROUP HOME: [phone],ATTEN:MYESHA (PT'S CARE TAKER)    Action Taken: Message routed to:  [facility] ([facility]

## 2019-01-21 NOTE — TELEPHONE ENCOUNTER
Faxed over competed lab results drawn on 1/11/19 to Janey patients caretaker as requested.  Fax number [phone].

## 2019-01-22 ENCOUNTER — TELEPHONE (OUTPATIENT)
Dept: ENDOCRINOLOGY | Facility: CLINIC | Age: 39
End: 2019-01-22

## 2019-01-22 NOTE — TELEPHONE ENCOUNTER
Med  Janey  at Group Home - need Assessment/Plan faxed -[phone] Pharmacy delivered metformin in order to administer Rx.

## 2019-01-30 ENCOUNTER — TELEPHONE (OUTPATIENT)
Dept: FAMILY MEDICINE | Facility: CLINIC | Age: 39
End: 2019-01-30

## 2019-01-30 NOTE — TELEPHONE ENCOUNTER
Please see note from group home  This prescription was written by Dr. Delatorre    Routing to allergy/specialty clinic    Corazon MCCLOUD Rn

## 2019-01-30 NOTE — TELEPHONE ENCOUNTER
Reason for Call:  Wondering if he needs the nasal spray or if he could something else.    Detailed comments: Patients group home is wondering if he needs the nasal spray due to it is showing in his urin according to the Endo Doctor.    Phone Number Patient can be reached at: Home number on file [phone] (home)    Best Time: any    Can we leave a detailed message on this number? YES    Call taken on 1/30/2019 at 10:48 AM by Kamryn Rodriguez

## 2019-01-31 NOTE — TELEPHONE ENCOUNTER
Spoke with the Group Home, they state that they will be making a follow-up appointment with Dr. Meyer regarding stopping the fluticasone and prescribing a different nasal spray. Informed them that the provider would be able to read Dr. Delatorre's note regarding fluticasone.     Awilda Cornell RN

## 2019-01-31 NOTE — TELEPHONE ENCOUNTER
"Please call the group home and verify what they mean by \"it is showing in his urine.\" I do not see any documentation from his endocrinologist regarding stopping the fluticasone nasal spray.    Fluticasone nasal spray was prescribed for his rhinitis symptoms. It can be stopped if his endocrinologist feels this is necessary. If his symptoms return we can try a non-steroid option such as azelastine however he may not be able to tolerate this medication as well due to the bitter taste.  "

## 2019-02-14 ENCOUNTER — OFFICE VISIT (OUTPATIENT)
Dept: FAMILY MEDICINE | Facility: CLINIC | Age: 39
End: 2019-02-14
Payer: MEDICARE

## 2019-02-14 VITALS
WEIGHT: 255 LBS | HEART RATE: 85 BPM | SYSTOLIC BLOOD PRESSURE: 100 MMHG | TEMPERATURE: 96.8 F | DIASTOLIC BLOOD PRESSURE: 68 MMHG | OXYGEN SATURATION: 94 % | RESPIRATION RATE: 14 BRPM | HEIGHT: 63 IN | BODY MASS INDEX: 45.18 KG/M2

## 2019-02-14 DIAGNOSIS — K21.9 GASTROESOPHAGEAL REFLUX DISEASE WITHOUT ESOPHAGITIS: ICD-10-CM

## 2019-02-14 DIAGNOSIS — E66.09 OBESITY DUE TO EXCESS CALORIES, UNSPECIFIED OBESITY SEVERITY: Chronic | ICD-10-CM

## 2019-02-14 DIAGNOSIS — M25.562 PAIN IN BOTH KNEES, UNSPECIFIED CHRONICITY: ICD-10-CM

## 2019-02-14 DIAGNOSIS — J31.0 NONALLERGIC RHINITIS: Primary | ICD-10-CM

## 2019-02-14 DIAGNOSIS — M25.561 PAIN IN BOTH KNEES, UNSPECIFIED CHRONICITY: ICD-10-CM

## 2019-02-14 PROCEDURE — 99214 OFFICE O/P EST MOD 30 MIN: CPT | Performed by: NURSE PRACTITIONER

## 2019-02-14 RX ORDER — CALCIUM CARBONATE 500 MG/1
1 TABLET, CHEWABLE ORAL EVERY 6 HOURS PRN
Qty: 150 TABLET | Refills: 3 | Status: SHIPPED | OUTPATIENT
Start: 2019-02-14 | End: 2019-08-01

## 2019-02-14 ASSESSMENT — MIFFLIN-ST. JEOR: SCORE: 1971.8

## 2019-02-14 NOTE — PATIENT INSTRUCTIONS
Thank you for choosing [facility].  You may be receiving a survey in the mail from [employer] regarding your visit today.  Please take a few minutes to complete and return the survey to let us know how we are doing.      If you have questions or concerns, please contact us via [employer] or you can contact your care team at [phone].    Our Clinic hours are:  Monday 6:40 am  to 7:00 pm  Tuesday -Friday 6:40 am to 5:00 pm    The [place] outpatient lab hours are:  Monday - Friday 6:10 am to 4:45 pm  Saturdays 7:00 am to 11:00 am  Appointments are required, call [phone]    If you have clinical questions after hours or would like to schedule an appointment,  call the clinic at [phone].

## 2019-02-14 NOTE — PROGRESS NOTES
"  SUBJECTIVE:   Humberto Estrella is a 38 year old male who presents to clinic today for the following health issues:      Patient was instructed by his Endocrinologist to see primary about Flonase. Traces of Fluticasone were detected in his urine-using- using for rhinitis.     GERD- wants to stop taking Nexium. Does not feel that he needs it.     Wants to discuss Metformin dose for his \"appetite\".    C/O bilateral knee pain. \"They crack a lot\" - walks daily on treadmill /and at work.     Problem list and histories reviewed & adjusted, as indicated.  Additional history: as documented    Patient Active Problem List   Diagnosis     TRISOMY 21 (DOWN SYNDROME)     Hearing loss     MYOPIA     LATENT NYSTAGMUS     Headache     INTERMITT EXPLOSIVE DIS     post traumatic stress disorder     CARDIOVASCULAR SCREENING; LDL GOAL LESS THAN 160     Cortical, lamellar, or zonular cataract, nonsenile     Nonallergic rhinitis     Obesity due to excess calories, unspecified obesity severity     Subclinical hypothyroidism     VIDAL (obstructive sleep apnea)-AHI 26     Health care maintenance     Morbid obesity (H)     Anxiety     Explosive personality disorder (H)     Past Surgical History:   Procedure Laterality Date     PE TUBES      Left     PHACOEMULSIFICATION WITH STANDARD INTRAOCULAR LENS IMPLANT  10/3/2013    Procedure: PHACOEMULSIFICATION WITH STANDARD INTRAOCULAR LENS IMPLANT;  Left Kelman Phacoemulsification with Intraocular Lens Implant;  Surgeon: Luis Barajas MD;  Location: [facility] OR     PHACOEMULSIFICATION WITH STANDARD INTRAOCULAR LENS IMPLANT  2014    Procedure: PHACOEMULSIFICATION WITH STANDARD INTRAOCULAR LENS IMPLANT;  Surgeon: Luis Barajas MD;  Location: [facility] OR       Social History     Tobacco Use     Smoking status: Former Smoker     Last attempt to quit: 1998     Years since quittin.5     Smokeless tobacco: Never Used   Substance Use Topics     Alcohol use: No     Alcohol/week: 0.0 oz     " "Family History   Problem Relation Age of Onset     Unknown/Adopted Mother            Reviewed and updated as needed this visit by clinical staff       Reviewed and updated as needed this visit by Provider         ROS:  Constitutional, HEENT, cardiovascular, pulmonary, GI, , musculoskeletal, neuro, skin, endocrine and psych systems are negative, except as otherwise noted.    OBJECTIVE:     /68 (BP Location: Left arm)   Pulse 85   Temp 96.8  F (36  C) (Tympanic)   Resp 14   Ht 1.6 m (5' 3\")   Wt 115.7 kg (255 lb)   SpO2 94%   BMI 45.17 kg/m    Body mass index is 45.17 kg/m .  GENERAL: healthy, alert and no distress  MS: no gross musculoskeletal defects noted, no edema    Diagnostic Test Results:  none     ASSESSMENT/PLAN:       1. Nonallergic rhinitis  - stop Flonase as it was detected in his urine  And to use on a PRN basis     2. Pain in both knees, unspecified chronicity  Likely due to obesity/   - continue to stay active- recommend weight loss    3. Gastroesophageal reflux disease without esophagitis  - stop Nexium   - calcium carbonate (TUMS) 500 MG chewable tablet; Take 1 tablet (500 mg) by mouth every 6 hours as needed for heartburn  Dispense: 150 tablet; Refill: 3    4. Obesity due to excess calories, unspecified obesity severity  Counseled on diet and exercise  Patient recently was started on Metformin  - likely due to side effects of Psych meds- follow up with psychiatrist.         GM Bradshaw [facility]  "

## 2019-02-14 NOTE — NURSING NOTE
"Initial /68 (BP Location: Left arm)   Pulse 85   Temp 96.8  F (36  C) (Tympanic)   Resp 14   Ht 1.6 m (5' 3\")   Wt 115.7 kg (255 lb)   SpO2 94%   BMI 45.17 kg/m   Estimated body mass index is 45.17 kg/m  as calculated from the following:    Height as of this encounter: 1.6 m (5' 3\").    Weight as of this encounter: 115.7 kg (255 lb). .    Malia Arndt    "

## 2019-03-08 ENCOUNTER — OFFICE VISIT (OUTPATIENT)
Dept: FAMILY MEDICINE | Facility: CLINIC | Age: 39
End: 2019-03-08
Payer: MEDICARE

## 2019-03-08 VITALS
HEART RATE: 71 BPM | RESPIRATION RATE: 16 BRPM | SYSTOLIC BLOOD PRESSURE: 116 MMHG | WEIGHT: 259.6 LBS | OXYGEN SATURATION: 96 % | DIASTOLIC BLOOD PRESSURE: 66 MMHG | TEMPERATURE: 97.3 F | HEIGHT: 63 IN | BODY MASS INDEX: 46 KG/M2

## 2019-03-08 DIAGNOSIS — J01.10 ACUTE NON-RECURRENT FRONTAL SINUSITIS: Primary | ICD-10-CM

## 2019-03-08 PROCEDURE — 99213 OFFICE O/P EST LOW 20 MIN: CPT | Performed by: NURSE PRACTITIONER

## 2019-03-08 ASSESSMENT — MIFFLIN-ST. JEOR: SCORE: 1992.67

## 2019-03-08 NOTE — PATIENT INSTRUCTIONS
Augmentin twice daily for 7 days, take with food, with breakfast and dinner     Nasal saline irrigations 4 times daily as needed for nasal congestion

## 2019-03-08 NOTE — PROGRESS NOTES
"  SUBJECTIVE:   Humberto Estrella is a 38 year old male who presents to clinic today for the following health issues:    ENT Symptoms             Symptoms: cc Present Absent Comment   Fever/Chills   x    Fatigue   x    Muscle Aches   x    Eye Irritation  x  Watery    Sneezing  x     Nasal Thompson/Drg  x  Nasal green mucus, runny nose    Sinus Pressure/Pain  x     Loss of smell   x    Dental pain   x    Sore Throat  x     Swollen Glands   x    Ear Pain/Fullness  x  Otalgia both ears also have been popping    Cough   x    Wheeze   x    Chest Pain   x    Shortness of breath   x    Rash   x    Other         Symptom duration: One week, worsening the past 2 days    Symptom severity: Moderate    Treatments tried:  none    Contacts:  none        Problem list and histories reviewed & adjusted, as indicated.  Additional history: as documented    Labs reviewed in EPIC    Reviewed and updated as needed this visit by clinical staff       Reviewed and updated as needed this visit by Provider         ROS:  Constitutional, HEENT, cardiovascular, pulmonary, gi and gu systems are negative, except as otherwise noted.    OBJECTIVE:     /66 (BP Location: Left arm, Patient Position: Sitting, Cuff Size: Adult Large)   Pulse 71   Temp 97.3  F (36.3  C) (Tympanic)   Resp 16   Ht 1.6 m (5' 3\")   Wt 117.8 kg (259 lb 9.6 oz)   SpO2 96%   BMI 45.99 kg/m    Body mass index is 45.99 kg/m .  GENERAL: healthy, alert and no distress  EYES: Eyes grossly normal to inspection, PERRL and conjunctivae and sclerae normal  HENT: normal cephalic/atraumatic, ear canals and TM's normal, nose and mouth without ulcers or lesions, nasal mucosa edematous , rhinorrhea yellow, oropharynx clear, oral mucous membranes moist and sinuses: not tender, maxillary swelling on bilaterally  NECK: no adenopathy, no asymmetry, masses, or scars and thyroid normal to palpation  RESP: lungs clear to auscultation - no rales, rhonchi or wheezes  CV: regular rate and " rhythm, normal S1 S2, no S3 or S4, no murmur, click or rub, no peripheral edema and peripheral pulses strong  PSYCH: mentation appears normal, affect normal/bright    Diagnostic Test Results:  none     ASSESSMENT/PLAN:       1. Acute non-recurrent frontal sinusitis    - amoxicillin-clavulanate (AUGMENTIN) 875-125 MG tablet; Take 1 tablet by mouth 2 times daily for 7 days  Dispense: 14 tablet; Refill: 0  - sodium chloride (SALINE MIST) 0.65 % nasal spray; Spray 2 sprays into both nostrils 4 times daily as needed for congestion    See Patient Instructions    [facility] [place]

## 2019-03-09 RX ORDER — ECHINACEA PURPUREA EXTRACT 125 MG
2 TABLET ORAL 4 TIMES DAILY PRN
Start: 2019-03-09 | End: 2019-07-29

## 2019-04-02 DIAGNOSIS — Z13.6 CARDIOVASCULAR SCREENING; LDL GOAL LESS THAN 160: ICD-10-CM

## 2019-04-02 DIAGNOSIS — F63.81 INTERMITTENT EXPLOSIVE DISORDER: Primary | ICD-10-CM

## 2019-04-02 LAB
ALBUMIN SERPL-MCNC: 3.2 G/DL (ref 3.4–5)
ALP SERPL-CCNC: 59 U/L (ref 40–150)
ALT SERPL W P-5'-P-CCNC: 50 U/L (ref 0–70)
AST SERPL W P-5'-P-CCNC: 23 U/L (ref 0–45)
BILIRUB DIRECT SERPL-MCNC: <0.1 MG/DL (ref 0–0.2)
BILIRUB SERPL-MCNC: 0.5 MG/DL (ref 0.2–1.3)
CHOLEST SERPL-MCNC: 166 MG/DL
ERYTHROCYTE [DISTWIDTH] IN BLOOD BY AUTOMATED COUNT: 15.4 % (ref 10–15)
HCT VFR BLD AUTO: 44.9 % (ref 40–53)
HDLC SERPL-MCNC: 32 MG/DL
HGB BLD-MCNC: 15.1 G/DL (ref 13.3–17.7)
LDLC SERPL CALC-MCNC: 92 MG/DL
MCH RBC QN AUTO: 33 PG (ref 26.5–33)
MCHC RBC AUTO-ENTMCNC: 33.6 G/DL (ref 31.5–36.5)
MCV RBC AUTO: 98 FL (ref 78–100)
NONHDLC SERPL-MCNC: 134 MG/DL
PLATELET # BLD AUTO: 213 10E9/L (ref 150–450)
PROT SERPL-MCNC: 7.4 G/DL (ref 6.8–8.8)
RBC # BLD AUTO: 4.57 10E12/L (ref 4.4–5.9)
TRIGL SERPL-MCNC: 212 MG/DL
VALPROATE SERPL-MCNC: 45 MG/L (ref 50–100)
WBC # BLD AUTO: 3.7 10E9/L (ref 4–11)

## 2019-04-02 PROCEDURE — 85027 COMPLETE CBC AUTOMATED: CPT | Performed by: NURSE PRACTITIONER

## 2019-04-02 PROCEDURE — 80076 HEPATIC FUNCTION PANEL: CPT | Performed by: NURSE PRACTITIONER

## 2019-04-02 PROCEDURE — 36415 COLL VENOUS BLD VENIPUNCTURE: CPT | Performed by: NURSE PRACTITIONER

## 2019-04-02 PROCEDURE — 80164 ASSAY DIPROPYLACETIC ACD TOT: CPT | Performed by: NURSE PRACTITIONER

## 2019-04-02 PROCEDURE — 80061 LIPID PANEL: CPT | Performed by: NURSE PRACTITIONER

## 2019-04-04 DIAGNOSIS — J30.2 SEASONAL ALLERGIC RHINITIS: ICD-10-CM

## 2019-04-04 DIAGNOSIS — J30.2 SEASONAL ALLERGIC RHINITIS, UNSPECIFIED TRIGGER: Primary | ICD-10-CM

## 2019-04-04 NOTE — TELEPHONE ENCOUNTER
Routing refill request to provider for review/approval because:  Drug not on the [facility] refill protocol     Negin ESCOBEDO RN

## 2019-04-04 NOTE — TELEPHONE ENCOUNTER
Requested Prescriptions   Pending Prescriptions Disp Refills     SUDOGEST 12 HOUR 120 MG 12 hr tablet [Pharmacy Med Name: SUDOGEST 120MG ER 12 HR TABLET]  1     Sig: TAKE 1 TABLET BY MOUTH ONCE DAILY AS NEEDED. *12 TOTAL FILL*    There is no refill protocol information for this order        Last Written Prescription Date:  3/19/18  Last Fill Quantity: 20,  # refills: 11   Last office visit: 3/8/2019 with prescribing provider:  Betsy   Future Office Visit:

## 2019-04-08 RX ORDER — PSEUDOEPHEDRINE HCL 120 MG/1
TABLET, FILM COATED, EXTENDED RELEASE ORAL
Qty: 12 TABLET | Refills: 1 | Status: SHIPPED | OUTPATIENT
Start: 2019-04-08 | End: 2020-05-20

## 2019-04-09 ENCOUNTER — TELEPHONE (OUTPATIENT)
Dept: FAMILY MEDICINE | Facility: CLINIC | Age: 39
End: 2019-04-09

## 2019-04-09 NOTE — TELEPHONE ENCOUNTER
Reason for Call:  Other call back    Detailed comments: The group home is asking for the PCP to look over the Lab results from 4/2/19 and have them sent to the group home fax number is [phone]. They are asking also if the result be sent to Kamryn Joel she is the Doctor that ordered them her fax number is [phone].    Phone Number Patient can be reached at: Home number on file [phone] (home)    Best Time: any    Can we leave a detailed message on this number? YES    Call taken on 4/9/2019 at 1:13 PM by Kamryn Rodriguez

## 2019-04-09 NOTE — TELEPHONE ENCOUNTER
The labs need to be addressed by the doctor ordering them.  If lab did not fax then they need to be faxed.    Covering for your clinician.  Thanks,  Boo Golden MD  [facility]

## 2019-04-09 NOTE — TELEPHONE ENCOUNTER
Contacted [facility] spoke with Demond, relayed provider message from below confirmed fax numbers and faxed Lab results as below:  [facility] fax number is [phone].   Kamryn Joel fax number is [phone].

## 2019-05-13 ENCOUNTER — OFFICE VISIT (OUTPATIENT)
Dept: ENDOCRINOLOGY | Facility: CLINIC | Age: 39
End: 2019-05-13
Payer: MEDICARE

## 2019-05-13 VITALS
SYSTOLIC BLOOD PRESSURE: 117 MMHG | DIASTOLIC BLOOD PRESSURE: 77 MMHG | HEART RATE: 67 BPM | WEIGHT: 260.9 LBS | BODY MASS INDEX: 46.23 KG/M2 | HEIGHT: 63 IN

## 2019-05-13 DIAGNOSIS — E66.01 MORBID OBESITY (H): Primary | ICD-10-CM

## 2019-05-13 DIAGNOSIS — E03.8 SUBCLINICAL HYPOTHYROIDISM: ICD-10-CM

## 2019-05-13 RX ORDER — TOPIRAMATE 25 MG/1
TABLET, FILM COATED ORAL
Qty: 60 TABLET | Refills: 3 | Status: SHIPPED | OUTPATIENT
Start: 2019-05-13 | End: 2019-05-13

## 2019-05-13 RX ORDER — TOPIRAMATE 25 MG/1
TABLET, FILM COATED ORAL
Qty: 60 TABLET | Refills: 3 | Status: SHIPPED | OUTPATIENT
Start: 2019-05-13 | End: 2019-08-01

## 2019-05-13 ASSESSMENT — MIFFLIN-ST. JEOR: SCORE: 1993.43

## 2019-05-13 ASSESSMENT — PAIN SCALES - GENERAL: PAINLEVEL: NO PAIN (0)

## 2019-05-13 NOTE — LETTER
5/13/2019     RE: Humberto Estrella  [id number]  [place] [id number]     Dear Colleague,    Thank you for referring your patient, Humberto Estrella, to the [facility] ENDOCRINOLOGY at [facility]. Please see a copy of my visit note below.    Endocrine Clinic Consult:     Reason for consult: Subclinical Hypothyroidism  Date: 05/13/2019    Interval history:   Wt stable, did not loose wt.   Per psychiatry: Risperdal cannot be modified due to intermittent violent behavior.   Taking meds regularly including metformin.   Diet: Group home provides healthy choices.   No exercise at all   Off Fluticasone since 2/14/19    Labs reviewed.   Normal LFTs, slightly low albumin.   LDL 92  Normal Hb levels.     Assessment:     39 year old male with rapid weight gain in the setting of use of Risperidal and Fluticasone and subclinical hypothyroidism treated with levothyroxine.     Morbid obesity with rapid weight gain since 5/2017 xochilt after starting Risperidal for behavioral issues.     Hunger disorder.  Off Fluticasone nasal spray   Pinkish to purplish abdominal striae without any other symptoms of Cushing syndrome. Normal ACTH / Cortisol     Subclinical hypothyroidism on levothyroxine 100 mcg daily.  TSH was over 10 on one occasion.  Clinically euthyroid.    Obstructive sleep apnea, on CPAP: Motivated to get off CPAP.     Plan:  Metformin 500 mg BID [ will plan to discontinue if topamax helps]   Topiramate 50 mg daily started.   Wt management referral.     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     Hypothyroidism:   Abnormal labs date back to 7/2015.   LT4 started 10/2016, TSH > 10 on one occasion.   Dose gradually increased to current dose of 100 mcg daily.   Takes it in the morning     Symptoms Details   Temp intolerance No   Palpitations No   SOB no    Appetite Increased since Risperidal per care giver Janey.    Weight changes Rapid gain since May 2017   Change in BM No   Diet Increased portion size.   Did not follow dietician recs.      Exercise No, but motivated to do so.   Energy Levels Good   Sleep On CPAP/    Mental status change No,   Skin or hair changes No  Shaves daily   Extremity No edema   PSY Needing more Zyprexa.   Risperidal 3 mg/ day       Compressive symptoms  No neck pain   No problems with swallowing.    FH Unknown.     Radiation No     Weight gain:   Accelerated gain in 2017.   Moderate sleep apnea  Intermittent steroid use.   Hunger disorder.       Other ROS:   No eye symptoms  No headache, change in vision, loss of peripheral vision  Complete ROS that were obtained were negative.     Past Medical History:   Diagnosis Date     Diagnostic skin and sensitization tests (aka ALLERGENS) 9/30/15 IgE tests all NEGATIVE for environmental allergens.      Nonallergic rhinitis     9/30/15 IgE tests all NEGATIVE for environmental allergens.      Past Surgical History:   Procedure Laterality Date     PE TUBES      Left     PHACOEMULSIFICATION WITH STANDARD INTRAOCULAR LENS IMPLANT  10/3/2013    Procedure: PHACOEMULSIFICATION WITH STANDARD INTRAOCULAR LENS IMPLANT;  Left Kelman Phacoemulsification with Intraocular Lens Implant;  Surgeon: Luis Barajas MD;  Location: [facility]     PHACOEMULSIFICATION WITH STANDARD INTRAOCULAR LENS IMPLANT  5/1/2014    Procedure: PHACOEMULSIFICATION WITH STANDARD INTRAOCULAR LENS IMPLANT;  Surgeon: Luis Barajas MD;  Location: [facility] OR     Family History   Problem Relation Age of Onset     Unknown/Adopted Mother      Social History     Socioeconomic History     Marital status: Single     Spouse name: Not on file     Number of children: 0     Years of education: 12     Highest education level: Not on file   Occupational History     Occupation: Cleaning Services     Comment: [employer]     Employer:  UNEMPLOYED     Employer: DISABLED   Social Needs     Financial resource strain: Not on file     Food insecurity:     Worry: Not on file     Inability: Not on file     Transportation needs:     Medical: Not on file     Non-medical: Not on file   Tobacco Use     Smoking status: Former Smoker     Last attempt to quit: 1998     Years since quittin.7     Smokeless tobacco: Never Used   Substance and Sexual Activity     Alcohol use: No     Alcohol/week: 0.0 oz     Drug use: No     Sexual activity: Never   Lifestyle     Physical activity:     Days per week: Not on file     Minutes per session: Not on file     Stress: Not on file   Relationships     Social connections:     Talks on phone: Not on file     Gets together: Not on file     Attends Congregational service: Not on file     Active member of club or organization: Not on file     Attends meetings of clubs or organizations: Not on file     Relationship status: Not on file     Intimate partner violence:     Fear of current or ex partner: Not on file     Emotionally abused: Not on file     Physically abused: Not on file     Forced sexual activity: Not on file   Other Topics Concern     Parent/sibling w/ CABG, MI or angioplasty before 65F 55M? No   Social History Narrative     Not on file        Allergies   Allergen Reactions     Nkda [No Known Drug Allergies]      Current Outpatient Medications   Medication     acetaminophen (TYLENOL) 325 MG tablet     albuterol (ALBUTEROL) 108 (90 BASE) MCG/ACT inhaler     alum & mag hydroxide-simethicone (MYLANTA/MAALOX) 200-200-20 MG/5ML SUSP     calcium carbonate (TUMS) 500 MG chewable tablet     cholecalciferol (VITAMIN D3) 1000 UNIT tablet     divalproex (DEPAKOTE ER) 500 MG 24 hr tablet     Guaifenesin-Codeine (ROBITUSSIN A-C OR)     ibuprofen (ADVIL,MOTRIN) 600 MG tablet     levocetirizine (XYZAL) 5 MG tablet     levothyroxine (SYNTHROID/LEVOTHROID) 100 MCG tablet     magnesium hydroxide (MILK OF MAGNESIA) 400 MG/5ML  "suspension     metFORMIN (GLUCOPHAGE) 500 MG tablet     OLANZapine (ZYPREXA) 2.5 MG tablet     order for DME     phenol (CHLORASEPTIC) 1.4 % LIQD spray     Polyethyl Glycol-Propyl Glycol (SYSTANE OP)     risperiDONE (RISPERDAL) 1 MG tablet     sodium chloride (SALINE MIST) 0.65 % nasal spray     SUDOGEST 12 HOUR 120 MG 12 hr tablet     triamcinolone (KENALOG) 0.1 % cream     UNABLE TO FIND     venlafaxine (EFFEXOR-XR) 37.5 MG 24 hr capsule     No current facility-administered medications for this visit.            Exam:  /77   Pulse 67   Ht 1.6 m (5' 2.99\")   Wt 118.3 kg (260 lb 14.4 oz)   BMI 46.23 kg/m      Constitutional: obese.   Head: Normocephalic. No masses, lesions, tenderness or abnormalities  Neck: Neck supple. No adenopathy. Thyroid no large goiter, Carotids without bruits.  ENT: No throat congestion, no neck nodes or sinus tenderness  Cardiovascular: regular rhythm, no tachycardia  Respiratory: Lungs clear  Gastrointestinal: Abdomen soft, non-tender. BS normal. Pink striae  Musculoskeletal: gait normal and normal muscle tone  Neurologic: Normal speech, reflexes normal.   Psychiatric: mentation appears normal       TSH   Date Value Ref Range Status   01/11/2019 3.78 0.40 - 4.00 mU/L Final   11/27/2018 5.40 (H) 0.40 - 4.00 mU/L Final   08/07/2018 7.00 (H) 0.40 - 4.00 mU/L Final   05/11/2018 4.65 (H) 0.40 - 4.00 mU/L Final   02/16/2018 5.87 (H) 0.40 - 4.00 mU/L Final       T4 Free   Date Value Ref Range Status   01/11/2019 1.06 0.76 - 1.46 ng/dL Final   11/27/2018 1.01 0.76 - 1.46 ng/dL Final   08/07/2018 1.02 0.76 - 1.46 ng/dL Final   05/11/2018 0.93 0.76 - 1.46 ng/dL Final   02/16/2018 0.95 0.76 - 1.46 ng/dL Final   ]    CBC RESULTS:   Recent Labs   Lab Test 03/06/18  0654   WBC 3.8*   RBC 4.45   HGB 15.3   HCT 45.3   *   MCH 34.4*   MCHC 33.8   RDW 15.4*        Recent Labs   Lab Test 09/22/16  1055 06/28/16  0627    143   POTASSIUM 4.3 4.4   CHLORIDE 105 110*   CO2 29 27 "   ANIONGAP 4 6   GLC 95 86   BUN 21 15   CR 1.19 1.11   CHELSEA 8.4* 8.0*     Again, thank you for allowing me to participate in the care of your patient.      Sincerely,    Linda Boss MD

## 2019-05-13 NOTE — PROGRESS NOTES
Endocrine Clinic Consult:     Reason for consult: Subclinical Hypothyroidism  Date: 05/13/2019    Interval history:   Wt stable, did not loose wt.   Per psychiatry: Risperdal cannot be modified due to intermittent violent behavior.   Taking meds regularly including metformin.   Diet: Group home provides healthy choices.   No exercise at all   Off Fluticasone since 2/14/19    Labs reviewed.   Normal LFTs, slightly low albumin.   LDL 92  Normal Hb levels.     Assessment:     39 year old male with rapid weight gain in the setting of use of Risperidal and Fluticasone and subclinical hypothyroidism treated with levothyroxine.     Morbid obesity with rapid weight gain since 5/2017 xochilt after starting Risperidal for behavioral issues.     Hunger disorder.  Off Fluticasone nasal spray   Pinkish to purplish abdominal striae without any other symptoms of Cushing syndrome. Normal ACTH / Cortisol     Subclinical hypothyroidism on levothyroxine 100 mcg daily.  TSH was over 10 on one occasion.  Clinically euthyroid.    Obstructive sleep apnea, on CPAP: Motivated to get off CPAP.     Plan:  Metformin 500 mg BID [ will plan to discontinue if topamax helps]   Topiramate 50 mg daily started.   Wt management referral.     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     Hypothyroidism:   Abnormal labs date back to 7/2015.   LT4 started 10/2016, TSH > 10 on one occasion.   Dose gradually increased to current dose of 100 mcg daily.   Takes it in the morning     Symptoms Details   Temp intolerance No   Palpitations No   SOB no   Appetite Increased since Risperidal per care giver Janey.    Weight changes Rapid gain since May 2017   Change in BM No   Diet Increased portion size.   Did not follow dietician recs.      Exercise No, but motivated to do so.   Energy Levels Good   Sleep On CPAP/    Mental status  change No,   Skin or hair changes No  Shaves daily   Extremity No edema   PSY Needing more Zyprexa.   Risperidal 3 mg/ day       Compressive symptoms  No neck pain   No problems with swallowing.    FH Unknown.     Radiation No     Weight gain:   Accelerated gain in 2017.   Moderate sleep apnea  Intermittent steroid use.   Hunger disorder.       Other ROS:   No eye symptoms  No headache, change in vision, loss of peripheral vision  Complete ROS that were obtained were negative.     Past Medical History:   Diagnosis Date     Diagnostic skin and sensitization tests (aka ALLERGENS) 9/30/15 IgE tests all NEGATIVE for environmental allergens.      Nonallergic rhinitis     9/30/15 IgE tests all NEGATIVE for environmental allergens.      Past Surgical History:   Procedure Laterality Date     PE TUBES      Left     PHACOEMULSIFICATION WITH STANDARD INTRAOCULAR LENS IMPLANT  10/3/2013    Procedure: PHACOEMULSIFICATION WITH STANDARD INTRAOCULAR LENS IMPLANT;  Left Kelman Phacoemulsification with Intraocular Lens Implant;  Surgeon: Luis Barajas MD;  Location: [facility] OR     PHACOEMULSIFICATION WITH STANDARD INTRAOCULAR LENS IMPLANT  5/1/2014    Procedure: PHACOEMULSIFICATION WITH STANDARD INTRAOCULAR LENS IMPLANT;  Surgeon: Luis Barajas MD;  Location: [facility] OR     Family History   Problem Relation Age of Onset     Unknown/Adopted Mother      Social History     Socioeconomic History     Marital status: Single     Spouse name: Not on file     Number of children: 0     Years of education: 12     Highest education level: Not on file   Occupational History     Occupation: Cleaning Services     Comment: [employer]     Employer: UNEMPLOYED     Employer: DISABLED   Social Needs     Financial resource strain: Not on file     Food insecurity:     Worry: Not on file     Inability: Not on file     Transportation needs:     Medical: Not on file     Non-medical: Not on file   Tobacco Use     Smoking status: Former  Smoker     Last attempt to quit: 1998     Years since quittin.7     Smokeless tobacco: Never Used   Substance and Sexual Activity     Alcohol use: No     Alcohol/week: 0.0 oz     Drug use: No     Sexual activity: Never   Lifestyle     Physical activity:     Days per week: Not on file     Minutes per session: Not on file     Stress: Not on file   Relationships     Social connections:     Talks on phone: Not on file     Gets together: Not on file     Attends Mandaeism service: Not on file     Active member of club or organization: Not on file     Attends meetings of clubs or organizations: Not on file     Relationship status: Not on file     Intimate partner violence:     Fear of current or ex partner: Not on file     Emotionally abused: Not on file     Physically abused: Not on file     Forced sexual activity: Not on file   Other Topics Concern     Parent/sibling w/ CABG, MI or angioplasty before 65F 55M? No   Social History Narrative     Not on file        Allergies   Allergen Reactions     Nkda [No Known Drug Allergies]      Current Outpatient Medications   Medication     acetaminophen (TYLENOL) 325 MG tablet     albuterol (ALBUTEROL) 108 (90 BASE) MCG/ACT inhaler     alum & mag hydroxide-simethicone (MYLANTA/MAALOX) 200-200-20 MG/5ML SUSP     calcium carbonate (TUMS) 500 MG chewable tablet     cholecalciferol (VITAMIN D3) 1000 UNIT tablet     divalproex (DEPAKOTE ER) 500 MG 24 hr tablet     Guaifenesin-Codeine (ROBITUSSIN A-C OR)     ibuprofen (ADVIL,MOTRIN) 600 MG tablet     levocetirizine (XYZAL) 5 MG tablet     levothyroxine (SYNTHROID/LEVOTHROID) 100 MCG tablet     magnesium hydroxide (MILK OF MAGNESIA) 400 MG/5ML suspension     metFORMIN (GLUCOPHAGE) 500 MG tablet     OLANZapine (ZYPREXA) 2.5 MG tablet     order for DME     phenol (CHLORASEPTIC) 1.4 % LIQD spray     Polyethyl Glycol-Propyl Glycol (SYSTANE OP)     risperiDONE (RISPERDAL) 1 MG tablet     sodium chloride (SALINE MIST) 0.65 % nasal  "spray     SUDOGEST 12 HOUR 120 MG 12 hr tablet     triamcinolone (KENALOG) 0.1 % cream     UNABLE TO FIND     venlafaxine (EFFEXOR-XR) 37.5 MG 24 hr capsule     No current facility-administered medications for this visit.            Exam:  /77   Pulse 67   Ht 1.6 m (5' 2.99\")   Wt 118.3 kg (260 lb 14.4 oz)   BMI 46.23 kg/m     Constitutional: obese.   Head: Normocephalic. No masses, lesions, tenderness or abnormalities  Neck: Neck supple. No adenopathy. Thyroid no large goiter, Carotids without bruits.  ENT: No throat congestion, no neck nodes or sinus tenderness  Cardiovascular: regular rhythm, no tachycardia  Respiratory: Lungs clear  Gastrointestinal: Abdomen soft, non-tender. BS normal. Pink striae  Musculoskeletal: gait normal and normal muscle tone  Neurologic: Normal speech, reflexes normal.   Psychiatric: mentation appears normal       TSH   Date Value Ref Range Status   01/11/2019 3.78 0.40 - 4.00 mU/L Final   11/27/2018 5.40 (H) 0.40 - 4.00 mU/L Final   08/07/2018 7.00 (H) 0.40 - 4.00 mU/L Final   05/11/2018 4.65 (H) 0.40 - 4.00 mU/L Final   02/16/2018 5.87 (H) 0.40 - 4.00 mU/L Final       T4 Free   Date Value Ref Range Status   01/11/2019 1.06 0.76 - 1.46 ng/dL Final   11/27/2018 1.01 0.76 - 1.46 ng/dL Final   08/07/2018 1.02 0.76 - 1.46 ng/dL Final   05/11/2018 0.93 0.76 - 1.46 ng/dL Final   02/16/2018 0.95 0.76 - 1.46 ng/dL Final   ]    CBC RESULTS:   Recent Labs   Lab Test 03/06/18  0654   WBC 3.8*   RBC 4.45   HGB 15.3   HCT 45.3   *   MCH 34.4*   MCHC 33.8   RDW 15.4*        Recent Labs   Lab Test 09/22/16  1055 06/28/16  0627    143   POTASSIUM 4.3 4.4   CHLORIDE 105 110*   CO2 29 27   ANIONGAP 4 6   GLC 95 86   BUN 21 15   CR 1.19 1.11   CHELSEA 8.4* 8.0*       "

## 2019-05-13 NOTE — PATIENT INSTRUCTIONS
Start topiramate.    3 month follow up with weight management clinic.      MEDICATION STARTED AT THIS APPOINTMENT  We are starting topiramate at bedtime.  Start one tab, 25 mg, for a week. Go up to 50 mg (2 tabs) for the next week.  Stay at 2 tabs until you are seen again. Call the nurse at [phone] if you have any questions or concerns. (Do not stop taking it if you don't think it's working. For some people it works even though they do not feel much different.)    Topiramate (Topamax) is a medication that is used most often to treat migraine headaches or for seizures. It has also been found to help with weight loss. Although it's not currently FDA approved for weight loss, it has been used safely for a number of years to help people who are carrying extra weight.     Just how topiramate helps with weight loss has not been exactly determined. However it seems to work on areas of the brain to quiet down signals related to eating.      Topiramate may make you:    >feel less interest in eating in between meals   >think less about food and eating   >find it easier to push the plate away   >find giving up pop easier    >have an easier time eating less    For some of our patients, the pills work right away. They feel and think quite differently about food. Other patients don't feel much of a change but find in fact they have lost weight! Like all weight loss medications, topiramate works best when you help it work.  This means:    1) Have less tempting high calorie (fattening) food around the house or office    2) Have lower calorie food (fruits, vegetables,low fat meats and dairy) for snacks    3) Eat out only one time or less each week.   4) Eat your meals at a table with the TV or computer off.    Side-effects. Topiramate is generally well tolerated. The main side-effects we see are:   Tingling in hands,feet, or face (usually not very troublesome)   Mental confusion and word finding trouble (about 10% of patients  have this.)     Feeling sleepy or a bit dopey- this goes away very soon after starting.    Please refer to the pharmacy insert for more information on side-effects. Since many pharmacists are not familiar with the use of topiramate in weight loss, calling the clinic will get you the most accurate information on the use of this medication for weight loss.                          BARIATRICS  : [id number]

## 2019-05-14 ENCOUNTER — TELEPHONE (OUTPATIENT)
Dept: ENDOCRINOLOGY | Facility: CLINIC | Age: 39
End: 2019-05-14

## 2019-05-14 NOTE — TELEPHONE ENCOUNTER
[facility] Call Center    Phone Message    May a detailed message be left on voicemail: yes    Reason for Call: Other: pharmacy is requesting clarification on directions for topiramate (TOPAMAX) 25 MG tablet.  Please follow up at [phone]     Action Taken: Message routed to:  [facility] ([facility] endo

## 2019-05-14 NOTE — TELEPHONE ENCOUNTER
Pharmacy received  2 different scripts and needed to verify which  one was correct.  The take 1 pill daily  X one week at 7:30 PM  and then increase to 2 tablets daily thereafter is the correct one. Verified with [facility] pharmacy per Dr Karyn Nichole, RN on 5/14/2019 at 11:07 AM  .    able to participate in moderate recreational activities, denies ever experiencing chest pain, palpitations, [place] - experiences occasional left knee discomfort with strenuous activity

## 2019-06-05 ENCOUNTER — APPOINTMENT (OUTPATIENT)
Dept: GENERAL RADIOLOGY | Facility: CLINIC | Age: 39
End: 2019-06-05
Attending: PHYSICIAN ASSISTANT
Payer: MEDICARE

## 2019-06-05 ENCOUNTER — APPOINTMENT (OUTPATIENT)
Dept: CT IMAGING | Facility: CLINIC | Age: 39
End: 2019-06-05
Attending: PHYSICIAN ASSISTANT
Payer: MEDICARE

## 2019-06-05 ENCOUNTER — HOSPITAL ENCOUNTER (EMERGENCY)
Facility: CLINIC | Age: 39
Discharge: HOME | End: 2019-06-05
Attending: PHYSICIAN ASSISTANT | Admitting: PHYSICIAN ASSISTANT
Payer: MEDICARE

## 2019-06-05 VITALS
SYSTOLIC BLOOD PRESSURE: 122 MMHG | RESPIRATION RATE: 28 BRPM | WEIGHT: 255 LBS | OXYGEN SATURATION: 92 % | DIASTOLIC BLOOD PRESSURE: 80 MMHG | BODY MASS INDEX: 45.18 KG/M2 | TEMPERATURE: 97.6 F | HEART RATE: 73 BPM

## 2019-06-05 DIAGNOSIS — R07.9 CHEST PAIN, UNSPECIFIED TYPE: ICD-10-CM

## 2019-06-05 LAB
ALBUMIN SERPL-MCNC: 3.3 G/DL (ref 3.4–5)
ALP SERPL-CCNC: 59 U/L (ref 40–150)
ALT SERPL W P-5'-P-CCNC: 71 U/L (ref 0–70)
ANION GAP SERPL CALCULATED.3IONS-SCNC: 6 MMOL/L (ref 3–14)
AST SERPL W P-5'-P-CCNC: 38 U/L (ref 0–45)
BASOPHILS # BLD AUTO: 0.1 10E9/L (ref 0–0.2)
BASOPHILS NFR BLD AUTO: 1.4 %
BILIRUB SERPL-MCNC: 0.4 MG/DL (ref 0.2–1.3)
BUN SERPL-MCNC: 18 MG/DL (ref 7–30)
CALCIUM SERPL-MCNC: 9 MG/DL (ref 8.5–10.1)
CHLORIDE SERPL-SCNC: 108 MMOL/L (ref 94–109)
CO2 SERPL-SCNC: 28 MMOL/L (ref 20–32)
CREAT SERPL-MCNC: 1.07 MG/DL (ref 0.66–1.25)
D DIMER PPP FEU-MCNC: 0.8 UG/ML FEU (ref 0–0.5)
DIFFERENTIAL METHOD BLD: NORMAL
EOSINOPHIL # BLD AUTO: 0 10E9/L (ref 0–0.7)
EOSINOPHIL NFR BLD AUTO: 0.9 %
ERYTHROCYTE [DISTWIDTH] IN BLOOD BY AUTOMATED COUNT: 14.6 % (ref 10–15)
GFR SERPL CREATININE-BSD FRML MDRD: 87 ML/MIN/{1.73_M2}
GLUCOSE SERPL-MCNC: 90 MG/DL (ref 70–99)
HCT VFR BLD AUTO: 44.6 % (ref 40–53)
HGB BLD-MCNC: 14.9 G/DL (ref 13.3–17.7)
IMM GRANULOCYTES # BLD: 0 10E9/L (ref 0–0.4)
IMM GRANULOCYTES NFR BLD: 0.7 %
LYMPHOCYTES # BLD AUTO: 1.4 10E9/L (ref 0.8–5.3)
LYMPHOCYTES NFR BLD AUTO: 31.4 %
MCH RBC QN AUTO: 33 PG (ref 26.5–33)
MCHC RBC AUTO-ENTMCNC: 33.4 G/DL (ref 31.5–36.5)
MCV RBC AUTO: 99 FL (ref 78–100)
MONOCYTES # BLD AUTO: 0.6 10E9/L (ref 0–1.3)
MONOCYTES NFR BLD AUTO: 14.2 %
NEUTROPHILS # BLD AUTO: 2.3 10E9/L (ref 1.6–8.3)
NEUTROPHILS NFR BLD AUTO: 51.4 %
NRBC # BLD AUTO: 0 10*3/UL
NRBC BLD AUTO-RTO: 0 /100
PLATELET # BLD AUTO: 252 10E9/L (ref 150–450)
POTASSIUM SERPL-SCNC: 4.1 MMOL/L (ref 3.4–5.3)
PROT SERPL-MCNC: 7.4 G/DL (ref 6.8–8.8)
RBC # BLD AUTO: 4.52 10E12/L (ref 4.4–5.9)
SODIUM SERPL-SCNC: 142 MMOL/L (ref 133–144)
TROPONIN I SERPL-MCNC: <0.015 UG/L (ref 0–0.04)
WBC # BLD AUTO: 4.4 10E9/L (ref 4–11)

## 2019-06-05 PROCEDURE — 85025 COMPLETE CBC W/AUTO DIFF WBC: CPT | Performed by: EMERGENCY MEDICINE

## 2019-06-05 PROCEDURE — 99285 EMERGENCY DEPT VISIT HI MDM: CPT | Mod: 25 | Performed by: PHYSICIAN ASSISTANT

## 2019-06-05 PROCEDURE — 84484 ASSAY OF TROPONIN QUANT: CPT | Performed by: EMERGENCY MEDICINE

## 2019-06-05 PROCEDURE — 71046 X-RAY EXAM CHEST 2 VIEWS: CPT

## 2019-06-05 PROCEDURE — 25000125 ZZHC RX 250: Performed by: RADIOLOGY

## 2019-06-05 PROCEDURE — 71260 CT THORAX DX C+: CPT

## 2019-06-05 PROCEDURE — 93010 ELECTROCARDIOGRAM REPORT: CPT | Mod: Z6 | Performed by: EMERGENCY MEDICINE

## 2019-06-05 PROCEDURE — 25000128 H RX IP 250 OP 636: Performed by: RADIOLOGY

## 2019-06-05 PROCEDURE — 80053 COMPREHEN METABOLIC PANEL: CPT | Performed by: EMERGENCY MEDICINE

## 2019-06-05 PROCEDURE — 93005 ELECTROCARDIOGRAM TRACING: CPT | Performed by: PHYSICIAN ASSISTANT

## 2019-06-05 PROCEDURE — 85379 FIBRIN DEGRADATION QUANT: CPT | Performed by: PHYSICIAN ASSISTANT

## 2019-06-05 RX ORDER — IOPAMIDOL 755 MG/ML
92 INJECTION, SOLUTION INTRAVASCULAR ONCE
Status: COMPLETED | OUTPATIENT
Start: 2019-06-05 | End: 2019-06-05

## 2019-06-05 RX ADMIN — IOPAMIDOL 92 ML: 755 INJECTION, SOLUTION INTRAVENOUS at 12:18

## 2019-06-05 RX ADMIN — SODIUM CHLORIDE 100 ML: 9 INJECTION, SOLUTION INTRAVENOUS at 12:18

## 2019-06-05 ASSESSMENT — ENCOUNTER SYMPTOMS
SHORTNESS OF BREATH: 0
GASTROINTESTINAL NEGATIVE: 1
RESPIRATORY NEGATIVE: 1
MUSCULOSKELETAL NEGATIVE: 1
COUGH: 0
CONSTITUTIONAL NEGATIVE: 1

## 2019-06-05 NOTE — ED PROVIDER NOTES
"  History     Chief Complaint   Patient presents with     Chest Pain     mid sternal/epigastric pain for past 2 days, no soa, no n/v, no recent illness, no relief with using inhaler or taking tums     HPI  Humberto Estrella is a 39 year old male with history of VIDAL, explosive personality disorder, anxiety, trisomy 21, PTSD who presents from his group home with his caregiver with complaints of central, nonradiating, midsternal chest pain since yesterday.  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  Patient returned back to his group home and took a nap and the pain had resolved.  He experienced another episode of chest pain this morning lasting a couple hours.  He is currently pain-free.  Patient describes the pain as feeling \"like a ball in my chest that is being squeezed.\"  Patient denies any associated shortness of breath.  Denies fevers, chills, cough, nausea, vomiting, abdominal pain, or leg pain/swelling.  Patient denies any aggravating or relieving factors of his chest pain.  Patient's caregiver attempted to give him Tums this morning without improvement in his symptoms.  Patient also attempted to take his inhaler at work without change in his symptoms.  Patient is not a smoker.  No reported history of cardiac disease, PE/DVT, hypertension, or diabetes mellitus.    Patient's last echo from 2017:  Interpretation Summary  Left ventricular systolic function is normal. The visual ejection fraction isestimated at 55-60%.  Normal right ventricle structure and size noted and the right ventricular systolic pressure could not be approximated due to inadequate tricuspid regurgitation.  No hemodynamically significant valvular aortic stenosis or aortic regurgitation is present. There is trace mitral regurgitation. There is no comparison study available.      Allergies:  Allergies   Allergen Reactions     Nkda [No Known Drug Allergies]        Problem List:  "   Patient Active Problem List    Diagnosis Date Noted     Anxiety 11/27/2018     Priority: Medium     Explosive personality disorder (H) 11/27/2018     Priority: Medium     Morbid obesity (H) 07/27/2018     Priority: Medium     Health care maintenance 08/16/2017     Priority: Medium     VIDAL (obstructive sleep apnea)-AHI 26 01/25/2017     Priority: Medium     1/17/2017(226#)-AHI 26, RDI 35, lowest oxygen saturation was 78%, CPAP 11 cm/H20.        Subclinical hypothyroidism 12/12/2016     Priority: Medium     Obesity due to excess calories, unspecified obesity severity 06/13/2016     Priority: Medium     Nonallergic rhinitis      Priority: Medium     9/30/15 IgE tests all NEGATIVE for environmental allergens.        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 Provider to review       MYOPIA 06/21/2006     Priority: Medium     LATENT NYSTAGMUS 06/21/2006     Priority: Medium     Headache 06/21/2006     Priority: Medium     Problem list name updated by automated process. Provider to review       INTERMITT EXPLOSIVE DIS 06/21/2006     Priority: Medium     post traumatic stress disorder 06/21/2006     Priority: Medium        Past Medical History:    Past Medical History:   Diagnosis Date     Diagnostic skin and sensitization tests (aka ALLERGENS) 9/30/15 IgE tests all NEGATIVE for environmental allergens.      Nonallergic rhinitis        Past Surgical History:    Past Surgical History:   Procedure Laterality Date     PE TUBES      Left     PHACOEMULSIFICATION WITH STANDARD INTRAOCULAR LENS IMPLANT  10/3/2013    Procedure: PHACOEMULSIFICATION WITH STANDARD INTRAOCULAR LENS IMPLANT;  Left Kelman  Phacoemulsification with Intraocular Lens Implant;  Surgeon: Luis Barajas MD;  Location: [facility]     PHACOEMULSIFICATION WITH STANDARD INTRAOCULAR LENS IMPLANT  2014    Procedure: PHACOEMULSIFICATION WITH STANDARD INTRAOCULAR LENS IMPLANT;  Surgeon: Luis Barajas MD;  Location: [facility] OR       Family History:    Family History   Problem Relation Age of Onset     Unknown/Adopted Mother        Social History:  Marital Status:  Single [1]  Social History     Tobacco Use     Smoking status: Former Smoker     Last attempt to quit: 1998     Years since quittin.8     Smokeless tobacco: Never Used   Substance Use Topics     Alcohol use: No     Alcohol/week: 0.0 oz     Drug use: No        Medications:      acetaminophen (TYLENOL) 325 MG tablet   albuterol (ALBUTEROL) 108 (90 BASE) MCG/ACT inhaler   alum & mag hydroxide-simethicone (MYLANTA/MAALOX) 200-200-20 MG/5ML SUSP   calcium carbonate (TUMS) 500 MG chewable tablet   cholecalciferol (VITAMIN D3) 1000 UNIT tablet   divalproex (DEPAKOTE ER) 500 MG 24 hr tablet   Guaifenesin-Codeine (ROBITUSSIN A-C OR)   ibuprofen (ADVIL,MOTRIN) 600 MG tablet   levocetirizine (XYZAL) 5 MG tablet   levothyroxine (SYNTHROID/LEVOTHROID) 100 MCG tablet   magnesium hydroxide (MILK OF MAGNESIA) 400 MG/5ML suspension   metFORMIN (GLUCOPHAGE) 500 MG tablet   OLANZapine (ZYPREXA) 2.5 MG tablet   order for DME   phenol (CHLORASEPTIC) 1.4 % LIQD spray   Polyethyl Glycol-Propyl Glycol (SYSTANE OP)   risperiDONE (RISPERDAL) 1 MG tablet   sodium chloride (SALINE MIST) 0.65 % nasal spray   SUDOGEST 12 HOUR 120 MG 12 hr tablet   topiramate (TOPAMAX) 25 MG tablet   triamcinolone (KENALOG) 0.1 % cream   UNABLE TO FIND   venlafaxine (EFFEXOR-XR) 37.5 MG 24 hr capsule         Review of Systems   Constitutional: Negative.    Respiratory: Negative.  Negative for cough and shortness of breath.    Cardiovascular: Positive for chest pain.   Gastrointestinal: Negative.    Musculoskeletal:  Negative.    Skin: Negative.    All other systems reviewed and are negative.      Physical Exam   BP: 122/80  Pulse: 73  Heart Rate: 65  Temp: 97.6  F (36.4  C)  Resp: 28  Weight: 115.7 kg (255 lb)  SpO2: 94 %      Physical Exam   Constitutional: He appears well-developed and well-nourished.  Non-toxic appearance. No distress.   HENT:   Head: Normocephalic and atraumatic.   Eyes: Pupils are equal, round, and reactive to light. Conjunctivae and EOM are normal.   Neck: Normal range of motion. Neck supple.   Cardiovascular: Normal rate, regular rhythm and normal heart sounds.   Pulmonary/Chest: Effort normal and breath sounds normal. No stridor. No respiratory distress. He has no decreased breath sounds. He has no wheezes. He has no rhonchi. He has no rales.   Abdominal: Soft. He exhibits no distension. There is no tenderness. There is no rigidity, no rebound and no guarding.   Musculoskeletal: Normal range of motion. He exhibits no edema or tenderness.   Neurological: He is alert.   Skin: Skin is warm and dry. No rash noted.   Psychiatric: He has a normal mood and affect.       ED Course        Procedures               EKG Interpretation:      Interpreted by Pilar Birch and Dr. Pichardo  Time reviewed: 1100  Symptoms at time of EKG: None   Rhythm: Normal sinus   Rate: Normal  Axis: Normal  Ectopy: None  Conduction: Normal  ST Segments/ T Waves: No acute ischemic changes  Q Waves: None  Comparison to prior: Unchanged from 6/5/18    Clinical Impression: No acute changes      Results for orders placed or performed during the hospital encounter of 06/05/19 (from the past 24 hour(s))   CBC with platelets differential   Result Value Ref Range    WBC 4.4 4.0 - 11.0 10e9/L    RBC Count 4.52 4.4 - 5.9 10e12/L    Hemoglobin 14.9 13.3 - 17.7 g/dL    Hematocrit 44.6 40.0 - 53.0 %    MCV 99 78 - 100 fl    MCH 33.0 26.5 - 33.0 pg    MCHC 33.4 31.5 - 36.5 g/dL    RDW 14.6 10.0 - 15.0 %    Platelet Count 252 150 - 450 10e9/L     Diff Method Automated Method     % Neutrophils 51.4 %    % Lymphocytes 31.4 %    % Monocytes 14.2 %    % Eosinophils 0.9 %    % Basophils 1.4 %    % Immature Granulocytes 0.7 %    Nucleated RBCs 0 0 /100    Absolute Neutrophil 2.3 1.6 - 8.3 10e9/L    Absolute Lymphocytes 1.4 0.8 - 5.3 10e9/L    Absolute Monocytes 0.6 0.0 - 1.3 10e9/L    Absolute Eosinophils 0.0 0.0 - 0.7 10e9/L    Absolute Basophils 0.1 0.0 - 0.2 10e9/L    Abs Immature Granulocytes 0.0 0 - 0.4 10e9/L    Absolute Nucleated RBC 0.0    Comprehensive metabolic panel   Result Value Ref Range    Sodium 142 133 - 144 mmol/L    Potassium 4.1 3.4 - 5.3 mmol/L    Chloride 108 94 - 109 mmol/L    Carbon Dioxide 28 20 - 32 mmol/L    Anion Gap 6 3 - 14 mmol/L    Glucose 90 70 - 99 mg/dL    Urea Nitrogen 18 7 - 30 mg/dL    Creatinine 1.07 0.66 - 1.25 mg/dL    GFR Estimate 87 >60 mL/min/[1.73_m2]    GFR Estimate If Black >90 >60 mL/min/[1.73_m2]    Calcium 9.0 8.5 - 10.1 mg/dL    Bilirubin Total 0.4 0.2 - 1.3 mg/dL    Albumin 3.3 (L) 3.4 - 5.0 g/dL    Protein Total 7.4 6.8 - 8.8 g/dL    Alkaline Phosphatase 59 40 - 150 U/L    ALT 71 (H) 0 - 70 U/L    AST 38 0 - 45 U/L   Troponin I   Result Value Ref Range    Troponin I ES <0.015 0.000 - 0.045 ug/L   D dimer quantitative   Result Value Ref Range    D Dimer 0.8 (H) 0.0 - 0.50 ug/ml FEU   XR Chest 2 Views    Narrative    CHEST TWO VIEWS June 5, 2019 11:36 AM     HISTORY: Chest pain.    COMPARISON: Chest x-ray 9/22/2016. CT chest 7/15/2016.      Impression    IMPRESSION: PA and lateral views of the chest. Lungs are clear. Heart  is normal in size. No effusions are evident. No pneumothorax. Opacity  along the posterior right lung base best seen on the lateral view is  unchanged and is consistent with a subpleural lipoma as seen on prior  CT. No further follow-up required.    FIDELIA ARELLANO MD   CT Chest Pulmonary Embolism w Contrast    Narrative    CT CHEST WITH CONTRAST  6/5/2019 12:27 PM    HISTORY: Chest pain with  elevated D-dimer. Evaluate for pulmonary  embolism.    COMPARISON: A CT on 7/15/2016.    TECHNIQUE: Routine transverse CT imaging of the chest was performed  following the uneventful intravenous administration of 92 mL Isovue  370 contrast. A pulmonary embolism protocol was utilized. Radiation  dose for this scan was reduced using automated exposure control,  adjustment of the mA and/or kV according to patient size, or iterative  reconstruction technique.    FINDINGS: The heart size is normal.No enlarged lymph node or other  abnormal mediastinal mass is seen. The thoracic aorta is unremarkable.  There is good opacification of the pulmonary arteries with contrast.  No pulmonary embolism is seen. There is mild atelectasis in both  lungs. Previously seen small pulmonary nodules are not clearly seen  currently, likely obscured by the atelectasis. The lungs are otherwise  clear. No pneumothorax is demonstrated. No pleural effusion is  identified. Again seen is a Bochdalek hernia in the posterior right  chest base measuring 6.3 cm long. This is unchanged. The osseous  structures are unremarkable. No chest wall pathology is seen. The  visualized upper abdomen is unremarkable.      Impression    IMPRESSION:   1. No evidence of pulmonary embolism.  2. Mild atelectasis of both lungs with no pulmonary consolidation  seen.    SADAF JENKINS MD       Medications   iopamidol (ISOVUE-370) solution 92 mL (92 mLs Intravenous Given 6/5/19 1218)   sodium chloride 0.9 % bag 500mL for CT scan flush use (100 mLs Intravenous Given 6/5/19 1218)       HEART Score  Background  Calculates the overall risk of adverse event in patient's presenting with chest pain.  Based on 5 criteria (each assigned 0-2 points) including suspiciousness of history, EKG, age, risk factors and troponin.    Data  39 year old male  has TRISOMY 21 (DOWN SYNDROME); Hearing loss; MYOPIA; LATENT NYSTAGMUS; Headache; INTERMITT EXPLOSIVE DIS; post traumatic stress  disorder; CARDIOVASCULAR SCREENING; LDL GOAL LESS THAN 160; Cortical, lamellar, or zonular cataract, nonsenile; Nonallergic rhinitis; Obesity due to excess calories, unspecified obesity severity; Subclinical hypothyroidism; VIDAL (obstructive sleep apnea)-AHI 26; Health care maintenance; Morbid obesity (H); Anxiety; and Explosive personality disorder (H) on their problem list.   reports that he quit smoking about 20 years ago. He has never used smokeless tobacco.  family history includes Unknown/Adopted in his mother.  Lab Results   Component Value Date    TROPI <0.015 06/05/2019     Criteria   0-2 points for each of 5 items (maximum of 10 points):  Score 0- History slightly suspicious for coronary syndrome  Score 0- EKG Normal  Score 0- Age <45 years old  Score 0- No risk factors for atherosclerotic disease  Score 0- Within normal limits for troponin levels  Interpretation  Risk of adverse outcome  Heart Score: 0  Total Score 0-3- Adverse Outcome Risk 2.5% - Supports early discharge with appropriate follow-up        CARLITOS Score (calculator)  Background  Calculates the overall risk of death, MI or revascularization within 2 weeks of chest pain or coronary equivalent evaluation.  Based on 7 criteria including age >65, aspirin, recent angina, ST changes on EKG, increased cardiac markers, known CAD, and three or more CAD risks.  Data  39 year old  has TRISOMY 21 (DOWN SYNDROME); Hearing loss; MYOPIA; LATENT NYSTAGMUS; Headache; INTERMITT EXPLOSIVE DIS; post traumatic stress disorder; CARDIOVASCULAR SCREENING; LDL GOAL LESS THAN 160; Cortical, lamellar, or zonular cataract, nonsenile; Nonallergic rhinitis; Obesity due to excess calories, unspecified obesity severity; Subclinical hypothyroidism; VIDAL (obstructive sleep apnea)-AHI 26; Health care maintenance; Morbid obesity (H); Anxiety; and Explosive personality disorder (H) on their problem list.   reports that he quit smoking about 20 years ago. He has never used smokeless  tobacco.  family history includes Unknown/Adopted in his mother.  @[email or web address]@  Lab Results   Component Value Date    TROPI <0.015 06/05/2019     Criteria   One point for each of 7 possible items:  NEGATIVE  Interpretation  2 week subsequent risk of death, myocardial infarction or need for revascularization  CARLITOS Score: 0  Score 0-1: 4.7% risk      WELLS PE SCORE for Pulmonary Embolism likelihood (calculator)  Background  Calculates likelihood of PE based on 7 criteria including suspected DVT, HR>100, recent surgery or immobilization, prior VTE, hemoptysis, active cancer.  Data  has TRISOMY 21 (DOWN SYNDROME); Hearing loss; MYOPIA; LATENT NYSTAGMUS; Headache; INTERMITT EXPLOSIVE DIS; post traumatic stress disorder; CARDIOVASCULAR SCREENING; LDL GOAL LESS THAN 160; Cortical, lamellar, or zonular cataract, nonsenile; Nonallergic rhinitis; Obesity due to excess calories, unspecified obesity severity; Subclinical hypothyroidism; VIDAL (obstructive sleep apnea)-AHI 26; Health care maintenance; Morbid obesity (H); Anxiety; and Explosive personality disorder (H) on their problem list.   has a past surgical history that includes pe tubes; Phacoemulsification with standard intraocular lens implant (10/3/2013); and Phacoemulsification with standard intraocular lens implant (5/1/2014).  Pulse: 73  Criteria   Of possible 12.5 points for 7 possible items:  NEGATIVE  Interpretation  Wells PE Score: 0  Score <=4 AND Negative D-Dimer: Low PE Probability (3% risk)      Assessments & Plan (with Medical Decision Making)     Pt is a 39 year old male with history of VIDAL, explosive personality disorder, anxiety, trisomy 21, PTSD who presents from his group home with his caregiver with complaints of central, nonradiating, midsternal chest pain since yesterday.  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  Patient returned back to his group home and took a nap and the pain had  resolved.  He experienced another episode of chest pain this morning lasting a couple hours.  He is currently pain-free.  Patient denies any associated shortness of breath.  Patient is not a smoker.  No reported history of cardiac disease, PE/DVT, hypertension, or diabetes mellitus.  Pt is afebrile on arrival.  Exam as above.  No acute ischemic changes noted on EKG.  It appears unchanged from his most recent EKG from 1 year ago.  Troponin is undetectable.  CBC and comprehensive metabolic panel are unremarkable.  D-dimer is slightly elevated and therefore a CT pulmonary angios was obtained and was negative for PE.  Discussed results with patient and his caregiver.  Patient has remained pain-free while in the emergency department.  Return precautions were reviewed.  Hand-outs were provided.    Patient was instructed to follow-up with PCP in 5-7 days for continued care and management or sooner if new or worsening symptoms.  He is to return to the ED for persistent and/or worsening symptoms.  Patient expressed understanding of the diagnosis and plan and was discharged home in good condition.    I have reviewed the nursing notes.    I have reviewed the findings, diagnosis, plan and need for follow up with the patient.       Medication List      There are no discharge medications for this visit.         Final diagnoses:   Chest pain, unspecified type       6/5/2019   [facility] EMERGENCY DEPARTMENT      Disclaimer:  This note consists of symbols derived from keyboarding, dictation and/or voice recognition software.  As a result, there may be errors in the script that have gone undetected.  Please consider this when interpreting information found in this chart.     Pilar Birch PA-C  06/05/19 [id number]

## 2019-06-05 NOTE — ED TRIAGE NOTES
mid sternal/epigastric pain for past 2 days, no soa, no n/v, no recent illness, no relief with using inhaler or taking tums

## 2019-06-05 NOTE — ED AVS SNAPSHOT
[facility] Emergency Department  [address] [address]  Phone:  [phone]  Fax:  [phone]                                    Humberto Estrella   MRN: [id number]    Department:  [facility] Emergency Department   Date of Visit:  6/5/2019           After Visit Summary Signature Page    I have received my discharge instructions, and my questions have been answered. I have discussed any challenges I see with this plan with the nurse or doctor.    ..........................................................................................................................................  Patient/Patient Representative Signature      ..........................................................................................................................................  Patient Representative Print Name and Relationship to Patient    ..................................................               ................................................  Date                                   Time    ..........................................................................................................................................  Reviewed by Signature/Title    ...................................................              ..............................................  Date                                               Time          22EPIC Rev 08/18

## 2019-06-05 NOTE — ED NOTES
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 This am he had some pain at home and after getting to work the pain returned. He states \"it feels like a ball in my chest that is being squeezed\" denies pain at rest but returns with activity, denies SOB, fever/chills, N/V/D. Has been eating and drinking normal. Janey (staff) at bedside. Pt is at his base level per staff. Pt is p/w/d, a/o x 4.    "

## 2019-06-10 ENCOUNTER — OFFICE VISIT (OUTPATIENT)
Dept: FAMILY MEDICINE | Facility: CLINIC | Age: 39
End: 2019-06-10
Payer: MEDICARE

## 2019-06-10 VITALS
WEIGHT: 253 LBS | DIASTOLIC BLOOD PRESSURE: 74 MMHG | BODY MASS INDEX: 44.83 KG/M2 | HEART RATE: 76 BPM | OXYGEN SATURATION: 94 % | TEMPERATURE: 97.2 F | SYSTOLIC BLOOD PRESSURE: 122 MMHG | RESPIRATION RATE: 14 BRPM

## 2019-06-10 DIAGNOSIS — R07.89 CHEST DISCOMFORT: Primary | ICD-10-CM

## 2019-06-10 PROCEDURE — 99213 OFFICE O/P EST LOW 20 MIN: CPT | Performed by: NURSE PRACTITIONER

## 2019-06-10 NOTE — PROGRESS NOTES
Subjective     Humberto Estrella is a 39 year old male who presents to clinic today for the following health issues:    HPI   ED/UC Followup:    Facility:  [facility]  Date of visit: 6/5/19  Reason for visit: chest pain  Current Status: better- patient denies any further chest pain. He was able to do all of his ADL's without return of his symptoms.     ER NOTE:  Pt is a 39 year old male with history of VIDAL, explosive personality disorder, anxiety, trisomy 21, PTSD who presents from his group home with his caregiver with complaints of central, nonradiating, midsternal chest pain since yesterday.  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  Patient returned back to his group home and took a nap and the pain had resolved.  He experienced another episode of chest pain this morning lasting a couple hours.  He is currently pain-free.  Patient denies any associated shortness of breath.  Patient is not a smoker.  No reported history of cardiac disease, PE/DVT, hypertension, or diabetes mellitus.  Pt is afebrile on arrival.  Exam as above.  No acute ischemic changes noted on EKG.  It appears unchanged from his most recent EKG from 1 year ago.  Troponin is undetectable.  CBC and comprehensive metabolic panel are unremarkable.  D-dimer is slightly elevated and therefore a CT pulmonary angios was obtained and was negative for PE.  Discussed results with patient and his caregiver.  Patient has remained pain-free while in the emergency department.  Return precautions were reviewed.  Hand-outs were provided.     Patient was instructed to follow-up with PCP in 5-7 days for continued care and management or sooner if new or worsening symptoms.  He is to return to the ED for persistent and/or worsening symptoms.  Patient expressed understanding of the diagnosis and plan and was discharged home in good condition          -------------------------------------    Patient Active  Problem List   Diagnosis     TRISOMY 21 (DOWN SYNDROME)     Hearing loss     MYOPIA     LATENT NYSTAGMUS     Headache     INTERMITT EXPLOSIVE DIS     post traumatic stress disorder     CARDIOVASCULAR SCREENING; LDL GOAL LESS THAN 160     Cortical, lamellar, or zonular cataract, nonsenile     Nonallergic rhinitis     Obesity due to excess calories, unspecified obesity severity     Subclinical hypothyroidism     VIDAL (obstructive sleep apnea)-AHI 26     Health care maintenance     Morbid obesity (H)     Anxiety     Explosive personality disorder (H)     Past Surgical History:   Procedure Laterality Date     PE TUBES      Left     PHACOEMULSIFICATION WITH STANDARD INTRAOCULAR LENS IMPLANT  10/3/2013    Procedure: PHACOEMULSIFICATION WITH STANDARD INTRAOCULAR LENS IMPLANT;  Left Kelman Phacoemulsification with Intraocular Lens Implant;  Surgeon: Luis Barajas MD;  Location: [facility] OR     PHACOEMULSIFICATION WITH STANDARD INTRAOCULAR LENS IMPLANT  2014    Procedure: PHACOEMULSIFICATION WITH STANDARD INTRAOCULAR LENS IMPLANT;  Surgeon: Luis Barajas MD;  Location: [facility] OR       Social History     Tobacco Use     Smoking status: Former Smoker     Last attempt to quit: 1998     Years since quittin.8     Smokeless tobacco: Never Used   Substance Use Topics     Alcohol use: No     Alcohol/week: 0.0 oz     Family History   Problem Relation Age of Onset     Unknown/Adopted Mother            -------------------------------------  Reviewed and updated as needed this visit by Provider         Review of Systems   ROS COMP: Constitutional, HEENT, cardiovascular, pulmonary, GI, , musculoskeletal, neuro, skin, endocrine and psych systems are negative, except as otherwise noted.      Objective    There were no vitals taken for this visit.  There is no height or weight on file to calculate BMI.  Physical Exam   GENERAL: healthy, alert and no distress  RESP: lungs clear to auscultation - no rales, rhonchi or  "wheezes  CV: regular rate and rhythm, normal S1 S2, no S3 or S4, no murmur, click or rub, no peripheral edema and peripheral pulses strong  MS: no gross musculoskeletal defects noted, no edema  SKIN: no suspicious lesions or rashes    Diagnostic Test Results:  Labs reviewed in Epic        Assessment & Plan     1. Chest discomfort  Resolved- workup in ER was unremarkable-   Discussed GERD diet to avoid carbonated beverages; spicy foods that can cause such symptoms        BMI:   Estimated body mass index is 44.83 kg/m  as calculated from the following:    Height as of 5/13/19: 1.6 m (5' 2.99\").    Weight as of this encounter: 114.8 kg (253 lb).   Weight management plan: counseled on diet and exercise- pt plans to lose 10 lbs            No follow-ups on file.    GM Bradshaw [facility]      "

## 2019-06-10 NOTE — PATIENT INSTRUCTIONS
Thank you for choosing [facility].  You may be receiving an email and/or telephone survey request from [facility] Customer Experience regarding your visit today.  Please take a few minutes to respond to the survey to let us know how we are doing.      If you have questions or concerns, please contact us via [employer] or you can contact your care team at [phone].    Our Clinic hours are:  Monday 6:40 am  to 7:00 pm  Tuesday -Friday 6:40 am to 5:00 pm    The [place] outpatient lab hours are:  Monday - Friday 6:10 am to 4:45 pm  Saturdays 7:00 am to 11:00 am  Appointments are required, call [phone]    If you have clinical questions after hours or would like to schedule an appointment,  call the clinic at [phone].

## 2019-07-29 ENCOUNTER — OFFICE VISIT (OUTPATIENT)
Dept: FAMILY MEDICINE | Facility: CLINIC | Age: 39
End: 2019-07-29
Payer: MEDICARE

## 2019-07-29 VITALS
OXYGEN SATURATION: 95 % | TEMPERATURE: 98.2 F | HEIGHT: 63 IN | HEART RATE: 85 BPM | DIASTOLIC BLOOD PRESSURE: 82 MMHG | SYSTOLIC BLOOD PRESSURE: 118 MMHG | WEIGHT: 246.6 LBS | BODY MASS INDEX: 43.7 KG/M2 | RESPIRATION RATE: 14 BRPM

## 2019-07-29 DIAGNOSIS — R06.02 SOB (SHORTNESS OF BREATH): ICD-10-CM

## 2019-07-29 DIAGNOSIS — Z00.00 ROUTINE GENERAL MEDICAL EXAMINATION AT A HEALTH CARE FACILITY: Primary | ICD-10-CM

## 2019-07-29 DIAGNOSIS — J01.10 ACUTE NON-RECURRENT FRONTAL SINUSITIS: ICD-10-CM

## 2019-07-29 DIAGNOSIS — E55.9 VITAMIN D DEFICIENCY: ICD-10-CM

## 2019-07-29 DIAGNOSIS — K21.9 GASTROESOPHAGEAL REFLUX DISEASE WITHOUT ESOPHAGITIS: ICD-10-CM

## 2019-07-29 DIAGNOSIS — E66.09 OBESITY DUE TO EXCESS CALORIES, UNSPECIFIED OBESITY SEVERITY: Chronic | ICD-10-CM

## 2019-07-29 DIAGNOSIS — J30.1 SEASONAL ALLERGIC RHINITIS DUE TO POLLEN: ICD-10-CM

## 2019-07-29 DIAGNOSIS — E03.9 HYPOTHYROIDISM, UNSPECIFIED TYPE: ICD-10-CM

## 2019-07-29 DIAGNOSIS — E66.01 MORBID OBESITY (H): ICD-10-CM

## 2019-07-29 PROCEDURE — 99395 PREV VISIT EST AGE 18-39: CPT | Performed by: NURSE PRACTITIONER

## 2019-07-29 PROCEDURE — 99213 OFFICE O/P EST LOW 20 MIN: CPT | Mod: 25 | Performed by: NURSE PRACTITIONER

## 2019-07-29 RX ORDER — LORATADINE 10 MG/1
10 TABLET ORAL DAILY PRN
Qty: 30 TABLET | Refills: 5 | Status: CANCELLED | OUTPATIENT
Start: 2019-07-29

## 2019-07-29 ASSESSMENT — MIFFLIN-ST. JEOR: SCORE: 1920.76

## 2019-07-29 NOTE — PROGRESS NOTES
SUBJECTIVE:   CC: Humberto Estrella is an 39 year old male who presents for preventive health visit.     Healthy Habits:    Do you get at least three servings of calcium containing foods daily (dairy, green leafy vegetables, etc.)? yes    Amount of exercise or daily activities, outside of work: 4-5 day(s) per week    Problems taking medications regularly No    Medication side effects: No    Have you had an eye exam in the past two years? yes    Do you see a dentist twice per year? yes    Do you have sleep apnea, excessive snoring or daytime drowsiness?no      C/O itching and watery eyes/ sinus drainage/ sneezing, ear pop.     Today's PHQ-2 Score:   PHQ-2 (  [employer]) 2018   Q1: Little interest or pleasure in doing things 0 0   Q2: Feeling down, depressed or hopeless 0 0   PHQ-2 Score 0 0       Abuse: Current or Past(Physical, Sexual or Emotional)- no  Do you feel safe in your environment? Yes    Social History     Tobacco Use     Smoking status: Former Smoker     Last attempt to quit: 1998     Years since quittin.9     Smokeless tobacco: Never Used   Substance Use Topics     Alcohol use: No     Alcohol/week: 0.0 oz     If you drink alcohol do you typically have >3 drinks per day or >7 drinks per week? No                      Last PSA:   PSA   Date Value Ref Range Status   2018 0.07 0 - 4 ug/L Final     Comment:     Assay Method:  Chemiluminescence using Siemens Vista analyzer       Reviewed orders with patient. Reviewed health maintenance and updated orders accordingly - Yes  BP Readings from Last 3 Encounters:   19 118/82   06/10/19 122/74   19 122/80    Wt Readings from Last 3 Encounters:   19 111.9 kg (246 lb 9.6 oz)   06/10/19 114.8 kg (253 lb)   19 115.7 kg (255 lb)                    Reviewed and updated as needed this visit by clinical staff         Reviewed and updated as needed this visit by Provider            ROS:  CONSTITUTIONAL: NEGATIVE for fever,  chills, change in weight  INTEGUMENTARY/SKIN: NEGATIVE for worrisome rashes, moles or lesions  EYES: NEGATIVE for vision changes or irritation  ENT: NEGATIVE for ear, mouth and throat problems  RESP: NEGATIVE for significant cough or SOB  CV: NEGATIVE for chest pain, palpitations or peripheral edema  GI: NEGATIVE for nausea, abdominal pain, heartburn, or change in bowel habits   male: negative for dysuria, hematuria, decreased urinary stream, erectile dysfunction, urethral discharge  MUSCULOSKELETAL: NEGATIVE for significant arthralgias or myalgia  NEURO: NEGATIVE for weakness, dizziness or paresthesias  PSYCHIATRIC: NEGATIVE for changes in mood or affect    OBJECTIVE:   There were no vitals taken for this visit.  EXAM:  GENERAL: healthy, alert and no distress  EYES: Eyes grossly normal to inspection, PERRL and conjunctivae and sclerae normal  HENT: ear canals and TM's normal, nose and mouth without ulcers or lesions  NECK: no adenopathy, no asymmetry, masses, or scars and thyroid normal to palpation  RESP: lungs clear to auscultation - no rales, rhonchi or wheezes  CV: regular rate and rhythm, normal S1 S2, no S3 or S4, no murmur, click or rub, no peripheral edema and peripheral pulses strong  ABDOMEN: soft, nontender, no hepatosplenomegaly, no masses and bowel sounds normal   (male): normal male genitalia without lesions or urethral discharge, no hernia  MS: no gross musculoskeletal defects noted, no edema  SKIN: no suspicious lesions or rashes  NEURO: Normal strength and tone, mentation intact and speech normal  PSYCH: mentation appears normal, affect normal/bright    Diagnostic Test Results:  Labs reviewed in Epic    ASSESSMENT/PLAN:   1. Routine general medical examination at a health care facility      2. SOB (shortness of breath)  Stable- has not needed to use inhaler   - albuterol (PROAIR HFA) 108 (90 Base) MCG/ACT inhaler; Inhale 2 puffs into the lungs every 6 hours as needed for shortness of breath /  dyspnea or wheezing or cough  Dispense: 1 Inhaler; Refill: 3    3. Gastroesophageal reflux disease without esophagitis  stable  - Refilled alum & mag hydroxide-simethicone (MYLANTA/MAALOX) 200-200-20 MG/5ML SUSP suspension; Take 30 mLs by mouth every 4 hours as needed for indigestion (2 tsp for upset stomach)  Dispense: 1 Bottle; Refill: 3  - calcium carbonate (TUMS) 500 MG chewable tablet; Take 1 tablet (500 mg) by mouth every 6 hours as needed for heartburn  Dispense: 150 tablet; Refill: 3  - magnesium hydroxide (MILK OF MAGNESIA) 400 MG/5ML suspension; Take 30-60 mLs by mouth daily as needed for constipation or heartburn (If No Bowel Movement in 2 days.) Reported on 4/12/2017  Dispense: 360 mL; Refill: 1    4. Vitamin D deficiency    - vitamin D3 (CHOLECALCIFEROL) 1000 units (25 mcg) tablet; Take 1 tablet (1,000 Units) by mouth daily  Dispense: 30 tablet; Refill: 11    5. Seasonal allergic rhinitis due to pollen    - acetaminophen (TYLENOL) 325 MG tablet; Take 1-2 tablets (325-650 mg) by mouth every 4 hours as needed Reported on 4/12/2017  Dispense: 100 tablet; Refill: 1  - levocetirizine (XYZAL) 5 MG tablet; TAKE 1 TABLET BY MOUTH EVERY EVENING (MAKE SURE TO PUT AN EVENING STICKER ON IT)  Dispense: 30 tablet; Refill: 11    6. Hypothyroidism, unspecified type  stable  - levothyroxine (SYNTHROID/LEVOTHROID) 100 MCG tablet; Take 1 tablet (100 mcg) by mouth daily  Dispense: 90 tablet; Refill: 3    7. Obesity due to excess calories, unspecified obesity severity  stable  - metFORMIN (GLUCOPHAGE) 500 MG tablet; Take 1 tablet (500 mg) by mouth 2 times daily (with meals)  Dispense: 180 tablet; Refill: 3    8. Acute non-recurrent frontal sinusitis    - sodium chloride (SALINE MIST) 0.65 % nasal spray; Spray 2 sprays into both nostrils 4 times daily as needed for congestion  Dispense: 30 mL; Refill: 3    9. Morbid obesity (H)  Recommend exercise  - topiramate (TOPAMAX) 25 MG tablet; Take 1 pill at 7:30 pm every day for a  "week then increase to 2 pills daily thereafter  Dispense: 60 tablet; Refill: 3    COUNSELING:  Reviewed preventive health counseling, as reflected in patient instructions       Regular exercise       Healthy diet/nutrition    Estimated body mass index is 44.83 kg/m  as calculated from the following:    Height as of 5/13/19: 1.6 m (5' 2.99\").    Weight as of 6/10/19: 114.8 kg (253 lb).    Weight management plan: Patient referred to endocrine and/or weight management specialty     reports that he quit smoking about 20 years ago. He has never used smokeless tobacco.      Counseling Resources:  ATP IV Guidelines  Pooled Cohorts Equation Calculator  FRAX Risk Assessment  ICSI Preventive Guidelines  Dietary Guidelines for Americans, 2010  USDA's MyPlate  ASA Prophylaxis  Lung CA Screening    GM Bradshaw [facility]  "

## 2019-07-31 ENCOUNTER — TELEPHONE (OUTPATIENT)
Dept: FAMILY MEDICINE | Facility: CLINIC | Age: 39
End: 2019-07-31

## 2019-07-31 DIAGNOSIS — J30.1 SEASONAL ALLERGIC RHINITIS DUE TO POLLEN: Primary | ICD-10-CM

## 2019-07-31 RX ORDER — LORATADINE 10 MG/1
10 TABLET ORAL DAILY
Qty: 90 TABLET | Refills: 3 | Status: SHIPPED | OUTPATIENT
Start: 2019-07-31 | End: 2020-08-04

## 2019-07-31 NOTE — TELEPHONE ENCOUNTER
Reason for Call:  Medication or medication refill:    Do you use a [facility]?  Name of the pharmacy and phone number for the current request:      [facility] group home calling  Fax order to [facility] at [phone]  And [facility]  [phone]    Name of the medication requested: Clariton 10 mg daily prn      Other request:  Med was to be ordered at last appointment on 7/29/19 but never was    Can we leave a detailed message on this number? YES    Phone number patient can be reached at: Home number on file [phone] (home)    Best Time: any    Call taken on 7/31/2019 at 3:12 PM by Deana Hassan

## 2019-07-31 NOTE — TELEPHONE ENCOUNTER
S:  Refill request for loratadine    B:  LOV 7/29/19 for routine visit    A:  Patient's group home staff stating that the loratadine was supposed to be ordered in 7/29/19 appointment but never was  Patient will also need a copy of the order faxed to group home at [phone]    R:  Writer pended order of loratadine  Routed to provider:  Can patient have a prescription for loratadine?  If loratadine signed please have copy faxed to patient's group home.    Mo Wilder RN

## 2019-08-01 ENCOUNTER — TELEPHONE (OUTPATIENT)
Dept: FAMILY MEDICINE | Facility: CLINIC | Age: 39
End: 2019-08-01

## 2019-08-01 DIAGNOSIS — R06.02 SOB (SHORTNESS OF BREATH): Primary | ICD-10-CM

## 2019-08-01 RX ORDER — MAGNESIUM HYDROXIDE/ALUMINUM HYDROXICE/SIMETHICONE 120; 1200; 1200 MG/30ML; MG/30ML; MG/30ML
30 SUSPENSION ORAL EVERY 4 HOURS PRN
Qty: 1 BOTTLE | Refills: 3 | Status: SHIPPED | OUTPATIENT
Start: 2019-08-01

## 2019-08-01 RX ORDER — CALCIUM CARBONATE 500 MG/1
1 TABLET, CHEWABLE ORAL EVERY 6 HOURS PRN
Qty: 150 TABLET | Refills: 3 | Status: SHIPPED | OUTPATIENT
Start: 2019-08-01

## 2019-08-01 RX ORDER — LEVOCETIRIZINE DIHYDROCHLORIDE 5 MG/1
TABLET, FILM COATED ORAL
Qty: 30 TABLET | Refills: 11 | Status: SHIPPED | OUTPATIENT
Start: 2019-08-01 | End: 2020-05-28 | Stop reason: ALTCHOICE

## 2019-08-01 RX ORDER — ACETAMINOPHEN 325 MG/1
325-650 TABLET ORAL EVERY 4 HOURS PRN
Qty: 100 TABLET | Refills: 1 | Status: SHIPPED | OUTPATIENT
Start: 2019-08-01 | End: 2021-07-15

## 2019-08-01 RX ORDER — LEVOTHYROXINE SODIUM 100 UG/1
100 TABLET ORAL DAILY
Qty: 90 TABLET | Refills: 3 | Status: SHIPPED | OUTPATIENT
Start: 2019-08-01 | End: 2020-08-04

## 2019-08-01 RX ORDER — ALBUTEROL SULFATE 90 UG/1
2 AEROSOL, METERED RESPIRATORY (INHALATION) EVERY 6 HOURS PRN
Qty: 3 INHALER | Refills: 1 | Status: SHIPPED | OUTPATIENT
Start: 2019-08-01 | End: 2020-08-04

## 2019-08-01 RX ORDER — TOPIRAMATE 25 MG/1
TABLET, FILM COATED ORAL
Qty: 60 TABLET | Refills: 3 | Status: SHIPPED | OUTPATIENT
Start: 2019-08-01 | End: 2019-08-06 | Stop reason: DRUGHIGH

## 2019-08-01 RX ORDER — ALBUTEROL SULFATE 90 UG/1
2 AEROSOL, METERED RESPIRATORY (INHALATION) EVERY 6 HOURS PRN
Qty: 1 INHALER | Refills: 3 | Status: SHIPPED | OUTPATIENT
Start: 2019-08-01 | End: 2019-08-01 | Stop reason: ALTCHOICE

## 2019-08-01 RX ORDER — ECHINACEA PURPUREA EXTRACT 125 MG
2 TABLET ORAL 4 TIMES DAILY PRN
Qty: 30 ML | Refills: 3 | Status: SHIPPED | OUTPATIENT
Start: 2019-08-01 | End: 2021-11-16

## 2019-08-01 NOTE — TELEPHONE ENCOUNTER
Madeline Garrett wants Ventolin instead of the proair.  The Rx doesn't include the ventolin name so I pended the correct one that has it all for them. Jacquie COVARRUBIAS RN

## 2019-08-01 NOTE — TELEPHONE ENCOUNTER
Reason for Call:  Other prescription    Detailed comments: [facility] calling to see if they can get Ventilin instead of Proair due to Insurance for this pt.  Please advise    Phone Number Patient can be reached at: Other phone number:  [phone]    Best Time: any    Can we leave a detailed message on this number? Not Applicable    Call taken on 8/1/2019 at 10:01 AM by Jacquie Gonzalez

## 2019-08-06 ENCOUNTER — OFFICE VISIT (OUTPATIENT)
Dept: ENDOCRINOLOGY | Facility: CLINIC | Age: 39
End: 2019-08-06
Payer: MEDICARE

## 2019-08-06 VITALS
DIASTOLIC BLOOD PRESSURE: 76 MMHG | HEIGHT: 63 IN | WEIGHT: 250.5 LBS | BODY MASS INDEX: 44.38 KG/M2 | OXYGEN SATURATION: 96 % | SYSTOLIC BLOOD PRESSURE: 123 MMHG | HEART RATE: 61 BPM

## 2019-08-06 DIAGNOSIS — E66.09 OBESITY DUE TO EXCESS CALORIES, UNSPECIFIED OBESITY SEVERITY: Primary | Chronic | ICD-10-CM

## 2019-08-06 RX ORDER — TOPIRAMATE 50 MG/1
75 TABLET, FILM COATED ORAL DAILY
Qty: 90 TABLET | Refills: 3 | Status: SHIPPED | OUTPATIENT
Start: 2019-08-06 | End: 2019-11-12

## 2019-08-06 ASSESSMENT — MIFFLIN-ST. JEOR: SCORE: 1938.45
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
## 2019-08-06 NOTE — NURSING NOTE
"Chief Complaint   Patient presents with     Weight Problem     NMWM       Vitals:    08/06/19 1518   BP: 123/76   Pulse: 61   SpO2: 96%   Weight: 113.6 kg (250 lb 8 oz)   Height: 1.588 m (5' 2.5\")       Body mass index is 45.09 kg/m .    Kelsea Carranza CMA    "

## 2019-08-06 NOTE — PATIENT INSTRUCTIONS
Welcome to our weight management program!   We are excited to join you on your weight loss journey    Thank you for allowing us the privilege of caring for you. We hope we provided you with the excellent service you deserve.    Please let us know if there is anything else we can do so that we can be sure you are leaving completely satisfied with your care experience.    You saw Dr. Chasidy CASTANEDA today.    Instructions per today's visit:   -increase topiramate to 75 mg daily (1.5 pill)  -cut down diet soda  -Please take a look at this website for resources and recipes [email or web address]      Follow up appointments:  3 months    For any questions/concerns contact Sandra Kendrick LPN at [phone] or Marlin Godwin RN at [phone]    To schedule appointments with our team, please call [phone]     Please call during clinic hours Monday through Friday 8:00a - 4:00p if you have questions or you can contact us via [employer] at anytime. ?    Lab results will be communicated through My Chart or letter (if My Chart not used). Please call the clinic if you have not received communication after 1 week or if you have any questions.?      Fax: [phone]    Thank you,  Medical Weight Management Team

## 2019-08-12 ENCOUNTER — TELEPHONE (OUTPATIENT)
Dept: FAMILY MEDICINE | Facility: CLINIC | Age: 39
End: 2019-08-12

## 2019-08-12 DIAGNOSIS — H91.90 HEARING LOSS, UNSPECIFIED HEARING LOSS TYPE, UNSPECIFIED LATERALITY: Primary | ICD-10-CM

## 2019-08-12 NOTE — TELEPHONE ENCOUNTER
Reason for Call:  Other referral    Detailed comments: Annual hearing exam    Phone Number Janey can be reached at: Home number on file [phone] (home)    Best Time: any    Can we leave a detailed message on this number? YES    Call taken on 8/12/2019 at 3:19 PM by Lidia Ernandez

## 2019-08-12 NOTE — TELEPHONE ENCOUNTER
S-(situation): Audiology referral request    B-(background): LOV 07/29/19 with PCP.   Last referral 08/21/18 for Hearing loss, unspecified hearing loss type, unspecified laterality    A-(assessment): Group Home requesting referral for annual hearing exam.     R-(recommendations): Referral pended for signature.      NAREN HamiltonN, RN

## 2019-08-13 NOTE — TELEPHONE ENCOUNTER
Janey at [facility] notified. Patient already has appointment scheduled.      NAREN HamiltonN, RN

## 2019-09-02 ENCOUNTER — HOSPITAL ENCOUNTER (EMERGENCY)
Facility: CLINIC | Age: 39
Discharge: HOME | End: 2019-09-02
Attending: FAMILY MEDICINE | Admitting: FAMILY MEDICINE
Payer: MEDICARE

## 2019-09-02 VITALS
TEMPERATURE: 97.7 F | OXYGEN SATURATION: 99 % | RESPIRATION RATE: 18 BRPM | HEART RATE: 75 BPM | SYSTOLIC BLOOD PRESSURE: 139 MMHG | DIASTOLIC BLOOD PRESSURE: 85 MMHG

## 2019-09-02 DIAGNOSIS — T17.320A CHOKING DUE TO FOOD (REGURGITATED), INITIAL ENCOUNTER: ICD-10-CM
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
## 2019-09-02 PROCEDURE — 99282 EMERGENCY DEPT VISIT SF MDM: CPT | Mod: Z6 | Performed by: FAMILY MEDICINE

## 2019-09-02 PROCEDURE — 99282 EMERGENCY DEPT VISIT SF MDM: CPT | Performed by: FAMILY MEDICINE

## 2019-09-02 NOTE — ED NOTES
Pt here awake and alert. The patient had an episode of choking at the group home where he resides. The patient is awake, and alert. Denies complaints at this time.

## 2019-09-02 NOTE — ED AVS SNAPSHOT
[facility] Emergency Department  [address] [address]  Phone:  [phone]  Fax:  [phone]                                    Humberto Estrella   MRN: [id number]    Department:  [facility] Emergency Department   Date of Visit:  9/2/2019           After Visit Summary Signature Page    I have received my discharge instructions, and my questions have been answered. I have discussed any challenges I see with this plan with the nurse or doctor.    ..........................................................................................................................................  Patient/Patient Representative Signature      ..........................................................................................................................................  Patient Representative Print Name and Relationship to Patient    ..................................................               ................................................  Date                                   Time    ..........................................................................................................................................  Reviewed by Signature/Title    ...................................................              ..............................................  Date                                               Time          22EPIC Rev 08/18

## 2019-09-03 ENCOUNTER — TELEPHONE (OUTPATIENT)
Dept: FAMILY MEDICINE | Facility: CLINIC | Age: 39
End: 2019-09-03

## 2019-09-03 ENCOUNTER — PATIENT OUTREACH (OUTPATIENT)
Dept: CARE COORDINATION | Facility: CLINIC | Age: 39
End: 2019-09-03

## 2019-09-03 DIAGNOSIS — Z71.89 OTHER SPECIFIED COUNSELING: ICD-10-CM

## 2019-09-03 DIAGNOSIS — E66.09 OBESITY DUE TO EXCESS CALORIES, UNSPECIFIED OBESITY SEVERITY: Chronic | ICD-10-CM

## 2019-09-03 NOTE — ED PROVIDER NOTES
HPI  Current medications, past medical history, and social history are reviewed.    The patient is a 39-year-old male presenting choking episode at home.  He has Down syndrome.  He lives at a group home.  This evening he was eating supper when his caregiver heard him choking.  She turned to see him collapsing to the ground.  She and staff helped lift him up and the Heimlich maneuver was used.  A large chunk of meat was expelled from the patient's mouth as a result of this.  When this happened, the patient returned to baseline.  He has had minimal complaints since that time.  Currently, he tells me his throat is a bit sore.  He denies shortness of breath.  No fever since the choking episode.  No other abnormality reported by staff.    ROS: All other review of systems are negative other than that noted above.      Past Medical History:   Diagnosis Date     Coronary artery disease      Depressive disorder      Diagnostic skin and sensitization tests (aka ALLERGENS) 9/30/15 IgE tests all NEGATIVE for environmental allergens.      Nonallergic rhinitis     9/30/15 IgE tests all NEGATIVE for environmental allergens.      Thyroid disease      Past Surgical History:   Procedure Laterality Date     PE TUBES      Left     PHACOEMULSIFICATION WITH STANDARD INTRAOCULAR LENS IMPLANT  10/3/2013    Procedure: PHACOEMULSIFICATION WITH STANDARD INTRAOCULAR LENS IMPLANT;  Left Kelman Phacoemulsification with Intraocular Lens Implant;  Surgeon: Luis Barajas MD;  Location: [facility] OR     PHACOEMULSIFICATION WITH STANDARD INTRAOCULAR LENS IMPLANT  5/1/2014    Procedure: PHACOEMULSIFICATION WITH STANDARD INTRAOCULAR LENS IMPLANT;  Surgeon: Luis Barajas MD;  Location: [facility] OR     Medicines    acetaminophen (TYLENOL) 325 MG tablet   albuterol (PROAIR HFA/PROVENTIL HFA/VENTOLIN HFA) 108 (90 Base) MCG/ACT inhaler   alum & mag hydroxide-simethicone (MYLANTA/MAALOX) 200-200-20 MG/5ML SUSP suspension   calcium carbonate (TUMS) 500 MG  chewable tablet   divalproex (DEPAKOTE ER) 500 MG 24 hr tablet   Guaifenesin-Codeine (ROBITUSSIN A-C OR)   ibuprofen (ADVIL,MOTRIN) 600 MG tablet   levocetirizine (XYZAL) 5 MG tablet   levothyroxine (SYNTHROID/LEVOTHROID) 100 MCG tablet   loratadine (CLARITIN) 10 MG tablet   magnesium hydroxide (MILK OF MAGNESIA) 400 MG/5ML suspension   metFORMIN (GLUCOPHAGE) 500 MG tablet   OLANZapine (ZYPREXA) 2.5 MG tablet   order for DME   phenol (CHLORASEPTIC) 1.4 % spray   Polyethyl Glycol-Propyl Glycol (SYSTANE OP)   risperiDONE (RISPERDAL) 1 MG tablet   sodium chloride (SALINE MIST) 0.65 % nasal spray   SUDOGEST 12 HOUR 120 MG 12 hr tablet   topiramate (TOPAMAX) 50 MG tablet   triamcinolone (KENALOG) 0.1 % cream   UNABLE TO FIND   venlafaxine (EFFEXOR-XR) 37.5 MG 24 hr capsule   vitamin D3 (CHOLECALCIFEROL) 1000 units (25 mcg) tablet     Family History   Problem Relation Age of Onset     Unknown/Adopted Mother      Other Cancer Mother      Anxiety Disorder Brother      Substance Abuse Brother      Depression Brother      Substance Abuse Father      Social History     Tobacco Use     Smoking status: Former Smoker     Packs/day: 1.00     Years: 1.00     Pack years: 1.00     Types: Cigarettes     Start date: 1999     Last attempt to quit: 2000     Years since quittin.3     Smokeless tobacco: Never Used   Substance Use Topics     Alcohol use: No     Alcohol/week: 0.0 oz     Drug use: No         PHYSICAL  /85   Pulse 75   Temp 97.7  F (36.5  C) (Temporal)   Resp 18   SpO2 99%   General: Patient is alert and in no distress.  Sitting up in bed, smiling, cooperative.  Neurological: Alert.  Moving upper and lower extremities equally, bilaterally.  Head / Neck: Atraumatic.  Ears: Not done.  Eyes: Pupils are equal, round, and reactive.  Normal conjunctiva.  Nose: Midline.  No epistaxis.  Mouth / Throat:  Moist.  No foreign body.  No evidence of trauma.  Respiratory: No respiratory distress.  Clear to auscultation  bilaterally.  Cardiovascular: Regular rhythm.  Peripheral extremities are warm.  Abdomen / Pelvis: Not done.  Genitalia: Not done.  Musculoskeletal: Not done.  Skin: No evidence of rash or trauma.        PHYSICIAN  1943.  The patient has had a choking episode, as described above.  This was relieved with a Heimlich maneuver.  He has been well since.  No emergent need for further work-up at this time.      IMPRESSION    ICD-10-CM    1. Choking due to food (regurgitated), initial encounter T17.320A             Eugene Bridges MD  09/02/19 1943

## 2019-09-03 NOTE — DISCHARGE INSTRUCTIONS
Return to the Emergency Room if the following occurs:     Worsened breathing, fever >101, or for any concern at anytime.    Or, follow-up with the following provider as we discussed:     Return to your primary doctor as needed.    Medications discussed:    None new.  No changes.    If you received pain-relieving or sedating medication during your time in the ER, avoid alcohol, driving automobiles, or working with machinery.  Also, a responsible adult must stay with you.        Call the Nurse Advice Line at [phone] or [phone] for any concern at anytime.

## 2019-09-03 NOTE — LETTER
[facility]  [address] [place] [address]      September 3, 2019      Humberto Estrella  [address]  [place] [address]        Dear Humberto,    I am a clinic care coordinator who works with GM Bradshaw CNP at [facility].  I wanted to introduce myself and provide you with my contact information so that you can call me with questions or concerns about your health care. Below is a description of clinic care coordination and how I can further assist you.     The clinic care coordinator is a registered nurse and/or  who understand the health care system. The goal of clinic care coordination is to help you manage your health and improve access to the [facility] system in the most efficient manner. The registered nurse can assist you in meeting your health care goals by providing education, coordinating services, and strengthening the communication among your providers. The  can assist you with financial, behavioral, psychosocial, chemical dependency, counseling, and/or psychiatric resources.    Please feel free to contact me at [phone], with any questions or concerns. We at [facility] are focused on providing you with the highest-quality healthcare experience possible and that all starts with you.     Sincerely,     Manasa Olguin, NARENN, RN   [facility] - RN Care Coordinator

## 2019-09-03 NOTE — TELEPHONE ENCOUNTER
"Requested Prescriptions   Pending Prescriptions Disp Refills     metFORMIN (GLUCOPHAGE) 500 MG tablet 180 tablet 3     Sig: Take 1 tablet (500 mg) by mouth 2 times daily (with meals)   Last Written Prescription Date:  8/1/19  Last Fill Quantity: 180 tab,  # refills: 3   Last office visit: 7/29/2019 with prescribing provider:  Carla Meyer     Future Office Visit:        Biguanide Agents Failed - 9/3/2019  1:44 PM        Failed - Patient has had a Microalbumin in the past 15 mos.     No lab results found.          Failed - Patient has documented A1c within the specified period of time.     If HgbA1C is 8 or greater, it needs to be on file within the past 3 months.  If less than 8, must be on file within the past 6 months.     Recent Labs   Lab Test 11/19/15  0625   A1C 5.1             Passed - Blood pressure less than 140/90 in past 6 months     BP Readings from Last 3 Encounters:   09/02/19 139/85   08/06/19 123/76   07/29/19 118/82                 Passed - Patient has documented LDL within the past 12 mos.     Recent Labs   Lab Test 04/02/19  0746   LDL 92             Passed - Patient is age 10 or older        Passed - Patient's CR is NOT>1.4 OR Patient's EGFR is NOT<45 within past 12 mos.     Recent Labs   Lab Test 06/05/19  1100   GFRESTIMATED 87   GFRESTBLACK >90       Recent Labs   Lab Test 06/05/19  1100   CR 1.07             Passed - Patient does NOT have a diagnosis of CHF.        Passed - Medication is active on med list        Passed - Recent (6 mo) or future (30 days) visit within the authorizing provider's specialty     Patient had office visit in the last 6 months or has a visit in the next 30 days with authorizing provider or within the authorizing provider's specialty.  See \"Patient Info\" tab in inbasket, or \"Choose Columns\" in Meds & Orders section of the refill encounter.              "

## 2019-09-03 NOTE — PROGRESS NOTES
Clinic Care Coordination Contact  [facility]/Voicemail    Referral Source: ED Follow-Up  Patient resides at [facility] and presented to ED on 9/2 for evaluation after choking episode at home. Per ED physician note, no emergent need for further work-up as patient was stable and in well since episode.    Clinical Data: Care Coordinator Outreach    Outreach attempted x 1.  Left message on [facility]'s voicemail with call back information and requested return call.    Plan: Care Coordinator will send care coordination introduction letter with care coordinator contact information and explanation of care coordination services via mail. Care Coordinator will try to reach patient again in 1-2 business days.    CHIKIS Singh, RN   [facility] - RN Care Coordinator  Phone: [phone]

## 2019-09-04 NOTE — PROGRESS NOTES
Clinic Care Coordination Contact  [facility]/Voicemail    Referral Source: ED Follow-Up  See initial note below    Clinical Data: Care Coordinator Outreach    Outreach attempted x 2.  Left message on snf's voicemail with call back information and requested return call.  Plan: Care Coordinator sent care coordination introduction letter on 9/3 via mail. Care Coordinator will do no further outreaches at this time.    CHIKIS Singh, RN   [facility] - RN Care Coordinator  Phone: [phone]

## 2019-09-05 NOTE — TELEPHONE ENCOUNTER
Left message for patient's group home to return call to clinic.  RX dated 8-1-19 was sent to [facility].  This request is from [email or web address] mail order.  Is group home switching pharmacies for this med?  Riya MCCLOUD RN

## 2019-09-10 ENCOUNTER — OFFICE VISIT (OUTPATIENT)
Dept: AUDIOLOGY | Facility: CLINIC | Age: 39
End: 2019-09-10
Payer: MEDICARE

## 2019-09-10 DIAGNOSIS — H90.3 SENSORINEURAL HEARING LOSS, BILATERAL: Primary | ICD-10-CM

## 2019-09-10 PROCEDURE — 92557 COMPREHENSIVE HEARING TEST: CPT | Performed by: AUDIOLOGIST

## 2019-09-10 PROCEDURE — 99207 ZZC NO CHARGE LOS: CPT | Performed by: AUDIOLOGIST

## 2019-09-10 PROCEDURE — 92550 TYMPANOMETRY & REFLEX THRESH: CPT | Performed by: AUDIOLOGIST

## 2019-09-10 NOTE — PROGRESS NOTES
AUDIOLOGY REPORT    SUBJECTIVE:  Humberto Estrella is a 39 year old male who was seen in the Audiology Clinic at [facility] for an audiologic evaluation, referred by Dr. Gallegos. Patient is here today with his group home caregiver.  The patient has been seen previously in this clinic  for assessment and results indicated a mild to moderate high tone sensorineural hearing loss. The patient reports he feels many things are too loud. The group home staff feel he is hearing well. The patient denies bilateral tinnitus, bilateral otalgia and history of noise exposure.     OBJECTIVE:    Otoscopic exam indicates ears are clear of cerumen bilaterally       Pure Tone Thresholds assessed using conventional audiometry with good  reliability from 250-8000 Hz bilaterally using circumaural headphones     RIGHT:  normal, mild and moderate sensorineural hearing loss    LEFT:    normal, mild and moderate sensorineural hearing loss    Tympanogram:    RIGHT: restricted eardrum mobility (Type As), ,1000 Hz ipsi reflex present, contra absent    LEFT:   negative pressure  (type C) ,1000 Hz ipsi reflex present, contra absent    Speech Reception Threshold:    RIGHT: 15 dB HL    LEFT:   20 dB HL  Word Recognition Score:     RIGHT: 88% at 55 dB HL using NU-6 recorded word list.    LEFT:   88% at 60 dB HL using NU-6 recorded word list.      ASSESSMENT:   Normal hearing through 3000 Hz sloping to a mild to moderate high frequency sensorineural hearing loss bilaterally.     Compared to patient's previous audiogram dated 8/21/2018, hearing has remained stable bilaterally. Today s results were discussed with the patient and his caregiver. A copy of the audiogram was given to the group home staff.     PLAN: Patient was counseled regarding hearing loss and impact on communication.    Please call this clinic with questions regarding these results or recommendations.        Veronika Ramírez M.A. ROGELIO-AAA  Clinical audiologist [place] #  [id number]  9/10/2019

## 2019-09-12 ENCOUNTER — IMMUNIZATION (OUTPATIENT)
Dept: FAMILY MEDICINE | Facility: CLINIC | Age: 39
End: 2019-09-12
Payer: MEDICARE

## 2019-09-12 DIAGNOSIS — Z23 NEED FOR PROPHYLACTIC VACCINATION AND INOCULATION AGAINST INFLUENZA: Primary | ICD-10-CM

## 2019-09-12 PROCEDURE — 99207 ZZC NO CHARGE NURSE ONLY: CPT

## 2019-09-12 PROCEDURE — G0008 ADMIN INFLUENZA VIRUS VAC: HCPCS

## 2019-09-12 PROCEDURE — 90686 IIV4 VACC NO PRSV 0.5 ML IM: CPT

## 2019-09-20 ENCOUNTER — OFFICE VISIT (OUTPATIENT)
Dept: SLEEP MEDICINE | Facility: CLINIC | Age: 39
End: 2019-09-20
Payer: MEDICARE

## 2019-09-20 VITALS
SYSTOLIC BLOOD PRESSURE: 105 MMHG | BODY MASS INDEX: 39.71 KG/M2 | WEIGHT: 253 LBS | HEIGHT: 67 IN | OXYGEN SATURATION: 94 % | HEART RATE: 82 BPM | DIASTOLIC BLOOD PRESSURE: 70 MMHG

## 2019-09-20 DIAGNOSIS — G47.33 OSA (OBSTRUCTIVE SLEEP APNEA): Primary | ICD-10-CM

## 2019-09-20 PROCEDURE — 99214 OFFICE O/P EST MOD 30 MIN: CPT | Performed by: FAMILY MEDICINE

## 2019-09-20 ASSESSMENT — MIFFLIN-ST. JEOR: SCORE: 2021.35

## 2019-09-20 NOTE — PROGRESS NOTES
Obstructive Sleep Apnea - PAP Follow-Up Visit:    Chief Complaint   Patient presents with     CPAP Follow Up     Yearly follow up appt.. Needs order for supplies       Humberto Estrella comes in today for follow-up of their moderate obstructive sleep apnea, managed with CPAP.     Pertinent PMHx of obesity, trisomy 21, PTSD, explosive personality disorder.    He lives in a group home environment.    Last visit with Jonny Rossi on 2018 and doing well with CPAP 11 cm H2O.    He returns today for an annual follow-up.  He is joined by a staff from his group home.  Humberto feels he is sleeping well and his only concern is that his mask leaks at night.  Otherwise, feels well rested and no longer needs daytime naps.  Average sleep time of 10-11 hours.    CPAP download from 2019 - 2019 on set pressure 11 cm H2O.  Used 30/30 days, average usage of 11 hours 1 minutes.  Leak median 9.4 L/min, 95th%ile of 26.4 L/min.  AHI 3.2.    Prior Sleep Testin2017 - PSG with weight 226 lbs.  AHI 26, RDI 35, lowest oxygen saturation was 78%, CPAP 11 cm/H2O effective.      Problem List:  Patient Active Problem List    Diagnosis Date Noted     Anxiety 2018     Priority: Medium     Explosive personality disorder (H) 2018     Priority: Medium     Morbid obesity (H) 2018     Priority: Medium     Health care maintenance 2017     Priority: Medium     VIDAL (obstructive sleep apnea)-AHI 26 2017     Priority: Medium     2017(226#)-AHI 26, RDI 35, lowest oxygen saturation was 78%, CPAP 11 cm/H20.        Subclinical hypothyroidism 2016     Priority: Medium     Obesity due to excess calories, unspecified obesity severity 2016     Priority: Medium     Nonallergic rhinitis      Priority: Medium     9/30/15 IgE tests all NEGATIVE for environmental allergens.        Cortical, lamellar, or zonular cataract, nonsenile 10/02/2013     Priority: Medium     CARDIOVASCULAR SCREENING; LDL GOAL  "LESS THAN 160 10/31/2010     Priority: Medium     TRISOMY 21 (DOWN SYNDROME) 06/21/2006     Priority: Medium     With mild mental retardation       Hearing loss 06/21/2006     Priority: Medium     Problem list name updated by automated process. Provider to review       MYOPIA 06/21/2006     Priority: Medium     LATENT NYSTAGMUS 06/21/2006     Priority: Medium     Headache 06/21/2006     Priority: Medium     Problem list name updated by automated process. Provider to review       INTERMITT EXPLOSIVE DIS 06/21/2006     Priority: Medium     post traumatic stress disorder 06/21/2006     Priority: Medium          /70   Pulse 82   Ht 1.702 m (5' 7.01\")   Wt 114.8 kg (253 lb)   SpO2 94%   BMI 39.62 kg/m      Impression/Plan:    1.)  Moderate VIDAL   - Appears well controlled on CPAP 11 cm H2O.     - Continue CPAP on current settings, plan for mask fitting.      Humberto KVNG Estrella will follow up in about 1 year(s).     Twenty-five minutes spent with patient, all of which were spent face-to-face counseling, consulting, coordinating plan of care.      David Peralta MD, MD    CC:  Carla Meyer  "

## 2019-09-20 NOTE — PATIENT INSTRUCTIONS
Your BMI is Body mass index is 39.62 kg/m .  Weight management is a personal decision.  If you are interested in exploring weight loss strategies, the following discussion covers the approaches that may be successful. Body mass index (BMI) is one way to tell whether you are at a healthy weight, overweight, or obese. It measures your weight in relation to your height.  A BMI of 18.5 to 24.9 is in the healthy range. A person with a BMI of 25 to 29.9 is considered overweight, and someone with a BMI of 30 or greater is considered obese. More than two-thirds of American adults are considered overweight or obese.  Being overweight or obese increases the risk for further weight gain. Excess weight may lead to heart disease and diabetes.  Creating and following plans for healthy eating and physical activity may help you improve your health.  Weight control is part of healthy lifestyle and includes exercise, emotional health, and healthy eating habits. Careful eating habits lifelong are the mainstay of weight control. Though there are significant health benefits from weight loss, long-term weight loss with diet alone may be very difficult to achieve- studies show long-term success with dietary management in less than 10% of people. Attaining a healthy weight may be especially difficult to achieve in those with severe obesity. In some cases, medications, devices and surgical management might be considered.  What can you do?  If you are overweight or obese and are interested in methods for weight loss, you should discuss this with your provider.     Consider reducing daily calorie intake by 500 calories.     Keep a food journal.     Avoiding skipping meals, consider cutting portions instead.    Diet combined with exercise helps maintain muscle while optimizing fat loss. Strength training is particularly important for building and maintaining muscle mass. Exercise helps reduce stress, increase energy, and improves fitness.  Increasing exercise without diet control, however, may not burn enough calories to loose weight.       Start walking three days a week 10-20 minutes at a time    Work towards walking thirty minutes five days a week     Eventually, increase the speed of your walking for 1-2 minutes at time    In addition, we recommend that you review healthy lifestyles and methods for weight loss available through the National Institutes of Health patient information sites:  http://win.niddk.nih.gov/publications/index.htm    And look into health and wellness programs that may be available through your health insurance provider, employer, local community center, or pricilla club.    Weight management plan: Patient was referred to their PCP to discuss a diet and exercise plan.        Your Body mass index is 39.62 kg/m .  Weight management is a personal decision.  If you are interested in exploring weight loss strategies, the following discussion covers the approaches that may be successful. Body mass index (BMI) is one way to tell whether you are at a healthy weight, overweight, or obese. It measures your weight in relation to your height.  A BMI of 18.5 to 24.9 is in the healthy range. A person with a BMI of 25 to 29.9 is considered overweight, and someone with a BMI of 30 or greater is considered obese. More than two-thirds of American adults are considered overweight or obese.  Being overweight or obese increases the risk for further weight gain. Excess weight may lead to heart disease and diabetes.  Creating and following plans for healthy eating and physical activity may help you improve your health.  Weight control is part of healthy lifestyle and includes exercise, emotional health, and healthy eating habits. Careful eating habits lifelong are the mainstay of weight control. Though there are significant health benefits from weight loss, long-term weight loss with diet alone may be very difficult to achieve- studies show long-term  success with dietary management in less than 10% of people. Attaining a healthy weight may be especially difficult to achieve in those with severe obesity. In some cases, medications, devices and surgical management might be considered.  What can you do?  If you are overweight or obese and are interested in methods for weight loss, you should discuss this with your provider.     Consider reducing daily calorie intake by 500 calories.     Keep a food journal.     Avoiding skipping meals, consider cutting portions instead.    Diet combined with exercise helps maintain muscle while optimizing fat loss. Strength training is particularly important for building and maintaining muscle mass. Exercise helps reduce stress, increase energy, and improves fitness. Increasing exercise without diet control, however, may not burn enough calories to loose weight.       Start walking three days a week 10-20 minutes at a time    Work towards walking thirty minutes five days a week     Eventually, increase the speed of your walking for 1-2 minutes at time    In addition, we recommend that you review healthy lifestyles and methods for weight loss available through the National Institutes of Health patient information sites:  http://win.niddk.nih.gov/publications/index.htm    And look into health and wellness programs that may be available through your health insurance provider, employer, local community center, or pricilla club.    Weight management plan: Discussed healthy diet and exercise guidelines

## 2019-10-11 DIAGNOSIS — F63.81 INTERMITTENT EXPLOSIVE DISORDER: Primary | ICD-10-CM

## 2019-10-11 LAB
ALBUMIN SERPL-MCNC: 3.2 G/DL (ref 3.4–5)
ALP SERPL-CCNC: 61 U/L (ref 40–150)
ALT SERPL W P-5'-P-CCNC: 66 U/L (ref 0–70)
AST SERPL W P-5'-P-CCNC: 36 U/L (ref 0–45)
BILIRUB DIRECT SERPL-MCNC: <0.1 MG/DL (ref 0–0.2)
BILIRUB SERPL-MCNC: 0.4 MG/DL (ref 0.2–1.3)
ERYTHROCYTE [DISTWIDTH] IN BLOOD BY AUTOMATED COUNT: 14.8 % (ref 10–15)
HCT VFR BLD AUTO: 44.2 % (ref 40–53)
HGB BLD-MCNC: 15.2 G/DL (ref 13.3–17.7)
MCH RBC QN AUTO: 34 PG (ref 26.5–33)
MCHC RBC AUTO-ENTMCNC: 34.4 G/DL (ref 31.5–36.5)
MCV RBC AUTO: 99 FL (ref 78–100)
PLATELET # BLD AUTO: 217 10E9/L (ref 150–450)
PROT SERPL-MCNC: 7.4 G/DL (ref 6.8–8.8)
RBC # BLD AUTO: 4.47 10E12/L (ref 4.4–5.9)
VALPROATE SERPL-MCNC: 43 MG/L (ref 50–100)
WBC # BLD AUTO: 5.2 10E9/L (ref 4–11)

## 2019-10-11 PROCEDURE — 80076 HEPATIC FUNCTION PANEL: CPT | Performed by: NURSE PRACTITIONER

## 2019-10-11 PROCEDURE — 36415 COLL VENOUS BLD VENIPUNCTURE: CPT | Performed by: NURSE PRACTITIONER

## 2019-10-11 PROCEDURE — 85027 COMPLETE CBC AUTOMATED: CPT | Performed by: NURSE PRACTITIONER

## 2019-10-11 PROCEDURE — 80164 ASSAY DIPROPYLACETIC ACD TOT: CPT | Performed by: NURSE PRACTITIONER

## 2019-10-17 ENCOUNTER — TELEPHONE (OUTPATIENT)
Dept: FAMILY MEDICINE | Facility: CLINIC | Age: 39
End: 2019-10-17

## 2019-11-12 ENCOUNTER — OFFICE VISIT (OUTPATIENT)
Dept: ENDOCRINOLOGY | Facility: CLINIC | Age: 39
End: 2019-11-12
Payer: MEDICARE

## 2019-11-12 VITALS
WEIGHT: 255.5 LBS | SYSTOLIC BLOOD PRESSURE: 108 MMHG | HEART RATE: 95 BPM | BODY MASS INDEX: 40.1 KG/M2 | TEMPERATURE: 97.6 F | DIASTOLIC BLOOD PRESSURE: 65 MMHG | OXYGEN SATURATION: 93 % | HEIGHT: 67 IN

## 2019-11-12 DIAGNOSIS — E66.09 OBESITY DUE TO EXCESS CALORIES, UNSPECIFIED OBESITY SEVERITY: Primary | Chronic | ICD-10-CM

## 2019-11-12 RX ORDER — TOPIRAMATE 50 MG/1
100 TABLET, FILM COATED ORAL DAILY
Qty: 180 TABLET | Refills: 3 | Status: SHIPPED | OUTPATIENT
Start: 2019-11-12 | End: 2020-03-17 | Stop reason: DRUGHIGH

## 2019-11-12 ASSESSMENT — MIFFLIN-ST. JEOR: SCORE: 2032.57

## 2019-11-12 ASSESSMENT — PAIN SCALES - GENERAL: PAINLEVEL: NO PAIN (0)

## 2019-11-12 NOTE — NURSING NOTE
"No chief complaint on file.      Vitals:    11/12/19 1523   BP: 108/65   BP Location: Left arm   Patient Position: Sitting   Cuff Size: Adult Large   Pulse: 95   Temp: 97.6  F (36.4  C)   TempSrc: Oral   SpO2: 93%   Weight: 115.9 kg (255 lb 8 oz)   Height: 1.702 m (5' 7\")       Body mass index is 40.02 kg/m .                            CELSA RICARDO, EMT    "

## 2019-11-12 NOTE — PATIENT INSTRUCTIONS
- increase topiramate to 100 mg before bedtime  - walk daily for 30 minutes  - increase amount of vegetable and fruit  - minimize fries, bun, bread, toast, ketchup    Lauren Bloch, MTM pharmacist in clinic in 2 months  Dr. Umaña in 4 months    If you have any questions, please do not hesitate to call Weight management clinic at [phone] or [phone]    Sincerely,    Chasidy Juarez MD  Endocrinology
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
## 2019-11-29 DIAGNOSIS — E03.9 HYPOTHYROIDISM, UNSPECIFIED TYPE: ICD-10-CM

## 2019-11-29 RX ORDER — LEVOTHYROXINE SODIUM 100 UG/1
100 TABLET ORAL DAILY
Qty: 90 TABLET | Refills: 3 | OUTPATIENT
Start: 2019-11-29

## 2019-11-29 NOTE — TELEPHONE ENCOUNTER
"Requested Prescriptions   Pending Prescriptions Disp Refills     levothyroxine (SYNTHROID/LEVOTHROID) 100 MCG tablet 90 tablet 3     Sig: Take 1 tablet (100 mcg) by mouth daily       Thyroid Protocol Passed - 11/29/2019  8:35 AM        Passed - Patient is 12 years or older        Passed - Recent (12 mo) or future (30 days) visit within the authorizing provider's specialty     Patient has had an office visit with the authorizing provider or a provider within the authorizing providers department within the previous 12 mos or has a future within next 30 days. See \"Patient Info\" tab in inbasket, or \"Choose Columns\" in Meds & Orders section of the refill encounter.              Passed - Medication is active on med list        Passed - Normal TSH on file in past 12 months     Recent Labs   Lab Test 01/11/19  1039   TSH 3.78              Last Written Prescription Date:  8/1/19  Last Fill Quantity: 90,  # refills: 3   Last office visit: 9/12/2019 with prescribing provider:  Betsy   Future Office Visit:   Next 5 appointments (look out 90 days)    Misael 15, 2020  3:30 PM CST  (Arrive by 3:15 PM)  Office Visit with Lauren Turner Bloch, RPH  [facility] Specialties [facility] ([facility]) [address] [place] [address] [phone]           "

## 2019-12-12 ENCOUNTER — MEDICAL CORRESPONDENCE (OUTPATIENT)
Dept: HEALTH INFORMATION MANAGEMENT | Facility: CLINIC | Age: 39
End: 2019-12-12

## 2019-12-16 ENCOUNTER — TELEPHONE (OUTPATIENT)
Dept: FAMILY MEDICINE | Facility: CLINIC | Age: 39
End: 2019-12-16

## 2019-12-31 ENCOUNTER — TELEPHONE (OUTPATIENT)
Dept: FAMILY MEDICINE | Facility: CLINIC | Age: 39
End: 2019-12-31

## 2019-12-31 DIAGNOSIS — R05.9 COUGH: Primary | ICD-10-CM

## 2019-12-31 NOTE — TELEPHONE ENCOUNTER
Tami,    I have called and spoken to Janey at the group home.  They do not have guaifenesin AC, PRN med order.   Hoping they can change the guaifenesin  AC to plain guaifenesin as they have several other clients on just plain guaifenesin.  I have pended the plain for you. Jacquie COVARRUBIAS RN

## 2019-12-31 NOTE — TELEPHONE ENCOUNTER
Reason for Call:  Other prescription    Detailed comments: Dia from [facility] is calling asking if patient would be ok to take  The Guaifenesin with out the codeine.    Phone Number Patient can be reached at: Home number on file [phone] (home)    Best Time: any    Can we leave a detailed message on this number? YES    Call taken on 12/31/2019 at 8:47 AM by Kamryn Rodriguez

## 2020-01-08 RX ORDER — GUAIFENESIN 200 MG/10ML
200 LIQUID ORAL EVERY 4 HOURS PRN
Qty: 236 ML | Refills: 1 | Status: SHIPPED | OUTPATIENT
Start: 2020-01-08 | End: 2021-02-16

## 2020-01-09 ENCOUNTER — TELEPHONE (OUTPATIENT)
Dept: FAMILY MEDICINE | Facility: CLINIC | Age: 40
End: 2020-01-09

## 2020-01-09 DIAGNOSIS — R05.9 COUGH: ICD-10-CM

## 2020-01-09 RX ORDER — GUAIFENESIN 200 MG/10ML
200 LIQUID ORAL EVERY 4 HOURS PRN
Qty: 236 ML | Refills: 1 | OUTPATIENT
Start: 2020-01-09

## 2020-01-09 NOTE — TELEPHONE ENCOUNTER
Requested Prescriptions   Pending Prescriptions Disp Refills     guaiFENesin (ROBITUSSIN) 100 MG/5ML liquid 236 mL 1     Sig: Take 10 mLs (200 mg) by mouth every 4 hours as needed for cough       There is no refill protocol information for this order        Last Written Prescription Date:  1/8/2020  Last Fill Quantity: 236,  # refills: 1   Last office visit: 9/12/2019 with prescribing provider:  Betsy   Future Office Visit:   Next 5 appointments (look out 90 days)    Misael 15, 2020  3:30 PM CST  (Arrive by 3:15 PM)  Office Visit with Lauren Turner Bloch, RPH  [facility] Specialties [facility] ([facility]) [address] [facility] [address] [phone]

## 2020-01-10 NOTE — TELEPHONE ENCOUNTER
Group home reports patient is no longer taking the combo guanfacine-codiene med (listed as historical).  Med d/c'd and that order faxed to group [place].  Riya MCCLOUD RN

## 2020-01-10 NOTE — TELEPHONE ENCOUNTER
[facility] is also asking for a form stating that she is Discontinuing the Guaifenesin with codeine.    Kamryn Rodriguez on 1/10/2020 at 1:13 PM

## 2020-01-10 NOTE — TELEPHONE ENCOUNTER
Please change pharmacy.  [facility] - [place], [place] - [address]  [phone]    Kamryn Rodriguez on 1/10/2020 at 1:10 PM

## 2020-01-15 ENCOUNTER — OFFICE VISIT (OUTPATIENT)
Dept: PHARMACY | Facility: CLINIC | Age: 40
End: 2020-01-15
Payer: MEDICAID

## 2020-01-15 VITALS
HEART RATE: 81 BPM | DIASTOLIC BLOOD PRESSURE: 69 MMHG | SYSTOLIC BLOOD PRESSURE: 111 MMHG | BODY MASS INDEX: 38.98 KG/M2 | WEIGHT: 248.9 LBS

## 2020-01-15 DIAGNOSIS — F60.3 EXPLOSIVE PERSONALITY DISORDER (H): ICD-10-CM

## 2020-01-15 DIAGNOSIS — F32.A DEPRESSION, UNSPECIFIED DEPRESSION TYPE: ICD-10-CM

## 2020-01-15 DIAGNOSIS — R21 RASH AND NONSPECIFIC SKIN ERUPTION: ICD-10-CM

## 2020-01-15 DIAGNOSIS — E66.09 OBESITY DUE TO EXCESS CALORIES, UNSPECIFIED OBESITY SEVERITY: Primary | Chronic | ICD-10-CM

## 2020-01-15 DIAGNOSIS — F41.9 ANXIETY: ICD-10-CM

## 2020-01-15 DIAGNOSIS — G44.209 TENSION-TYPE HEADACHE, NOT INTRACTABLE, UNSPECIFIED CHRONICITY PATTERN: ICD-10-CM

## 2020-01-15 DIAGNOSIS — Z00.00 ROUTINE GENERAL MEDICAL EXAMINATION AT A HEALTH CARE FACILITY: ICD-10-CM

## 2020-01-15 DIAGNOSIS — K21.00 GASTROESOPHAGEAL REFLUX DISEASE WITH ESOPHAGITIS: ICD-10-CM

## 2020-01-15 DIAGNOSIS — E66.09 OBESITY DUE TO EXCESS CALORIES, UNSPECIFIED OBESITY SEVERITY: Chronic | ICD-10-CM

## 2020-01-15 DIAGNOSIS — K59.00 CONSTIPATION, UNSPECIFIED CONSTIPATION TYPE: ICD-10-CM

## 2020-01-15 DIAGNOSIS — E03.8 SUBCLINICAL HYPOTHYROIDISM: ICD-10-CM

## 2020-01-15 DIAGNOSIS — R73.03 PREDIABETES: ICD-10-CM

## 2020-01-15 LAB
ALBUMIN SERPL-MCNC: 3.3 G/DL (ref 3.4–5)
ALP SERPL-CCNC: 70 U/L (ref 40–150)
ALT SERPL W P-5'-P-CCNC: 58 U/L (ref 0–70)
ANION GAP SERPL CALCULATED.3IONS-SCNC: 3 MMOL/L (ref 3–14)
AST SERPL W P-5'-P-CCNC: 31 U/L (ref 0–45)
BILIRUB SERPL-MCNC: 0.3 MG/DL (ref 0.2–1.3)
BUN SERPL-MCNC: 23 MG/DL (ref 7–30)
CALCIUM SERPL-MCNC: 8.7 MG/DL (ref 8.5–10.1)
CHLORIDE SERPL-SCNC: 109 MMOL/L (ref 94–109)
CO2 SERPL-SCNC: 29 MMOL/L (ref 20–32)
CREAT SERPL-MCNC: 1.31 MG/DL (ref 0.66–1.25)
GFR SERPL CREATININE-BSD FRML MDRD: 68 ML/MIN/{1.73_M2}
GLUCOSE SERPL-MCNC: 86 MG/DL (ref 70–99)
POTASSIUM SERPL-SCNC: 4.1 MMOL/L (ref 3.4–5.3)
PROT SERPL-MCNC: 7.8 G/DL (ref 6.8–8.8)
SODIUM SERPL-SCNC: 141 MMOL/L (ref 133–144)

## 2020-01-15 PROCEDURE — 99605 MTMS BY PHARM NP 15 MIN: CPT | Mod: GY | Performed by: PHARMACIST

## 2020-01-15 PROCEDURE — 99607 MTMS BY PHARM ADDL 15 MIN: CPT | Mod: GY | Performed by: PHARMACIST

## 2020-01-15 RX ORDER — VENLAFAXINE HYDROCHLORIDE 75 MG/1
1 CAPSULE, EXTENDED RELEASE ORAL EVERY MORNING
COMMUNITY
Start: 2020-01-10 | End: 2021-07-26 | Stop reason: ALTCHOICE

## 2020-01-15 NOTE — Clinical Note
JAMES ordered CMP due to depakote + topiramate on board. He is doing well, lost 6.6 lb since you saw him and he has made some good changes since your visit with him. Erica LAMAR

## 2020-01-15 NOTE — PATIENT INSTRUCTIONS
Recommendations from today's MTM visit:                                                    MTM (medication therapy management) is a service provided by a clinical pharmacist designed to help you get the most of out of your medicines.   Today we reviewed what your medicines are for, how to know if they are working, that your medicines are safe and how to make your medicine regimen as easy as possible.     1. Increase your walk on the treadmill 25 minutes daily     2. Can take acetaminophen for headaches when you need it. Make sure you tell your staff when you have a headache.     3. Decrease caffeine intake to 1-2 servings per day (this includes coffee, soda and iced tea)    4. Food goal: make better food choices as you have. Look for support with your staff members.     5. Going down to lab after this.     It was great to speak with you today.  I value your experience and would be very thankful for your time with providing feedback on our clinic survey. You may receive a survey via email or text message in the next few days.     Next MTM visit: 1 month with pharmacist - call [phone] to schedule or can stop out at the pod when you leave    To schedule another MTM appointment, please call the clinic directly or you may call the MTM scheduling line at [phone] or toll-free at [phone].     My Clinical Pharmacist's contact information:                                                      It was a pleasure talking with you today!  Please feel free to contact me with any questions or concerns you have.      Lauren Bloch, PharmD  Medication Therapy Management Pharmacist   [facility]   Phone: [phone]

## 2020-01-15 NOTE — LETTER
[place] [id number] [facility] [id number] [phone]    FACSIMILE TRANSMITTAL SHEET    TO: Group Home [address]    FAX NUMBER: [phone]     FROM: [facility]  PHONE: [phone]  DATE: 20  NUMBER OF PAGES: 1 of 1     _____URGENT __X__REVIEW ONLY _____PLEASE COMMENT____PLEASE REPLY    NOTES/COMMENTS:     Patient: Humberto Estrella  1980    Due to lab results that patient had completed on 1/15/2020, please see recommendations below:     1) Patient to stop utilizing NSAIDs (like ibuprofen) for daily headache use. Patient to utilize acetaminophen as needed for headaches. If patient is to utilize NSAID, patient to do so very sparingly.     2) Ensure that Humberto is getting in adequate water intake, shoot for 64+ ounces per day if possible. Was recommended at visit yesterday to decrease caffeine use as well.     Please call with questions or concerns,     Lauren Bloch, PharmD  Medication Therapy Management Pharmacist   [facility]   Phone: [phone]    IF YOU DID NOT RECEIVE THE CORRECT NUMBER OF PAGES OR THE FAX DID NOT COME THROUGH CLEARLY, PLEASE CALL THE SENDER     CONFIDENTIALITY STATEMENT: Confidential information that may accompany this transmission contains protected health information under state and federal law and is legally privileged. This information is intended only for the use of the individual or entity named above and may be used only for carrying out treatment, payment or other healthcare operations. The recipient or person responsible for delivering this information is prohibited by law from disclosing this information without proper authorization to any other party, unless required to do so by law or regulation. If you are not the intended recipient, you are hereby notified that any review, dissemination, distribution, or copying of this message is strictly prohibited. If you have received this  communication in error, please destroy the materials and contact us immediately by calling the number listed above. No response indicates that the information was received by the

## 2020-01-21 ENCOUNTER — TELEPHONE (OUTPATIENT)
Dept: FAMILY MEDICINE | Facility: CLINIC | Age: 40
End: 2020-01-21

## 2020-01-22 ENCOUNTER — TELEPHONE (OUTPATIENT)
Dept: PHARMACY | Facility: CLINIC | Age: 40
End: 2020-01-22

## 2020-01-22 ENCOUNTER — NURSE TRIAGE (OUTPATIENT)
Dept: NURSING | Facility: CLINIC | Age: 40
End: 2020-01-22

## 2020-01-22 NOTE — TELEPHONE ENCOUNTER
Janey with [facility] is calling.  [phone].  Humberto was in on Jan 15th and seen by Lauren Bloch [facility] and today senior care is needing the lab results faxed to them at [phone].

## 2020-01-22 NOTE — TELEPHONE ENCOUNTER
Fax sent today, 1/22/2020 to [facility] of last lab reading - fax [phone].    Lauren Bloch, PharmD  Medication Therapy Management Pharmacist   [facility]   Phone: [phone]

## 2020-01-22 NOTE — TELEPHONE ENCOUNTER
Clinic Action Needed:  Yes fax  FNA Triage Call  Presenting Problem:    Janey with [facility] is calling.  [phone].  Humberto was in on Jan 15th and seen by Lauren Bloch [facility] and today [facility] is needing the lab results faxed to them at [phone].      Routed to:  RN Pool    Please be sure to close this encounter once this patient's issue/question has been addressed.    Nohemi Anderson RN/[facility]

## 2020-02-11 ENCOUNTER — OFFICE VISIT (OUTPATIENT)
Dept: PHARMACY | Facility: CLINIC | Age: 40
End: 2020-02-11
Payer: MEDICAID

## 2020-02-11 VITALS
SYSTOLIC BLOOD PRESSURE: 115 MMHG | BODY MASS INDEX: 38.92 KG/M2 | DIASTOLIC BLOOD PRESSURE: 68 MMHG | HEART RATE: 81 BPM | WEIGHT: 248.5 LBS

## 2020-02-11 DIAGNOSIS — R73.03 PREDIABETES: ICD-10-CM

## 2020-02-11 DIAGNOSIS — E66.09 OBESITY DUE TO EXCESS CALORIES, UNSPECIFIED OBESITY SEVERITY: Chronic | ICD-10-CM

## 2020-02-11 DIAGNOSIS — G44.209 TENSION-TYPE HEADACHE, NOT INTRACTABLE, UNSPECIFIED CHRONICITY PATTERN: ICD-10-CM

## 2020-02-11 LAB
ANION GAP SERPL CALCULATED.3IONS-SCNC: 6 MMOL/L (ref 3–14)
BUN SERPL-MCNC: 18 MG/DL (ref 7–30)
CALCIUM SERPL-MCNC: 8.9 MG/DL (ref 8.5–10.1)
CHLORIDE SERPL-SCNC: 109 MMOL/L (ref 94–109)
CO2 SERPL-SCNC: 26 MMOL/L (ref 20–32)
CREAT SERPL-MCNC: 1.12 MG/DL (ref 0.66–1.25)
GFR SERPL CREATININE-BSD FRML MDRD: 82 ML/MIN/{1.73_M2}
GLUCOSE SERPL-MCNC: 85 MG/DL (ref 70–99)
HBA1C MFR BLD: 5.5 % (ref 0–5.6)
POTASSIUM SERPL-SCNC: 3.9 MMOL/L (ref 3.4–5.3)
SODIUM SERPL-SCNC: 141 MMOL/L (ref 133–144)

## 2020-02-11 PROCEDURE — 99607 MTMS BY PHARM ADDL 15 MIN: CPT | Performed by: PHARMACIST

## 2020-02-11 PROCEDURE — 99606 MTMS BY PHARM EST 15 MIN: CPT | Performed by: PHARMACIST
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
## 2020-02-11 NOTE — PATIENT INSTRUCTIONS
Recommendations from today's MTM visit:                                                      KEEP UP THE AWESOME WORK ON YOUR WEIGHT LOSS! YOU ARE GOING GREAT!     1. Exercise: Keep working on the 25 minutes per day of exercise (walking) on the treadmill. Do not feel like you have to go fast. Can stay at 10-15 speed so you do not feel like it is too fast.     2. Let me know from the log that your staff has how often you have had a migraine/needed to use tylenol in the past month.     3. Go to lab.     It was great to speak with you today.  I value your experience and would be very thankful for your time with providing feedback on our clinic survey. You may receive a survey via email or text message in the next few days.     Next MTM visit: 2 months, 1 month with Dr. Umaña    To schedule another MTM appointment, please call the clinic directly or you may call the MTM scheduling line at [phone] or toll-free at [phone].     My Clinical Pharmacist's contact information:                                                      It was a pleasure talking with you today!  Please feel free to contact me with any questions or concerns you have.      Lauren Bloch, PharmD  Medication Therapy Management Pharmacist   [facility]   Phone: [phone]

## 2020-02-11 NOTE — Clinical Note
"FYI, focused more on non-med changes to move forward. Kept medications the same, He was recommended to see you in 1 month. He was working on walking on treadmill more and decreasing portions and limiting \"special occasion\" foods. Erica LAMAR. "

## 2020-02-12 ENCOUNTER — TELEPHONE (OUTPATIENT)
Dept: ENDOCRINOLOGY | Facility: CLINIC | Age: 40
End: 2020-02-12

## 2020-02-12 NOTE — TELEPHONE ENCOUNTER
Faxed MAGUI back to [phone] to be signed by patient. Last updated MAGUI in chart is from 2012. Will wait to receive fax back, send MAGUI for scanning and fax lab results back.

## 2020-02-12 NOTE — TELEPHONE ENCOUNTER
Reason for call:  Results   Name of test or procedure: labs   Date of test or procedure: 2/11/2020  Location of test or procedure: [facility]    Additional comments: [facility] would like his lab results faxed to them at [phone]    Phone number to reach patient:  Work number on file:  Janey [phone]  Best Time:  anytime    Can we leave a detailed message on this number?  YES    Travel screening: Not Applicable

## 2020-02-19 NOTE — TELEPHONE ENCOUNTER
Faxed MAGUI to [phone] to have MAGUI signed. Once signed and faxed back will send labs over. Erica LAMAR Pharmacist

## 2020-03-03 ENCOUNTER — TELEPHONE (OUTPATIENT)
Dept: FAMILY MEDICINE | Facility: CLINIC | Age: 40
End: 2020-03-03

## 2020-03-03 NOTE — TELEPHONE ENCOUNTER
Not sure what this is about, or who is requesting this.   Has x90 day supply ordered with refills through 8/1/2020.    Outpatient Medication Detail      Disp Refills Start End [place]   metFORMIN (GLUCOPHAGE) 500 MG tablet 180 tablet 3 8/1/2019  No   Sig - Route: Take 1 tablet (500 mg) by mouth 2 times daily (with meals) - Oral   Sent to pharmacy as: metFORMIN (GLUCOPHAGE) 500 MG tablet   Class: E-Prescribe   Order: [id number]   E-Prescribing Status: Receipt confirmed by pharmacy (8/1/2019  8:51 AM CDT)   Printout Tracking     External Result Report   Pharmacy     [facility] [place] - [place], [place] - [address] [address] HUSSEIN ESCOBEDO RN

## 2020-03-03 NOTE — TELEPHONE ENCOUNTER
Patients insurance approves patient for a 90-day supply for metFORMIN (GLUCOPHAGE) 500 MG tablet for the same cost as a 30-day supply. Please send refill for 90-day supply for metFORMIN (GLUCOPHAGE) 500 MG tablet. Thank you

## 2020-03-17 ENCOUNTER — VIRTUAL VISIT (OUTPATIENT)
Dept: ENDOCRINOLOGY | Facility: CLINIC | Age: 40
End: 2020-03-17
Payer: MEDICARE

## 2020-03-17 DIAGNOSIS — E66.09 OBESITY DUE TO EXCESS CALORIES, UNSPECIFIED OBESITY SEVERITY: Primary | ICD-10-CM

## 2020-03-17 RX ORDER — TOPIRAMATE 100 MG/1
150 TABLET, FILM COATED ORAL DAILY
Qty: 135 TABLET | Refills: 1 | Status: SHIPPED | OUTPATIENT
Start: 2020-03-17 | End: 2020-08-18
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
## 2020-03-17 NOTE — PATIENT INSTRUCTIONS
- Please increase topiramate to 150 mg daily (1 and 1/2 tablet)  - continue to eat healthy  - try to increase walking to 30 minutes per day    - return to clinic in 2-3 months.    If you have any questions, please do not hesitate to call Weight management clinic at [phone] or [phone]    Sincerely,    Chasidy Juarez MD  Endocrinology

## 2020-04-14 ENCOUNTER — ALLIED HEALTH/NURSE VISIT (OUTPATIENT)
Dept: PHARMACY | Facility: CLINIC | Age: 40
End: 2020-04-14
Payer: MEDICAID

## 2020-04-14 PROCEDURE — 99607 MTMS BY PHARM ADDL 15 MIN: CPT | Performed by: PHARMACIST

## 2020-04-14 PROCEDURE — 99606 MTMS BY PHARM EST 15 MIN: CPT | Performed by: PHARMACIST

## 2020-04-14 NOTE — Clinical Note
FYI, tolerating new topiramate dose well. Potentially small change in weight. Staff member reports noticing some changes. Discussed nutrition and exercise goals and continuing meds as is until you follow up with him next month. Erica LAMRA
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
## 2020-05-04 ENCOUNTER — TELEPHONE (OUTPATIENT)
Dept: PHARMACY | Facility: CLINIC | Age: 40
End: 2020-05-04

## 2020-05-04 NOTE — TELEPHONE ENCOUNTER
Patient's group home called to schedule follow up MTM appointment. Staff member requested call back to group home at [phone]. Called back, Provided phone number to call and schedule to group home staff.     Lauren Bloch, PharmD  Medication Therapy Management Pharmacist   [facility]   Phone: [phone]

## 2020-05-19 ENCOUNTER — VIRTUAL VISIT (OUTPATIENT)
Dept: ENDOCRINOLOGY | Facility: CLINIC | Age: 40
End: 2020-05-19
Payer: MEDICARE

## 2020-05-19 ENCOUNTER — TELEPHONE (OUTPATIENT)
Dept: ENDOCRINOLOGY | Facility: CLINIC | Age: 40
End: 2020-05-19

## 2020-05-19 VITALS — WEIGHT: 243.8 LBS | BODY MASS INDEX: 38.27 KG/M2 | HEIGHT: 67 IN

## 2020-05-19 DIAGNOSIS — E66.09 OBESITY DUE TO EXCESS CALORIES, UNSPECIFIED OBESITY SEVERITY: Primary | ICD-10-CM

## 2020-05-19 ASSESSMENT — PAIN SCALES - GENERAL: PAINLEVEL: NO PAIN (0)

## 2020-05-19 ASSESSMENT — MIFFLIN-ST. JEOR: SCORE: 1974.5

## 2020-05-19 NOTE — NURSING NOTE
"(   Chief Complaint   Patient presents with     RECHECK     2 month weight management follow up.    )    ( Weight: 110.6 kg (243 lb 12.8 oz)(Pt reported) )  ( Height: 170.2 cm (5' 7\") )  ( BMI (Calculated): 38.18 )  (   )  (   )  (   )  (   )  (   )  (   )    (   )  (   )  (   )  (   )  (   )  (   )  (   )    (   Patient Active Problem List   Diagnosis     TRISOMY 21 (DOWN SYNDROME)     Hearing loss     MYOPIA     LATENT NYSTAGMUS     Headache     INTERMITT EXPLOSIVE DIS     post traumatic stress disorder     CARDIOVASCULAR SCREENING; LDL GOAL LESS THAN 160     Cortical, lamellar, or zonular cataract, nonsenile     Nonallergic rhinitis     Obesity due to excess calories, unspecified obesity severity     Subclinical hypothyroidism     VIDAL (obstructive sleep apnea)-AHI 26     Health care maintenance     Morbid obesity (H)     Anxiety     Explosive personality disorder (H)    )  (   Current Outpatient Medications   Medication Sig Dispense Refill     acetaminophen (TYLENOL) 325 MG tablet Take 1-2 tablets (325-650 mg) by mouth every 4 hours as needed Reported on 4/12/2017 100 tablet 1     albuterol (PROAIR HFA/PROVENTIL HFA/VENTOLIN HFA) 108 (90 Base) MCG/ACT inhaler Inhale 2 puffs into the lungs every 6 hours as needed for shortness of breath / dyspnea or wheezing 3 Inhaler 1     alum & mag hydroxide-simethicone (MYLANTA/MAALOX) 200-200-20 MG/5ML SUSP suspension Take 30 mLs by mouth every 4 hours as needed for indigestion (2 tsp for upset stomach) 1 Bottle 3     calcium carbonate (TUMS) 500 MG chewable tablet Take 1 tablet (500 mg) by mouth every 6 hours as needed for heartburn 150 tablet 3     divalproex (DEPAKOTE ER) 500 MG 24 hr tablet Take 1,500 mg by mouth every morning       guaiFENesin (ROBITUSSIN) 100 MG/5ML liquid Take 10 mLs (200 mg) by mouth every 4 hours as needed for cough 236 mL 1     ibuprofen (ADVIL,MOTRIN) 600 MG tablet Take 1 tablet (600 mg) by mouth every 6 hours as needed for moderate pain 60 tablet " 0     levocetirizine (XYZAL) 5 MG tablet TAKE 1 TABLET BY MOUTH EVERY EVENING (MAKE SURE TO PUT AN EVENING STICKER ON IT) 30 tablet 11     levothyroxine (SYNTHROID/LEVOTHROID) 100 MCG tablet Take 1 tablet (100 mcg) by mouth daily 90 tablet 3     loratadine (CLARITIN) 10 MG tablet Take 1 tablet (10 mg) by mouth daily 90 tablet 3     magnesium hydroxide (MILK OF MAGNESIA) 400 MG/5ML suspension Take 30-60 mLs by mouth daily as needed for constipation or heartburn (If No Bowel Movement in 2 days.) Reported on 4/12/2017 360 mL 1     metFORMIN (GLUCOPHAGE) 500 MG tablet Take 1 tablet (500 mg) by mouth 2 times daily (with meals) 180 tablet 3     OLANZapine (ZYPREXA) 2.5 MG tablet Take 5 mg by mouth 3 times daily as needed  30 tablet 1     order for DME Equipment being ordered: CPAP  AIRSENSE 10  11 CM H20  AIRFIT F20 MEDIUM  SN# [id number]  DN# [id number]       phenol (CHLORASEPTIC) 1.4 % spray Take 1 spray (1 mL) by mouth every hour as needed for sore throat Use as directed for sore throt       Polyethyl Glycol-Propyl Glycol (SYSTANE OP) 1-2 drops in eye as needed up to 5 times per day for watery eyes.       risperiDONE (RISPERDAL) 1 MG tablet Take 1 tablet in the morning and 2 tablets at 5 pm 60 tablet      sodium chloride (SALINE MIST) 0.65 % nasal spray Spray 2 sprays into both nostrils 4 times daily as needed for congestion 30 mL 3     SUDOGEST 12 HOUR 120 MG 12 hr tablet TAKE 1 TABLET BY MOUTH ONCE DAILY AS NEEDED. *12 TOTAL FILL* 12 tablet 1     topiramate (TOPAMAX) 100 MG tablet Take 1.5 tablets (150 mg) by mouth daily 135 tablet 1     triamcinolone (KENALOG) 0.1 % cream Apply  topically 3 times daily. Apply sparingly to affected area. 30 g 1     venlafaxine (EFFEXOR-XR) 37.5 MG 24 hr capsule Take 37.5 mg by mouth daily + 75 mg daily = 112.5mg daily       venlafaxine (EFFEXOR-XR) 75 MG 24 hr capsule Take 1 capsule by mouth every morning + 37.5 mg daily = 112.5 mg daily       vitamin D3 (CHOLECALCIFEROL) 1000 units (25  mcg) tablet Take 1 tablet (1,000 Units) by mouth daily 30 tablet 11    )  ( Diabetes Eval:    )    ( Pain Eval:  No Pain (0) )    ( Wound Eval:       )    (   History   Smoking Status     Former Smoker     Packs/day: 1.00     Years: 1.00     Types: Cigarettes     Start date: 5/5/1999     Quit date: 5/5/2000   Smokeless Tobacco     Never Used    )    ( Signed By:  Lilli Thomas CMA; May 19, 2020; 11:46 AM )
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
## 2020-05-19 NOTE — TELEPHONE ENCOUNTER
Called and spoke with Lidia at group home to give office fax number to send paperwork for Humberto's appointment with Dr. Umaña today.

## 2020-05-20 ENCOUNTER — TELEPHONE (OUTPATIENT)
Dept: ENDOCRINOLOGY | Facility: CLINIC | Age: 40
End: 2020-05-20

## 2020-05-20 DIAGNOSIS — J30.2 SEASONAL ALLERGIC RHINITIS, UNSPECIFIED TRIGGER: ICD-10-CM

## 2020-05-20 RX ORDER — PSEUDOEPHEDRINE HCL 120 MG/1
TABLET, FILM COATED, EXTENDED RELEASE ORAL
Qty: 12 TABLET | Refills: 5 | Status: SHIPPED | OUTPATIENT
Start: 2020-05-20 | End: 2022-03-21

## 2020-05-20 NOTE — TELEPHONE ENCOUNTER
Message left from Lidia at [facility] in regards to Lomaira 8mg that was prescribed by Dr. Umaña at yesterday's virtual visit. [facility] pharmacy notified [facility] that patient's rx was not covered by insurance and costs $23.18 per month. Advised that Medicare does not cover medications used for weight loss and there is not an alternative that would be covered. [facility] states that Humberto has money in his account and can pay for the medication. Nurse at [facility] questioned side effects and risks. Advised to have blood pressure and pulse checked regularly and monitor any behavior changes such as mood or anxiety. Patient will trial for 2-3 months and follow up with Dr. Umaña as discussed. [facility] requesting that a copy of the rx be faxed to [phone]. No further questions.

## 2020-05-20 NOTE — TELEPHONE ENCOUNTER
Routing refill request to provider for review/approval because:  Drug not on the G refill protocol     Requested Prescriptions   Pending Prescriptions Disp Refills     SUDOGEST 12 HOUR 120 MG 12 hr tablet [Pharmacy Med Name: SudoGest 12 Hour 120 MG Tablet extended release 12 hr] 12 tablet 5     Sig: TAKE 1 TABLET BY MOUTH ONCE DAILY AS NEEDED *2 TOTAL FILLS*       There is no refill protocol information for this order

## 2020-05-21 ENCOUNTER — TRANSFERRED RECORDS (OUTPATIENT)
Dept: HEALTH INFORMATION MANAGEMENT | Facility: CLINIC | Age: 40
End: 2020-05-21

## 2020-05-28 ENCOUNTER — VIRTUAL VISIT (OUTPATIENT)
Dept: FAMILY MEDICINE | Facility: CLINIC | Age: 40
End: 2020-05-28
Payer: MEDICARE

## 2020-05-28 DIAGNOSIS — J31.0 NONALLERGIC RHINITIS: Primary | ICD-10-CM

## 2020-05-28 DIAGNOSIS — H10.13 ALLERGIC CONJUNCTIVITIS, BILATERAL: ICD-10-CM

## 2020-05-28 PROCEDURE — 99214 OFFICE O/P EST MOD 30 MIN: CPT | Mod: TEL | Performed by: INTERNAL MEDICINE

## 2020-05-28 RX ORDER — OLOPATADINE HYDROCHLORIDE 2 MG/ML
1 SOLUTION/ DROPS OPHTHALMIC DAILY
Qty: 2.5 ML | Refills: 2 | Status: SHIPPED | OUTPATIENT
Start: 2020-05-28 | End: 2020-08-03

## 2020-05-28 RX ORDER — FLUTICASONE PROPIONATE 50 MCG
1 SPRAY, SUSPENSION (ML) NASAL DAILY PRN
Qty: 16 G | Refills: 1 | Status: SHIPPED | OUTPATIENT
Start: 2020-05-28 | End: 2020-12-30

## 2020-05-28 RX ORDER — OLOPATADINE HYDROCHLORIDE 2 MG/ML
1 SOLUTION/ DROPS OPHTHALMIC DAILY
Qty: 2.5 ML | Refills: 2 | Status: SHIPPED | OUTPATIENT
Start: 2020-05-28 | End: 2020-05-28

## 2020-05-28 RX ORDER — MONTELUKAST SODIUM 10 MG/1
10 TABLET ORAL AT BEDTIME
Qty: 90 TABLET | Refills: 3 | Status: SHIPPED | OUTPATIENT
Start: 2020-05-28 | End: 2021-08-05

## 2020-05-28 RX ORDER — FLUTICASONE PROPIONATE 50 MCG
1 SPRAY, SUSPENSION (ML) NASAL DAILY PRN
Qty: 16 G | Refills: 1 | Status: SHIPPED | OUTPATIENT
Start: 2020-05-28 | End: 2020-05-28

## 2020-05-28 RX ORDER — MONTELUKAST SODIUM 10 MG/1
10 TABLET ORAL AT BEDTIME
Qty: 90 TABLET | Refills: 3 | Status: SHIPPED | OUTPATIENT
Start: 2020-05-28 | End: 2020-05-28

## 2020-05-28 NOTE — PATIENT INSTRUCTIONS
1. Stop Xyzal  2. Start Flonase 1 spray both nostril daily as needed, for the next 2-4 weeks until allergy symptoms are better controlled  3. Start Pataday 1 drop both eyes daily  4. Start Singulair once daily.  5. Follow-up if symptoms don't improve.  Patient Education     Conjunctivitis, Allergic    Conjunctivitis is an irritation of a thin membrane in the eye. This membrane is called the conjunctiva. It covers the white of the eye and the inside of the eyelid. The condition is often called pink eye or red eye because the eye looks pink or red. The eye can also be swollen. A thick fluid may leak from the eyelid. The eye may itch and burn, and feel gritty or scratchy.  Allergic conjunctivitis is caused by an allergen. Allergens are substances that cause the body to react with certain symptoms. Allergens that cause eye irritation include things such as house dust or pollen in the air. This can occur seasonally, most often in the spring.  Home care    Eye drops may be prescribed to reduce itching and redness. Use these as directed. Otherwise, over-the-counter decongestant eye drops may be used.    Apply a cool compress (towel soaked in cool water) to the affected eye 3 to 4 times a day to reduce swelling and itching.    It is common to have mucus drainage during the night, causing the eyelids to become crusted by morning. Use a warm, wet cloth to wipe this away. You may also use saline irrigating solution or artificial tears to rinse away mucus in the eye. Don't patch the eye.    You may use acetaminophen or ibuprofen to control pain, unless another medicine was prescribed. (Note: If you have chronic liver or kidney disease, or if you have ever had a stomach ulcer or gastrointestinal bleeding, talk with your healthcare provider before using these medicines.)    Don't wear contact lenses until your eyes have healed and all symptoms are gone.  Follow-up care  Follow up with your healthcare provider, or as  advised.  When to seek medical advice  Call your healthcare provider right away if any of these occur:    Increased eyelid swelling    New or worsening drainage from the eye    Increasing redness around the eye    Facial swelling  Date Last Reviewed: 7/1/2017 2000-2019 [employer]. [address] [place] [address]. All rights reserved. This information is not intended as a substitute for professional medical care. Always follow your healthcare professional's instructions.

## 2020-05-28 NOTE — PROGRESS NOTES
"Humberto Estrella is a 40 year old male who is being evaluated via a billable telephone visit.      The patient has been notified of following:     \"This telephone visit will be conducted via a call between you and your physician/provider. We have found that certain health care needs can be provided without the need for a physical exam.  This service lets us provide the care you need with a short phone conversation.  If a prescription is necessary we can send it directly to your pharmacy.  If lab work is needed we can place an order for that and you can then stop by our lab to have the test done at a later time.    Telephone visits are billed at different rates depending on your insurance coverage. During this emergency period, for some insurers they may be billed the same as an in-person visit.  Please reach out to your insurance provider with any questions.    If during the course of the call the physician/provider feels a telephone visit is not appropriate, you will not be charged for this service.\"    Patient has given verbal consent for Telephone visit?  Yes    What phone number would you like to be contacted at? [phone]    How would you like to obtain your AVS? Mail a copy    Subjective     Humberto Estrella is a 40 year old male who presents via phone visit today for the following health issues:    HPI  Acute Illness   Acute illness concerns: Allergies  Onset: Couple weeks    Fever: no    Chills/Sweats: no    Headache (location?): YES- a few    Sinus Pressure:YES    Conjunctivitis:  YES: both    Ear Pain: no    Rhinorrhea: YES    Congestion: no    Sore Throat: no     Cough: no    Wheeze: no    Decreased Appetite: no    Nausea: no    Vomiting: no    Diarrhea:  no    Dysuria/Freq.: no    Fatigue/Achiness: no    Sick/Strep Exposure: no     Therapies Tried and outcome: albuterol inhaler (very rare use), xyzal, claritin, systane, saline nasal spray, sudafed.  Used to take flonase but it was found to be in his " urine, was told that his body was not absorbing it    He lives in a group home.  [facility] staff member is present on the call  --itchy watery eyes, sneezing, itchy skin, rhinorrhea  --eye symptoms are the worst.           Current Outpatient Medications   Medication Sig Dispense Refill     acetaminophen (TYLENOL) 325 MG tablet Take 1-2 tablets (325-650 mg) by mouth every 4 hours as needed Reported on 4/12/2017 100 tablet 1     albuterol (PROAIR HFA/PROVENTIL HFA/VENTOLIN HFA) 108 (90 Base) MCG/ACT inhaler Inhale 2 puffs into the lungs every 6 hours as needed for shortness of breath / dyspnea or wheezing 3 Inhaler 1     alum & mag hydroxide-simethicone (MYLANTA/MAALOX) 200-200-20 MG/5ML SUSP suspension Take 30 mLs by mouth every 4 hours as needed for indigestion (2 tsp for upset stomach) 1 Bottle 3     calcium carbonate (TUMS) 500 MG chewable tablet Take 1 tablet (500 mg) by mouth every 6 hours as needed for heartburn 150 tablet 3     divalproex (DEPAKOTE ER) 500 MG 24 hr tablet Take 1,500 mg by mouth every morning       guaiFENesin (ROBITUSSIN) 100 MG/5ML liquid Take 10 mLs (200 mg) by mouth every 4 hours as needed for cough 236 mL 1     ibuprofen (ADVIL,MOTRIN) 600 MG tablet Take 1 tablet (600 mg) by mouth every 6 hours as needed for moderate pain 60 tablet 0     levocetirizine (XYZAL) 5 MG tablet TAKE 1 TABLET BY MOUTH EVERY EVENING (MAKE SURE TO PUT AN EVENING STICKER ON IT) 30 tablet 11     levothyroxine (SYNTHROID/LEVOTHROID) 100 MCG tablet Take 1 tablet (100 mcg) by mouth daily 90 tablet 3     loratadine (CLARITIN) 10 MG tablet Take 1 tablet (10 mg) by mouth daily 90 tablet 3     magnesium hydroxide (MILK OF MAGNESIA) 400 MG/5ML suspension Take 30-60 mLs by mouth daily as needed for constipation or heartburn (If No Bowel Movement in 2 days.) Reported on 4/12/2017 360 mL 1     metFORMIN (GLUCOPHAGE) 500 MG tablet Take 1 tablet (500 mg) by mouth 2 times daily (with meals) 180 tablet 3     OLANZapine (ZYPREXA) 2.5 MG  tablet Take 5 mg by mouth 3 times daily as needed  30 tablet 1     order for DME Equipment being ordered: CPAP  AIRSENSE 10  11 CM H20  AIRFIT F20 MEDIUM  SN# [id number]  DN# [id number]       phenol (CHLORASEPTIC) 1.4 % spray Take 1 spray (1 mL) by mouth every hour as needed for sore throat Use as directed for sore throt       phentermine (LOMAIRA) 8 MG tablet Take 1 tablet (8 mg) by mouth every morning (before breakfast) 30 tablet 1     Polyethyl Glycol-Propyl Glycol (SYSTANE OP) 1-2 drops in eye as needed up to 5 times per day for watery eyes.       risperiDONE (RISPERDAL) 1 MG tablet Take 1 tablet in the morning and 2 tablets at 5 pm 60 tablet      sodium chloride (SALINE MIST) 0.65 % nasal spray Spray 2 sprays into both nostrils 4 times daily as needed for congestion 30 mL 3     SUDOGEST 12 HOUR 120 MG 12 hr tablet TAKE 1 TABLET BY MOUTH ONCE DAILY AS NEEDED *2 TOTAL FILLS* 12 tablet 5     topiramate (TOPAMAX) 100 MG tablet Take 1.5 tablets (150 mg) by mouth daily 135 tablet 1     triamcinolone (KENALOG) 0.1 % cream Apply  topically 3 times daily. Apply sparingly to affected area. 30 g 1     venlafaxine (EFFEXOR-XR) 37.5 MG 24 hr capsule Take 37.5 mg by mouth daily + 75 mg daily = 112.5mg daily       venlafaxine (EFFEXOR-XR) 75 MG 24 hr capsule Take 1 capsule by mouth every morning + 37.5 mg daily = 112.5 mg daily       vitamin D3 (CHOLECALCIFEROL) 1000 units (25 mcg) tablet Take 1 tablet (1,000 Units) by mouth daily 30 tablet 11       Reviewed and updated as needed this visit by Provider         Review of Systems   Constitutional, HEENT, cardiovascular, pulmonary, gi and gu systems are negative, except as otherwise noted.       Objective   Reported vitals:  There were no vitals taken for this visit.   healthy, alert and no distress  PSYCH: Alert and oriented times 3; coherent speech, normal   rate and volume, able to articulate logical thoughts, able   to abstract reason, no tangential thoughts, no hallucinations    or delusions  His affect is normal  RESP: No cough, no audible wheezing, able to talk in full sentences  Remainder of exam unable to be completed due to telephone visits            Assessment/Plan:  1. Nonallergic rhinitis - start flonase.  Long term use may have contributed to the 'lack of absorption', but I could not find recent documentation of this.  Short course is safe and will likely be effective  - montelukast (SINGULAIR) 10 MG tablet; Take 1 tablet (10 mg) by mouth At Bedtime  Dispense: 90 tablet; Refill: 3  - olopatadine (PATADAY) 0.2 % ophthalmic solution; Place 1 drop into both eyes daily  Dispense: 2.5 mL; Refill: 2  - fluticasone (FLONASE) 50 MCG/ACT nasal spray; Spray 1 spray into both nostrils daily as needed for rhinitis or allergies  Dispense: 16 g; Refill: 1    2. Allergic conjunctivitis, bilateral  - montelukast (SINGULAIR) 10 MG tablet; Take 1 tablet (10 mg) by mouth At Bedtime  Dispense: 90 tablet; Refill: 3  - olopatadine (PATADAY) 0.2 % ophthalmic solution; Place 1 drop into both eyes daily  Dispense: 2.5 mL; Refill: 2  - fluticasone (FLONASE) 50 MCG/ACT nasal spray; Spray 1 spray into both nostrils daily as needed for rhinitis or allergies  Dispense: 16 g; Refill: 1    1. Stop Xyzal  2. Start Flonase 1 spray both nostril daily as needed, for the next 2-4 weeks until allergy symptoms are better controlled  3. Start Pataday 1 drop both eyes daily  4. Start Singulair once daily.  5. Follow-up if symptoms don't improve.    Return in about 2 weeks (around 6/11/2020) for If symptoms don't improve or if they worsen.      Phone call duration:  15 minutes    Carla Brown DO

## 2020-05-28 NOTE — NURSING NOTE
RX faxed today - confirmed 8:08 am - [facility] [place] [phone].  1st rx = olopatadine (PATADAY) 0.2% ophthalmic solution    Route :Both Eyes  Sig: Place 1 drop into both eyes daily.  Dispense Quantity: 2.5 (Two point five )m L - Hedy: NO  Refill: 2  _______________  2nd rx: fluticasone (FLONASE)   Sig: Spray 1 spray into both nostrils daily as need for rhinitis or allergies  Dispense Quantity: 16( Sixteen) g - Zenaida:No  Refill: 1  __________________  3rd rx: montelukast (SINGULAIR) 10 MG tablet  Sig: Take 1 tablet (10 mg) PO at Bedtime  Dispense Quantity: 90(Ninety) tablet - Hedy: NO  Refill: 3  _______________  All 3 rx placed in the  [facility].  Sima Foley CMA (TANI)   (aka: Liliana Foley)

## 2020-06-17 DIAGNOSIS — Z79.899 ENCOUNTER FOR LONG-TERM (CURRENT) USE OF OTHER MEDICATIONS: Primary | ICD-10-CM

## 2020-06-17 LAB
ALBUMIN SERPL-MCNC: 3.2 G/DL (ref 3.4–5)
ALP SERPL-CCNC: 66 U/L (ref 40–150)
ALT SERPL W P-5'-P-CCNC: 62 U/L (ref 0–70)
ANION GAP SERPL CALCULATED.3IONS-SCNC: 4 MMOL/L (ref 3–14)
AST SERPL W P-5'-P-CCNC: 35 U/L (ref 0–45)
BILIRUB SERPL-MCNC: 0.4 MG/DL (ref 0.2–1.3)
BUN SERPL-MCNC: 17 MG/DL (ref 7–30)
CALCIUM SERPL-MCNC: 9.3 MG/DL (ref 8.5–10.1)
CHLORIDE SERPL-SCNC: 105 MMOL/L (ref 94–109)
CO2 SERPL-SCNC: 29 MMOL/L (ref 20–32)
CREAT SERPL-MCNC: 1.33 MG/DL (ref 0.66–1.25)
ERYTHROCYTE [DISTWIDTH] IN BLOOD BY AUTOMATED COUNT: 14.1 % (ref 10–15)
GFR SERPL CREATININE-BSD FRML MDRD: 66 ML/MIN/{1.73_M2}
GLUCOSE SERPL-MCNC: 84 MG/DL (ref 70–99)
HCT VFR BLD AUTO: 46.1 % (ref 40–53)
HGB BLD-MCNC: 15.6 G/DL (ref 13.3–17.7)
MCH RBC QN AUTO: 33.9 PG (ref 26.5–33)
MCHC RBC AUTO-ENTMCNC: 33.8 G/DL (ref 31.5–36.5)
MCV RBC AUTO: 100 FL (ref 78–100)
PLATELET # BLD AUTO: 190 10E9/L (ref 150–450)
POTASSIUM SERPL-SCNC: 4.1 MMOL/L (ref 3.4–5.3)
PROT SERPL-MCNC: 7.7 G/DL (ref 6.8–8.8)
RBC # BLD AUTO: 4.6 10E12/L (ref 4.4–5.9)
SODIUM SERPL-SCNC: 138 MMOL/L (ref 133–144)
VALPROATE SERPL-MCNC: 65 MG/L (ref 50–100)
WBC # BLD AUTO: 5.2 10E9/L (ref 4–11)

## 2020-06-17 PROCEDURE — 85027 COMPLETE CBC AUTOMATED: CPT | Performed by: NURSE PRACTITIONER

## 2020-06-17 PROCEDURE — 80053 COMPREHEN METABOLIC PANEL: CPT | Performed by: NURSE PRACTITIONER

## 2020-06-17 PROCEDURE — 80164 ASSAY DIPROPYLACETIC ACD TOT: CPT | Performed by: NURSE PRACTITIONER

## 2020-06-17 PROCEDURE — 36415 COLL VENOUS BLD VENIPUNCTURE: CPT | Performed by: NURSE PRACTITIONER

## 2020-07-01 ENCOUNTER — MEDICAL CORRESPONDENCE (OUTPATIENT)
Dept: HEALTH INFORMATION MANAGEMENT | Facility: CLINIC | Age: 40
End: 2020-07-01

## 2020-07-01 ENCOUNTER — TRANSFERRED RECORDS (OUTPATIENT)
Dept: HEALTH INFORMATION MANAGEMENT | Facility: CLINIC | Age: 40
End: 2020-07-01

## 2020-07-01 ENCOUNTER — ALLIED HEALTH/NURSE VISIT (OUTPATIENT)
Dept: PHARMACY | Facility: CLINIC | Age: 40
End: 2020-07-01
Payer: MEDICAID

## 2020-07-01 PROCEDURE — 99607 MTMS BY PHARM ADDL 15 MIN: CPT | Performed by: PHARMACIST

## 2020-07-01 PROCEDURE — 99606 MTMS BY PHARM EST 15 MIN: CPT | Performed by: PHARMACIST

## 2020-07-01 NOTE — Clinical Note
Patient has lost additional 3 lb in the last month. He was very happy with his progress. Due to this success, having him continue on current path for now and continued modifications to nutrition. Told him to follow up with you in 1 month. Erica LAMAR
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
## 2020-07-06 DIAGNOSIS — E66.09 OBESITY DUE TO EXCESS CALORIES, UNSPECIFIED OBESITY SEVERITY: ICD-10-CM

## 2020-07-10 NOTE — TELEPHONE ENCOUNTER
Lomaira 8 MG Tablet   Last Written Prescription Date:  5/19/20  Last Fill Quantity: 30,   # refills: 1  Last Office Visit : 5/19/20  Future Office visit:  8/18/20    Routing refill request to provider for review/approval because:  controlled substance

## 2020-07-15 ENCOUNTER — TELEPHONE (OUTPATIENT)
Dept: ENDOCRINOLOGY | Facility: CLINIC | Age: 40
End: 2020-07-15

## 2020-07-15 DIAGNOSIS — E66.09 OBESITY DUE TO EXCESS CALORIES, UNSPECIFIED OBESITY SEVERITY: Primary | ICD-10-CM

## 2020-07-15 NOTE — TELEPHONE ENCOUNTER
Called and spoke with Nel to relay Dr. Umaña and Lauren Bloch's message to try 4mg daily of Lomaira. Nel requesting new rx be sent to pharmacy so new dose can be administered. Routing to Dr. Umaña for sign off. Nel will follow up in 1-2 weeks with update.

## 2020-07-15 NOTE — TELEPHONE ENCOUNTER
Nel from pt's group home needs call as PT seems to be having bad psych reactions to phentermine..    [phone] (group home/ Nel)

## 2020-07-15 NOTE — TELEPHONE ENCOUNTER
Called and spoke with Nel at group home. Nel states that patient has been showing personality changes since starting on Lomaira 8mg tabs. States he is irritable, aggressive, agitated, short tempered. Requesting advise from Dr. Umaña on plan of action. Will route to Dr. Umaña to advise and call Nel back with her recommendations.

## 2020-07-20 ENCOUNTER — TELEPHONE (OUTPATIENT)
Dept: ENDOCRINOLOGY | Facility: CLINIC | Age: 40
End: 2020-07-20

## 2020-07-20 DIAGNOSIS — E66.09 OBESITY DUE TO EXCESS CALORIES, UNSPECIFIED OBESITY SEVERITY: ICD-10-CM

## 2020-07-20 DIAGNOSIS — E66.01 MORBID OBESITY (H): Primary | ICD-10-CM

## 2020-07-20 NOTE — TELEPHONE ENCOUNTER
Reason for call:  Other   Patient called regarding (reason for call): prescription  Additional comments: Judy from the group home is calling in regards to a script that is needed for a dosage change on Lomaire. They need a copy sent to the group home, as well as [facility]. Pleases reach out with any further questions.     Phone number to reach patient:  Other phone number:  [phone] (Group Home - anyone who answers can assist) *    Best Time:  Anytime     Can we leave a detailed message on this number?  YES    Travel screening: Not Applicable

## 2020-07-21 NOTE — TELEPHONE ENCOUNTER
New rx signed by Dr. Umaña and sent to pharmacy. Printed copy of script faxed to group home by [facility].

## 2020-07-22 NOTE — TELEPHONE ENCOUNTER
HI Sandra. Can you close this? I can't and it sits in my inbasket. NO probs of course if still needs to be open

## 2020-07-28 ENCOUNTER — TELEPHONE (OUTPATIENT)
Dept: PHARMACY | Facility: CLINIC | Age: 40
End: 2020-07-28

## 2020-07-28 NOTE — TELEPHONE ENCOUNTER
Humberto had phone appt on 7/1, Dia from [facility] is wondering if you could fax appt summary to her.    [phone], fax    [phone], Dia  **ok to leave a detailed message

## 2020-07-28 NOTE — TELEPHONE ENCOUNTER
Have called the group home on 3 different occassions to try resending fax and still unable to get it. Will have staff email over as fax doesn't seem to be working.     Lauren Bloch, PharmD  Medication Therapy Management Pharmacist   [facility]   Phone: [phone]

## 2020-07-30 NOTE — PROGRESS NOTES
"SUBJECTIVE:   Humberto Estrella is a 40 year old male who presents for Preventive Visit.      Are you in the first 12 months of your Medicare coverage?  No    HPI         Vascular Disease Follow-up      How often do you take nitroglycerin? { :829210}    Do you take an aspirin every day? { :875925}    {Depression and Anxiety Followup:893356}  Hypothyroidism Follow-up      Since last visit, patient describes the following symptoms: { :770504::\"Weight stable, no hair loss, no skin changes, no constipation, no loose stools\"}    Do you feel safe in your environment? { :930480}    Have you ever done Advance Care Planning? (For example, a Health Directive, POLST, or a discussion with a medical provider or your loved ones about your wishes): { :180969}    {Hearing Test Done (Optional):612919}  Fall risk  { :122576}  {If any of the above assessments are answered yes, consider ordering appropriate referrals (Optional):159777::\"click delete button to remove this line now\"}  Cognitive Screening Not appropriate due to mental handicap    {Do you have sleep apnea, excessive snoring or daytime drowsiness? (Optional):244058}    Reviewed and updated as needed this visit by clinical staff         Reviewed and updated as needed this visit by Provider        Social History     Tobacco Use     Smoking status: Former Smoker     Packs/day: 1.00     Years: 1.00     Pack years: 1.00     Types: Cigarettes     Start date: 1999     Last attempt to quit: 2000     Years since quittin.2     Smokeless tobacco: Never Used   Substance Use Topics     Alcohol use: No     Alcohol/week: 0.0 standard drinks     {Rooming Staff- Complete this question if Prescreen response is not shown below for today's visit. If you drink alcohol do you typically have >3 drinks per day or >7 drinks per week? (Optional):831597}    Alcohol Use 2017   Prescreen: >3 drinks/day or >7 drinks/week? The patient does not drink >3 drinks per day nor >7 drinks per " "week.   {add AUDIT responses (Optional) (A score of 7 for adult men is an indication of hazardous drinking; a score of 8 or more is an indication of an alcohol use disorder.  A score of 7 or more for adult women is an indication of hazardous drinking or an alchohol use disorder):323998}    {Outside tests to abstract? :828458}    {additional problems to add (Optional):452360}    Current providers sharing in care for this patient include: {Rooming staff:  Please update Care Team in Rooming Activity, refresh this note and then delete this statement}  Patient Care Team:  Carla Meyer APRN CNP as PCP - General (Nurse Practitioner)  Carla Meyer APRN CNP as Assigned PCP  Bloch, Lauren Turner, [facility] as Pharmacist (Pharmacist)    The following health maintenance items are reviewed in Epic and correct as of today:  Health Maintenance   Topic Date Due     HIV SCREENING  1995     LIPID  2020     MEDICARE ANNUAL WELLNESS VISIT  2020     INFLUENZA VACCINE (1) 2020     DTAP/TDAP/TD IMMUNIZATION (3 - Td) 2021     IPV IMMUNIZATION  Aged Out     MENINGITIS IMMUNIZATION  Aged Out     HEPATITIS B IMMUNIZATION  Aged Out     {Chronicprobdata (optional):103324}  {Decision Support (Optional):326251}    Review of Systems  {ROS COMP (Optional):451022}    OBJECTIVE:   There were no vitals taken for this visit. Estimated body mass index is 38.18 kg/m  as calculated from the following:    Height as of 20: 1.702 m (5' 7\").    Weight as of 20: 110.6 kg (243 lb 12.8 oz).  Physical Exam  {Exam (Optional) :363149}    {Diagnostic Test Results (Optional):974861::\"Diagnostic Test Results:\",\"Labs reviewed in Epic\"}    ASSESSMENT / PLAN:   {Dia Picklist:399779}    COUNSELING:  {Medicare Counselin}    Estimated body mass index is 38.18 kg/m  as calculated from the following:    Height as of 20: 1.702 m (5' 7\").    Weight as of 20: 110.6 kg (243 lb 12.8 oz).    {Weight Management Plan " (ACO) Complete if BMI is abnormal-  Ages 18-64  BMI >24.9.  Age 65+ with BMI <23 or >30 (Optional):723563}     reports that he quit smoking about 20 years ago. His smoking use included cigarettes. He started smoking about 21 years ago. He has a 1.00 pack-year smoking history. He has never used smokeless tobacco.  {Tobacco Cessation -- Complete if patient is a smoker (Optional):568142}    Appropriate preventive services were discussed with this patient, including applicable screening as appropriate for cardiovascular disease, diabetes, osteopenia/osteoporosis, and glaucoma.  As appropriate for age/gender, discussed screening for colorectal cancer, prostate cancer, breast cancer, and cervical cancer. Checklist reviewing preventive services available has been given to the patient.    Reviewed patients plan of care and provided an AVS. The {CarePlan:854618} for Humberto meets the Care Plan requirement. This Care Plan has been established and reviewed with the {PATIENT, FAMILY MEMBER, CAREGIVER:159492}.    Counseling Resources:  ATP IV Guidelines  Pooled Cohorts Equation Calculator  Breast Cancer Risk Calculator  FRAX Risk Assessment  ICSI Preventive Guidelines  Dietary Guidelines for Americans, 2010  USDA's MyPlate  ASA Prophylaxis  Lung CA Screening    Carla Brown, DO  [facility]    Identified Health Risks:

## 2020-07-30 NOTE — PATIENT INSTRUCTIONS
Patient Education   Personalized Prevention Plan  You are due for the preventive services outlined below.  Your care team is available to assist you in scheduling these services.  If you have already completed any of these items, please share that information with your care team to update in your medical record.  Health Maintenance Due   Topic Date Due     HIV Screening  05/09/1995     Cholesterol Lab  04/02/2020     Annual Wellness Visit  07/29/2020        Preventive Health Recommendations  Male Ages 40 to 49    Yearly exam:             See your health care provider every year in order to  o   Review health changes.   o   Discuss preventive care.    o   Review your medicines if your doctor has prescribed any.    You should be tested each year for STDs (sexually transmitted diseases) if you re at risk.     Have a cholesterol test every 5 years.     Have a colonoscopy (test for colon cancer) if someone in your family has had colon cancer or polyps before age 50.     After age 45, have a diabetes test (fasting glucose). If you are at risk for diabetes, you should have this test every 3 years.      Talk with your health care provider about whether or not a prostate cancer screening test (PSA) is right for you.    Shots: Get a flu shot each year. Get a tetanus shot every 10 years.     Nutrition:    Eat at least 5 servings of fruits and vegetables daily.     Eat whole-grain bread, whole-wheat pasta and brown rice instead of white grains and rice.     Get adequate Calcium and Vitamin D.     Lifestyle    Exercise for at least 150 minutes a week (30 minutes a day, 5 days a week). This will help you control your weight and prevent disease.     Limit alcohol to one drink per day.     No smoking.     Wear sunscreen to prevent skin cancer.     See your dentist every six months for an exam and cleaning.      1. Because of increased agitation, stop the phentermine and keep virtual follow-up with Dr. Umaña  2. Can try heat or  ice for the back.  Agree with plan to use Tylenol first, then Ibuprofen if pain is severe.         Acute Low Back Pain: Self Care at Home Instructions  Conservative Treatment    Remaining active supports a quicker recovery, keep moving. (ex. Walking, increase time & speed as tolerated).    Bed rest is not recommended. Minimize sitting. Do not remain in one position for extended periods of time.    Maintain routine activity with attention to correct posture; stop aggravating activities.    Over-the-counter pain medications for short term symptom control. (See below)    Muscle relaxants are sometimes helpful for few days, but may cause drowsiness. (See below)    Cold packs or heat based upon your preference.    Most people improve within 2 weeks; most have significant improvement within 4 weeks.    If symptoms worsen or you experience numbness, contact your provider immediately.        Low Back Pain Exercises   Exercises that stretch and strengthen the muscles of your abdomen and spine can help prevent back problems. If your back and abdominal muscles are strong, you can maintain good posture and keep your spine in its correct position.     If your muscles are tight, take a warm shower or bath before doing the exercises. Exercise on a rug or mat. Stop doing any exercise that causes pain until you have talked with your provider.     These exercises are intended only as suggestions. Ask your provider or physical therapist to help you develop an exercise program. Check with your provider before starting the exercises. Ask your provider how many times a week you need to do the exercises.     Low Back Pain Exercises                             Cat and camel: Get down on your hands and knees. Let your stomach sag, allowing your back to curve downward. Hold this position for 5 seconds. Then arch your back and hold for 5 seconds. Do 3 sets of 10.       Pelvic tilt: Lie on your back with your knees bent and your feet flat on  the floor. Tighten your abdominal muscles and push your lower back into the floor. Hold this position for 5 seconds, then relax. Do 3 sets of 10.       Extension exercise: Lie face down on the floor for 5 minutes. If this hurts too much, lie face down with a pillow under your stomach. This should relieve your leg or back pain. When you can lie on your stomach for 5 minutes without a pillow, then you can continue with the rest of this exercise.     After lying on your stomach for 5 minutes, prop yourself up on your elbows for another 5 minutes. Lie flat again for 1 minute, then press down on your hands and extend your elbows while keeping your hips flat on the floor. Hold for 1 second and lower yourself to the floor. Repeat 10 times. Do 4 sets. Rest for 2 minutes between sets. You should have no pain in your legs when you do this, but it is normal to feel pain in your lower back. Do this several times a day.     Developed by "[employer]   Published by "[employer].   Last modified: 2009-02-08   Last reviewed: 2008-07-07   This content is reviewed periodically and is subject to change as new health information becomes available. The information is intended to inform and educate and is not a replacement for medical evaluation, advice, diagnosis or treatment by a healthcare professional.   Adult Health Advisor 2009.1 Index  Adult Health Advisor 2009.1 Credits     2009 "[employer] and/or its affiliates. All Rights Reserved.

## 2020-08-01 DIAGNOSIS — H10.13 ALLERGIC CONJUNCTIVITIS, BILATERAL: ICD-10-CM

## 2020-08-01 DIAGNOSIS — J31.0 NONALLERGIC RHINITIS: ICD-10-CM

## 2020-08-03 RX ORDER — OLOPATADINE HYDROCHLORIDE 2 MG/ML
SOLUTION/ DROPS OPHTHALMIC
Qty: 2.5 ML | Refills: 11 | Status: SHIPPED | OUTPATIENT
Start: 2020-08-03 | End: 2021-02-16

## 2020-08-03 NOTE — TELEPHONE ENCOUNTER
Can someone PLEASE close this encounter. I can't without touching prescriptions.. way above my pay grade :-)

## 2020-08-04 ENCOUNTER — OFFICE VISIT (OUTPATIENT)
Dept: FAMILY MEDICINE | Facility: CLINIC | Age: 40
End: 2020-08-04
Payer: MEDICARE

## 2020-08-04 VITALS
TEMPERATURE: 98.1 F | SYSTOLIC BLOOD PRESSURE: 112 MMHG | BODY MASS INDEX: 41.99 KG/M2 | HEIGHT: 63 IN | HEART RATE: 80 BPM | DIASTOLIC BLOOD PRESSURE: 68 MMHG | OXYGEN SATURATION: 94 % | WEIGHT: 237 LBS | RESPIRATION RATE: 16 BRPM

## 2020-08-04 DIAGNOSIS — E03.8 SUBCLINICAL HYPOTHYROIDISM: ICD-10-CM

## 2020-08-04 DIAGNOSIS — Z00.00 ENCOUNTER FOR MEDICARE ANNUAL WELLNESS EXAM: Primary | ICD-10-CM

## 2020-08-04 DIAGNOSIS — Z13.6 CARDIOVASCULAR SCREENING; LDL GOAL LESS THAN 100: ICD-10-CM

## 2020-08-04 DIAGNOSIS — R73.03 PREDIABETES: ICD-10-CM

## 2020-08-04 DIAGNOSIS — F60.3 EXPLOSIVE PERSONALITY DISORDER (H): ICD-10-CM

## 2020-08-04 DIAGNOSIS — E66.09 OBESITY DUE TO EXCESS CALORIES, UNSPECIFIED OBESITY SEVERITY: Chronic | ICD-10-CM

## 2020-08-04 DIAGNOSIS — R06.02 SOB (SHORTNESS OF BREATH): ICD-10-CM

## 2020-08-04 DIAGNOSIS — J30.1 SEASONAL ALLERGIC RHINITIS DUE TO POLLEN: ICD-10-CM

## 2020-08-04 DIAGNOSIS — Z78.9 LIVES IN GROUP HOME: ICD-10-CM

## 2020-08-04 LAB
CHOLEST SERPL-MCNC: 173 MG/DL
HDLC SERPL-MCNC: 36 MG/DL
LDLC SERPL CALC-MCNC: 89 MG/DL
NONHDLC SERPL-MCNC: 137 MG/DL
TRIGL SERPL-MCNC: 239 MG/DL
TSH SERPL DL<=0.005 MIU/L-ACNC: 0.7 MU/L (ref 0.4–4)

## 2020-08-04 PROCEDURE — 36415 COLL VENOUS BLD VENIPUNCTURE: CPT | Performed by: INTERNAL MEDICINE

## 2020-08-04 PROCEDURE — 99396 PREV VISIT EST AGE 40-64: CPT | Performed by: INTERNAL MEDICINE

## 2020-08-04 PROCEDURE — 99214 OFFICE O/P EST MOD 30 MIN: CPT | Mod: 25 | Performed by: INTERNAL MEDICINE

## 2020-08-04 PROCEDURE — 80061 LIPID PANEL: CPT | Performed by: INTERNAL MEDICINE

## 2020-08-04 PROCEDURE — 84443 ASSAY THYROID STIM HORMONE: CPT | Performed by: INTERNAL MEDICINE

## 2020-08-04 RX ORDER — RISPERIDONE 2 MG/1
2 TABLET ORAL DAILY
COMMUNITY

## 2020-08-04 ASSESSMENT — MIFFLIN-ST. JEOR: SCORE: 1880.15
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
## 2020-08-04 NOTE — LETTER
August 5, 2020      Humberto Estrella  [address] [address]        Dear ,    We are writing to inform you of your test results.  Thyroid is in the normal range.  The cholesterol panel is similar to 1 year ago, mildly elevated triglycerides, mildly low HDL.  Weight loss will help with this.     Resulted Orders   TSH with free T4 reflex   Result Value Ref Range    TSH 0.70 0.40 - 4.00 mU/L   Lipid panel reflex to direct LDL Non-fasting   Result Value Ref Range    Cholesterol 173 <200 mg/dL    Triglycerides 239 (H) <150 mg/dL      Comment:      Borderline high:  150-199 mg/dl  High:             200-499 mg/dl  Very high:       >499 mg/dl  Non Fasting      HDL Cholesterol 36 (L) >39 mg/dL    LDL Cholesterol Calculated 89 <100 mg/dL      Comment:      Desirable:       <100 mg/dl    Non HDL Cholesterol 137 (H) <130 mg/dL      Comment:      Above Desirable:  130-159 mg/dl  Borderline high:  160-189 mg/dl  High:             190-219 mg/dl  Very high:       >219 mg/dl         If you have any questions or concerns, please call the clinic at the number listed above.     Sincerely,    Carla Brown, DO

## 2020-08-05 RX ORDER — LEVOTHYROXINE SODIUM 100 UG/1
100 TABLET ORAL DAILY
Qty: 90 TABLET | Refills: 3 | Status: SHIPPED | OUTPATIENT
Start: 2020-08-05 | End: 2021-08-05

## 2020-08-05 RX ORDER — LORATADINE 10 MG/1
10 TABLET ORAL DAILY
Qty: 90 TABLET | Refills: 3 | Status: SHIPPED | OUTPATIENT
Start: 2020-08-05 | End: 2021-08-05

## 2020-08-05 RX ORDER — ALBUTEROL SULFATE 90 UG/1
2 AEROSOL, METERED RESPIRATORY (INHALATION) EVERY 6 HOURS PRN
Qty: 3 INHALER | Refills: 1 | Status: SHIPPED | OUTPATIENT
Start: 2020-08-05 | End: 2022-09-13

## 2020-08-05 NOTE — RESULT ENCOUNTER NOTE
Thyroid is in the normal range.  The cholesterol panel is similar to 1 year ago, mildly elevated triglycerides, mildly low HDL.  Weight loss will help with this.

## 2020-08-18 ENCOUNTER — VIRTUAL VISIT (OUTPATIENT)
Dept: ENDOCRINOLOGY | Facility: CLINIC | Age: 40
End: 2020-08-18
Payer: MEDICARE

## 2020-08-18 VITALS — HEIGHT: 63 IN | WEIGHT: 233 LBS | BODY MASS INDEX: 41.29 KG/M2

## 2020-08-18 DIAGNOSIS — E66.09 OBESITY DUE TO EXCESS CALORIES, UNSPECIFIED OBESITY SEVERITY: ICD-10-CM

## 2020-08-18 RX ORDER — TOPIRAMATE 100 MG/1
150 TABLET, FILM COATED ORAL DAILY
Qty: 135 TABLET | Refills: 2 | Status: SHIPPED | OUTPATIENT
Start: 2020-08-18 | End: 2020-11-03

## 2020-08-18 ASSESSMENT — MIFFLIN-ST. JEOR: SCORE: 1862.01

## 2020-08-18 ASSESSMENT — PAIN SCALES - GENERAL: PAINLEVEL: MODERATE PAIN (5)
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
## 2020-08-18 NOTE — NURSING NOTE
"Chief Complaint   Patient presents with     RECHECK     Follow up appointment.       Vitals:    08/18/20 0905   Weight: 105.7 kg (233 lb)   Height: 1.6 m (5' 3\")       Body mass index is 41.27 kg/m .                            CELSA RICARDO, EMT    "
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
## 2020-08-18 NOTE — PATIENT INSTRUCTIONS
Continue topiramate 150 mg daily  NO phentermine    Continue to walk 30 minutes per day  Try to eat slowly and get more fruit and vegetable    Return in 2 months    If you have any questions, please do not hesitate to call Weight management clinic at [phone] or [phone]    Sincerely,    Chasidy Juarez MD  Endocrinology

## 2020-08-24 ENCOUNTER — TELEPHONE (OUTPATIENT)
Dept: INTERNAL MEDICINE | Facility: CLINIC | Age: 40
End: 2020-08-24

## 2020-08-25 ENCOUNTER — TELEPHONE (OUTPATIENT)
Dept: INTERNAL MEDICINE | Facility: CLINIC | Age: 40
End: 2020-08-25

## 2020-08-25 NOTE — TELEPHONE ENCOUNTER
Another message from today reported Chiropractor could talk tomorrow if provider would like.       Left message for Judy to call back. MRI of what?

## 2020-08-25 NOTE — TELEPHONE ENCOUNTER
Reason for Call: Request for an order:    Order being requested: MRI    Date needed: as soon as possible    Has the patient been seen by the PCP for this problem? YES    Additional comments: Please call Juyd if questions. Dr. Pelaez, Chiropractor would like MRI ordered    Phone number Patient can be reached at:  Other phone number:  [facility], [phone]    Best Time:  Any    Can we leave a detailed message on this number?  YES    Call taken on 8/25/2020 at 12:04 PM by Awilda Guadarrama

## 2020-08-25 NOTE — TELEPHONE ENCOUNTER
Judy, calls back and states she didn't know what MRI to order. Will transfer request to other phone call from today to call Chiropractor tomorrow.

## 2020-08-27 NOTE — TELEPHONE ENCOUNTER
Spoke with Chiro. He reports that low back pain woke him up in the middle of the night.  He thinks patient needs an MRI.    Please have Humberto schedule appointment with me so I can examine him and determine need for MRI

## 2020-09-03 ENCOUNTER — TELEPHONE (OUTPATIENT)
Dept: INTERNAL MEDICINE | Facility: CLINIC | Age: 40
End: 2020-09-03

## 2020-09-03 DIAGNOSIS — H90.3 SENSORINEURAL HEARING LOSS, BILATERAL: Primary | ICD-10-CM

## 2020-09-03 NOTE — TELEPHONE ENCOUNTER
Reason for Call: Request for an order or referral:    Order or referral being requested: Janey from pt's group home calling.  She's asking that a referral be placed for annual hearing test.  She will call and make appt, no need to fax to her.  No need to call Janey back, unless there are questions or problems.      Date needed: at your convenience    Has the patient been seen by the PCP for this problem? NO    Additional comments:     Phone number Janey can be reached at:  Home number on file [phone] (home)    Best Time:  any    Can we leave a detailed message on this number?  YES    Call taken on 9/3/2020 at 2:13 PM by Amrita Carnes

## 2020-09-03 NOTE — TELEPHONE ENCOUNTER
Pt's last 9/10/19 for audiology exam.    I have cued up the order.    Routed to PCP.    Mikki Sloan RN

## 2020-09-04 ENCOUNTER — OFFICE VISIT (OUTPATIENT)
Dept: FAMILY MEDICINE | Facility: CLINIC | Age: 40
End: 2020-09-04
Payer: MEDICARE

## 2020-09-04 VITALS
HEART RATE: 81 BPM | OXYGEN SATURATION: 95 % | RESPIRATION RATE: 16 BRPM | DIASTOLIC BLOOD PRESSURE: 64 MMHG | SYSTOLIC BLOOD PRESSURE: 98 MMHG | WEIGHT: 236 LBS | TEMPERATURE: 96.8 F | BODY MASS INDEX: 41.81 KG/M2

## 2020-09-04 DIAGNOSIS — M54.50 ACUTE MIDLINE LOW BACK PAIN WITHOUT SCIATICA: Primary | ICD-10-CM

## 2020-09-04 DIAGNOSIS — F40.240 CLAUSTROPHOBIA: ICD-10-CM

## 2020-09-04 PROCEDURE — 99214 OFFICE O/P EST MOD 30 MIN: CPT | Performed by: INTERNAL MEDICINE

## 2020-09-04 RX ORDER — LORAZEPAM 0.5 MG/1
1 TABLET ORAL
Qty: 2 TABLET | Refills: 0 | Status: SHIPPED | OUTPATIENT
Start: 2020-09-04 | End: 2020-12-30

## 2020-09-04 RX ORDER — IBUPROFEN 200 MG
400 TABLET ORAL
Qty: 28 TABLET | Refills: 0 | COMMUNITY
Start: 2020-09-04 | End: 2020-10-05

## 2020-09-04 NOTE — PATIENT INSTRUCTIONS
1. Get MRI.  They will call to schedule  2. Take the Ativan 1 mg 30-60 min prior to procedure.  1 extra given if needed  3. Dr. Brown does not see any medical reason or drug interactions with Ibuprofen (Advil).  Recommend 400 mg twice daily x 1 week, take with food.  Ok to continue Tylenol 500-1000 mg twice daily as needed

## 2020-09-04 NOTE — PROGRESS NOTES
Subjective     Humberto Estrella is a 40 year old male who presents to clinic today for the following health issues:    HPI       Flu shot: declined    Here with caretaker from group home    Back Pain  Onset/Duration: a month, perhaps more  Description:   Location of pain: low back both  Character of pain: sharp and intermittent  Pain radiation: none  New numbness or weakness in legs, not attributed to pain: no   Intensity: Unable to rate pain on 0-10 scale  Progression of Symptoms: worsening and intermittent  History:   Specific cause: none  Pain interferes with job: not applicable  History of back problems: recurrent self limited episodes of low back pain in the past  Any previous MRI or X-rays: None  Sees a specialist for back pain: No  Alleviating factors:   Improved by: chiropractor    Precipitating factors:  Worsened by: Lying Flat  Therapies tried and outcome: chiropractor, ice, Tylenol 500 mg BID not every day (somewhat helpful).  Was told to avoid NSAIDs but unsure why    Accompanying Signs & Symptoms:  Risk of Fracture: None  Risk of Cauda Equina: None  Risk of Infection: None  Risk of Cancer: Unrelenting night time pain  Risk of Ankylosing Spondylitis: Onset at age <35, male, AND morning back stiffness  no                 Review of Systems   Constitutional, HEENT, cardiovascular, pulmonary, gi and gu systems are negative, except as otherwise noted.      Objective    BP 98/64   Pulse 81   Temp 96.8  F (36  C) (Tympanic)   Resp 16   Wt 107 kg (236 lb)   SpO2 95%   BMI 41.81 kg/m    Body mass index is 41.81 kg/m .  Physical Exam   GENERAL APPEARANCE: alert, no distress and cognitive impairment  Comprehensive back pain exam:  Tenderness of mid thoracic and mid-lumbar spinous process, left paraspinal muscles, Range of motion not limited by pain, Lower extremity strength functional and equal on both sides, unable to elicit reflexes bilat knee/ankles, Lower extremity sensation normal and equal on both sides  and Straight leg raise negative bilaterally            Assessment & Plan     Humberto was seen today for back pain.    Diagnoses and all orders for this visit:    Acute midline low back pain without sciatica  -     MR Lumbar Spine w/o Contrast; Future    Claustrophobia  -     LORazepam (ATIVAN) 0.5 MG tablet; Take 2 tablets (1 mg) by mouth once as needed for anxiety         Not a good historian due to down syndrome.  Severe pain waking him up at night - get MRI.        Patient Instructions   1. Get MRI.  They will call to schedule  2. Take the Ativan 1 mg 30-60 min prior to procedure.  1 extra given if needed  3. Dr. Brown does not see any medical reason or drug interactions with Ibuprofen (Advil).  Recommend 400 mg twice daily x 1 week, take with food.  Ok to continue Tylenol 500-1000 mg twice daily as needed      No follow-ups on file.    Carla Brown, DO  [facility]

## 2020-09-08 ENCOUNTER — HOSPITAL ENCOUNTER (OUTPATIENT)
Dept: MRI IMAGING | Facility: CLINIC | Age: 40
Discharge: HOME | End: 2020-09-08
Attending: INTERNAL MEDICINE | Admitting: INTERNAL MEDICINE
Payer: MEDICARE

## 2020-09-08 DIAGNOSIS — M54.50 ACUTE MIDLINE LOW BACK PAIN WITHOUT SCIATICA: ICD-10-CM

## 2020-09-08 PROCEDURE — 72148 MRI LUMBAR SPINE W/O DYE: CPT

## 2020-09-09 DIAGNOSIS — M51.46 SCHMORL'S NODES OF LUMBAR REGION: Primary | ICD-10-CM

## 2020-09-09 NOTE — RESULT ENCOUNTER NOTE
There is a cyst in the T12 vertebral body which is causing disc herniation.  Recommend consultation with spine doctor, referral placed

## 2020-09-09 NOTE — PROGRESS NOTES
"Humberto Estrella is a 40 year old male who is being evaluated via a billable telephone visit.      The patient has been notified of following:     \"This telephone visit will be conducted via a call between you and your physician/provider. We have found that certain health care needs can be provided without the need for a physical exam.  This service lets us provide the care you need with a short phone conversation.  If a prescription is necessary we can send it directly to your pharmacy.  If lab work is needed we can place an order for that and you can then stop by our lab to have the test done at a later time.    Telephone visits are billed at different rates depending on your insurance coverage. During this emergency period, for some insurers they may be billed the same as an in-person visit.  Please reach out to your insurance provider with any questions.    If during the course of the call the physician/provider feels a telephone visit is not appropriate, you will not be charged for this service.\"    Patient has given verbal consent for Telephone visit?  Yes    What phone number would you like to be contacted at? [phone]     How would you like to obtain your AVS? Mail a copy    Virtual visit for annual follow-up of moderate VIDAL managed with CPAP.    Assessment:  - Moderate VIDAL, managed with CPAP    Plan:  - Appears very well controlled with excellent compliance with CPAP 11 cm H2O.  - Follow-up in 1-2 years    39 yo M with obesity, trisomy 21, PTSD, explosive personality disorder.    Last office visit on 9/20/2019 and appearing very well controlled with excellent compliance on CPAP 11 cm H2O.  No concerns.    Today, our visit is over the phone and includes one of his group home staff members.  Humberto is talkative and interactive.  He denies any concerns with his sleep and likes his CPAP.  No concerns from staff.    CPAP download reviewed over past 30 days on set pressure 11 cm H2O.  Used 30/30 days, average " usage 680 minutes.  AHI 3.5.    Prior Sleep Testin2017 - PSG with weight 226 lbs.  AHI 26, RDI 35, lowest oxygen saturation was 78%, CPAP 11 cm/H2O effective.    Phone call duration: 15 minutes    David Peralta MD, MD

## 2020-09-10 ENCOUNTER — VIRTUAL VISIT (OUTPATIENT)
Dept: SLEEP MEDICINE | Facility: CLINIC | Age: 40
End: 2020-09-10
Payer: MEDICARE

## 2020-09-10 ENCOUNTER — CARE COORDINATION (OUTPATIENT)
Dept: SLEEP MEDICINE | Facility: CLINIC | Age: 40
End: 2020-09-10

## 2020-09-10 DIAGNOSIS — G47.33 OSA (OBSTRUCTIVE SLEEP APNEA): Primary | ICD-10-CM

## 2020-09-10 PROCEDURE — 99442 ZZC PHYSICIAN TELEPHONE EVALUATION 11-20 MIN: CPT | Performed by: FAMILY MEDICINE

## 2020-09-11 ENCOUNTER — MEDICAL CORRESPONDENCE (OUTPATIENT)
Dept: HEALTH INFORMATION MANAGEMENT | Facility: CLINIC | Age: 40
End: 2020-09-11

## 2020-09-14 ENCOUNTER — OFFICE VISIT (OUTPATIENT)
Dept: ORTHOPEDICS | Facility: CLINIC | Age: 40
End: 2020-09-14
Attending: INTERNAL MEDICINE
Payer: MEDICARE

## 2020-09-14 VITALS
WEIGHT: 236 LBS | BODY MASS INDEX: 41.82 KG/M2 | DIASTOLIC BLOOD PRESSURE: 70 MMHG | SYSTOLIC BLOOD PRESSURE: 118 MMHG | HEIGHT: 63 IN

## 2020-09-14 DIAGNOSIS — M51.46 SCHMORL'S NODES OF LUMBAR REGION: ICD-10-CM

## 2020-09-14 DIAGNOSIS — M54.50 ACUTE LOW BACK PAIN WITHOUT SCIATICA, UNSPECIFIED BACK PAIN LATERALITY: Primary | ICD-10-CM

## 2020-09-14 PROCEDURE — 99203 OFFICE O/P NEW LOW 30 MIN: CPT | Performed by: FAMILY MEDICINE

## 2020-09-14 ASSESSMENT — MIFFLIN-ST. JEOR: SCORE: 1875.62

## 2020-09-14 NOTE — PATIENT INSTRUCTIONS
Diagnosis: Acute low back pain   Image Findings: Mild disc herniations, possible mild compression fracture though no surgery needed  Treatment: referral to physical therapy  Job: [employer]  Medications: Limited tylenol/ibuprofen for pain for 1-2 weeks  Follow-up: In one month if symptoms do not improve, sooner if worsening      Please call [phone]   Ask for my team if you have any questions or concerns    It was great seeing you today!    Murali Middleton

## 2020-09-14 NOTE — LETTER
9/14/2020         RE: Humberto Estrella  [address]  [facility] [address]        Dear Colleague,    Thank you for referring your patient, Humberto Estrella, to the [facility]. Please see a copy of my visit note below.    ASSESSMENT & PLAN  Humberto was seen today for pain.    Diagnoses and all orders for this visit:    Acute low back pain without sciatica, unspecified back pain laterality  -     PHYSICAL THERAPY REFERRAL (Internal); Future    Schmorl's nodes of lumbar region  -     Orthopedic & Spine  Referral  -     PHYSICAL THERAPY REFERRAL (Internal); Future      Patient is a 40 year old male presenting for evaluation of   Chief Complaint   Patient presents with     Lower Back - Pain      # Acute Low Back Pain: Pain noted over hte past couple of mon in the lower back with some paraspinal muscle pain in the mid back as well.  Pt has mild TTP over right lateral low back with intact flexion/extension.  Lumbar MRI showing Schmorl's and findings of T12  Superior endplate compression fracture with no acute injury.  No red flag symptoms/signs on history or examination.  Counseled patient and caregiver on nature of condition and treatment options.  Given this plan as below, follow-up 1 month    Treatment: activities as tolerated  Physical Therapy Referred to PT today  Injection none  Medications  Limited NSAIDs/Tylenol    Concerning signs/sx that would warrant urgent evaluation were discussed.  All questions were answered, patient understands and agrees with plan.      Return in about 1 month (around 10/14/2020).  -----    SUBJECTIVE  Humberto Estrella is a/an 40 year old male who is seen in consultation at the request of  Carla Brown D.O. for evaluation of low back pain. The patient is seen with their .    Onset: 1-2 month(s) ago. Reports insidious onset without acute precipitating event. Saw PCP 9/4/20 and MRI was ordered.    Location of Pain:  bilateral low back, radiating to scapula   Rating of Pain at worst: 4/10  Rating of Pain Currently: 3/10  Worsened by: night pain, flexion and extension   Better with: stretching   Treatments tried: chiropractor, ice, Tylenol,   Quality: dull  Red flags: Weakness: No, bowel/bladder loss: No, foot drop: No  Associated symptoms: no distal numbness or tingling; denies swelling or warmth  Orthopedic history: YES - self limiting   Relevant surgical history: NO  Social: Lives in group home   Hobbies: Watch Sports   Past Medical History:   Diagnosis Date     Coronary artery disease      Depressive disorder      Diagnostic skin and sensitization tests (aka ALLERGENS) 9/30/15 IgE tests all NEGATIVE for environmental allergens.      Nonallergic rhinitis     9/30/15 IgE tests all NEGATIVE for environmental allergens.      Thyroid disease      Social History     Socioeconomic History     Marital status: Single     Spouse name: None     Number of children: 0     Years of education: 12     Highest education level: None   Occupational History     Occupation: Cleaning Services     Comment: [employer]     Employer: UNEMPLOYED     Employer: DISABLED   Social Needs     Financial resource strain: None     Food insecurity     Worry: None     Inability: None     Transportation needs     Medical: None     Non-medical: None   Tobacco Use     Smoking status: Former Smoker     Packs/day: 1.00     Years: 1.00     Pack years: 1.00     Types: Cigarettes     Start date: 1999     Last attempt to quit: 2000     Years since quittin.3     Smokeless tobacco: Never Used   Substance and Sexual Activity     Alcohol use: No     Alcohol/week: 0.0 standard drinks     Drug use: No     Sexual activity: Never   Lifestyle     Physical activity     Days per week: None     Minutes per session: None     Stress: None   Relationships     Social connections     Talks on phone: None     Gets together: None     Attends Zoroastrian  "service: None     Active member of club or organization: None     Attends meetings of clubs or organizations: None     Relationship status: None     Intimate partner violence     Fear of current or ex partner: None     Emotionally abused: None     Physically abused: None     Forced sexual activity: None   Other Topics Concern     Parent/sibling w/ CABG, MI or angioplasty before 65F 55M? No   Social History Narrative     None       Patient's past medical, surgical, social, and family histories were reviewed today and no changes are noted.  No family history pertinent to the patient's problem today    REVIEW OF SYSTEMS:  10 point ROS is negative other than symptoms noted above in HPI, Past Medical History or as stated below  Constitutional: NEGATIVE for fever, chills, change in weight  Skin: NEGATIVE for worrisome rashes, moles or lesions  GI/: NEGATIVE for bowel or bladder changes  Neuro: NEGATIVE for weakness, dizziness or paresthesias    OBJECTIVE:  /70   Ht 1.6 m (5' 3\")   Wt 107 kg (236 lb)   BMI 41.81 kg/m     General: healthy, alert and in no distress  HEENT: no scleral icterus or conjunctival erythema  Skin: no suspicious lesions or rash. No jaundice.  CV: no pedal edema  Resp: normal respiratory effort without conversational dyspnea   Psych: normal mood and affect  Gait: normal steady gait with appropriate coordination and balance  Neuro: normal light touch sensory exam of the bilateral lower extremities.  DTR's 1+ patella and achilles bilaterally.  MSK:  THORACIC/LUMBAR SPINE  Inspection:    No gross deformity/asymmetry  Palpation:    Tender about the left para lumbar muscles and right para lumbar muscles. Otherwise remainder of landmarks are nontender.  Range of Motion:     Lumbar flexion limited by tightness    Lumbar extension full  Strength:    5/5 - quadriceps, hamstrings, tibialis anterior, gastrocsoleus, and extensor hallicus longus  Special Tests:    Positive: None    Negative: straight " leg raise (bilateral), slump test (bilateral)    BILATERAL HIP  Inspection:    No obvious deformity or asymmetry, pelvis level  Palpation:  Nontender.  Active Range of Motion:     Flexion full, IR full, ER  full  Strength:    Flexion 5/5, adduction 5/5, abduction 5/5  Special Tests:    Positive: None    Negative: Logroll, ANJU, anterior impingement (FADIR)    Independent visualization of the below image:  No results found for this or any previous visit (from the past 24 hour(s)).  MRI LUMBAR SPINE WITHOUT CONTRAST   9/8/2020 10:11 AM      HISTORY: Radiculopathy, greater than 6 weeks conservative treatment,  persistent symptoms. Acute midline low back pain without sciatica.      TECHNIQUE: Multiplanar multisequence MRI of the lumbar spine without  contrast.      COMPARISON: None. Correlation is made with CT of the chest dated  6/5/2019.     FINDINGS: Nomenclature is based on five lumbar vertebral bodies.  Minimal retrolisthesis of L2 on L3 measuring approximately 2 mm with  otherwise normal sagittal alignment. There appears to be a new  Schmorl's node in the superior endplate of T12 with mild concavity of  the T12 superior endplate. There is edema-like marrow signal  underlying the T12 superior endplate, and also seen along the  partially visualized inferior endplate of T11. The findings may be  related to a recent Schmorl's node/intravertebral disc herniation in  the T12 superior endplate with accompanying Modic type I degenerative  endplate changes. There may or may not be a superimposed mild superior  endplate compression deformity of T12.     Minimal scattered Modic type I degenerative endplate changes elsewhere  with more chronic-appearing fatty-type degenerative endplate marrow  signal changes elsewhere in the lumbar spine. Scattered small  chronic-appearing Schmorl's node/degenerative endplate contour  irregularities seen from L1-L2 through L4-L5. Probable small  intraosseous hemangiomas in the L2 vertebral  body. No  aggressive-appearing osseous lesion. The conus terminates at L2.  Unremarkable paraspinous soft tissues and visualized bony pelvis.     Segmental analysis:  T11-T12: Minimal disc height loss. No significant disc bulge or  herniation. Normal facets. No spinal canal or neural foraminal  stenosis.     T12-L1: Normal disc height and signal. No herniation. Normal facets.  No spinal canal or neural foraminal stenosis.     L1-L2: Mild disc height loss. Small symmetric disc bulge. Normal  facets. No spinal canal or neural foraminal stenosis.     L2-L3: Minimal disc height loss. Small symmetric disc bulge. Normal  facets. No spinal canal stenosis. Minimal right neural foraminal  narrowing. Left neural foramen is patent.     L3-L4: Minimal disc height loss. Small symmetric disc bulge. Mild  facet arthropathy. No spinal stenosis. Minimal bilateral neural  foraminal narrowing.     L4-L5: Minimal disc height loss. Symmetric disc bulge. Mild facet  arthropathy. No spinal stenosis. Mild, left more than right, neural  foraminal stenosis.     L5-S1: Normal disc height and signal. No significant disc bulge or  herniation. Mild-to-moderate facet arthropathy. No spinal stenosis.  Minimal left neural foraminal narrowing. The right neural foramen is  patent.                                                                   IMPRESSION:  1. Newly apparent superior endplate Schmorl's node with associated  mild superior endplate concavity involving the T12 vertebral body with  edema-like marrow signal changes at T11-T12 centered about the disc  space. The findings may be related to a recent T12 Schmorl's  node/intervertebral disc herniation with associated Modic type I  degenerative endplate change. There may or may not be a superimposed  mild recent T12 superior endplate compression fracture component.  2. No other acute findings. Multilevel degenerative disc disease and  facet arthropathy of the lumbar spine, as described. No  high-grade  spinal stenosis. Mild multilevel neural foraminal stenosis, most  pronounced on the left at L4-L5. No definite high-grade neural  foraminal narrowing.     MD Murali GARZA MD [facility]    Disclaimer: This note consists of symbols derived from keyboarding, dictation and/or voice recognition software. As a result, there may be errors in the script that have gone undetected. Please consider this when interpreting information found in this chart.          Again, thank you for allowing me to participate in the care of your patient.        Sincerely,        Murali Middleton MD

## 2020-09-14 NOTE — PROGRESS NOTES
ASSESSMENT & PLAN  Humberto was seen today for pain.    Diagnoses and all orders for this visit:    Acute low back pain without sciatica, unspecified back pain laterality  -     PHYSICAL THERAPY REFERRAL (Internal); Future    Schmorl's nodes of lumbar region  -     Orthopedic & Spine  Referral  -     PHYSICAL THERAPY REFERRAL (Internal); Future      Patient is a 40 year old male presenting for evaluation of   Chief Complaint   Patient presents with     Lower Back - Pain      # Acute Low Back Pain: Pain noted over hte past couple of mon in the lower back with some paraspinal muscle pain in the mid back as well.  Pt has mild TTP over right lateral low back with intact flexion/extension.  Lumbar MRI showing Schmorl's and findings of T12  Superior endplate compression fracture with no acute injury.  No red flag symptoms/signs on history or examination.  Counseled patient and caregiver on nature of condition and treatment options.  Given this plan as below, follow-up 1 month    Treatment: activities as tolerated  Physical Therapy Referred to PT today  Injection none  Medications  Limited NSAIDs/Tylenol    Concerning signs/sx that would warrant urgent evaluation were discussed.  All questions were answered, patient understands and agrees with plan.      Return in about 1 month (around 10/14/2020).  -----    SUBJECTIVE  Humberto Estrella is a/an 40 year old male who is seen in consultation at the request of  Carla Brown D.O. for evaluation of low back pain. The patient is seen with their .    Onset: 1-2 month(s) ago. Reports insidious onset without acute precipitating event. Saw PCP 9/4/20 and MRI was ordered.    Location of Pain: bilateral low back, radiating to scapula   Rating of Pain at worst: 4/10  Rating of Pain Currently: 3/10  Worsened by: night pain, flexion and extension   Better with: stretching   Treatments tried: chiropractor, ice, Tylenol,   Quality: dull  Red flags: Weakness: No,  bowel/bladder loss: No, foot drop: No  Associated symptoms: no distal numbness or tingling; denies swelling or warmth  Orthopedic history: YES - self limiting   Relevant surgical history: NO  Social: Lives in group home   Hobbies: Watch Sports   Past Medical History:   Diagnosis Date     Coronary artery disease      Depressive disorder      Diagnostic skin and sensitization tests (aka ALLERGENS) 9/30/15 IgE tests all NEGATIVE for environmental allergens.      Nonallergic rhinitis     9/30/15 IgE tests all NEGATIVE for environmental allergens.      Thyroid disease      Social History     Socioeconomic History     Marital status: Single     Spouse name: None     Number of children: 0     Years of education: 12     Highest education level: None   Occupational History     Occupation: Cleaning Services     Comment: [employer]     Employer: UNEMPLOYED     Employer: DISABLED   Social Needs     Financial resource strain: None     Food insecurity     Worry: None     Inability: None     Transportation needs     Medical: None     Non-medical: None   Tobacco Use     Smoking status: Former Smoker     Packs/day: 1.00     Years: 1.00     Pack years: 1.00     Types: Cigarettes     Start date: 1999     Last attempt to quit: 2000     Years since quittin.3     Smokeless tobacco: Never Used   Substance and Sexual Activity     Alcohol use: No     Alcohol/week: 0.0 standard drinks     Drug use: No     Sexual activity: Never   Lifestyle     Physical activity     Days per week: None     Minutes per session: None     Stress: None   Relationships     Social connections     Talks on phone: None     Gets together: None     Attends Adventist service: None     Active member of club or organization: None     Attends meetings of clubs or organizations: None     Relationship status: None     Intimate partner violence     Fear of current or ex partner: None     Emotionally abused: None     Physically abused: None     " Forced sexual activity: None   Other Topics Concern     Parent/sibling w/ CABG, MI or angioplasty before 65F 55M? No   Social History Narrative     None       Patient's past medical, surgical, social, and family histories were reviewed today and no changes are noted.  No family history pertinent to the patient's problem today    REVIEW OF SYSTEMS:  10 point ROS is negative other than symptoms noted above in HPI, Past Medical History or as stated below  Constitutional: NEGATIVE for fever, chills, change in weight  Skin: NEGATIVE for worrisome rashes, moles or lesions  GI/: NEGATIVE for bowel or bladder changes  Neuro: NEGATIVE for weakness, dizziness or paresthesias    OBJECTIVE:  /70   Ht 1.6 m (5' 3\")   Wt 107 kg (236 lb)   BMI 41.81 kg/m     General: healthy, alert and in no distress  HEENT: no scleral icterus or conjunctival erythema  Skin: no suspicious lesions or rash. No jaundice.  CV: no pedal edema  Resp: normal respiratory effort without conversational dyspnea   Psych: normal mood and affect  Gait: normal steady gait with appropriate coordination and balance  Neuro: normal light touch sensory exam of the bilateral lower extremities.  DTR's 1+ patella and achilles bilaterally.  MSK:  THORACIC/LUMBAR SPINE  Inspection:    No gross deformity/asymmetry  Palpation:    Tender about the left para lumbar muscles and right para lumbar muscles. Otherwise remainder of landmarks are nontender.  Range of Motion:     Lumbar flexion limited by tightness    Lumbar extension full  Strength:    5/5 - quadriceps, hamstrings, tibialis anterior, gastrocsoleus, and extensor hallicus longus  Special Tests:    Positive: None    Negative: straight leg raise (bilateral), slump test (bilateral)    BILATERAL HIP  Inspection:    No obvious deformity or asymmetry, pelvis level  Palpation:  Nontender.  Active Range of Motion:     Flexion full, IR full, ER  full  Strength:    Flexion 5/5, adduction 5/5, abduction 5/5  Special " Tests:    Positive: None    Negative: Logroll, ANJU, anterior impingement (FADIR)    Independent visualization of the below image:  No results found for this or any previous visit (from the past 24 hour(s)).  MRI LUMBAR SPINE WITHOUT CONTRAST   9/8/2020 10:11 AM      HISTORY: Radiculopathy, greater than 6 weeks conservative treatment,  persistent symptoms. Acute midline low back pain without sciatica.      TECHNIQUE: Multiplanar multisequence MRI of the lumbar spine without  contrast.      COMPARISON: None. Correlation is made with CT of the chest dated  6/5/2019.     FINDINGS: Nomenclature is based on five lumbar vertebral bodies.  Minimal retrolisthesis of L2 on L3 measuring approximately 2 mm with  otherwise normal sagittal alignment. There appears to be a new  Schmorl's node in the superior endplate of T12 with mild concavity of  the T12 superior endplate. There is edema-like marrow signal  underlying the T12 superior endplate, and also seen along the  partially visualized inferior endplate of T11. The findings may be  related to a recent Schmorl's node/intravertebral disc herniation in  the T12 superior endplate with accompanying Modic type I degenerative  endplate changes. There may or may not be a superimposed mild superior  endplate compression deformity of T12.     Minimal scattered Modic type I degenerative endplate changes elsewhere  with more chronic-appearing fatty-type degenerative endplate marrow  signal changes elsewhere in the lumbar spine. Scattered small  chronic-appearing Schmorl's node/degenerative endplate contour  irregularities seen from L1-L2 through L4-L5. Probable small  intraosseous hemangiomas in the L2 vertebral body. No  aggressive-appearing osseous lesion. The conus terminates at L2.  Unremarkable paraspinous soft tissues and visualized bony pelvis.     Segmental analysis:  T11-T12: Minimal disc height loss. No significant disc bulge or  herniation. Normal facets. No spinal canal or  neural foraminal  stenosis.     T12-L1: Normal disc height and signal. No herniation. Normal facets.  No spinal canal or neural foraminal stenosis.     L1-L2: Mild disc height loss. Small symmetric disc bulge. Normal  facets. No spinal canal or neural foraminal stenosis.     L2-L3: Minimal disc height loss. Small symmetric disc bulge. Normal  facets. No spinal canal stenosis. Minimal right neural foraminal  narrowing. Left neural foramen is patent.     L3-L4: Minimal disc height loss. Small symmetric disc bulge. Mild  facet arthropathy. No spinal stenosis. Minimal bilateral neural  foraminal narrowing.     L4-L5: Minimal disc height loss. Symmetric disc bulge. Mild facet  arthropathy. No spinal stenosis. Mild, left more than right, neural  foraminal stenosis.     L5-S1: Normal disc height and signal. No significant disc bulge or  herniation. Mild-to-moderate facet arthropathy. No spinal stenosis.  Minimal left neural foraminal narrowing. The right neural foramen is  patent.                                                                   IMPRESSION:  1. Newly apparent superior endplate Schmorl's node with associated  mild superior endplate concavity involving the T12 vertebral body with  edema-like marrow signal changes at T11-T12 centered about the disc  space. The findings may be related to a recent T12 Schmorl's  node/intervertebral disc herniation with associated Modic type I  degenerative endplate change. There may or may not be a superimposed  mild recent T12 superior endplate compression fracture component.  2. No other acute findings. Multilevel degenerative disc disease and  facet arthropathy of the lumbar spine, as described. No high-grade  spinal stenosis. Mild multilevel neural foraminal stenosis, most  pronounced on the left at L4-L5. No definite high-grade neural  foraminal narrowing.     MD Murali GARZA MD [facility]    Disclaimer: This note  consists of symbols derived from keyboarding, dictation and/or voice recognition software. As a result, there may be errors in the script that have gone undetected. Please consider this when interpreting information found in this chart.

## 2020-09-18 NOTE — PROGRESS NOTES
Faxed signed copy of [facility] visit notes 9/10/2020, with orders, current findings, and to return to clinic in one year to [facility]:  Fax [phone], phone [phone].  Copy of form sent to HIM for scan into epic.

## 2020-09-22 ENCOUNTER — HOSPITAL ENCOUNTER (OUTPATIENT)
Dept: PHYSICAL THERAPY | Facility: CLINIC | Age: 40
Setting detail: THERAPIES SERIES
End: 2020-09-22
Attending: FAMILY MEDICINE
Payer: MEDICARE

## 2020-09-22 DIAGNOSIS — M51.46 SCHMORL'S NODES OF LUMBAR REGION: ICD-10-CM

## 2020-09-22 DIAGNOSIS — M54.50 ACUTE LOW BACK PAIN WITHOUT SCIATICA, UNSPECIFIED BACK PAIN LATERALITY: ICD-10-CM

## 2020-09-22 PROCEDURE — 97110 THERAPEUTIC EXERCISES: CPT | Mod: GP | Performed by: PHYSICAL THERAPIST

## 2020-09-22 PROCEDURE — 97161 PT EVAL LOW COMPLEX 20 MIN: CPT | Mod: GP | Performed by: PHYSICAL THERAPIST

## 2020-09-22 NOTE — PROGRESS NOTES
09/22/20 1600   General Information   Type of Visit Initial OP Ortho PT Evaluation   Start of Care Date 09/22/20   Referring Physician Murali Middleton MD    Patient/Family Goals Statement To stretch out my back, to feel better   Orders Evaluate and Treat   Date of Order 09/14/20   Certification Required? Yes   Medical Diagnosis LBP, schmorls nodes   Body Part(s)   Body Part(s) Lumbar Spine/SI   Presentation and Etiology   Pertinent history of current problem (include personal factors and/or comorbidities that impact the POC) Pt presents w/ B LBP X 1 +month; no specific injury.  Pt notes intemittent pain 4/10.  No radiating pain, numbness or tingling.  Neg bowel / bladder/ LE weakness.    MRI in chart : schmorls nodes and Multilevel degenerative disc disease.   Meds:  tylenol prn.   PMHX:   UBP / B shoulder pain currently.  Low complexity   Onset date of current episode/exacerbation 08/01/20   Pain exacerbation comment Lifting > 25#,  sitting > 30 min.  wakes 1X/night due to LBP..   Bending to tie his shoes,  Walking fast   Pain/symptoms eased by H. Cold;I. OTC medication(s)   Progression of symptoms since onset: Unchanged   Prior Level of Function   Functional Level Prior Comment walks 2X/wk X 15-20 min   Current Level of Function   Patient role/employment history A. Employed   Employment Comments works at [employer]--rotates jobs   Living environment Group home   Fall Risk Screen   Fall screen completed by PT   Have you fallen 2 or more times in the past year? No   Have you fallen and had an injury in the past year? No   Is patient a fall risk? No   Lumbar Spine/SI Objective Findings   Posture Increased lordosis.  L PSIS / crest slightly lower   Gait/Locomotion normal , slower pace (reported as his normal)   Flexion ROM WNL   Extension ROM 25% no complaints   Right Side Bending ROM 40% no complaints   Left Side Bending ROM 40% no complaints   Hip Screen PROM ER R 30*, L 25*, IR R 23*, L 25*.   B carisa/ ANJU/  FADIR neg   Hip Flexion (L2) Strength B 5/5   Hip Abduction Strength B 4/5   Knee Flexion Strength B 5/5   Knee Extension (L3) Strength B 5/5   Ankle Dorsiflexion (L4) Strength B 5/5   Hamstring Flexibility mod tightness B    SLR neg B    Crossover SLR neg B    Slump Test neg B    Segmental Mobility Neg ERS/FRS lower lumbar spine   Palpation No palpable tenderness   Planned Therapy Interventions   Planned Therapy Interventions manual therapy;ROM;strengthening;stretching   Clinical Impression   Criteria for Skilled Therapeutic Interventions Met yes, treatment indicated   PT Diagnosis LBP   Influenced by the following impairments pain, decreased strength   Functional limitations due to impairments prolonged sitting, lifting > 25#, bending, fast walking, sleeping   Clinical Presentation Stable/Uncomplicated   Clinical Presentation Rationale no recent change since onset   Clinical Decision Making (Complexity) Low complexity   Therapy Frequency 1 time/week   Predicted Duration of Therapy Intervention (days/wks) 4 weeks then 1X in 2 weeks = 5 visits   Risk & Benefits of therapy have been explained Yes   Patient, Family & other staff in agreement with plan of care Yes   Education Assessment   Barriers to Learning No barriers   Ortho Goal 1   Goal Identifier 1     Goal Description Pt willl be able to sit 30 min w/ LBP no > 2/10   Target Date 11/11/20   Ortho Goal 2   Goal Identifier 2.   Goal Description Pt will report increased ease to bend and tie his shoes   Target Date 11/11/20   Ortho Goal 3   Goal Identifier 3.   Goal Description Pt will be able to lift 30-40# w/ LBP no > 2/10   Target Date 11/11/20   Ortho Goal 4   Goal Identifier 4.   Goal Description Pt will be consistent w/HEP w/ help from caregivers   Target Date 11/11/20   Total Evaluation Time   PT Michael Low Complexity Minutes (52528) 25   Therapy Certification   Certification date from 09/22/20   Certification date to 11/11/20   Medical Diagnosis LBP,  katie's nodes     Thank you for this referral,    Connie Gray, PT,  CEAS   #[id number]  [facility]  [phone]

## 2020-09-24 ENCOUNTER — ALLIED HEALTH/NURSE VISIT (OUTPATIENT)
Dept: FAMILY MEDICINE | Facility: CLINIC | Age: 40
End: 2020-09-24
Payer: MEDICARE

## 2020-09-24 DIAGNOSIS — Z23 NEED FOR PROPHYLACTIC VACCINATION AND INOCULATION AGAINST INFLUENZA: Primary | ICD-10-CM

## 2020-09-24 PROCEDURE — 90686 IIV4 VACC NO PRSV 0.5 ML IM: CPT

## 2020-09-24 PROCEDURE — 99207 ZZC NO CHARGE NURSE ONLY: CPT

## 2020-09-24 PROCEDURE — G0008 ADMIN INFLUENZA VIRUS VAC: HCPCS

## 2020-10-01 ENCOUNTER — ALLIED HEALTH/NURSE VISIT (OUTPATIENT)
Dept: PHARMACY | Facility: CLINIC | Age: 40
End: 2020-10-01
Payer: MEDICAID

## 2020-10-01 DIAGNOSIS — M54.50 ACUTE LOW BACK PAIN, UNSPECIFIED BACK PAIN LATERALITY, UNSPECIFIED WHETHER SCIATICA PRESENT: ICD-10-CM

## 2020-10-01 PROCEDURE — 99606 MTMS BY PHARM EST 15 MIN: CPT | Performed by: PHARMACIST

## 2020-10-01 PROCEDURE — 99607 MTMS BY PHARM ADDL 15 MIN: CPT | Performed by: PHARMACIST

## 2020-10-01 NOTE — Clinical Note
He has not lost weight this month, but he does report continuing to make changes but think that some dietary changes that he was working hard on he forgot about so did discuss this. I briefly talked about adding additional med (like a GLP1 agonist) but he didn't like the idea of injeciton even if staff could help. He more so wanted to work on nutrition so we discussed this. He sees you in 1month. Enedina LAMAR
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
## 2020-10-02 ENCOUNTER — HOSPITAL ENCOUNTER (OUTPATIENT)
Dept: PHYSICAL THERAPY | Facility: CLINIC | Age: 40
Setting detail: THERAPIES SERIES
End: 2020-10-02
Attending: FAMILY MEDICINE
Payer: MEDICARE

## 2020-10-02 PROCEDURE — 97110 THERAPEUTIC EXERCISES: CPT | Mod: GP | Performed by: PHYSICAL THERAPIST

## 2020-10-16 ENCOUNTER — HOSPITAL ENCOUNTER (OUTPATIENT)
Dept: PHYSICAL THERAPY | Facility: CLINIC | Age: 40
Setting detail: THERAPIES SERIES
End: 2020-10-16
Attending: FAMILY MEDICINE
Payer: MEDICARE

## 2020-10-16 PROCEDURE — 97110 THERAPEUTIC EXERCISES: CPT | Mod: GP | Performed by: PHYSICAL THERAPIST

## 2020-10-21 ENCOUNTER — VIRTUAL VISIT (OUTPATIENT)
Dept: FAMILY MEDICINE | Facility: CLINIC | Age: 40
End: 2020-10-21
Payer: MEDICARE

## 2020-10-21 DIAGNOSIS — Z20.822 EXPOSURE TO COVID-19 VIRUS: Primary | ICD-10-CM

## 2020-10-21 PROCEDURE — 99441 PR PHYSICIAN TELEPHONE EVALUATION 5-10 MIN: CPT | Mod: 95 | Performed by: NURSE PRACTITIONER

## 2020-10-21 ASSESSMENT — ENCOUNTER SYMPTOMS
HEADACHES: 0
GASTROINTESTINAL NEGATIVE: 1
CONSTITUTIONAL NEGATIVE: 1
RESPIRATORY NEGATIVE: 1

## 2020-10-21 NOTE — PROGRESS NOTES
"Humberto Estrella is a 40 year old male who is being evaluated via a billable telephone visit.      The patient has been notified of following:     \"This telephone visit will be conducted via a call between you and your physician/provider. We have found that certain health care needs can be provided without the need for a physical exam.  This service lets us provide the care you need with a short phone conversation.  If a prescription is necessary we can send it directly to your pharmacy.  If lab work is needed we can place an order for that and you can then stop by our lab to have the test done at a later time.    Telephone visits are billed at different rates depending on your insurance coverage. During this emergency period, for some insurers they may be billed the same as an in-person visit.  Please reach out to your insurance provider with any questions.    If during the course of the call the physician/provider feels a telephone visit is not appropriate, you will not be charged for this service.\"    Patient has given verbal consent for Telephone visit?  Yes    What phone number would you like to be contacted at? [phone]  Janey staff at group    How would you like to obtain your AVS? Fax [phone]    Subjective     Humberto Estrella is a 40 year old male who presents via phone visit today for the following health issues:    HPI     Chief Complaint   Patient presents with     Covid 19 Testing     patient lives in a group home, there is a positive COVID resident in the home he lives in, dx 10/14/20, patient needs a COVID test. does not have any symptoms       Additional provider notes: Staff on phone for visit. Patient at table with staff. Lives in a group home and had contact with a positive COVID staff member on 10/14/20. Last contact was 10/14/20. Requesting COVID testing. 2 other residents are also being tested.     Review of Systems   Constitutional: Negative.    HENT: Negative.    Respiratory: " Negative.    Gastrointestinal: Negative.    Skin: Negative for rash.   Neurological: Negative for headaches.             Objective          Vitals:  No vitals were obtained today due to virtual visit.    Per staff:  healthy, alert, no distress and cooperative  PSYCH: Alert and oriented times 3; coherent speech, normal   rate and volume, able to articulate logical thoughts, able   to abstract reason, no tangential thoughts, no hallucinations   or delusions  His affect is normal  RESP: No cough, no audible wheezing, able to talk in full sentences  Remainder of exam unable to be completed due to telephone visits            Assessment/Plan:    Assessment & Plan     Exposure to COVID-19 virus  Exposure consistent with [facility] testing guidelines. Has been 7 days since last exposure. Discussed quarantining recommendations. Discussed process for scheduling COVID testing.     - Symptomatic COVID-19 Virus (Coronavirus) by PCR; Future        Patient Instructions   COVID testing ordered today. You will receive a call to schedule that appointment.     Please quarantine until you receive your results.     If the results are negative, you need to be symptom free for 24 hours before ending quarantine.     If results are positive, you need to quarantine for 10 days from start of symptoms AND you need to be fever free (without the use of fever reducing medications) for 24 hours before ending quarantine.     If results are positive, asymptomatic household contacts will need to quarantine for 14 days.     If you develop symptoms such as difficulty breathing, please go to ER.        Return if symptoms worsen or fail to improve.    GM [facility]    Phone call duration:  4 minutes

## 2020-10-23 ENCOUNTER — AMBULATORY - HEALTHEAST (OUTPATIENT)
Dept: FAMILY MEDICINE | Facility: CLINIC | Age: 40
End: 2020-10-23

## 2020-10-23 DIAGNOSIS — Z20.822 EXPOSURE TO COVID-19 VIRUS: ICD-10-CM

## 2020-10-25 ENCOUNTER — AMBULATORY - HEALTHEAST (OUTPATIENT)
Dept: FAMILY MEDICINE | Facility: CLINIC | Age: 40
End: 2020-10-25

## 2020-10-25 DIAGNOSIS — Z20.822 EXPOSURE TO COVID-19 VIRUS: ICD-10-CM

## 2020-10-27 ENCOUNTER — COMMUNICATION - HEALTHEAST (OUTPATIENT)
Dept: SCHEDULING | Facility: CLINIC | Age: 40
End: 2020-10-27

## 2020-11-03 ENCOUNTER — VIRTUAL VISIT (OUTPATIENT)
Dept: ENDOCRINOLOGY | Facility: CLINIC | Age: 40
End: 2020-11-03
Payer: MEDICARE

## 2020-11-03 VITALS — HEIGHT: 63 IN | WEIGHT: 228.2 LBS | BODY MASS INDEX: 40.43 KG/M2

## 2020-11-03 DIAGNOSIS — E66.09 OBESITY DUE TO EXCESS CALORIES, UNSPECIFIED OBESITY SEVERITY: ICD-10-CM

## 2020-11-03 PROCEDURE — 99441 PR PHYSICIAN TELEPHONE EVALUATION 5-10 MIN: CPT | Mod: 95 | Performed by: INTERNAL MEDICINE

## 2020-11-03 RX ORDER — TOPIRAMATE 100 MG/1
150 TABLET, FILM COATED ORAL DAILY
Qty: 135 TABLET | Refills: 2 | Status: SHIPPED | OUTPATIENT
Start: 2020-11-03 | End: 2021-02-09

## 2020-11-03 ASSESSMENT — MIFFLIN-ST. JEOR: SCORE: 1840.24

## 2020-11-03 ASSESSMENT — PAIN SCALES - GENERAL: PAINLEVEL: NO PAIN (0)

## 2020-11-03 NOTE — PATIENT INSTRUCTIONS
- continue topiramate 150 mg daily    - return in 2 months    If you have any questions, please do not hesitate to call Weight management clinic at [phone] or [phone]    Sincerely,    Chasidy Juarez MD  Endocrinology

## 2020-11-03 NOTE — NURSING NOTE
"Chief Complaint   Patient presents with     Follow Up     [facility] follow up       Vitals:    11/03/20 1547   Weight: 103.5 kg (228 lb 3.2 oz)   Height: 1.6 m (5' 3\")       Body mass index is 40.42 kg/m .                            "
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
## 2020-11-04 ENCOUNTER — TELEPHONE (OUTPATIENT)
Dept: ENDOCRINOLOGY | Facility: CLINIC | Age: 40
End: 2020-11-04

## 2020-11-23 ENCOUNTER — OFFICE VISIT (OUTPATIENT)
Dept: AUDIOLOGY | Facility: CLINIC | Age: 40
End: 2020-11-23
Payer: MEDICARE

## 2020-11-23 DIAGNOSIS — H90.3 SENSORINEURAL HEARING LOSS, BILATERAL: Primary | ICD-10-CM

## 2020-11-23 PROCEDURE — 92550 TYMPANOMETRY & REFLEX THRESH: CPT | Performed by: AUDIOLOGIST

## 2020-11-23 PROCEDURE — 92557 COMPREHENSIVE HEARING TEST: CPT | Performed by: AUDIOLOGIST

## 2020-11-23 PROCEDURE — 99207 PR NO CHARGE LOS: CPT | Performed by: AUDIOLOGIST

## 2020-11-23 NOTE — PROGRESS NOTES
AUDIOLOGY REPORT    SUBJECTIVE:  Humberto Estrella is a 40 year old male who was seen at [facility] for an audiologic evaluation, referred by Dr. Carla Brown.The patient is here today with his group home staff for his annual hearing evaluation. Staff note no changes in his hearing and feel he is hearing them well.  The patient has been seen previously in this clinic for assessment and results indicated a mild to moderate high tone hearing loss bilaterally.The patient denies bilateral tinnitus and bilateral otalgia.     OBJECTIVE:    Otoscopic exam indicates ears are clear of cerumen bilaterally       Pure Tone Thresholds assessed using conventional audiometry with good  reliability from 250-8000 Hz bilaterally using circumaural headphones     RIGHT:  normal, mild and moderate sensorineural hearing loss    LEFT:    normal, mild and moderate sensorineural hearing loss    Tympanogram:    RIGHT: normal eardrum mobility (Type As)    LEFT:   normal eardrum mobility (Type As)    Reflexes (reported by stimulus ear 1000 Hz):     RIGHT: Ipsilateral is present    RIGHT: Contralateral is absent    LEFT: Ipsilateral is present    LEFT: Contralateral is absent    Speech Reception Threshold:    RIGHT: 20 dB HL    LEFT:   20 dB HL  Word Recognition Score:     RIGHT: 88% at 60 dB HL using NU-6 recorded word list.    LEFT:   84% at 60 dB HL using NU-6 recorded word list.      ASSESSMENT:   Normal hearing through 2000 Hz sloping to a mild to moderate high frequency sensorineural hearing loss bilaterally.     Compared to patient's previous audiogram dated 9/10/2019, hearing has decreased 10 dB in a few of the higher tones bilaterally.  Today s results were discussed with the patient's group home staff in detail. A copy of today's audiogram and form were given to the staff.     PLAN: It is recommended that the patient return for annual hearing evaluations. Patient was counseled regarding hearing loss and  impact on communication.    Please call this clinic with questions regarding these results or recommendations.        Veronika Ramírez M.A. F-AAA  Clinical audiologist [place] # [id number]  11/23/2020

## 2020-11-24 NOTE — PROGRESS NOTES
"   OUTPATIENT PHYSICAL THERAPY DISCHARGE SUMMARY   Murali Middleton MD  9/22/20 to 10/16/20 1500   Signing Clinician's Name / Credentials   Signing clinician's name / credentials Connie Gray, PT [id number]   Session Number   Session Number 3    Ortho Goal 1   Goal Identifier 1     Goal Description Pt willl be able to sit 30 min w/ LBP no > 2/10.  10/2/20 no pain.  10/16/20 no pain MET   Target Date 11/11/20   Ortho Goal 2   Goal Identifier 2.   Goal Description Pt will report increased ease to bend and tie his shoes.  10/2/20 pt notes 4/10 w/ bending to tie shoes.  10/16/20 getting better, no pain currently MET   Target Date 11/11/20   Ortho Goal 3   Goal Identifier 3.   Goal Description Pt will be able to lift 30-40# w/ LBP no > 2/10.  10/16/20 has not been doing heavy lifting ---doing 15-20# currently   Target Date 11/11/20   Ortho Goal 4   Goal Identifier 4.   Goal Description Pt will be consistent w/HEP w/ help from caregivers.  10/16/20 MET   Target Date 11/11/20   Subjective Report   Subjective Report Pt states his back is getting stronger.   Pt only notes 1 time since last visit did he have pain 2/10; no pain currently.  Pt states ex are going good--no complaints.    Objective Measures    LROM flex WNLno complaints,  SB B WFL --no complaints    MMT:  hip abd R 4/5, L 4+/5   Therapeutic Procedure/exercise   Treatment Detail clam X 15 B.   LTRS.   Dugout HS stretch 30\" B ( seated on plinth).  Seated trunk flex X 5.  Sit to stand from chair w/o UE assist x 15.   Donkey kicks X 10 B   GTB rows X 10.      Plan   Home program ex as above   Plan Pt to cont on own w/HEP.  Discharge from physical therapy.   comments Pt met goals 1,2 and 4     "

## 2020-11-27 ENCOUNTER — OFFICE VISIT (OUTPATIENT)
Dept: FAMILY MEDICINE | Facility: CLINIC | Age: 40
End: 2020-11-27
Payer: MEDICARE

## 2020-11-27 VITALS
SYSTOLIC BLOOD PRESSURE: 110 MMHG | WEIGHT: 235 LBS | OXYGEN SATURATION: 96 % | TEMPERATURE: 97 F | HEART RATE: 80 BPM | DIASTOLIC BLOOD PRESSURE: 70 MMHG | BODY MASS INDEX: 41.63 KG/M2

## 2020-11-27 DIAGNOSIS — L08.9 INFECTION OF TOE: Primary | ICD-10-CM

## 2020-11-27 PROCEDURE — 99213 OFFICE O/P EST LOW 20 MIN: CPT | Performed by: NURSE PRACTITIONER

## 2020-11-27 RX ORDER — SULFAMETHOXAZOLE/TRIMETHOPRIM 800-160 MG
1 TABLET ORAL 2 TIMES DAILY
Qty: 20 TABLET | Refills: 0 | Status: SHIPPED | OUTPATIENT
Start: 2020-11-27 | End: 2020-12-07

## 2020-11-27 NOTE — PROGRESS NOTES
Subjective     Humberto Estrella is a 40 year old male who presents to clinic today for the following health issues:    HPI         Concern - ingrown toenail?  Onset: one week? Not sure  Description: patient has a painful right great toe  Intensity: moderate  Progression of Symptoms:  same  Accompanying Signs & Symptoms: redness and soreness  A little swollen  Previous history of similar problem: no  Precipitating factors:        Worsened by: unknown  Alleviating factors:        Improved by: nothing  Therapies tried and outcome: warm water/epsom salt soaks        Review of Systems   Constitutional, HEENT, cardiovascular, pulmonary, gi and gu systems are negative, except as otherwise noted.      Objective    /70 (BP Location: Right arm, Cuff Size: Adult Large)   Pulse 80   Temp 97  F (36.1  C) (Tympanic)   Wt 106.6 kg (235 lb)   SpO2 96%   BMI 41.63 kg/m    Body mass index is 41.63 kg/m .  Physical Exam   GENERAL: healthy, alert and no distress  SKIN: right great toe - toe is erythematous and edematous. Yellow drainage noted at bilateral nail edges. Nail did not appear ingrown.            Assessment & Plan     Infection of toe  Instructed staff to soak foot twice daily.  Avoid footwear that is constricting.  Complete antibiotics:  - sulfamethoxazole-trimethoprim (BACTRIM DS) 800-160 MG tablet; Take 1 tablet by mouth 2 times daily for 10 days    If infection returns, will need to refer to podiatry to cut back the nail.            Return in about 1 week (around 12/4/2020), or if symptoms worsen or fail to improve.    The risks, benefits and treatment options of prescribed medications or other treatments have been discussed with the patient. The patient verbalized their understanding and should call or follow up if no improvement or if they develop further problems.    GM Stevenson CNP  [facility]

## 2020-12-02 ENCOUNTER — VIRTUAL VISIT (OUTPATIENT)
Dept: PHARMACY | Facility: CLINIC | Age: 40
End: 2020-12-02
Payer: MEDICAID

## 2020-12-02 PROCEDURE — 99606 MTMS BY PHARM EST 15 MIN: CPT | Mod: TEL | Performed by: PHARMACIST
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
## 2020-12-30 ENCOUNTER — VIRTUAL VISIT (OUTPATIENT)
Dept: FAMILY MEDICINE | Facility: CLINIC | Age: 40
End: 2020-12-30
Payer: MEDICARE

## 2020-12-30 ENCOUNTER — TELEPHONE (OUTPATIENT)
Dept: FAMILY MEDICINE | Facility: CLINIC | Age: 40
End: 2020-12-30

## 2020-12-30 DIAGNOSIS — Z79.899 NEED FOR PROPHYLACTIC CHEMOTHERAPY: Primary | ICD-10-CM

## 2020-12-30 DIAGNOSIS — Z20.822 SUSPECTED COVID-19 VIRUS INFECTION: Primary | ICD-10-CM

## 2020-12-30 DIAGNOSIS — J31.0 NONALLERGIC RHINITIS: ICD-10-CM

## 2020-12-30 DIAGNOSIS — J34.89 SINUS PRESSURE: ICD-10-CM

## 2020-12-30 LAB
ALBUMIN SERPL-MCNC: 3.3 G/DL (ref 3.4–5)
ALP SERPL-CCNC: 77 U/L (ref 40–150)
ALT SERPL W P-5'-P-CCNC: 40 U/L (ref 0–70)
ANION GAP SERPL CALCULATED.3IONS-SCNC: 2 MMOL/L (ref 3–14)
AST SERPL W P-5'-P-CCNC: 28 U/L (ref 0–45)
BASOPHILS # BLD AUTO: 0 10E9/L (ref 0–0.2)
BASOPHILS NFR BLD AUTO: 0.7 %
BILIRUB SERPL-MCNC: 0.3 MG/DL (ref 0.2–1.3)
BUN SERPL-MCNC: 19 MG/DL (ref 7–30)
CALCIUM SERPL-MCNC: 9.2 MG/DL (ref 8.5–10.1)
CHLORIDE SERPL-SCNC: 106 MMOL/L (ref 94–109)
CO2 SERPL-SCNC: 30 MMOL/L (ref 20–32)
CREAT SERPL-MCNC: 1.09 MG/DL (ref 0.66–1.25)
DIFFERENTIAL METHOD BLD: ABNORMAL
EOSINOPHIL # BLD AUTO: 0 10E9/L (ref 0–0.7)
EOSINOPHIL NFR BLD AUTO: 1 %
ERYTHROCYTE [DISTWIDTH] IN BLOOD BY AUTOMATED COUNT: 14.9 % (ref 10–15)
GFR SERPL CREATININE-BSD FRML MDRD: 84 ML/MIN/{1.73_M2}
GLUCOSE SERPL-MCNC: 73 MG/DL (ref 70–99)
HCT VFR BLD AUTO: 43.8 % (ref 40–53)
HGB BLD-MCNC: 15.1 G/DL (ref 13.3–17.7)
LYMPHOCYTES # BLD AUTO: 1.8 10E9/L (ref 0.8–5.3)
LYMPHOCYTES NFR BLD AUTO: 44 %
MCH RBC QN AUTO: 34.6 PG (ref 26.5–33)
MCHC RBC AUTO-ENTMCNC: 34.5 G/DL (ref 31.5–36.5)
MCV RBC AUTO: 100 FL (ref 78–100)
MONOCYTES # BLD AUTO: 0.5 10E9/L (ref 0–1.3)
MONOCYTES NFR BLD AUTO: 12.2 %
NEUTROPHILS # BLD AUTO: 1.7 10E9/L (ref 1.6–8.3)
NEUTROPHILS NFR BLD AUTO: 42.1 %
PLATELET # BLD AUTO: 160 10E9/L (ref 150–450)
POTASSIUM SERPL-SCNC: 4 MMOL/L (ref 3.4–5.3)
PROT SERPL-MCNC: 7.9 G/DL (ref 6.8–8.8)
RBC # BLD AUTO: 4.37 10E12/L (ref 4.4–5.9)
SODIUM SERPL-SCNC: 138 MMOL/L (ref 133–144)
WBC # BLD AUTO: 4.1 10E9/L (ref 4–11)

## 2020-12-30 PROCEDURE — 85025 COMPLETE CBC W/AUTO DIFF WBC: CPT | Performed by: NURSE PRACTITIONER

## 2020-12-30 PROCEDURE — 80053 COMPREHEN METABOLIC PANEL: CPT | Performed by: NURSE PRACTITIONER

## 2020-12-30 PROCEDURE — 36415 COLL VENOUS BLD VENIPUNCTURE: CPT | Performed by: NURSE PRACTITIONER

## 2020-12-30 PROCEDURE — 99441 PR PHYSICIAN TELEPHONE EVALUATION 5-10 MIN: CPT | Mod: 95 | Performed by: PHYSICIAN ASSISTANT

## 2020-12-30 RX ORDER — FLUTICASONE PROPIONATE 50 MCG
1 SPRAY, SUSPENSION (ML) NASAL DAILY PRN
Qty: 16 G | Refills: 1 | Status: SHIPPED | OUTPATIENT
Start: 2020-12-30 | End: 2023-09-08

## 2020-12-30 NOTE — PROGRESS NOTES
"Humberto Estrella is a 40 year old male who is being evaluated via a billable telephone visit.      The patient has been notified of following:     \"This telephone visit will be conducted via a call between you and your physician/provider. We have found that certain health care needs can be provided without the need for a physical exam.  This service lets us provide the care you need with a short phone conversation.  If a prescription is necessary we can send it directly to your pharmacy.  If lab work is needed we can place an order for that and you can then stop by our lab to have the test done at a later time.    Telephone visits are billed at different rates depending on your insurance coverage. During this emergency period, for some insurers they may be billed the same as an in-person visit.  Please reach out to your insurance provider with any questions.    If during the course of the call the physician/provider feels a telephone visit is not appropriate, you will not be charged for this service.\"    Patient has given verbal consent for Telephone visit?  Yes    What phone number would you like to be contacted at? [phone]    How would you like to obtain your AVS? Mail a copy    Subjective     Humberto Estrella is a 40 year old male who presents via phone visit today for the following health issues:    HPI       Concern for COVID-19  About how many days ago did these symptoms start? 3 days  Is this your first visit for this illness? Yes  In the 14 days before your symptoms started, have you had close contact with someone with COVID-19 (Coronavirus)? No  Do you have a fever or chills? No  Are you having new or worsening difficulty breathing? No  Do you have new or worsening cough? No  Have you had any new or unexplained body aches? No    Have you experienced any of the following NEW symptoms?    Headache: YES    Sore throat: No    Loss of taste or smell: No    Chest pain: No    Diarrhea: No    Rash: No  What " treatments have you tried? Tylenol with no relief  Who do you live with? Lives in a group Home  Are you, or a household member, a healthcare worker or a ? No  Do you live in a nursing home, group home, or shelter? YES  Do you have a way to get food/medications if quarantined? Yes, I have a friend or family member who can help me.    headache in the forehead, by eyes/nose  He has nasal congestion, runny nose  He has PRN nasal saline, PRN sudogest       Fax [phone] attn Janey    Review of Systems   Other than noted above, general, HEENT, respiratory, cardiac, MS, and gastrointestinal systems are negative.        Objective          Vitals:  No vitals were obtained today due to virtual visit.    healthy, alert and no distress  PSYCH: Alert and oriented times 3; coherent speech, normal   rate and volume, able to articulate logical thoughts, able   to abstract reason, no tangential thoughts, no hallucinations   or delusions  His affect is normal  RESP: No cough, no audible wheezing, able to talk in full sentences  Remainder of exam unable to be completed due to telephone visits        Assessment/Plan:    Assessment & Plan     ASSESSMENT/PLAN:      ICD-10-CM    1. Suspected COVID-19 virus infection  Z20.828 Symptomatic COVID-19 Virus (Coronavirus) by PCR   2. Nonallergic rhinitis  J31.0 fluticasone (FLONASE) 50 MCG/ACT nasal spray   3. Sinus pressure  J34.89        Patient Instructions   flonase once daily as needed  Nasal saline twice daily as needed  Sudafed once daily as needed  Ibuprofen 600 mg every 6 hours as needed    Return in about 1 week (around 1/6/2021) for if not improving or if worsening.    MARVIN [facility]    Phone call duration:  9 minutes

## 2020-12-30 NOTE — TELEPHONE ENCOUNTER
Reason for Call:  Other prescription    Detailed comments: Dia from [facility] called and stated that Humberto does not need flonase script as he already has PRN script for that.  Wanted you to know that they will not be picking up. Thank you..Elke Chaparro    Phone Number Patient can be reached at: Home number on file [phone] (home)  Dia    Best Time:  Any time if questions    Call taken on 12/30/2020 at 2:40 PM by Elke Chaparro

## 2020-12-31 ENCOUNTER — TELEPHONE (OUTPATIENT)
Dept: FAMILY MEDICINE | Facility: CLINIC | Age: 40
End: 2020-12-31

## 2020-12-31 DIAGNOSIS — Z79.899 NEED FOR PROPHYLACTIC CHEMOTHERAPY: ICD-10-CM

## 2020-12-31 LAB — VALPROATE SERPL-MCNC: 55 MG/L (ref 50–100)

## 2020-12-31 PROCEDURE — U0003 INFECTIOUS AGENT DETECTION BY NUCLEIC ACID (DNA OR RNA); SEVERE ACUTE RESPIRATORY SYNDROME CORONAVIRUS 2 (SARS-COV-2) (CORONAVIRUS DISEASE [COVID-19]), AMPLIFIED PROBE TECHNIQUE, MAKING USE OF HIGH THROUGHPUT TECHNOLOGIES AS DESCRIBED BY CMS-2020-01-R: HCPCS | Performed by: PHYSICIAN ASSISTANT

## 2020-12-31 PROCEDURE — 36415 COLL VENOUS BLD VENIPUNCTURE: CPT | Performed by: NURSE PRACTITIONER

## 2020-12-31 PROCEDURE — 80164 ASSAY DIPROPYLACETIC ACD TOT: CPT | Performed by: NURSE PRACTITIONER

## 2020-12-31 NOTE — TELEPHONE ENCOUNTER
Reason for Call:  Form, our goal is to have forms completed with 72 hours, however, some forms may require a visit or additional information.    Type of letter, form or note:  medical / [place] [employer]    Who is the form from?: [place] Dept [employer] (if other please explain)    Where did the form come from: form was faxed in    What clinic location was the form placed at?: [facility]     Where the form was placed: Given to physician / John    What number is listed as a contact on the form?: [phone]       Additional:   Can fax to [phone]    Call taken on 12/31/2020 at 8:28 AM by Elke Chaparro

## 2020-12-31 NOTE — PATIENT INSTRUCTIONS
flonase once daily as needed  Nasal saline twice daily as needed  Sudafed once daily as needed  Ibuprofen 600 mg every 6 hours as needed

## 2021-01-08 ENCOUNTER — OFFICE VISIT (OUTPATIENT)
Dept: FAMILY MEDICINE | Facility: CLINIC | Age: 41
End: 2021-01-08
Payer: MEDICARE

## 2021-01-08 VITALS
BODY MASS INDEX: 41.46 KG/M2 | HEIGHT: 63 IN | DIASTOLIC BLOOD PRESSURE: 62 MMHG | SYSTOLIC BLOOD PRESSURE: 108 MMHG | WEIGHT: 234 LBS | OXYGEN SATURATION: 97 % | HEART RATE: 81 BPM | RESPIRATION RATE: 16 BRPM | TEMPERATURE: 97.2 F

## 2021-01-08 DIAGNOSIS — J01.90 ACUTE SINUSITIS WITH SYMPTOMS > 10 DAYS: Primary | ICD-10-CM

## 2021-01-08 PROCEDURE — 99213 OFFICE O/P EST LOW 20 MIN: CPT | Performed by: NURSE PRACTITIONER

## 2021-01-08 RX ORDER — DOXYCYCLINE HYCLATE 100 MG
100 TABLET ORAL 2 TIMES DAILY
Qty: 20 TABLET | Refills: 0 | Status: SHIPPED | OUTPATIENT
Start: 2021-01-08 | End: 2021-01-18

## 2021-01-08 ASSESSMENT — MIFFLIN-ST. JEOR: SCORE: 1866.55

## 2021-01-08 NOTE — PROGRESS NOTES
"  Assessment & Plan     Acute sinusitis with symptoms > 10 days  Given length of symptoms, will treat with doxycycline. Continue Flonase. Let me know if not improving.  - doxycycline hyclate (VIBRA-TABS) 100 MG tablet; Take 1 tablet (100 mg) by mouth 2 times daily for 10 days                       Patient Instructions   Start doxycycline twice daily.  Continue Flonase.  Let us know if not improving.      Return in about 1 week (around 1/15/2021) for worsening or continued symptoms.    [facility]    Cleve Paul is a 40 year old who presents to clinic today for the following health issues  accompanied by his group home staff:    HPI       Acute Illness  Acute illness concerns: ? Sinus infection  Onset/Duration: X 1 1/2 weeks  Symptoms:  Fever: no  Chills/Sweats: no  Headache (location?): YES  Sinus Pressure: YES  Conjunctivitis:  YES  Ear Pain: YES: bilateral  Rhinorrhea: YES, a lot of green drainage.   Congestion: YES  Sore Throat: YES, a little bit  Cough: no  Wheeze: no  Decreased Appetite: no  Nausea: no  Vomiting: no  Diarrhea: no  Dysuria/Freq.: no  Dysuria or Hematuria: no  Fatigue/Achiness: no  Sick/Strep Exposure: no  Therapies tried and outcome: nasal spray, tylenol    Above HPI reviewed. Additionally, group home staff report increasing behaviors due to headaches. Facial pain, nasal discharge. Negative COVID test on 12/31.         Review of Systems   Constitutional, HEENT, cardiovascular, pulmonary, gi and gu systems are negative, except as otherwise noted.      Objective    /62   Pulse 81   Temp 97.2  F (36.2  C) (Tympanic)   Resp 16   Ht 1.6 m (5' 3\")   Wt 106.1 kg (234 lb)   SpO2 97%   BMI 41.45 kg/m    Body mass index is 41.45 kg/m .  Physical Exam  Vitals signs and nursing note reviewed.   Constitutional:       Appearance: Normal appearance.   HENT:      Head: Normocephalic and atraumatic.      Right Ear: Tympanic membrane and " ear canal normal.      Left Ear: Tympanic membrane and ear canal normal.      Nose: Rhinorrhea present. Rhinorrhea is purulent.      Right Turbinates: Enlarged.      Left Turbinates: Enlarged.      Right Sinus: Maxillary sinus tenderness and frontal sinus tenderness present.      Left Sinus: Maxillary sinus tenderness and frontal sinus tenderness present.      Mouth/Throat:      Lips: Pink.      Mouth: Mucous membranes are moist.      Pharynx: Oropharynx is clear.   Eyes:      General: Lids are normal.      Conjunctiva/sclera: Conjunctivae normal.      Comments: Non icteric   Neck:      Musculoskeletal: Neck supple.   Cardiovascular:      Rate and Rhythm: Normal rate and regular rhythm.      Pulses: Normal pulses.      Heart sounds: Normal heart sounds, S1 normal and S2 normal.   Pulmonary:      Effort: Pulmonary effort is normal.      Breath sounds: Normal breath sounds and air entry.   Lymphadenopathy:      Cervical: No cervical adenopathy.   Skin:     General: Skin is warm and dry.   Neurological:      General: No focal deficit present.      Mental Status: He is alert and oriented to person, place, and time.   Psychiatric:         Mood and Affect: Mood normal.         Behavior: Behavior normal.         Thought Content: Thought content normal.         Judgment: Judgment normal.

## 2021-01-13 ENCOUNTER — OFFICE VISIT (OUTPATIENT)
Dept: FAMILY MEDICINE | Facility: CLINIC | Age: 41
End: 2021-01-13
Payer: MEDICARE

## 2021-01-13 VITALS
TEMPERATURE: 96.6 F | SYSTOLIC BLOOD PRESSURE: 104 MMHG | OXYGEN SATURATION: 96 % | HEIGHT: 63 IN | BODY MASS INDEX: 40.43 KG/M2 | DIASTOLIC BLOOD PRESSURE: 58 MMHG | RESPIRATION RATE: 16 BRPM | WEIGHT: 228.2 LBS | HEART RATE: 84 BPM

## 2021-01-13 DIAGNOSIS — J01.90 ACUTE NON-RECURRENT SINUSITIS, UNSPECIFIED LOCATION: Primary | ICD-10-CM

## 2021-01-13 PROCEDURE — 99213 OFFICE O/P EST LOW 20 MIN: CPT | Performed by: FAMILY MEDICINE

## 2021-01-13 ASSESSMENT — MIFFLIN-ST. JEOR: SCORE: 1840.24

## 2021-01-13 NOTE — PATIENT INSTRUCTIONS
Patient Education     Understanding Acute Rhinosinusitis  Acute rhinosinusitis is when the lining of the inside of the nose and the sinuses becomes irritated and swollen. It is also called sinusitis, or a sinus infection.   Sinuses are air-filled spaces in the skull behind the face. They are kept moist and clean by a lining of mucosa. Things such as pollen, smoke, and chemical fumes can irritate the mucosa. It can then swell up. As a response to irritation, the mucosa makes more mucus and other fluids. Tiny hairlike cilia cover the mucosa. Cilia help carry mucus toward the opening of the sinus. Too much mucus may cause the cilia to stop working. This blocks the sinus opening. A buildup of fluid in the sinuses then causes pain and pressure. It can also cause bacteria to grow in the sinuses.     What causes acute rhinosinusitis?  A sinus infection is most often caused by a virus. You are more likely to get one after having a cold or the flu. In some cases, a sinus infection can be caused by bacteria.   You are at higher risk for a sinus infection if you:     Are older in age    Have structural problems with your sinuses    Smoke or are exposed to secondhand smoke    Are exposed to changes in pressure, such as from flying a lot or deep sea diving    Have asthma or allergies    Have a weak immune system    Have dental disease  Symptoms of acute rhinosinusitis  Symptoms of acute rhinosinusitis often last around 7 to 10 days. If you have a bacterial infection, they may last longer. They may also get better but then worsen. You may have:     Face pain or pressure under the eyes and around the nose    Headache    Fluid draining in the back of the throat (postnasal drip)    Congestion    Drainage that is thick and colored (often green), instead of clear    Cough    Problems with your sense of smell    Ear pain or hearing problems    Fever    Tooth pain    Fatigue  Diagnosing acute rhinosinusitis  Your healthcare provider  will ask about your symptoms and past health. He or she will look at your ears, nose, throat, and sinuses. Imaging tests, such as X-rays, are often not needed.   It can be hard to figure out if a sinus infection is caused by a virus or bacterium. A bacterial infection tends to last longer. Symptoms may also get better but then worsen. Your healthcare provider may take a sample of mucus from your nose to check for bacteria.   Treating acute rhinosinusitis  Most sinus infections will go away within 10 days. Your body will fight off the virus. If your symptoms seem to get better but then worsen, you may have a bacterial infection instead. Your healthcare provider will then give you antibiotics. Take this medicine until it is gone, even if you feel better.   To help ease your symptoms, your healthcare provider may advise:     Over-the-counter pain relievers. Medicines such as acetaminophen or ibuprofen can ease sinus pain. They may also lower a fever.    Nasal washes. Washing your nasal passages with salt water may ease pain and pressure. It can rinse out mucous and other irritants from your sinuses. Your healthcare provider can show you how to do it.    Nasal steroid spray. This prescription medicine can reduce inflammation in your sinuses.    Other medicines. Decongestants, antihistamines, and other nasal sprays may give short-term relief. They may help with congestion. Talk with your healthcare provider before taking these medicines.    Preventing acute rhinosinusitis  You can help prevent a sinus infection with these steps:     Wash your hands well and often.    Stay away from people who have a cold or upper respiratory infection.    Don't smoke. And stay away from secondhand smoke.    Use a humidifier at home.    Make sure you are up-to-date on your vaccines, such as the flu shot.    When to call your healthcare provider  Call your healthcare provider right away if you have any of these:     Fever of 100.4 F (38 C)  or higher, or as directed by your healthcare provider    Pain that gets worse    Symptoms that don t get better, or get worse    New symptoms  Jose last reviewed this educational content on 6/1/2019 2000-2020 [employer]. [address] [place] [address], [place] [address]. All rights reserved. This information is not intended as a substitute for professional medical care. Always follow your healthcare professional's instructions.         Augmentin twice daily for 7 days   Continue with flonase  If not improved after 5-7 days of being on treatment recommend re-evaluation for potential imaging.

## 2021-01-13 NOTE — PROGRESS NOTES
Assessment & Plan     Acute non-recurrent sinusitis, unspecified location  -Patient continues to have sinusitis symptoms.  Was given doxycycline and completed this course without relief.  Will provide with Augmentin to be taken for the next week.  Medication side effects discussed.  Encouraged to continue to use Flonase.  If after 5 to 7 days on treatment symptoms or not improving or if worsening would recommend a return visit and at that time will obtain CT imaging of the sinuses for further evaluation if deemed appropriate return visit.  - amoxicillin-clavulanate (AUGMENTIN) 875-125 MG tablet  Dispense: 14 tablet; Refill: 0    Follow up as needed.      [facility] is a 40 year [facility] who presents to clinic today for the following health issues  accompanied by Janey ( caregiver) :    HPI     Acute Illness   Acute illness concerns: Sinus Infection.   Onset: patient was seen 01/08/2021 for Sinus Infection - at that point patient reported sx for 1.5 weeks. Pt was prescribed Doxy 100mg to be taken BID x 10 days and advised to continue flonase nasal spray. Has been missing work due to not feeling well. Does not need a work note per Janey - care giver.     Fever: no    Chills/Sweats: no    Headache (location?): YES    Sinus Pressure:YES    Conjunctivitis:  YES: bilateral    Ear Pain: YES: bilateral    Rhinorrhea: YES    Congestion: no     Sore Throat: no      Cough: no    Wheeze: no    Decreased Appetite: no    Nausea: no    Vomiting: no    Diarrhea:  no    Dysuria/Freq.: no    Fatigue/Achiness: no    Sick/Strep Exposure: no     Therapies Tried and outcome: Doxy 100mg BID x 10 - no improvement. Flonase Nasal spray. Tylenol.     Continues to have sinus pressure and also headaches.  No fevers.    Review of Systems   Constitutional, HEENT, cardiovascular, pulmonary, gi and gu systems are negative, except as otherwise noted.      Objective    /58 (BP  "Location: Left arm, Patient Position: Sitting, Cuff Size: Adult Large)   Pulse 84   Temp 96.6  F (35.9  C) (Tympanic)   Resp 16   Ht 1.6 m (5' 3\")   Wt 103.5 kg (228 lb 3.2 oz)   SpO2 96%   BMI 40.42 kg/m    Body mass index is 40.42 kg/m .  Physical Exam   General: alert, cooperative, no acute distress   HEENT: PERRL. Ears without signs of infection. Nasal turbinates inflamed. Oropharynx clear, non-erythematous. Tender to palpation over the maxillary sinuses bilaterally.   Neck: Supple, no lymphadenopathy.   CV: RRR, no murmur  Resp: non-labored breathing, clear to auscultation, no wheezing or rales   Abdomen: Soft, non-tender, no guarding.   Extremities: No peripheral edema, calves non-tender.     "

## 2021-02-09 ENCOUNTER — VIRTUAL VISIT (OUTPATIENT)
Dept: ENDOCRINOLOGY | Facility: CLINIC | Age: 41
End: 2021-02-09
Payer: MEDICARE

## 2021-02-09 VITALS — BODY MASS INDEX: 35.79 KG/M2 | HEIGHT: 67 IN | WEIGHT: 228 LBS

## 2021-02-09 DIAGNOSIS — E66.09 OBESITY DUE TO EXCESS CALORIES, UNSPECIFIED OBESITY SEVERITY: ICD-10-CM

## 2021-02-09 PROCEDURE — 99442 PR PHYSICIAN TELEPHONE EVALUATION 11-20 MIN: CPT | Mod: 95 | Performed by: INTERNAL MEDICINE

## 2021-02-09 RX ORDER — TOPIRAMATE 100 MG/1
150 TABLET, FILM COATED ORAL DAILY
Qty: 135 TABLET | Refills: 2 | Status: SHIPPED | OUTPATIENT
Start: 2021-02-09 | End: 2021-05-11

## 2021-02-09 ASSESSMENT — PAIN SCALES - GENERAL: PAINLEVEL: NO PAIN (0)

## 2021-02-09 ASSESSMENT — MIFFLIN-ST. JEOR: SCORE: 1902.83
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
## 2021-02-09 NOTE — NURSING NOTE
"Chief Complaint   Patient presents with     Follow Up     Follow-up Weight Management       Vitals:    02/09/21 1557   Weight: 103.4 kg (228 lb)   Height: 1.702 m (5' 7\")       Body mass index is 35.71 kg/m .      :                      "

## 2021-02-09 NOTE — PATIENT INSTRUCTIONS
Continue topiramate 150 mg daily  Encourage to walk    Return in 3 months    If you have any questions, please do not hesitate to call Weight management clinic at [phone] or [phone].  If you need to fax, please fax to [phone].    Sincerely,    Chasidy Juarez MD  Endocrinology
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
## 2021-02-10 ENCOUNTER — VIRTUAL VISIT (OUTPATIENT)
Dept: PHARMACY | Facility: CLINIC | Age: 41
End: 2021-02-10
Payer: MEDICAID

## 2021-02-10 ENCOUNTER — TELEPHONE (OUTPATIENT)
Dept: ENDOCRINOLOGY | Facility: CLINIC | Age: 41
End: 2021-02-10

## 2021-02-10 DIAGNOSIS — K59.00 CONSTIPATION, UNSPECIFIED CONSTIPATION TYPE: ICD-10-CM

## 2021-02-10 DIAGNOSIS — K21.00 GASTROESOPHAGEAL REFLUX DISEASE WITH ESOPHAGITIS WITHOUT HEMORRHAGE: ICD-10-CM

## 2021-02-10 DIAGNOSIS — E66.09 CLASS 2 OBESITY DUE TO EXCESS CALORIES WITH BODY MASS INDEX (BMI) OF 35.0 TO 35.9 IN ADULT, UNSPECIFIED WHETHER SERIOUS COMORBIDITY PRESENT: Primary | ICD-10-CM

## 2021-02-10 DIAGNOSIS — G44.209 TENSION-TYPE HEADACHE, NOT INTRACTABLE, UNSPECIFIED CHRONICITY PATTERN: ICD-10-CM

## 2021-02-10 DIAGNOSIS — F32.A DEPRESSION, UNSPECIFIED DEPRESSION TYPE: ICD-10-CM

## 2021-02-10 DIAGNOSIS — R73.03 PREDIABETES: ICD-10-CM

## 2021-02-10 DIAGNOSIS — E03.8 SUBCLINICAL HYPOTHYROIDISM: ICD-10-CM

## 2021-02-10 DIAGNOSIS — F41.9 ANXIETY: ICD-10-CM

## 2021-02-10 DIAGNOSIS — F60.3 EXPLOSIVE PERSONALITY DISORDER (H): ICD-10-CM

## 2021-02-10 DIAGNOSIS — E66.812 CLASS 2 OBESITY DUE TO EXCESS CALORIES WITH BODY MASS INDEX (BMI) OF 35.0 TO 35.9 IN ADULT, UNSPECIFIED WHETHER SERIOUS COMORBIDITY PRESENT: Primary | ICD-10-CM

## 2021-02-10 DIAGNOSIS — R21 RASH AND NONSPECIFIC SKIN ERUPTION: ICD-10-CM

## 2021-02-10 DIAGNOSIS — J30.2 SEASONAL ALLERGIES: ICD-10-CM

## 2021-02-10 DIAGNOSIS — Z00.00 ROUTINE GENERAL MEDICAL EXAMINATION AT A HEALTH CARE FACILITY: ICD-10-CM

## 2021-02-10 PROCEDURE — 99607 MTMS BY PHARM ADDL 15 MIN: CPT | Mod: TEL | Performed by: PHARMACIST

## 2021-02-10 PROCEDURE — 99605 MTMS BY PHARM NP 15 MIN: CPT | Mod: TEL | Performed by: PHARMACIST

## 2021-02-10 NOTE — TELEPHONE ENCOUNTER
Reason for call:  Other   Patient called regarding (reason for call): paperwork  Additional comments: Janey at [facility] needs patient's last appointment with MD Umaña's After Care Summary faxed to the [facility], so they can add it to the patient file. Please fax to [phone]. Call Janey with any further questions.     Phone number to reach patient:  Other phone number:  Janey [phone]*    Best Time:  Anytime     Can we leave a detailed message on this number?  YES    Travel screening: Not Applicable

## 2021-02-10 NOTE — Clinical Note
Hi Dr. Brown - completed yearly comprehensive review of medications. See staff message I sent to you. Thank you, Erica LAMAR
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
## 2021-02-10 NOTE — Clinical Note
Completed yearly comprehensive review of medications. He is doing well. Tolerating most meds. Has had good weight loss thus far. Discussed getting back to previous activity. Erica LAMAR

## 2021-02-11 ENCOUNTER — OFFICE VISIT (OUTPATIENT)
Dept: FAMILY MEDICINE | Facility: CLINIC | Age: 41
End: 2021-02-11
Payer: MEDICARE

## 2021-02-11 VITALS
BODY MASS INDEX: 35.71 KG/M2 | DIASTOLIC BLOOD PRESSURE: 72 MMHG | HEART RATE: 78 BPM | TEMPERATURE: 97.7 F | RESPIRATION RATE: 12 BRPM | OXYGEN SATURATION: 98 % | SYSTOLIC BLOOD PRESSURE: 114 MMHG | WEIGHT: 228 LBS

## 2021-02-11 DIAGNOSIS — M25.562 ACUTE PAIN OF BOTH KNEES: Primary | ICD-10-CM

## 2021-02-11 DIAGNOSIS — M79.652 PAIN IN BOTH THIGHS: ICD-10-CM

## 2021-02-11 DIAGNOSIS — M25.561 ACUTE PAIN OF BOTH KNEES: Primary | ICD-10-CM

## 2021-02-11 DIAGNOSIS — R21 RASH: ICD-10-CM

## 2021-02-11 DIAGNOSIS — M79.651 PAIN IN BOTH THIGHS: ICD-10-CM

## 2021-02-11 LAB
CK SERPL-CCNC: 138 U/L (ref 30–300)
CRP SERPL-MCNC: 4.2 MG/L (ref 0–8)
ERYTHROCYTE [DISTWIDTH] IN BLOOD BY AUTOMATED COUNT: 14.5 % (ref 10–15)
ERYTHROCYTE [SEDIMENTATION RATE] IN BLOOD BY WESTERGREN METHOD: 16 MM/H (ref 0–15)
HCT VFR BLD AUTO: 45 % (ref 40–53)
HGB BLD-MCNC: 15 G/DL (ref 13.3–17.7)
MCH RBC QN AUTO: 33.9 PG (ref 26.5–33)
MCHC RBC AUTO-ENTMCNC: 33.3 G/DL (ref 31.5–36.5)
MCV RBC AUTO: 102 FL (ref 78–100)
PLATELET # BLD AUTO: 219 10E9/L (ref 150–450)
RBC # BLD AUTO: 4.43 10E12/L (ref 4.4–5.9)
URATE SERPL-MCNC: 8.5 MG/DL (ref 3.5–7.2)
WBC # BLD AUTO: 3.9 10E9/L (ref 4–11)

## 2021-02-11 PROCEDURE — 85027 COMPLETE CBC AUTOMATED: CPT | Performed by: INTERNAL MEDICINE

## 2021-02-11 PROCEDURE — 82550 ASSAY OF CK (CPK): CPT | Performed by: INTERNAL MEDICINE

## 2021-02-11 PROCEDURE — 84550 ASSAY OF BLOOD/URIC ACID: CPT | Performed by: INTERNAL MEDICINE

## 2021-02-11 PROCEDURE — 36415 COLL VENOUS BLD VENIPUNCTURE: CPT | Performed by: INTERNAL MEDICINE

## 2021-02-11 PROCEDURE — 86140 C-REACTIVE PROTEIN: CPT | Performed by: INTERNAL MEDICINE

## 2021-02-11 PROCEDURE — 99213 OFFICE O/P EST LOW 20 MIN: CPT | Performed by: INTERNAL MEDICINE

## 2021-02-11 PROCEDURE — 85652 RBC SED RATE AUTOMATED: CPT | Performed by: INTERNAL MEDICINE

## 2021-02-11 RX ORDER — PSEUDOEPHEDRINE HCL 120 MG/1
120 TABLET, FILM COATED, EXTENDED RELEASE ORAL DAILY PRN
COMMUNITY
End: 2021-07-26

## 2021-02-11 RX ORDER — TRIAMCINOLONE ACETONIDE 1 MG/G
CREAM TOPICAL 2 TIMES DAILY
Qty: 45 G | Refills: 1 | Status: SHIPPED | OUTPATIENT
Start: 2021-02-11 | End: 2021-02-16

## 2021-02-11 NOTE — LETTER
"February 12, 2021      Humberto Estrella  [address] [address]        Dear ,    We are writing to inform you of your test results.    \"Please let Humberto/his group home staff know that one of his inflammatory markers is just barely above normal and the other is normal, so overall looks like there is not a significant inflammatory process occurring.  He does have elevated uric acid, so may be prone to gout attacks, but I do not think his current symptoms are consistent with gout.  If gets an episode of intense pain with swelling and redness and warmth in a joint in the future, gout should be considered.  His muscle enzyme level is normal.  Blood cell counts are fine, similar to his baseline.     He should proceed with the x-rays as planned.\"    Resulted Orders   CBC with platelets   Result Value Ref Range    WBC 3.9 (L) 4.0 - 11.0 10e9/L    RBC Count 4.43 4.4 - 5.9 10e12/L    Hemoglobin 15.0 13.3 - 17.7 g/dL    Hematocrit 45.0 40.0 - 53.0 %     (H) 78 - 100 fl    MCH 33.9 (H) 26.5 - 33.0 pg    MCHC 33.3 31.5 - 36.5 g/dL    RDW 14.5 10.0 - 15.0 %    Platelet Count 219 150 - 450 10e9/L   ESR: Erythrocyte sedimentation rate   Result Value Ref Range    Sed Rate 16 (H) 0 - 15 mm/h   CRP, inflammation   Result Value Ref Range    CRP Inflammation 4.2 0.0 - 8.0 mg/L   Uric acid   Result Value Ref Range    Uric Acid 8.5 (H) 3.5 - 7.2 mg/dL   CK total   Result Value Ref Range    CK Total 138 30 - 300 U/L       If you have any questions or concerns, please call the clinic at the number listed above.       Sincerely,      Alistair Beaver MD          "

## 2021-02-11 NOTE — PATIENT INSTRUCTIONS
Treatments for arthritis:  1. Over the counter pain medications   A. Tylenol (Acetaminophen) 500-1000 mg 3 x day as needed   B. Ibuprofen (or other NSAIDs such as Aleve, Aspirin, Advil) 400-600 mg 3 x day as needed - can alternate with Tylenol (max 3000mg per day)   C. Supplement such as Glucosamine with Chondroitin    D. Voltaren gel (topical anti-inflammatory)  2. Over the counter topical   A. Aspercream with Lidocaine, Capsaicin, Icy Hot, Biofreeze, Salon Pas, Blue Emu  3. Prescription pain medications (NSAIDS)   A. Celebrex 100-200 mg 2 x day as needed.  Similar to Ibuprofen, cannot take along with Celebrex   B. Prescription Ibuprofen  4. Physical therapy   5. Heat, ice, stretching, braces, modified activity  6. Sports Medicine or Orthopedics for Injections (steroids, 'rooster comb')  7. Joint replacement

## 2021-02-11 NOTE — NURSING NOTE
"Initial /72   Pulse 78   Temp 97.7  F (36.5  C) (Tympanic)   Resp 12   Wt 103.4 kg (228 lb)   SpO2 98%   BMI 35.71 kg/m   Estimated body mass index is 35.71 kg/m  as calculated from the following:    Height as of 2/9/21: 1.702 m (5' 7\").    Weight as of this encounter: 103.4 kg (228 lb). .      "

## 2021-02-11 NOTE — PROGRESS NOTES
"    Assessment & Plan     Acute pain of both knees  And  Pain in both thighs    Humberto presents with 2 days of bilateral knee pain with no injury and non-specific exam- he reports pain to palpation all over in the knees, thighs, and calves.  He reports some swelling and maybe some redness, though sounds like the latter may be just related to the rash behind the knees.  We'll check some inflammatory markers as well as CBC and uric acid, though both infection and gout seem unlikely since symptoms are bilateral.  Check CK as he also reports diffuse leg muscle pain.  We will have him return for knee x-rays since the x-ray machine is down today.  He is very concerned about arthritis- we discussed possible treatments for arthritis if this is in fact present.  He has been using Tylenol with some relief, so can continue this and plan to alternate with ibuprofen.      - CBC with platelets  - ESR: Erythrocyte sedimentation rate  - CRP, inflammation  - Uric acid  - XR Knee Bilateral 3 Views; Future  - CK total    Rash    Rash behind knees appears like a heat rash- will try triamcinolone.  Follow-up as needed if not improving in 2 weeks or new/worsening symptoms develop.       - triamcinolone (KENALOG) 0.1 % external cream; Apply topically 2 times daily For up to 14 days.  Can resume after a 1 week break if needed.    Assessment requiring an independent historian(s) - group home staff      Alistair Beaver MD  [facility]   Humberto is a 40 year old who presents for the following health issues  accompanied by his .    HPI     Knee Pain    Symptoms for 2 days, states no injury/incident.     States that bilateral knees \"bones\" hurts, and feel very tight at knees and feel swollen.  Has some pain in the thighs and knee caps.      States knees crack a lot.    Wondering if this is due to arthritis, would like to do bloodwork    Increased pain with walking, moving to stand.     Has tried " Tylenol. States was helpful.    Lost weight.    Worker states that there is a rash on posterior right knee, just noticed this yesterday, Better with hydrocortisone cream.    Rates knee pain at 5/10 today. Skipped work today because of the pain.    No similar issues in the past.    Review of Systems   Constitutional, MSK systems are negative, except as otherwise noted.      Objective    /72   Pulse 78   Temp 97.7  F (36.5  C) (Tympanic)   Resp 12   Wt 103.4 kg (228 lb)   SpO2 98%   BMI 35.71 kg/m    Body mass index is 35.71 kg/m .  Physical Exam     GENERAL: healthy, alert and no distress  MS: bilateral knee: pain to palpation diffusely without obvious swelling, has some papular rash behind both knees greater on the R, no joint laxity, he reports pain with both varus and valgus stress test.  Reports no pain with rotation of the lower legs

## 2021-02-15 ENCOUNTER — ANCILLARY PROCEDURE (OUTPATIENT)
Dept: GENERAL RADIOLOGY | Facility: CLINIC | Age: 41
End: 2021-02-15
Attending: INTERNAL MEDICINE
Payer: MEDICARE

## 2021-02-15 DIAGNOSIS — M25.561 ACUTE PAIN OF BOTH KNEES: ICD-10-CM

## 2021-02-15 DIAGNOSIS — M25.562 ACUTE PAIN OF BOTH KNEES: ICD-10-CM

## 2021-02-15 PROCEDURE — 73562 X-RAY EXAM OF KNEE 3: CPT | Mod: RT | Performed by: RADIOLOGY

## 2021-02-16 ENCOUNTER — TELEPHONE (OUTPATIENT)
Dept: ENDOCRINOLOGY | Facility: CLINIC | Age: 41
End: 2021-02-16

## 2021-02-16 NOTE — TELEPHONE ENCOUNTER
Janey from Humberto's group home needs the After Visit Summary from Dr. Umaña's 2/9 visit faxed over.    Fax: [phone] Attn: Janey

## 2021-02-17 ENCOUNTER — TELEPHONE (OUTPATIENT)
Dept: INTERNAL MEDICINE | Facility: CLINIC | Age: 41
End: 2021-02-17

## 2021-02-17 NOTE — PROGRESS NOTES
Update 2/17/21:     Dr. Carla Brown has authorized switched psyllium powder to capsule. Will send into pharmacy and inform patient.       Lauren Bloch, PharmGABBY  Medication Therapy Management Pharmacist   [facility]      Hanson, Rebecca Sara, DO Bloch, Lauren Turner, [facility]             That is fine to switch it.  Do I need to put order in?  If so, which pharmacy?     Thanks     Dr. Carla Brown DO   [facility]    Previous Messages    ----- Message -----   From: Bloch, Lauren Turner, [facility]   Sent: 2/16/2021   8:16 PM CST   To: Carla Brown, DO   Subject: Switchign Reguloid from powder to capsule?       Hi Dr. Brown,     I completed yearly comprehensive review of medication. I have been referred to this patient from an Endocrinologist at my [facility], Dr. Umaña. I was wondering your thoughts to switch Humberto's Reguloid (psyllium) from powder to capsule? He is taking 1 tsp SF which equals around 3.4 g psyllium daily so was going to suggest 2 capsules BID for now. He doesn't like the powder to liquid and would prefer a capsule if covered.     Let me know your thoughts! Thank you!   Lauren Bloch, PharmGABBY   Medication Therapy Management Pharmacist   [facility]

## 2021-02-17 NOTE — TELEPHONE ENCOUNTER
Faxed Imaging Results from 2/15/2021 to Janey at patient's Group Home, [phone]. Awilda Guadarrama on 2/17/2021 at 11:42 AM

## 2021-02-22 ENCOUNTER — TELEPHONE (OUTPATIENT)
Dept: INTERNAL MEDICINE | Facility: CLINIC | Age: 41
End: 2021-02-22

## 2021-02-22 DIAGNOSIS — K59.01 SLOW TRANSIT CONSTIPATION: Primary | ICD-10-CM

## 2021-02-22 RX ORDER — CALCIUM POLYCARBOPHIL 625 MG 625 MG/1
2 TABLET ORAL 2 TIMES DAILY
Qty: 360 TABLET | Refills: 3 | Status: SHIPPED | OUTPATIENT
Start: 2021-02-22 | End: 2023-09-08

## 2021-02-22 NOTE — TELEPHONE ENCOUNTER
Forwarding below request for alternative to PCP.  I could not find a psyllium tablet order to pend.      Dea Wilder RN  [facility]

## 2021-02-22 NOTE — TELEPHONE ENCOUNTER
Reason for Call: Request for an order:    Order or referral being requested:  Need order to d/c 1 tsp Reguloid powder    Date needed: as soon as possible    Has the patient been seen by the PCP for this problem? YES    Phone number Patient can be reached at: [facility], [phone]    Best Time:  Any    Can we leave a detailed message on this number?  YES    Call taken on 2/22/2021 at 4:33 PM by Awilda Guadarrama

## 2021-02-22 NOTE — LETTER
February 23, 2021      Humberto Estrella  [address] [facility] [address]        To Whom It May Concern,      Please discontinue Reguloid Powder.  Patient started Fibercon 625 mg-Take 2 tablets (1250 mg) by mouth 2 times daily as of 2/22/21.       Sincerely,      Carla Brown DO/Dea Wilder RN

## 2021-02-23 NOTE — TELEPHONE ENCOUNTER
Group home called back stating that they can not take a verbal and requesting orders to be faxed [phone]: jack cristina.      Shanna VILLASENOR  Station

## 2021-02-23 NOTE — TELEPHONE ENCOUNTER
Forwarding request for order to PCP.  Letter pended, okay to fax?    Returned call to group home to see if they can accept a verbal order, as RN sees Reguloid has been discontinued and Fibercon ordered by PCP.   They need a written.  Will prep for PCP approval.     Dea Wilder RN   [facility]

## 2021-02-24 NOTE — TELEPHONE ENCOUNTER
Janey from group home called stating that orders were not received as fax was broken.     Please refax to [phone]    Shanna VILLASENOR  Station

## 2021-03-18 ENCOUNTER — HOSPITAL ENCOUNTER (OUTPATIENT)
Facility: CLINIC | Age: 41
Setting detail: OBSERVATION
Discharge: NURSING, REHAB OR LONG-TERM CARE | End: 2021-03-19
Attending: EMERGENCY MEDICINE | Admitting: FAMILY MEDICINE
Payer: MEDICARE

## 2021-03-18 DIAGNOSIS — T50.904A DRUG OVERDOSE, UNDETERMINED INTENT, INITIAL ENCOUNTER: ICD-10-CM

## 2021-03-18 DIAGNOSIS — T50.914A: ICD-10-CM

## 2021-03-18 DIAGNOSIS — Z11.52 ENCOUNTER FOR SCREENING LABORATORY TESTING FOR COVID-19 VIRUS: ICD-10-CM

## 2021-03-18 DIAGNOSIS — Q90.9 DOWN'S SYNDROME: Chronic | ICD-10-CM

## 2021-03-18 LAB
ALBUMIN SERPL-MCNC: 3.2 G/DL (ref 3.4–5)
ALP SERPL-CCNC: 75 U/L (ref 40–150)
ALT SERPL W P-5'-P-CCNC: 44 U/L (ref 0–70)
AMPHETAMINES UR QL SCN: NEGATIVE
ANION GAP SERPL CALCULATED.3IONS-SCNC: 7 MMOL/L (ref 3–14)
APAP SERPL-MCNC: <2 MG/L (ref 10–20)
APTT PPP: 30 SEC (ref 22–37)
AST SERPL W P-5'-P-CCNC: 30 U/L (ref 0–45)
BARBITURATES UR QL: NEGATIVE
BASOPHILS # BLD AUTO: 0 10E9/L (ref 0–0.2)
BASOPHILS NFR BLD AUTO: 0.8 %
BENZODIAZ UR QL: NEGATIVE
BILIRUB SERPL-MCNC: 0.3 MG/DL (ref 0.2–1.3)
BUN SERPL-MCNC: 17 MG/DL (ref 7–30)
CALCIUM SERPL-MCNC: 8.9 MG/DL (ref 8.5–10.1)
CANNABINOIDS UR QL SCN: NEGATIVE
CHLORIDE SERPL-SCNC: 107 MMOL/L (ref 94–109)
CO2 SERPL-SCNC: 28 MMOL/L (ref 20–32)
COCAINE UR QL: NEGATIVE
CREAT SERPL-MCNC: 1.12 MG/DL (ref 0.66–1.25)
DIFFERENTIAL METHOD BLD: ABNORMAL
EOSINOPHIL # BLD AUTO: 0 10E9/L (ref 0–0.7)
EOSINOPHIL NFR BLD AUTO: 0.8 %
ERYTHROCYTE [DISTWIDTH] IN BLOOD BY AUTOMATED COUNT: 14.3 % (ref 10–15)
GFR SERPL CREATININE-BSD FRML MDRD: 81 ML/MIN/{1.73_M2}
GLUCOSE SERPL-MCNC: 79 MG/DL (ref 70–99)
HCT VFR BLD AUTO: 44.7 % (ref 40–53)
HGB BLD-MCNC: 15.2 G/DL (ref 13.3–17.7)
IMM GRANULOCYTES # BLD: 0 10E9/L (ref 0–0.4)
IMM GRANULOCYTES NFR BLD: 1.1 %
INR PPP: 0.99 (ref 0.86–1.14)
LABORATORY COMMENT REPORT: NORMAL
LYMPHOCYTES # BLD AUTO: 1.7 10E9/L (ref 0.8–5.3)
LYMPHOCYTES NFR BLD AUTO: 45.4 %
MCH RBC QN AUTO: 35 PG (ref 26.5–33)
MCHC RBC AUTO-ENTMCNC: 34 G/DL (ref 31.5–36.5)
MCV RBC AUTO: 103 FL (ref 78–100)
MONOCYTES # BLD AUTO: 0.5 10E9/L (ref 0–1.3)
MONOCYTES NFR BLD AUTO: 13.9 %
NEUTROPHILS # BLD AUTO: 1.4 10E9/L (ref 1.6–8.3)
NEUTROPHILS NFR BLD AUTO: 38 %
NRBC # BLD AUTO: 0 10*3/UL
NRBC BLD AUTO-RTO: 0 /100
OPIATES UR QL SCN: NEGATIVE
PCP UR QL SCN: NEGATIVE
PLATELET # BLD AUTO: 194 10E9/L (ref 150–450)
POTASSIUM SERPL-SCNC: 4 MMOL/L (ref 3.4–5.3)
PROT SERPL-MCNC: 7.7 G/DL (ref 6.8–8.8)
RBC # BLD AUTO: 4.34 10E12/L (ref 4.4–5.9)
SALICYLATES SERPL-MCNC: <2 MG/DL
SARS-COV-2 RNA RESP QL NAA+PROBE: NEGATIVE
SODIUM SERPL-SCNC: 142 MMOL/L (ref 133–144)
SPECIMEN SOURCE: NORMAL
VALPROATE SERPL-MCNC: 79 MG/L (ref 50–100)
WBC # BLD AUTO: 3.7 10E9/L (ref 4–11)

## 2021-03-18 PROCEDURE — 258N000003 HC RX IP 258 OP 636: Performed by: EMERGENCY MEDICINE

## 2021-03-18 PROCEDURE — C9803 HOPD COVID-19 SPEC COLLECT: HCPCS | Performed by: EMERGENCY MEDICINE

## 2021-03-18 PROCEDURE — 87635 SARS-COV-2 COVID-19 AMP PRB: CPT | Performed by: EMERGENCY MEDICINE

## 2021-03-18 PROCEDURE — 80179 DRUG ASSAY SALICYLATE: CPT | Performed by: EMERGENCY MEDICINE

## 2021-03-18 PROCEDURE — 80143 DRUG ASSAY ACETAMINOPHEN: CPT | Performed by: EMERGENCY MEDICINE

## 2021-03-18 PROCEDURE — 96360 HYDRATION IV INFUSION INIT: CPT | Performed by: EMERGENCY MEDICINE

## 2021-03-18 PROCEDURE — 93005 ELECTROCARDIOGRAM TRACING: CPT | Performed by: EMERGENCY MEDICINE

## 2021-03-18 PROCEDURE — 80307 DRUG TEST PRSMV CHEM ANLYZR: CPT | Performed by: EMERGENCY MEDICINE

## 2021-03-18 PROCEDURE — 80164 ASSAY DIPROPYLACETIC ACD TOT: CPT | Performed by: EMERGENCY MEDICINE

## 2021-03-18 PROCEDURE — 85025 COMPLETE CBC W/AUTO DIFF WBC: CPT | Performed by: EMERGENCY MEDICINE

## 2021-03-18 PROCEDURE — 96361 HYDRATE IV INFUSION ADD-ON: CPT | Performed by: EMERGENCY MEDICINE

## 2021-03-18 PROCEDURE — 99285 EMERGENCY DEPT VISIT HI MDM: CPT | Mod: 25 | Performed by: EMERGENCY MEDICINE

## 2021-03-18 PROCEDURE — 85610 PROTHROMBIN TIME: CPT | Performed by: EMERGENCY MEDICINE

## 2021-03-18 PROCEDURE — 80053 COMPREHEN METABOLIC PANEL: CPT | Performed by: EMERGENCY MEDICINE

## 2021-03-18 PROCEDURE — 93010 ELECTROCARDIOGRAM REPORT: CPT | Performed by: EMERGENCY MEDICINE

## 2021-03-18 PROCEDURE — 85730 THROMBOPLASTIN TIME PARTIAL: CPT | Performed by: EMERGENCY MEDICINE

## 2021-03-18 RX ADMIN — SODIUM CHLORIDE 1000 ML: 9 INJECTION, SOLUTION INTRAVENOUS at 21:16

## 2021-03-19 ENCOUNTER — DOCUMENTATION ONLY (OUTPATIENT)
Dept: OTHER | Facility: CLINIC | Age: 41
End: 2021-03-19

## 2021-03-19 ENCOUNTER — AMBULATORY - HEALTHEAST (OUTPATIENT)
Dept: OTHER | Facility: CLINIC | Age: 41
End: 2021-03-19

## 2021-03-19 ENCOUNTER — TELEPHONE (OUTPATIENT)
Dept: INTERNAL MEDICINE | Facility: CLINIC | Age: 41
End: 2021-03-19

## 2021-03-19 VITALS
DIASTOLIC BLOOD PRESSURE: 79 MMHG | BODY MASS INDEX: 36.4 KG/M2 | HEART RATE: 92 BPM | WEIGHT: 231.92 LBS | TEMPERATURE: 97.5 F | SYSTOLIC BLOOD PRESSURE: 113 MMHG | OXYGEN SATURATION: 98 % | HEIGHT: 67 IN | RESPIRATION RATE: 16 BRPM

## 2021-03-19 PROBLEM — T50.902A: Status: ACTIVE | Noted: 2021-03-19

## 2021-03-19 PROBLEM — F41.9 ANXIETY: Status: ACTIVE | Noted: 2018-11-27

## 2021-03-19 PROBLEM — T50.904A MEDICATION OVERDOSE, UNDETERMINED INTENT, INITIAL ENCOUNTER: Status: ACTIVE | Noted: 2021-03-19

## 2021-03-19 PROBLEM — F60.3 EXPLOSIVE PERSONALITY DISORDER (H): Status: ACTIVE | Noted: 2018-11-27

## 2021-03-19 PROCEDURE — 99220 PR INITIAL OBSERVATION CARE,LEVEL III: CPT | Performed by: HOSPITALIST

## 2021-03-19 PROCEDURE — G0378 HOSPITAL OBSERVATION PER HR: HCPCS

## 2021-03-19 PROCEDURE — 90791 PSYCH DIAGNOSTIC EVALUATION: CPT

## 2021-03-19 RX ORDER — DIVALPROEX SODIUM 125 MG/1
750 CAPSULE, COATED PELLETS ORAL 2 TIMES DAILY
COMMUNITY
Start: 2021-02-26 | End: 2024-01-16

## 2021-03-19 RX ORDER — ONDANSETRON 4 MG/1
4 TABLET, ORALLY DISINTEGRATING ORAL EVERY 6 HOURS PRN
Status: DISCONTINUED | OUTPATIENT
Start: 2021-03-19 | End: 2021-03-19 | Stop reason: HOSPADM

## 2021-03-19 RX ORDER — ONDANSETRON 2 MG/ML
4 INJECTION INTRAMUSCULAR; INTRAVENOUS EVERY 6 HOURS PRN
Status: DISCONTINUED | OUTPATIENT
Start: 2021-03-19 | End: 2021-03-19 | Stop reason: HOSPADM

## 2021-03-19 RX ORDER — MAGNESIUM HYDROXIDE/ALUMINUM HYDROXICE/SIMETHICONE 120; 1200; 1200 MG/30ML; MG/30ML; MG/30ML
30 SUSPENSION ORAL EVERY 4 HOURS PRN
Status: DISCONTINUED | OUTPATIENT
Start: 2021-03-19 | End: 2021-03-19

## 2021-03-19 RX ORDER — LIDOCAINE 40 MG/G
CREAM TOPICAL
Status: DISCONTINUED | OUTPATIENT
Start: 2021-03-19 | End: 2021-03-19 | Stop reason: HOSPADM

## 2021-03-19 RX ORDER — ACETAMINOPHEN 325 MG/1
650 TABLET ORAL EVERY 4 HOURS PRN
Status: DISCONTINUED | OUTPATIENT
Start: 2021-03-19 | End: 2021-03-19 | Stop reason: HOSPADM

## 2021-03-19 RX ORDER — ACETAMINOPHEN 650 MG/1
650 SUPPOSITORY RECTAL EVERY 4 HOURS PRN
Status: DISCONTINUED | OUTPATIENT
Start: 2021-03-19 | End: 2021-03-19 | Stop reason: HOSPADM

## 2021-03-19 ASSESSMENT — ACTIVITIES OF DAILY LIVING (ADL)
DEPENDENT_IADLS:: CLEANING;COOKING;LAUNDRY;SHOPPING;MEAL PREPARATION;MEDICATION MANAGEMENT;MONEY MANAGEMENT;TRANSPORTATION

## 2021-03-19 ASSESSMENT — MIFFLIN-ST. JEOR: SCORE: 1920.63

## 2021-03-19 ASSESSMENT — ENCOUNTER SYMPTOMS: POLYDIPSIA: 1

## 2021-03-19 NOTE — DISCHARGE SUMMARY
[facility]  Hospitalist Discharge Summary      Date of Admission:  3/18/2021  Date of Discharge:  3/19/2021  Discharging Provider: Carla Up PA-C    Discharge Diagnoses   Drug ingestion with intentional self harm / suicide attempt  Acute Toxic Encephalopathy, resolved  Transient respiratory failure with hypoxemia, resolved    Follow-ups Needed After Discharge     Unresulted Labs Ordered in the Past 30 Days of this Admission     No orders found for last 31 day(s).        Discharge Disposition   Discharged to group home  Condition at discharge: Stable    Hospital Course   Humberto Estrella is a 40 year old male admitted on 3/18/2021. He has a history of Down's syndrome. He lives in a group home. He was able to get into the medication cabinet which is normally locked and took his evening medications and another residents evening medications.     Drug ingestion with intentional self harm / suicide attempt  Patient reportedly ingested another group home resident's evening medications. ED initially felt this was consistent with typical behavior though upon further discussion with group home this was unusual for patient and he did make a statement of wanting to diet which he apparently quickly retracted and stated he did it because he was anxious and later angry.   He has consistently denied depressed mood or suicidal ideation to multiple providers since arrival. Does not exhibit any concerning mood in hospital, hopeful he would be able to make it to work this morning. Initially sleepy per admitting provider though on morning of discharge he was awake talkative and reports no symptoms/feels he is at his baseline. Vitals and labs unremarkable. Transient hypoxemia with sleeping as below, resolved with CPAP. ED discussed with poison control who recommended supportive cares and monitoring.  Medications ingested per ED MD:  His scheduled medications:     -Depakote 750 mg     -Topiramate 150  mg     -Risperidone 2 mg     -Montelukast 10 mg  Other group home patient scheduled medications:     -Zyprexa 5 mg     -Imipramine 25 mg     -Clozapine 125 mg     -Mirapex 0.5 mg     -Effexor  mg      -Atorvastatin  - obtained DEC assessment, during assessment he stated he did take the medications because he wanted to die. He was feeling stressed about work and not supported in this stress. He stated that he no longer felt that way, he felt safe at home and wanted to return there. They worked on a safety plan which was provided to the patient/group home on discharge. Group home comfortable with the plan.  - consistently denied any further thoughts of self harm, suicidal ideation. Mood appeared stable during hospital stay. Follow up arranged with psychiatrist in one week and with therapist in one week.   - monitored mental status, appears to be at baseline  - neuro examination, no focal concerns   - plan for discharge home to group home facility without medication privileges for now and better supervision of the medication area.  - resume home medications this evening     Acute Respiratory Failure with Hypoxemia  VIDAL  Apneic and hypoxemic episode noted when sleeping. Improved on CPAP. Lungs clear. Suspect this is related to underlying VIDAL. Improved when awake, 100% on room air.  - continue home CPAP at night     Acute Toxic Encephalopathy  Sleepy on presentation likely due to ingestion of above sedating medications per admitting provider. Appears improved on hospital day one.  - monitored mental status, appears to be at baseline  - neuro examination, no focal concerns   - resume home medications at bedtime     Anxiety / PTSD / Explosive Personality Disorder  Previously diagnosed. Highly anxious at baseline per group home staff, recently anxious about work.  - hold home Depakote, Topiramate, Risperidone, Venlafaxine until this evening, then will resume     Down's syndrome  Mild Cognitive Impairment  Resides in  "group home.      Hypothyroidism  Chronic.   - continue home levothyroxine     Obesity  Body mass index is 36.32 kg/m . Contributes to overall morbidity and mortality.  - hold home Topiramate until mental status clears  - continue home metformin    Consultations This Hospital Stay   CARE MANAGEMENT / SOCIAL WORK IP CONSULT    Code Status   Full Code    Time Spent on this Encounter   I, Carla Up PA-C, personally saw the patient today and spent greater than 30 minutes discharging this patient.     Carla Up PA-C  [facility] SURGICAL  [address] [address]  Phone: [phone]  Fax: [phone]    I have discussed patient with Dr. Mohinder Medeiros. Staff did attempt to get a hold of the patient's Brother several times who is apparently his Guardian per group home but were unable to reach him. Care transitions staff is working on getting the documentation to update the chart with the guardianship document as it is not on file.  ______________________________________________________________________  Interval History   Further history obtained from group home staff. They state he has stress and anxiety lately related to work. Apparently his guardian has been encouraging him to go to work as the patient has not been wanting to go. He has been perseverating on this for the past week and is concerned that the police will get involved if he does not go to work. The staff think he got this idea of police involvement because they have called for assistance with de-escalating him in the past due to aggressive behavior.     Recently he has been self-administering his 5 PM medications with staff supervision. He had been doing well with this so he \"graduated\" to self-administering his bedtime medications with supervision just this past week. He has been spending more time observing the staff setting up medications. Last night he was again observing this when the staff got " "pulled away for a pharmacy delivery. While medication area was unattended he pried open the locked cabinet and then took both his nightly medications and another resident's. He has not displayed this sort of behavior before and the staff stated this was highly unusual for him. They wonder if he had been watching the staff because he is interested in his new responsibility or if he was planning to open the medication cabinet. After the event he made multiple statements about why he did this. First he made a statement about wanting to die but then denied this and stated it was because he was anxious and later stated that it was because he was angry.    The staff state they have no concerns at present about self harm or suicidal intent. At times he makes passive statements about wanting to die when he is overwhelmed or upset but this is always passive and they feel it is more so because he is unable to effectively communicate his feelings surrounding being overwhelmed or upset. Recently his mood has been at it's baseline, they state he is always anxious and stressed about something and currently it just happens to be work related.    The patient states that he took the medications \"on accident\" and he has not done something like this before. Denies any intent of self harm or suicidal ideation.   Self reports that he is in good spirits lately, no reports of depressed mood. Hoping to go to work today.  Feels he is at his baseline, denies feeling sleepy, foggy thinking, lightheaded, dizziness, change in vision, change in balance, weakness.  Further denies fevers, chills, nausea, emesis, abdominal pain, headache, chest pain, shortness of breath or urinary changes.         Physical Exam   Vital Signs: Temp: 97.5  F (36.4  C) Temp src: Oral BP: 113/79 Pulse: 92   Resp: 16 SpO2: 98 % O2 Device: None (Room air) Oxygen Delivery: 2 LPM  Weight: 231 lbs 14.78 oz  Constitutional: sitting up comfortably in chair, well appearing, " "awake, alert, cooperative, no apparent distress, and appears stated age  Eyes: Lids and lashes normal, pupils equal, round and reactive to light, extra ocular muscles intact, sclera clear, conjunctiva normal. Occasional lateral nystagmus noted, though not consistently present.  ENT: oral pharynx with moist mucous membranes  Respiratory: No increased work of breathing, good air exchange, clear to auscultation bilaterally, no crackles or wheezing  Cardiovascular: regular rate and rhythm, and no murmur noted. No lower extremity edema.  GI: normal bowel sounds, soft, non-distended, non-tender  Skin: normal skin color, texture, turgor  Musculoskeletal: Moves all 4 extremities appropriately.  Neurologic: Awake, alert, oriented. Cranial nerves II-XII are grossly intact, as above lateral nystagmus noted inconsistently. Motor strength is grossly 5/5.   Neuropsychiatric: calm, cooperative. Reports \"good\" mood, affect somewhat flat though pleasant. Hopeful to go to work today, reports he has been doing his normal activities at home, enjoys living at the group home. Denies thoughts of self harm, suicidal ideation or homicidal ideation. Short term memory intact.       Primary Care Physician   Carla Brown    Discharge Orders      Reason for your hospital stay    You were seen after taking medications that belonged to another group home resident. You were monitored in the hospital to allow these medications to clear from your system. We did not observe any concerning effects related to these medications.    You may return to your group home residence. It is important that you only take your medications as prescribed. The group home staff will continue to help you with your medications. It is quite dangerous to take medications that are not prescribed to you or more medications than prescribed. If you are feeling unsafe or like you are wanting to harm yourself it is important that you left someone know. We have provided you " with the safety plan as discussed with the mental health assessment you had while in the hospital.     Follow-up and recommended labs and tests     Follow up with your mental health providers next Friday 3/26/21. Appointments have been made for you with your therapist and your psychiatrist as below.  - You have a phone visit with Kamryn Joel on March 26th at 1130 am.  - You have a follow up appointment with Zenaida Ledezma on March 26th at 3pm.     Activity    Your activity upon discharge: activity as tolerated     Diet    Follow this diet upon discharge: Regular       Significant Results and Procedures   Most Recent 3 CBC's:  Recent Labs   Lab Test 03/18/21 2045 02/11/21  1645 12/30/20  1554   WBC 3.7* 3.9* 4.1   HGB 15.2 15.0 15.1   * 102* 100    219 160     Most Recent 3 BMP's:  Recent Labs   Lab Test 03/18/21  2045 12/30/20  1554 06/17/20  0850    138 138   POTASSIUM 4.0 4.0 4.1   CHLORIDE 107 106 105   CO2 28 30 29   BUN 17 19 17   CR 1.12 1.09 1.33*   ANIONGAP 7 2* 4   CHELSEA 8.9 9.2 9.3   GLC 79 73 84       Results for orders placed or performed in visit on 02/15/21   XR Knee Bilateral 3 Views    Narrative    XR KNEE BILATERAL 3 VW 2/15/2021 4:14 PM     HISTORY: Acute pain of both knees; Acute pain of both knees      Impression    IMPRESSION: Negative exam.    CELESTE OBANDO MD       Discharge Medications   Current Discharge Medication List      CONTINUE these medications which have NOT CHANGED    Details   divalproex sodium delayed-release (DEPAKOTE SPRINKLE) 125 MG DR capsule Take 750 mg by mouth 2 times daily      fluticasone (FLONASE) 50 MCG/ACT nasal spray Spray 1 spray into both nostrils daily as needed for rhinitis or allergies  Qty: 16 g, Refills: 1    Associated Diagnoses: Nonallergic rhinitis      levothyroxine (SYNTHROID/LEVOTHROID) 100 MCG tablet Take 1 tablet (100 mcg) by mouth daily  Qty: 90 tablet, Refills: 3    Comments: Patient will call to fill  Associated Diagnoses:  Subclinical hypothyroidism      loratadine (CLARITIN) 10 MG tablet Take 1 tablet (10 mg) by mouth daily  Qty: 90 tablet, Refills: 3    Comments: Patient will call to fill  Associated Diagnoses: Seasonal allergic rhinitis due to pollen      metFORMIN (GLUCOPHAGE) 500 MG tablet Take 1 tablet (500 mg) by mouth 2 times daily (with meals)  Qty: 180 tablet, Refills: 3    Comments: Patient will call to fill  Associated Diagnoses: Obesity due to excess calories, unspecified obesity severity      montelukast (SINGULAIR) 10 MG tablet Take 1 tablet (10 mg) by mouth At Bedtime  Qty: 90 tablet, Refills: 3    Associated Diagnoses: Nonallergic rhinitis; Allergic conjunctivitis, bilateral      !! risperiDONE (RISPERDAL) 1 MG tablet 1 mg Take 1 tablet in the morning  Qty: 60 tablet      !! risperiDONE (RISPERDAL) 2 MG tablet Take 2 mg by mouth daily Take 1 tablet at 8:00pm      topiramate (TOPAMAX) 100 MG tablet Take 1.5 tablets (150 mg) by mouth daily  Qty: 135 tablet, Refills: 2    Associated Diagnoses: Obesity due to excess calories, unspecified obesity severity      !! venlafaxine (EFFEXOR-XR) 37.5 MG 24 hr capsule Take 37.5 mg by mouth daily + 75 mg daily = 112.5mg daily      !! venlafaxine (EFFEXOR-XR) 75 MG 24 hr capsule Take 1 capsule by mouth every morning + 37.5 mg daily = 112.5 mg daily      acetaminophen (TYLENOL) 325 MG tablet Take 1-2 tablets (325-650 mg) by mouth every 4 hours as needed Reported on 4/12/2017  Qty: 100 tablet, Refills: 1    Associated Diagnoses: Seasonal allergic rhinitis due to pollen      albuterol (PROAIR HFA/PROVENTIL HFA/VENTOLIN HFA) 108 (90 Base) MCG/ACT inhaler Inhale 2 puffs into the lungs every 6 hours as needed for shortness of breath / dyspnea or wheezing  Qty: 3 Inhaler, Refills: 1    Comments: Pharmacy may dispense brand covered by insurance (Proair, or proventil or ventolin or generic albuterol inhaler). Patient will call to fill  Associated Diagnoses: SOB (shortness of breath)      alum  & mag hydroxide-simethicone (MYLANTA/MAALOX) 200-200-20 MG/5ML SUSP suspension Take 30 mLs by mouth every 4 hours as needed for indigestion (2 tsp for upset stomach)  Qty: 1 Bottle, Refills: 3    Associated Diagnoses: Gastroesophageal reflux disease without esophagitis      calcium carbonate (TUMS) 500 MG chewable tablet Take 1 tablet (500 mg) by mouth every 6 hours as needed for heartburn  Qty: 150 tablet, Refills: 3    Associated Diagnoses: Gastroesophageal reflux disease without esophagitis      calcium polycarbophil (FIBERCON) 625 MG tablet Take 2 tablets (1,250 mg) by mouth 2 times daily  Qty: 360 tablet, Refills: 3    Associated Diagnoses: Slow transit constipation      ibuprofen (ADVIL,MOTRIN) 600 MG tablet Take 1 tablet (600 mg) by mouth every 6 hours as needed for moderate pain  Qty: 60 tablet, Refills: 0    Associated Diagnoses: Hip pain, unspecified laterality      magnesium hydroxide (MILK OF MAGNESIA) 400 MG/5ML suspension Take 30-60 mLs by mouth daily as needed for constipation or heartburn (If No Bowel Movement in 2 days.) Reported on 4/12/2017  Qty: 360 mL, Refills: 1    Associated Diagnoses: Gastroesophageal reflux disease without esophagitis      OLANZapine (ZYPREXA) 5 MG tablet Take 2.5 mg by mouth 3 times daily as needed   Qty: 30 tablet, Refills: 1      order for DME Equipment being ordered: CPAP  AIRSENSE 10  11 CM H20  AIRFIT F20 MEDIUM  SN# [id number]  DN# [id number]      phenol (CHLORASEPTIC) 1.4 % spray Take 1 spray (1 mL) by mouth every hour as needed for sore throat Use as directed for sore throt      !! pseudoePHEDrine (SUDAFED) 120 MG 12 hr tablet Take 120 mg by mouth every 12 hours PRN      sodium chloride (SALINE MIST) 0.65 % nasal spray Spray 2 sprays into both nostrils 4 times daily as needed for congestion  Qty: 30 mL, Refills: 3    Associated Diagnoses: Acute non-recurrent frontal sinusitis      !! SUDOGEST 12 HOUR 120 MG 12 hr tablet TAKE 1 TABLET BY MOUTH ONCE DAILY AS NEEDED *2 TOTAL  FILLS*  Qty: 12 tablet, Refills: 5    Associated Diagnoses: Seasonal allergic rhinitis, unspecified trigger      triamcinolone (KENALOG) 0.1 % cream Apply  topically 3 times daily. Apply sparingly to affected area.  Qty: 30 g, Refills: 1    Associated Diagnoses: Eczema      vitamin D3 (CHOLECALCIFEROL) 1000 units (25 mcg) tablet Take 1 tablet (1,000 Units) by mouth daily  Qty: 30 tablet, Refills: 11    Associated Diagnoses: Vitamin D deficiency       !! - Potential duplicate medications found. Please discuss with provider.        Allergies   Allergies   Allergen Reactions     Nkda [No Known Drug Allergies]

## 2021-03-19 NOTE — PLAN OF CARE
[facility] DISCHARGE NOTE    Patient discharged to group home at 4:05 PM via ambulation. Accompanied by other: Group Home staff. Discharge instructions reviewed with  [facility] [place] , opportunity offered to ask questions. Prescriptions - None ordered for discharge. All belongings sent with patient.    Avani Wadsworth RN

## 2021-03-19 NOTE — H&P
[facility]    History and Physical - Hospitalist Service       Date of Admission:  3/18/2021    Assessment & Plan   Humberto Estrella is a 40 year old male admitted on 3/18/2021. He has a history of Down's syndrome. He lives in a group home. He was able to get into the medication cabinet and took his medications and other medications.     Drug overdose, intentional without suicide ideation  -list of medications per ED MD:  Medications ingested:  His scheduled medications:  Depakote 750 mg  Topiramate 150 mg  Risperidone 2 mg  Montelukast 10 mg     Other group home patient scheduled medications:  Zyprexa 5 mg  Imipramine 25 mg  Clozapine 125 mg  Mirapex 0.5 mg  Effexor  mg    -This was discussed with poison control who recommended supportive care and monitoring.   -Patient did have episodes of apnea and was placed on his home CPAP.  -patient denies suicidal thoughts and ideation. Will defer determination of psychiatric evaluation to daytime rounder who will be able to assess patient when he is more alert.    Acute respiratory failure, hypoxia    Acute encephalopathy, toxic    Sleep apnea  -at bedtime CPAP    Depressive disorder    Down's syndrome     Diet: Low Saturated Fat Na <2400 mg    DVT Prophylaxis: Low Risk/Ambulatory with no VTE prophylaxis indicated and observation  Pardo Catheter: not present  Code Status: Full Code           Disposition Plan   Expected discharge: Tomorrow, recommended to prior living arrangement once mental status at baseline.  Entered: Bruno Bess MD 03/19/2021, 2:58 AM     The patient's care was discussed with the Patient.    Bruno Bess MD  [facility]  Contact information available via [facility] Paging/Directory      ______________________________________________________________________    Chief Complaint   Intentional overdose    History is obtained from the patient    History of Present Illness   Humberto Estrella is a 40  year old male who was noted by [facility] to have ingested medications from a lock box.    Patient was reported to have gotten into the group [facility] lock box and took his medications and other medications that are not his.     He was sleepy when he arrived at the ED. He had episodes of hypoventilation requiring supplemental oxygen and CPAP.     His overdose was discussed with poison control and patient will be admitted for observation per their recommendation    Patient continues to be sleepy but easily arousable    Review of Systems    The 10 point Review of Systems is negative other than noted in the HPI or here.     Past Medical History    I have reviewed this patient's medical history and updated it with pertinent information if needed.   Past Medical History:   Diagnosis Date     Coronary artery disease      Depressive disorder      Diagnostic skin and sensitization tests (aka ALLERGENS) 9/30/15 IgE tests all NEGATIVE for environmental allergens.      Nonallergic rhinitis     9/30/15 IgE tests all NEGATIVE for environmental allergens.      Thyroid disease        Past Surgical History   I have reviewed this patient's surgical history and updated it with pertinent information if needed.  Past Surgical History:   Procedure Laterality Date     PE TUBES      Left     PHACOEMULSIFICATION WITH STANDARD INTRAOCULAR LENS IMPLANT  10/3/2013    Procedure: PHACOEMULSIFICATION WITH STANDARD INTRAOCULAR LENS IMPLANT;  Left Kelman Phacoemulsification with Intraocular Lens Implant;  Surgeon: Luis Barajas MD;  Location: [facility] OR     PHACOEMULSIFICATION WITH STANDARD INTRAOCULAR LENS IMPLANT  5/1/2014    Procedure: PHACOEMULSIFICATION WITH STANDARD INTRAOCULAR LENS IMPLANT;  Surgeon: Luis Barajas MD;  Location: [facility] OR       Social History   I have reviewed this patient's social history and updated it with pertinent information if needed.  Social History     Tobacco Use     Smoking status: Former Smoker     Packs/day:  1.00     Years: 1.00     Pack years: 1.00     Types: Cigarettes     Start date: 1999     Quit date: 2000     Years since quittin.8     Smokeless tobacco: Never Used   Substance Use Topics     Alcohol use: No     Alcohol/week: 0.0 standard drinks     Drug use: No       Family History   I have reviewed this patient's family history and updated it with pertinent information if needed.  Family History   Problem Relation Age of Onset     Unknown/Adopted Mother      Other Cancer Mother      Anxiety Disorder Brother      Substance Abuse Brother      Depression Brother      Substance Abuse Father        Prior to Admission Medications   Prior to Admission Medications   Prescriptions Last Dose Informant Patient Reported? Taking?   OLANZapine (ZYPREXA) 5 MG tablet  Care Giver Yes No   Sig: Take 2.5 mg by mouth 3 times daily as needed    SUDOGEST 12 HOUR 120 MG 12 hr tablet   No No   Sig: TAKE 1 TABLET BY MOUTH ONCE DAILY AS NEEDED *2 TOTAL FILLS*   acetaminophen (TYLENOL) 325 MG tablet   No No   Sig: Take 1-2 tablets (325-650 mg) by mouth every 4 hours as needed Reported on 2017   albuterol (PROAIR HFA/PROVENTIL HFA/VENTOLIN HFA) 108 (90 Base) MCG/ACT inhaler   No No   Sig: Inhale 2 puffs into the lungs every 6 hours as needed for shortness of breath / dyspnea or wheezing   alum & mag hydroxide-simethicone (MYLANTA/MAALOX) 200-200-20 MG/5ML SUSP suspension   No No   Sig: Take 30 mLs by mouth every 4 hours as needed for indigestion (2 tsp for upset stomach)   calcium carbonate (TUMS) 500 MG chewable tablet   No No   Sig: Take 1 tablet (500 mg) by mouth every 6 hours as needed for heartburn   calcium polycarbophil (FIBERCON) 625 MG tablet   No No   Sig: Take 2 tablets (1,250 mg) by mouth 2 times daily   divalproex (DEPAKOTE ER) 500 MG 24 hr tablet  Care Giver Yes No   Sig: Take 1,500 mg by mouth every morning   fluticasone (FLONASE) 50 MCG/ACT nasal spray   No No   Sig: Spray 1 spray into both nostrils daily as  needed for rhinitis or allergies   ibuprofen (ADVIL,MOTRIN) 600 MG tablet  Care Giver No No   Sig: Take 1 tablet (600 mg) by mouth every 6 hours as needed for moderate pain   levothyroxine (SYNTHROID/LEVOTHROID) 100 MCG tablet   No No   Sig: Take 1 tablet (100 mcg) by mouth daily   loratadine (CLARITIN) 10 MG tablet   No No   Sig: Take 1 tablet (10 mg) by mouth daily   magnesium hydroxide (MILK OF MAGNESIA) 400 MG/5ML suspension   No No   Sig: Take 30-60 mLs by mouth daily as needed for constipation or heartburn (If No Bowel Movement in 2 days.) Reported on 2017   metFORMIN (GLUCOPHAGE) 500 MG tablet   No No   Sig: Take 1 tablet (500 mg) by mouth 2 times daily (with meals)   montelukast (SINGULAIR) 10 MG tablet   No No   Sig: Take 1 tablet (10 mg) by mouth At Bedtime   order for DME   Yes No   Sig: Equipment being ordered: CPAP  AIRSENSE 10  11 CM H20  AIRFIT F20 MEDIUM  SN# [id number]  DN# 416   phenol (CHLORASEPTIC) 1.4 % spray   Yes No   Sig: Take 1 spray (1 mL) by mouth every hour as needed for sore throat Use as directed for sore throt   pseudoePHEDrine (SUDAFED) 120 MG 12 hr tablet   Yes No   Sig: Take 120 mg by mouth every 12 hours PRN   risperiDONE (RISPERDAL) 1 MG tablet  Care Giver Yes No   Si mg Take 1 tablet in the morning   risperiDONE (RISPERDAL) 2 MG tablet   Yes No   Sig: Take 2 mg by mouth daily Take 1 tablet at 8:00pm   sodium chloride (SALINE MIST) 0.65 % nasal spray   No No   Sig: Spray 2 sprays into both nostrils 4 times daily as needed for congestion   topiramate (TOPAMAX) 100 MG tablet   No No   Sig: Take 1.5 tablets (150 mg) by mouth daily   triamcinolone (KENALOG) 0.1 % cream  Care Giver No No   Sig: Apply  topically 3 times daily. Apply sparingly to affected area.   venlafaxine (EFFEXOR-XR) 37.5 MG 24 hr capsule  Care Giver Yes No   Sig: Take 37.5 mg by mouth daily + 75 mg daily = 112.5mg daily   venlafaxine (EFFEXOR-XR) 75 MG 24 hr capsule   Yes No   Sig: Take 1 capsule by mouth  every morning + 37.5 mg daily = 112.5 mg daily   vitamin D3 (CHOLECALCIFEROL) 1000 units (25 mcg) tablet   No No   Sig: Take 1 tablet (1,000 Units) by mouth daily      Facility-Administered Medications: None     Allergies   Allergies   Allergen Reactions     Nkda [No Known Drug Allergies]        Physical Exam   Vital Signs: Temp: 96.3  F (35.7  C) Temp src: Oral BP: 112/71 Pulse: 80   Resp: 18 SpO2: 97 % O2 Device: Nasal cannula Oxygen Delivery: 2 LPM  Weight: 231 lbs 14.78 oz    Constitutional: sleepy, somnolent and cooperative  Eyes: down slanting eyes, typical Down's facies, pupils equal, round and reactive to light, extra ocular muscles intact, sclera clear, conjunctiva normal  ENT: Normocephalic, without obvious abnormality, atraumatic, sinuses nontender on palpation, external ears without lesions, oral pharynx with moist mucous membranes, tonsils without erythema or exudates, gums normal and good dentition., thick tongue  Hematologic / Lymphatic: no cervical lymphadenopathy  Respiratory: No increased work of breathing, good air exchange, clear to auscultation bilaterally, no crackles or wheezing  Cardiovascular: Normal apical impulse, regular rate and rhythm, normal S1 and S2, no S3 or S4, and no murmur noted  GI: No scars, normal bowel sounds, soft, non-distended, non-tender, no masses palpated, no hepatosplenomegally  Skin: no bruising or bleeding and normal skin color, texture, turgor  Musculoskeletal: There is no redness, warmth, or swelling of the joints.  Full range of motion noted.  Motor strength is 5 out of 5 all extremities bilaterally.  Tone is normal.  Neurologic: Awake, alert, oriented to name, place and time.  Cranial nerves II-XII are grossly intact.  Motor is 5 out of 5 bilaterally.  Cerebellar finger to nose, heel to shin intact.  Sensory is intact.  Babinski down going, Romberg negative, and gait is normal.  Neuropsychiatric: General: normal, calm and normal eye contact    Data   Data  reviewed today: I reviewed all medications, new labs and imaging results over the last 24 hours. I personally reviewed the EKG tracing showing sinus rhythm with normal QTc.    Recent Labs   Lab 03/18/21  2045   WBC 3.7*   HGB 15.2   *      INR 0.99      POTASSIUM 4.0   CHLORIDE 107   CO2 28   BUN 17   CR 1.12   ANIONGAP 7   CHELSEA 8.9   GLC 79   ALBUMIN 3.2*   PROTTOTAL 7.7   BILITOTAL 0.3   ALKPHOS 75   ALT 44   AST 30     No results found for this or any previous visit (from the past 24 hour(s)).

## 2021-03-19 NOTE — CONSULTS
Care Management Initial Consult    Brother Evans Estrella [phone] is pt's Legal Guardian.      Pt lives at [facility]/ [facility], [phone]/f: [phone].      General Information  Assessment completed with: Caregiver, VM-chart review,  for Evans, Legal Guardian & Janey staff at [facility]  Type of CM/SW Visit: Initial Assessment    Primary Care Provider verified and updated as needed: Yes   Readmission within the last 30 days:   No  Reason for Consult: discharge planning  Advance Care Planning:     Not in EPIC     Communication Assessment  Patient's communication style: spoken language (English or Bilingual)    Hearing Difficulty or Deaf: no   Wear Glasses or Blind: yes    Cognitive  Cognitive/Neuro/Behavioral: WDL  Level of Consciousness: alert  Arousal Level: opens eyes spontaneously  Orientation: oriented x 4  Mood/Behavior: calm, cooperative  Best Language: 0 - No aphasia  Speech: garbled(baseline)    Living Environment:   People in home: facility resident  [facility]  Current living Arrangements: group home      Able to return to prior arrangements: yes    Family/Social Support:  Care provided by: other (see comments)  Provides care for: no one, unable/limited ability to care for self  Marital Status: Single  Sibling(s), Facility resident(s)/Staff          Description of Support System: Supportive, Involved    Support Assessment: Adequate family and caregiver support, Adequate social supports    Current Resources:   Patient receiving home care services: No  Community Resources: Group Home  Equipment currently used at home: none  Supplies currently used at home:      Employment/Financial:  Employment Status: disabled     Financial Concerns: No concerns identified   Referral to Financial Counselor: No     Lifestyle & Psychosocial Needs:  Socioeconomic History     Marital status: Single     Spouse name: Not on file     Number of children: 0     Years of education: 12      Highest education level: Not on file   Occupational History     Occupation: Cleaning Services     Comment: [employer]     Employer: UNEMPLOYED     Employer: DISABLED     Tobacco Use     Smoking status: Former Smoker     Packs/day: 1.00     Years: 1.00     Pack years: 1.00     Types: Cigarettes     Start date: 1999     Quit date: 2000     Years since quittin.8     Smokeless tobacco: Never Used   Substance and Sexual Activity     Alcohol use: No     Alcohol/week: 0.0 standard drinks     Drug use: No     Sexual activity: Never     Functional Status:  Prior to admission patient needed assistance:   Dependent ADLs:: Dressing, Eating, Grooming  Dependent IADLs:: Cleaning, Cooking, Laundry, Shopping, Meal Preparation, Medication Management, Money Management, Transportation    Mental Health Status:  Mental Health Status: Current Concern  Mental Health Management: Medication    Chemical Dependency Status:  Chemical Dependency Status: No Current Concerns           Values/Beliefs:  Spiritual, Cultural Beliefs, Sabianist Practices, Values that affect care: no             Additional Information:  Care Management received referral to assist pt with discharge planning back to his group [place].  PRISCILLA placed call to pt's brother, Evans, left a messaged introducing self/role & requested a return call.  EMR is now updated, but previous to [employer] securing documents, PRISCILLA discussed with Risk the ability to do a DEC assessment with consent from guardian.  It was advised that the DEC assessment could be completed without guardian consent so that the medical team can provide plan of care based on the assessment.    PRISCILLA then spoke with [facility] staff, Janey, [phone], who informed this writer that pt will return to them upon discharge & they will transport pt home.  Janey confirmed their staff have spoken with the DEC  today & feel comfortable having pt return home.      CINDI Barcenas

## 2021-03-19 NOTE — PROGRESS NOTES
Reviewing chart due to high probability of Admit to Med Surg. Blaise Cheema RN on 3/19/2021 at 1:00 AM

## 2021-03-19 NOTE — PROGRESS NOTES
Writer heard from Sherry HARDY that the provider request for a DEC assessment can be completed at this time without consent following discussion with Steffanie CALDERON from risk management. Carla OSBORNE updated.

## 2021-03-19 NOTE — ED NOTES
Pt very drowsy, but awakens to verbal calling of name. Pt attempted to void at bedside but unable to go at this time. Group home staff updated on plan for admission. RT reports they do not have access to a CPAP machine for patient at this time.  staff leaving to retrieve device at this time.

## 2021-03-19 NOTE — PROGRESS NOTES
SKIN AFFIRMATION.  Writer agrees with initial skin assessment performed by RN caring for patient.

## 2021-03-19 NOTE — ED NOTES
Patient arrives via EMS following an impulsive act by the patient of grabbing another pateints meds. The patient is awake and alert and completely aware of what he did. No ill effects noted.
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
## 2021-03-19 NOTE — DISCHARGE INSTRUCTIONS
You have a phone visit with Kamryn Joel on March 26th at 1130 am  You have a follow up appointment with Zenaida Ledezma on March 26th at 3pm

## 2021-03-19 NOTE — ED NOTES
Spoke with poison control. They are following up on a call from the group [facility]. The [facility] called poison control at 1942 following the overdose and medication error. Poison control is concerned about the clozaril. The peak effect of that med is hypotension, bradycardia and sedation. The peak effect occurs at 1.5-2 hours. Flory will call again later for an update.

## 2021-03-19 NOTE — TELEPHONE ENCOUNTER
Reason for Call:   is calling because pt ingested a number of med that was not his and was transported last night 3/18/21 to [facility] and was moved to Med Surg Unit for OBS and will be discharge today 3/19/21.  If ZULEYMA Brown has orders or concerns please call .      Can we leave a detailed message on this number? YES    Phone number patient can be reached at: Home number on file [phone] (home)    Best Time: any time    Call taken on 3/19/2021 at 11:58 AM by Sydni Plummer

## 2021-03-19 NOTE — PROGRESS NOTES
"[facility] [place] [place] ADMISSION NOTE    Patient admitted to room 2308 at approximately 0130 via cart from emergency room. Patient was accompanied by transport tech and other: staff.     Verbal SBAR report received from ED RN prior to patient arrival.     Patient trasferred to bed via slip sheet Patient alert and oriented X 3. The patient is not having any pain.  . Admission vital signs: Blood pressure 112/71, pulse 80, temperature 96.3  F (35.7  C), temperature source Oral, resp. rate 18, height 1.702 m (5' 7\"), weight 105.2 kg (231 lb 14.8 oz), SpO2 97 %. Patient was oriented to plan of care, call light, bed controls, tv, telephone, bathroom and visiting hours.     Risk Assessment    The following safety risks were identified during admission: fall. Yellow risk band applied: YES.     Skin Initial Assessment    This writer admitted this patient and completed a full skin assessment and Ramos score in the Adult PCS flowsheet. Appropriate interventions initiated as needed.     Secondary skin check completed by Doreen LONG.         Education    Patient has a [place] to Observation order: Yes  Observation education completed and documented: Yes      Jair Merritt, RN    "

## 2021-03-19 NOTE — PROGRESS NOTES
Poison control called and writer provided an update to them. They said they would no longer be following him.

## 2021-03-19 NOTE — ED NOTES
Pt  staff present. Meds taken reviewed with staff. It was one single dose of HS meds of another residents, without amlodipine. Pt awake and talking. No respiratory depression noted. No nausea or vomiting. Denies complaints. Water at bedside. Declines blanket.

## 2021-03-22 NOTE — TELEPHONE ENCOUNTER
is notified to keep the upcoming appt and Dr. Brown will review with them then. Jacquie COVARRUBIAS RN

## 2021-03-23 ENCOUNTER — OFFICE VISIT (OUTPATIENT)
Dept: FAMILY MEDICINE | Facility: CLINIC | Age: 41
End: 2021-03-23
Payer: MEDICARE

## 2021-03-23 VITALS
SYSTOLIC BLOOD PRESSURE: 108 MMHG | DIASTOLIC BLOOD PRESSURE: 62 MMHG | HEART RATE: 78 BPM | OXYGEN SATURATION: 95 % | RESPIRATION RATE: 16 BRPM | TEMPERATURE: 96.2 F

## 2021-03-23 DIAGNOSIS — T14.91XA SUICIDE ATTEMPT (H): Primary | ICD-10-CM

## 2021-03-23 DIAGNOSIS — Z78.9 LIVES IN GROUP HOME: ICD-10-CM

## 2021-03-23 PROBLEM — T50.902A: Status: RESOLVED | Noted: 2021-03-19 | Resolved: 2021-03-23

## 2021-03-23 PROCEDURE — 99495 TRANSJ CARE MGMT MOD F2F 14D: CPT | Performed by: INTERNAL MEDICINE

## 2021-03-23 NOTE — PROGRESS NOTES
Assessment & Plan     Suicide attempt (H) -at hospital discharge, no medications were changed.  No specific labs were needed for follow-up.  Group home staff wondered why drug levels for each of his ingested drugs was not checked.  Explained the reasoning, cost, length of test time, not coming back in a timely fashion to impact hospital stay.  Group home staff member understood.  Agree with plan to continue staff administering medication, not self administering medication.  He has appropriate follow-up in 3 days with his mental health team.  Will defer any medication changes or other plans to his mental health team.  He reports his mood is significantly better and feels that he can trust his staff members when he confides in them.    Lives in group home -his legal guardian who is his brother has not been able to be contacted throughout his hospital stay or since his discharge home.  Patient himself has tried to contact his brother without success.  I purposefully will not attempt to contact his brother at this time since multiple unsuccessful attempts have already been made by various care members, although I am clearly open to discussing things with his brother.      Patient Instructions   1. Keep follow-up with mental health team  2. Continue for staff admin of all medications  3. Dr. Brown will not contact legal guardian Evans for now, since multiple attempts have been made.  However, if Evans would like to talk with Dr. Brown, please have him call the office      No follow-ups on file.    Carla Brown, [facility]    Cleve Paul is a 40 year old who presents for the following health issues  accompanied by his staff member jonnie ELISE       Pt verbalize to be feeling okay    Hospital Follow-up Visit:    Hospital/Nursing Home/IP Rehab Facility: [facility]  Date of Admission: 3/18/21  Date of Discharge: 3/19/21  Reason(s) for Admission: Ingestion of  unknown medical, intentional self-harm.      Was your hospitalization related to COVID-19? No   Problems taking medications regularly:  None  Medication changes since discharge: None  Problems adhering to non-medication therapy:  None    Summary of hospitalization:  [facility] hospital discharge summary reviewed  Diagnostic Tests/Treatments reviewed.  Follow up needed: Psychiatry, psychology  Other Healthcare Providers Involved in Patient s Care:         None  Update since discharge: improved.       Post Discharge Medication Reconciliation: discharge medications reconciled, continue medications without change.  Plan of care communicated with patient and caregiver              --he reports he took extra meds because he missed his family and parents  --he was testing god.  --he no longer feels this sad.  He is talking with staff and this has helped.  --saw family overnight with brother Evans (guardian) 2 weeks ago.  Was able to see grandmother. With COVID precautions.  Pre-COVID, he was seeing family every 1-2 months.  Both parents are .  --he is still seeing [facility] counselor Amadeo every 2 weeks, next appointment on 3/26.  Will be virtual  --psychiatrist Kamryn simmons through [facility] [facility], next appointment on 3/26 - will be face to face   --since the suicide attempt by Humberto, the group home has already changed locks on medicine cabinets.  Staff is checking on Humberto more often, xochilt at night.  Staff is being even more attentive than normal to Humberto and his emotions.  --hospital doctors attempted to contact the legal guardian brother mutliple times.  Group home has done the same.  Humberto has also left several messages    Review of Systems   Constitutional, HEENT, cardiovascular, pulmonary, GI, , musculoskeletal, neuro, skin, endocrine and psych systems are negative, except as otherwise noted.      Objective    /62   Pulse 78   Temp 96.2  F (35.7  C) (Tympanic)   Resp 16   SpO2 95%   There is no height or  weight on file to calculate BMI.  Physical Exam   GENERAL APPEARANCE: alert, no distress and over weight  EYES: Roving nystagmus  RESP: lungs clear to auscultation - no rales, rhonchi or wheezes  CV: regular rates and rhythm, normal S1 S2, no S3 or S4 and no murmur, click or rub  PSYCH: affect flat and mentation at baseline

## 2021-03-23 NOTE — PATIENT INSTRUCTIONS
1. Keep follow-up with mental health team  2. Continue for staff admin of all medications  3. Dr. Brown will not contact legal guardian Evans for now, since multiple attempts have been made.  However, if Evans would like to talk with Dr. Brown, please have him call the office

## 2021-04-07 DIAGNOSIS — E55.9 VITAMIN D DEFICIENCY: ICD-10-CM

## 2021-04-08 RX ORDER — CHOLECALCIFEROL (VITAMIN D3) 25 MCG
TABLET ORAL
Qty: 100 TABLET | Refills: 2 | Status: SHIPPED | OUTPATIENT
Start: 2021-04-08 | End: 2023-09-08

## 2021-04-08 NOTE — TELEPHONE ENCOUNTER
"Requested Prescriptions   Pending Prescriptions Disp Refills     VITAMIN D3 25 MCG (1000 UT) tablet [Pharmacy Med Name: Vitamin D3 25 MCG (1000 UT) Tablet] 100 tablet 3     Sig: TAKE 1 TABLET BY MOUTH ONCE DAILY       Vitamin Supplements (Adult) Protocol Passed - 4/7/2021  5:50 PM        Passed - High dose Vitamin D not ordered        Passed - Recent (12 mo) or future (30 days) visit within the authorizing provider's specialty     Patient has had an office visit with the authorizing provider or a provider within the authorizing providers department within the previous 12 mos or has a future within next 30 days. See \"Patient Info\" tab in inbasket, or \"Choose Columns\" in Meds & Orders section of the refill encounter.              Passed - Medication is active on med list             "

## 2021-05-11 ENCOUNTER — VIRTUAL VISIT (OUTPATIENT)
Dept: ENDOCRINOLOGY | Facility: CLINIC | Age: 41
End: 2021-05-11
Payer: MEDICARE

## 2021-05-11 VITALS — BODY MASS INDEX: 36.26 KG/M2 | HEIGHT: 67 IN | WEIGHT: 231 LBS

## 2021-05-11 DIAGNOSIS — E66.09 OBESITY DUE TO EXCESS CALORIES, UNSPECIFIED OBESITY SEVERITY: ICD-10-CM

## 2021-05-11 PROCEDURE — 99443 PR PHYSICIAN TELEPHONE EVALUATION 21-30 MIN: CPT | Mod: 95 | Performed by: INTERNAL MEDICINE

## 2021-05-11 RX ORDER — TOPIRAMATE 100 MG/1
150 TABLET, FILM COATED ORAL DAILY
Qty: 135 TABLET | Refills: 2 | Status: SHIPPED | OUTPATIENT
Start: 2021-05-11 | End: 2021-09-02

## 2021-05-11 ASSESSMENT — MIFFLIN-ST. JEOR: SCORE: 1911.44

## 2021-05-11 ASSESSMENT — PAIN SCALES - GENERAL: PAINLEVEL: NO PAIN (0)

## 2021-05-11 NOTE — PATIENT INSTRUCTIONS
If you have any questions, please do not hesitate to call Weight management clinic at [phone] or [phone].  If you need to fax, please fax to [phone].    Sincerely,    Chasidy Juarez MD  Endocrinology
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
## 2021-05-11 NOTE — NURSING NOTE
"Chief Complaint   Patient presents with     Follow Up     3 Months Follow-up Weight Management       Vitals:    05/11/21 1552   Weight: 104.8 kg (231 lb)   Height: 1.702 m (5' 7\")       Body mass index is 36.18 kg/m .                            "

## 2021-05-11 NOTE — PROGRESS NOTES
Humberto is a 41 year old who is being evaluated via a billable telephone visit.      What phone number would you like to be contacted at? [phone]  How would you like to obtain your AVS? Mail a copy  Phone call duration:  minutes

## 2021-05-12 ENCOUNTER — TELEPHONE (OUTPATIENT)
Dept: ENDOCRINOLOGY | Facility: CLINIC | Age: 41
End: 2021-05-12

## 2021-05-12 NOTE — TELEPHONE ENCOUNTER
Caregiver Janey needs to know if the clinic rec'd the fax sent yesterday for Dr. Umaña to fill out? If so, the return fax number is [phone].    If not, please call her:  Janey: [phone]

## 2021-05-12 NOTE — TELEPHONE ENCOUNTER
Called and spoke with Janey in regards to paperwork. Asked for it to be re-faxed attention to Dr. Umaña and Sandra. Will forward to Dr. Umaña to complete once received.

## 2021-07-15 ENCOUNTER — OFFICE VISIT (OUTPATIENT)
Dept: FAMILY MEDICINE | Facility: CLINIC | Age: 41
End: 2021-07-15
Payer: MEDICARE

## 2021-07-15 VITALS
BODY MASS INDEX: 36.51 KG/M2 | SYSTOLIC BLOOD PRESSURE: 110 MMHG | OXYGEN SATURATION: 96 % | WEIGHT: 233.1 LBS | HEART RATE: 80 BPM | DIASTOLIC BLOOD PRESSURE: 72 MMHG | TEMPERATURE: 97.9 F

## 2021-07-15 DIAGNOSIS — M10.9 ACUTE GOUT OF RIGHT HAND, UNSPECIFIED CAUSE: Primary | ICD-10-CM

## 2021-07-15 DIAGNOSIS — M21.619 BUNION OF UNSPECIFIED FOOT: ICD-10-CM

## 2021-07-15 LAB — URATE SERPL-MCNC: 8.7 MG/DL (ref 3.5–7.2)

## 2021-07-15 PROCEDURE — 90714 TD VACC NO PRESV 7 YRS+ IM: CPT | Performed by: NURSE PRACTITIONER

## 2021-07-15 PROCEDURE — 90471 IMMUNIZATION ADMIN: CPT | Performed by: NURSE PRACTITIONER

## 2021-07-15 PROCEDURE — 99213 OFFICE O/P EST LOW 20 MIN: CPT | Mod: 25 | Performed by: NURSE PRACTITIONER

## 2021-07-15 PROCEDURE — 36415 COLL VENOUS BLD VENIPUNCTURE: CPT | Performed by: NURSE PRACTITIONER

## 2021-07-15 PROCEDURE — 84550 ASSAY OF BLOOD/URIC ACID: CPT | Performed by: NURSE PRACTITIONER

## 2021-07-15 RX ORDER — PREDNISONE 20 MG/1
20 TABLET ORAL 2 TIMES DAILY
Qty: 10 TABLET | Refills: 0 | Status: SHIPPED | OUTPATIENT
Start: 2021-07-15 | End: 2021-07-20

## 2021-07-15 NOTE — LETTER
"July 16, 2021      Humberto Estrella  [address] [facility] [address]        Dear ,        We are writing to inform you of your test results.    \"Uric acid level elevated, [place] symptoms are due to gout flare, complete Prednisone course as prescribed, follow up if no improvement next week.     GM Vargas CNP\"       Resulted Orders   Uric acid   Result Value Ref Range    Uric Acid 8.7 (H) 3.5 - 7.2 mg/dL       If you have any questions or concerns, please call the clinic at the number listed above.       Sincerely,      GM Guardado CNP/Adrienne FAJARDO CMA            "

## 2021-07-15 NOTE — PATIENT INSTRUCTIONS
Start Prednisone 20 mg twice daily for 5 days     Try to follow gout diet    Schedule with podiatrist regarding bunions       Patient Education     Gout Diet  Gout is a painful condition caused by an excess of uric acid, a waste product made by the body. Uric acid forms crystals that collect in the joints. The immune response to these crystals brings on symptoms of joint pain and swelling. This is called a gout attack. Often, medications and diet changes are combined to manage gout. Below are some guidelines for changing your diet to help you manage gout and prevent attacks. Your healthcare provider will help you determine the best eating plan for you.  Eating to manage gout  Weight loss for those who are overweight may help reduce gout attacks.  Eat less of these foods  Eating too many foods containing purines may raise the levels of uric acid in your body. This raises your risk for a gout attack. Try to limit these foods and drinks:    Alcohol, such as beer and red wine. You may be told to avoid alcohol completely.    Soft drinks that contain sugar or high fructose corn syrup    Certain fish, including anchovies, sardines, fish eggs, and herring    Shellfish    Certain meats, such as red meat, hot dogs, luncheon meats, and turkey    Organ meats, such as liver, kidneys, and sweetbreads    Legumes, such as dried beans and peas    Other high fat foods such as gravy, whole milk, and high fat cheeses    Vegetables such as asparagus, cauliflower, spinach, and mushrooms used to be thought to contribute to an increased risk for a gout attack, but recent studies show that high purine vegetables don't increase the risk for a gout attack.  Eat more of these foods  Other foods may be helpful for people with gout. Add some of these foods to your diet:    Cherries contain chemicals that may lower uric acid.    Omega fatty acids. These are found in some fatty fish such as salmon, certain oils (flax, olive, or nut), and nuts  themselves. Omega fatty acids may help prevent inflammation due to gout.    Dairy products that are low-fat or fat-free, such as cheese and yogurt    Complex carbohydrate foods, including whole grains, brown rice, oats, and beans    Coffee, in moderation    Water, approximately 64 ounces per day  Follow-up care  Follow up with your healthcare provider as advised.  When to seek medical advice  Call your healthcare provider right away if any of these occur:    Return of gout symptoms, usually at night:    Severe pain, swelling, and heat in a joint, especially the base of the big toe    Affected joint is hard to move    Skin of the affected joint is purple or red    Fever of 100.4 F (38 C) or higher    Pain that doesn't get better even with prescribed medicine   [employer] last reviewed this educational content on 6/1/2018 2000-2021 [employer]. All rights reserved. This information is not intended as a substitute for professional medical care. Always follow your healthcare professional's instructions.           Patient Education     Gout Diet  Gout is a painful condition caused by an excess of uric acid, a waste product made by the body. Uric acid forms crystals that collect in the joints. The immune response to these crystals brings on symptoms of joint pain and swelling. This is called a gout attack. Often, medications and diet changes are combined to manage gout. Below are some guidelines for changing your diet to help you manage gout and prevent attacks. Your healthcare provider will help you determine the best eating plan for you.  Eating to manage gout  Weight loss for those who are overweight may help reduce gout attacks.  Eat less of these foods  Eating too many foods containing purines may raise the levels of uric acid in your body. This raises your risk for a gout attack. Try to limit these foods and drinks:    Alcohol, such as beer and red wine. You may be told to avoid alcohol  completely.    Soft drinks that contain sugar or high fructose corn syrup    Certain fish, including anchovies, sardines, fish eggs, and herring    Shellfish    Certain meats, such as red meat, hot dogs, luncheon meats, and turkey    Organ meats, such as liver, kidneys, and sweetbreads    Legumes, such as dried beans and peas    Other high fat foods such as gravy, whole milk, and high fat cheeses    Vegetables such as asparagus, cauliflower, spinach, and mushrooms used to be thought to contribute to an increased risk for a gout attack, but recent studies show that high purine vegetables don't increase the risk for a gout attack.  Eat more of these foods  Other foods may be helpful for people with gout. Add some of these foods to your diet:    Cherries contain chemicals that may lower uric acid.    Omega fatty acids. These are found in some fatty fish such as salmon, certain oils (flax, olive, or nut), and nuts themselves. Omega fatty acids may help prevent inflammation due to gout.    Dairy products that are low-fat or fat-free, such as cheese and yogurt    Complex carbohydrate foods, including whole grains, brown rice, oats, and beans    Coffee, in moderation    Water, approximately 64 ounces per day  Follow-up care  Follow up with your healthcare provider as advised.  When to seek medical advice  Call your healthcare provider right away if any of these occur:    Return of gout symptoms, usually at night:    Severe pain, swelling, and heat in a joint, especially the base of the big toe    Affected joint is hard to move    Skin of the affected joint is purple or red    Fever of 100.4 F (38 C) or higher    Pain that doesn't get better even with prescribed medicine   [employer] last reviewed this educational content on 6/1/2018 2000-2021 [employer]. All rights reserved. This information is not intended as a substitute for professional medical care. Always follow your healthcare professional's  instructions.

## 2021-07-15 NOTE — PROGRESS NOTES
Assessment & Plan     Acute gout of right hand, unspecified cause    - predniSONE (DELTASONE) 20 MG tablet; Take 1 tablet (20 mg) by mouth 2 times daily for 5 days  - Uric acid; Future  - Uric acid    Bunion of unspecified foot  -recommended follow up with podiatrist   - Orthopedic  Referral; Future      GM Vargas CNP  [facility] BARBARA Paul is a 41 year old who presents for the following health issues      Musculoskeletal problem/pain  Onset/Duration: 5 days   Description  Location: left foot   Joint Swelling: YES  Redness: no  Pain: YES  Warmth: YES  Intensity:  moderate  Progression of Symptoms:  worsening  Accompanying signs and symptoms: does have a bunion   Fevers: no  Numbness/tingling/weakness: no  History  Trauma to the area: no  Recent illness:  no  Previous similar problem: no  Previous evaluation:  no  Precipitating or alleviating factors:  Aggravating factors include: walking, putting pressure on it   Therapies tried and outcome: tylenol, ice         Review of Systems   Constitutional, HEENT, cardiovascular, pulmonary, gi and gu systems are negative, except as otherwise noted.      Objective    /72 (BP Location: Right arm, Patient Position: Sitting, Cuff Size: Adult Large)   Pulse 80   Temp 97.9  F (36.6  C) (Tympanic)   Wt 105.7 kg (233 lb 1.6 oz)   SpO2 96%   BMI 36.51 kg/m    Body mass index is 36.51 kg/m .  Physical Exam   GENERAL: healthy, alert and no distress  MS: right foot medial side edema and erythema, tender to palpation   NEURO: Normal strength and tone, mentation intact and speech normal  PSYCH: mentation appears normal, affect normal/bright    Results for orders placed or performed in visit on 07/15/21 (from the past 24 hour(s))   Uric acid   Result Value Ref Range    Uric Acid 8.7 (H) 3.5 - 7.2 mg/dL

## 2021-07-25 ENCOUNTER — HOSPITAL ENCOUNTER (EMERGENCY)
Facility: CLINIC | Age: 41
Discharge: HOME | End: 2021-07-25
Attending: EMERGENCY MEDICINE | Admitting: EMERGENCY MEDICINE
Payer: MEDICARE

## 2021-07-25 VITALS
DIASTOLIC BLOOD PRESSURE: 85 MMHG | OXYGEN SATURATION: 98 % | WEIGHT: 233 LBS | TEMPERATURE: 97.4 F | SYSTOLIC BLOOD PRESSURE: 136 MMHG | RESPIRATION RATE: 18 BRPM | BODY MASS INDEX: 36.49 KG/M2 | HEART RATE: 62 BPM

## 2021-07-25 DIAGNOSIS — L03.811 CELLULITIS OF HEAD EXCEPT FACE: ICD-10-CM

## 2021-07-25 PROCEDURE — 99284 EMERGENCY DEPT VISIT MOD MDM: CPT | Performed by: EMERGENCY MEDICINE

## 2021-07-25 PROCEDURE — 250N000013 HC RX MED GY IP 250 OP 250 PS 637: Performed by: EMERGENCY MEDICINE

## 2021-07-25 PROCEDURE — 99283 EMERGENCY DEPT VISIT LOW MDM: CPT | Performed by: EMERGENCY MEDICINE

## 2021-07-25 RX ORDER — CEPHALEXIN 500 MG/1
500 CAPSULE ORAL 4 TIMES DAILY
Qty: 40 CAPSULE | Refills: 0 | Status: SHIPPED | OUTPATIENT
Start: 2021-07-25 | End: 2021-08-04

## 2021-07-25 RX ORDER — CEPHALEXIN 500 MG/1
1000 CAPSULE ORAL ONCE
Status: COMPLETED | OUTPATIENT
Start: 2021-07-25 | End: 2021-07-25

## 2021-07-25 RX ADMIN — CEPHALEXIN 1000 MG: 500 CAPSULE ORAL at 17:47

## 2021-07-25 NOTE — ED TRIAGE NOTES
Wound on forehead.  Patient scratched head on door hook on the 18th and has been wearing a hat since so staff didn't noticed the redness getting worse.  Worsening redness and purulent drainage.  Denies fever.

## 2021-07-25 NOTE — ED NOTES
Pt arrived via triage, ambulatory, accomp by group home care giver.  Care giver states that pt will bang his head against the wall when is is angry.   Unknown to them, he did this on 7/18 after an anger outburst.  The pt wears a hat all day, everyday, and is independent with all his cares, so they did not notice the wound on his forehead until yesterday.       Middle of the forehead with open weeping abrasion, and large area of erythema and swelling around area.   Staff have washed area and applied neosporin and a dressing.    PLAN:  Will await MD assessment and orders.  Pt resting comfortably, is calm and cooperative.
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
## 2021-07-25 NOTE — DISCHARGE INSTRUCTIONS
Warm pack with washcloth 5-6 times daily for the next few days    Tylenol for discomfort    Cephalexin as prescribed    Return for progressive redness, swelling, pain, fever, vomiting or any other concern

## 2021-07-26 ENCOUNTER — HOSPITAL ENCOUNTER (EMERGENCY)
Facility: CLINIC | Age: 41
Discharge: HOME | End: 2021-07-26
Attending: FAMILY MEDICINE | Admitting: FAMILY MEDICINE
Payer: MEDICARE

## 2021-07-26 ENCOUNTER — OFFICE VISIT (OUTPATIENT)
Dept: PODIATRY | Facility: CLINIC | Age: 41
End: 2021-07-26
Payer: MEDICARE

## 2021-07-26 ENCOUNTER — ANCILLARY PROCEDURE (OUTPATIENT)
Dept: GENERAL RADIOLOGY | Facility: CLINIC | Age: 41
End: 2021-07-26
Attending: PODIATRIST
Payer: MEDICARE

## 2021-07-26 VITALS
WEIGHT: 233 LBS | BODY MASS INDEX: 36.57 KG/M2 | RESPIRATION RATE: 16 BRPM | DIASTOLIC BLOOD PRESSURE: 74 MMHG | TEMPERATURE: 97 F | SYSTOLIC BLOOD PRESSURE: 109 MMHG | HEART RATE: 82 BPM | OXYGEN SATURATION: 96 % | HEIGHT: 67 IN

## 2021-07-26 VITALS
DIASTOLIC BLOOD PRESSURE: 76 MMHG | SYSTOLIC BLOOD PRESSURE: 110 MMHG | BODY MASS INDEX: 36.57 KG/M2 | HEART RATE: 75 BPM | HEIGHT: 67 IN | WEIGHT: 233 LBS

## 2021-07-26 DIAGNOSIS — L03.811 CELLULITIS OF HEAD EXCEPT FACE: ICD-10-CM

## 2021-07-26 DIAGNOSIS — M20.11 HALLUX VALGUS, RIGHT: Primary | ICD-10-CM

## 2021-07-26 DIAGNOSIS — M21.619 BUNION OF UNSPECIFIED FOOT: ICD-10-CM

## 2021-07-26 DIAGNOSIS — M20.12 HALLUX VALGUS, LEFT: ICD-10-CM

## 2021-07-26 PROCEDURE — 99282 EMERGENCY DEPT VISIT SF MDM: CPT | Performed by: FAMILY MEDICINE

## 2021-07-26 PROCEDURE — 99284 EMERGENCY DEPT VISIT MOD MDM: CPT | Performed by: FAMILY MEDICINE

## 2021-07-26 PROCEDURE — 73630 X-RAY EXAM OF FOOT: CPT | Mod: LT | Performed by: RADIOLOGY

## 2021-07-26 PROCEDURE — 99203 OFFICE O/P NEW LOW 30 MIN: CPT | Performed by: PODIATRIST

## 2021-07-26 RX ORDER — DESVENLAFAXINE 50 MG/1
1 TABLET, FILM COATED, EXTENDED RELEASE ORAL EVERY MORNING
COMMUNITY
Start: 2021-07-08 | End: 2021-09-28 | Stop reason: DRUGHIGH

## 2021-07-26 ASSESSMENT — MIFFLIN-ST. JEOR
SCORE: 1920.51
SCORE: 1920.51

## 2021-07-26 NOTE — PATIENT INSTRUCTIONS
BUNION (HALLUX ABDUCTO VALGUS)  A bunion is caused by muscle imbalance. The great toe is pulled toward the smaller toes. The metatarsal head is pushed outward creating a lump on the side of your foot. Imbalance is the result of foot structure and instability.   Bunions do not improve with time. They usually enlarge, however this is a fairly slow process. Shoes do not necessarily cause bunions, however, they can hasten development and definitely cause bunions to hurt.   Bunions often run in families. We inherit a certain foot structure, which may be predisposed to bunion development.   Bunion pain is likely a combination of shoes rubbing on the bump, nerve irritation, compression between the toes, joint misalignment, arthritis and altered gait.   SYMPTOMS   Bunions are usually termed mild, moderate or severe. Just because you have a bunion does not mean you have to have pain. There are some people with very severe bunions and no pain and people with mild bunions and a lot of pain.   - Pain on the inside of your foot at the big toe joint (1st MTPJ)   - Swelling on the inside of your foot at the big toe joint   - Redness on the inside of your foot at the big toe joint   - Numbness or burning in the big toe (hallux)   - Decreased motion at the big toe joint   - Painful bursa (fluid-filled sac) on the inside of your foot at the big toe joint   - Pain while wearing shoes -especially shoes too narrow or with high heels    - Pain during activities   - Corn in between the big toe and second toe   - Callous formation on the side or bottom of the big toe or big toe joint   - Callous under the second toe joint (2nd MTPJ)   - Pain in the second toe joint   TREATMENT  Conservative (non-surgical) treatment will not make the bunion go away, but it will hopefully decrease the signs and symptoms you have and help you get rid of the pain and get you back to your activities.   1.  Wider shoes or extra depth shoes: Most bunion pain can  be improved simply by wearing compatible shoes. People with bunions cannot choose footwear simply because they like the style. Your bunion should determine which shoes are to be worn. Wide shoes with nonirritating seams,soft leather and a square toe box are most compatible with a bunion. Shoes should fit appropriately right out of the box but may need to be professionally stretched and modified to accommodate the bump. Heels, dress shoes and shoes with pointed toes will not be comfortable.   2. NSAIDs   3.  Arch supports, custom inserts, padding, splints, toe spacers : Most bunion pain can be improved simply by wearing compatible shoes. People with bunions cannot choose footwear simply because they like the style. Your bunion should determine which shoes are to be worn. Wide shoes with nonirritating seams,soft leather and a square toe box are most compatible with a bunion. Shoes should fit appropriately right out of the box but may need to be professionally stretched and modified to accommodate the bump. Heels, dress shoes and shoes with pointed toes will not be comfortable.   4.  Change activities   5.  Physical therapy  SURGERY  Surgical treatment for bunions is sometimes needed. If you are limited by pain, cannot fit in shoes comfortably and are not able to do your daily activities then surgery may be a good option for you. There are many different surgical procedures to repair bunions. Your foot and ankle surgeon will review your foot exam findings, your x-rays, your age, your health, your lifestyle, your physical activity level and discuss with you which procedure he or she would recommend. Surgical procedures for bunions range from soft tissue repair to cutting and realigning the bones. It is not recommended that you have bunion surgery for cosmetic reasons (you do not like how your foot looks) or because you want to fit in a certain pair of shoes; There is the risk that even after surgery, the bunion will  reoccur 9-10% of the time.   Bunion surgery involves cutting and repositioning the bones surrounding the bunion. Pins and screws are used to hold the bones in place during the healing process. The goal of bunion surgery is to reduce the size of the bunion bump. Realignment of the toe and joint is attempted.     Some first toes cannot be forced back into normal alignment even with surgery. Surgery is helpful in most cases but does not necessarily create a normal foot.   Healing after surgery requires about six weeks of protection. This allows the bone to heal. Maximum recovery takes about one year. The scar tissue and jOint structures require this amount of time to finish the healing process. Expect stiffness, swelling and numbness during that time frame. Bunion surgery does involve side effects. Some side effects are predictable and others are less common but do occur. A scar will be visible and could be irritated by shoes. The shoe may rub on the screw or internal pin requiring surgical removal of these fixation devices. The screw and pin would likely be left in place for a full year. The first toe may loose motion after bunion surgery. The amount of stiffness is variable. Some people never regain normal motion of the first toe. This is due to scar tissue inherent to any surgery. The first toe may drift toward the second toe or away from the second toe. Spreading of the first and second toes is a rare occurrence after bunion surgery. This can be quite bothersome and would need to be surgically repaired. Toe drift toward the second toe could result in a recurrent bunion and revision surgery. Joint fusion is one option to correct an unstable, drifting toe. This procedure straightens the toe, however, no motion remains. Fusion may be necessary to correct complications of bunion surgery or as the original procedure in severe cases.   All surgical procedures involve risk of infection, numbness, pain, delayed healing,  osteotomy dislocation, blood clots, continued foot pain, etc. Bunion surgery is quite complex and should not be taken lightly.   Any skin incision can lead to infection. Deep infection might involve the bone and thus repeat surgery and six weeks of IV antibiotics. Scar tissue can cause nerve pain or numbness. This is generally temporary but can be permanent. We do not have treatments that cure nerve problems. Second toe pain could be related to altered mechanics and pressure transferred to the second toe. Most feet with bunions have pre-existing second toe problems. Delayed bone healing would lengthen the healing time. Some bones simply do not heal. This requires repeat surgery, electronic bone stimulation and/or extended protection. Smokers have an approximate 20% chance of poor bone healing. This is double that of a non-smoker. The bone cut may displace. This may need to be repaired with a second operation. Displacement can cause jOint malalignment. Immobility after surgery can cause blood clots in the legs and lungs. This could result in death.   Foot pain is complex. Most feet hurt for more than one reason. Fixing the bunion would not necessarily create a pain free foot. Appropriate shoes, healthy body weight, avoidance of bare foot walking and moderation of activity will always be necessary to enjoy foot comfort. Your bunion may involve arthritis, which is incurable even with surgery. Long standing bunions often involve chronic irritation to the surrounding nerves. Nerve pain may not resolve even with reducing the bunion bump since permanent nerve damage may be present   Bunion surgery is nevertheless quite successful. Most surgical patients are pleased with their foot following bunion surgery. Many of the issues described above can be controlled by taking proper care of your foot during the healing process.   Your surgeon would be happy to fully describe any of the above issues. You should pursue a full  understanding of the operation,recovery process and any potential problems that could develop.   PREVENTION  1.  Do not wear high heels if there is a family history of bunions.  2.  Wear shoes that have enough width and depth in the toe box  Here are exercises that may benefit people with bunions:   Toe stretches - Stretching out your toes can help keep them limber and offset foot pain. To stretch your toes, point your toes straight ahead for 5 seconds and then curl them under for 5 seconds. Repeat these stretches 10 times. These exercises can be especially beneficial if you also have hammertoes, or chronically bent toes, in addition to a bunion.   Toe flexing and redd - Press your toes against a hard surface such as a wall, to flex and stretch them; hold the position for 10 seconds and repeat three to four times. Then flex your toes in the opposite direction; hold the position for 10 seconds and repeat three to four times.   Stretching your big toe - Using your fingers to gently pull your big toe over into proper alignment can be helpful as well. Hold your toe in position for 10 seconds and repeat three to four times.   Resistance exercises - Wrap either a towel or belt around your big toe and use it to pull your big toe toward you while simultaneously pushing forward, against the towel, with your big toe.   Ball roll - To massage the bottom of your foot, sit down, place a golf ball on the floor under your foot, and roll it around under your foot for two minutes. This can help relieve foot strain and cramping.   Towel curls - You can strengthen your toes by spreading out a small towel on the floor, curling your toes around it, and pulling it toward you. Repeat five times. Gripping objects with your toes like this can help keep your foot flexible.   Picking up marbles - Another gripping exercise you can perform to keep your foot flexible is picking up marbles with your toes. Do this by placing 20 marbles on  the floor in front of you and use your foot to pick the marbles up one by one and place them in a bowl.   Walking along the beach - Whenever possible, spend time walking on sand. This can give you a gentle foot massage and also help strengthen your toes. This is especially beneficial for people who have arthritis associated with their bunions.

## 2021-07-26 NOTE — NURSING NOTE
"Chief Complaint   Patient presents with     Consult     dorota Calderon xr today       Initial /76   Pulse 75   Ht 1.702 m (5' 7\")   Wt 105.7 kg (233 lb)   BMI 36.49 kg/m   Estimated body mass index is 36.49 kg/m  as calculated from the following:    Height as of this encounter: 1.702 m (5' 7\").    Weight as of this encounter: 105.7 kg (233 lb).  Medications and allergies reviewed.      Linnea PIERRE MA    "

## 2021-07-26 NOTE — PROGRESS NOTES
PATIENT HISTORY:  Humberto Estrella is a 41 year old male with Down syndrome who presents to clinic with his personal care attendant with a chief complaint of bilateral bunions.  The patient relates the pain is primarily located around the great toe joint.  The patient relates that the problem has been going on for several years and is getting worse.  The patient relates trying walking shoes with little relief.    The patient was referred by Carla Brown DO for consultation on the bilateral foot.  Any previous notes and studies that pertain to the patient's condition were reviewed.    Pertinent medical, surgical and family history was reviewed in Epic chart and include morbid obesity, hypothyroidism, Down syndrome    Medications:   Current Outpatient Medications:      calcium polycarbophil (FIBERCON) 625 MG tablet, Take 2 tablets (1,250 mg) by mouth 2 times daily, Disp: 360 tablet, Rfl: 3     cephALEXin (KEFLEX) 500 MG capsule, Take 1 capsule (500 mg) by mouth 4 times daily for 10 days, Disp: 40 capsule, Rfl: 0     divalproex sodium delayed-release (DEPAKOTE SPRINKLE) 125 MG DR capsule, Take 750 mg by mouth 2 times daily Open capsules and sprinkle into pudding/sauce, Disp: , Rfl:      levothyroxine (SYNTHROID/LEVOTHROID) 100 MCG tablet, Take 1 tablet (100 mcg) by mouth daily, Disp: 90 tablet, Rfl: 3     metFORMIN (GLUCOPHAGE) 500 MG tablet, Take 1 tablet (500 mg) by mouth 2 times daily (with meals), Disp: 180 tablet, Rfl: 3     montelukast (SINGULAIR) 10 MG tablet, Take 1 tablet (10 mg) by mouth At Bedtime, Disp: 90 tablet, Rfl: 3     order for DME, Equipment being ordered: CPAP AIRSENSE 10 11 CM H20 AIRFIT F20 [facility]# [id number]  DN# [id number], Disp: , Rfl:      risperiDONE (RISPERDAL) 1 MG tablet, Take 1 mg by mouth every morning , Disp: 60 tablet, Rfl:      risperiDONE (RISPERDAL) 2 MG tablet, Take 2 mg by mouth daily at 8:00pm, Disp: , Rfl:      topiramate (TOPAMAX) 100 MG tablet, Take 1.5 tablets (150 mg) by  mouth daily, Disp: 135 tablet, Rfl: 2     VITAMIN D3 25 MCG (1000 UT) tablet, TAKE 1 TABLET BY MOUTH ONCE DAILY (Patient taking differently: Take 1 tablet by mouth daily ), Disp: 100 tablet, Rfl: 2     albuterol (PROAIR HFA/PROVENTIL HFA/VENTOLIN HFA) 108 (90 Base) MCG/ACT inhaler, Inhale 2 puffs into the lungs every 6 hours as needed for shortness of breath / dyspnea or wheezing, Disp: 3 Inhaler, Rfl: 1     alum & mag hydroxide-simethicone (MYLANTA/MAALOX) 200-200-20 MG/5ML SUSP suspension, Take 30 mLs by mouth every 4 hours as needed for indigestion (2 tsp for upset stomach) (Patient taking differently: Take 15 mLs by mouth every 4 hours as needed for indigestion (2 tsp for upset stomach) ), Disp: 1 Bottle, Rfl: 3     calcium carbonate (TUMS) 500 MG chewable tablet, Take 1 tablet (500 mg) by mouth every 6 hours as needed for heartburn, Disp: 150 tablet, Rfl: 3     desvenlafaxine (PRISTIQ) 50 MG 24 hr tablet, Take 1 tablet by mouth every morning, Disp: , Rfl:      fluticasone (FLONASE) 50 MCG/ACT nasal spray, Spray 1 spray into both nostrils daily as needed for rhinitis or allergies, Disp: 16 g, Rfl: 1     ibuprofen (ADVIL,MOTRIN) 600 MG tablet, Take 1 tablet (600 mg) by mouth every 6 hours as needed for moderate pain, Disp: 60 tablet, Rfl: 0     loratadine (CLARITIN) 10 MG tablet, Take 1 tablet (10 mg) by mouth daily, Disp: 90 tablet, Rfl: 3     magnesium hydroxide (MILK OF MAGNESIA) 400 MG/5ML suspension, Take 30-60 mLs by mouth daily as needed for constipation or heartburn (If No Bowel Movement in 2 days.) Reported on 4/12/2017 (Patient taking differently: Take 30 mLs by mouth every 48 hours as needed for constipation or heartburn (If No Bowel Movement in 2 days.) Reported on 4/12/2017), Disp: 360 mL, Rfl: 1     OLANZapine (ZYPREXA) 5 MG tablet, Take 2.5 mg by mouth 3 times daily as needed , Disp: 30 tablet, Rfl: 1     PAIN & FEVER 325 MG tablet, TAKE 1-2 TABLETS (325-650MG) BY MOUTH EVERY 4 HOURS AS NEEDED  "*ORDER WHEN LOW* (Patient taking differently: Take 650 mg by mouth every 4 hours as needed for fever or pain ), Disp: 100 tablet, Rfl: 7     phenol (CHLORASEPTIC) 1.4 % spray, Take 1 spray by mouth every hour as needed for sore throat Use as directed for sore throt , Disp: , Rfl:      polyethylene glycol-propylene glycol PF (SYSTANE ULTRA PF) 0.4-0.3 % SOLN opthalmic solution, Place 1-2 drops into both eyes Up to 5 times daily as needed, Disp: , Rfl:      sodium chloride (SALINE MIST) 0.65 % nasal spray, Spray 2 sprays into both nostrils 4 times daily as needed for congestion, Disp: 30 mL, Rfl: 3     SUDOGEST 12 HOUR 120 MG 12 hr tablet, TAKE 1 TABLET BY MOUTH ONCE DAILY AS NEEDED *2 TOTAL FILLS*, Disp: 12 tablet, Rfl: 5     triamcinolone (KENALOG) 0.1 % cream, Apply  topically 3 times daily. Apply sparingly to affected area. (Patient taking differently: Apply topically 3 times daily as needed Apply sparingly to affected area.), Disp: 30 g, Rfl: 1     Allergies:    Allergies   Allergen Reactions     Nkda [No Known Drug Allergies]        Vitals: /76   Pulse 75   Ht 1.702 m (5' 7\")   Wt 105.7 kg (233 lb)   BMI 36.49 kg/m    BMI= Body mass index is 36.49 kg/m .    LOWER EXTREMITY PHYSICAL EXAM    Dermatologic: Skin is intact to right and left lower extremity without significant lesions, rash or abrasion.        Vascular: DP & PT pulses are intact & regular on the right and left.   CFT and skin temperature is normal to the right and left lower extremity.     Neurologic: Lower extremity sensation is intact to light touch.  No evidence of weakness in the right and left lower extremity.        Musculoskeletal: Patient is ambulatory without assistive device or brace.  No gross ankle deformity noted.  No foot or ankle joint effusion is noted.  Noted moderate to severe bilateral bunion deformities on both feet.  Pain on palpation over the medial aspect of the first metatarsophalangeal joint bilaterally.  Normal " range of motion of the first metatarsophalangeal joint bilaterally.    Diagnostics:  Radiographs included three views of the left and right foot demonstrating moderate to severe bunion deformity with an elevated first intermetatarsal angle and hallux abductus angle bilaterally.  No degenerative changes noted to the first metatarsophalangeal joint bilaterally.  No cortical erosions or periosteal elevation.  All joint margins appear stable.  There is no apparent fracture or tumor formation noted.  There is no evidence of foreign body.  The images were independently reviewed by myself along with the patient explaining the findings.      ASSESSMENT / PLAN:     ICD-10-CM    1. Hallux valgus, right  M20.11    2. Hallux valgus, left  M20.12        I have explained to Humberto about the conditions.  We discussed the underlying contributing factors to the condition as well as both conservative and surgical treatment options with associated risks and benefits along with expected length of recovery.  At this time, I am recommending continuing with accommodative shoe wear and avoid surgical reconstruction of the foot if all possible.  The patient will consult with his legal guardian about his options.    Humberto verbalized agreement with and understanding of the rational for the diagnosis and treatment plan.  All questions were answered to best of my ability and the patient's satisfaction. The patient was advised to contact the clinic with any questions that may arise after the clinic visit.      Disclaimer: This note consists of symbols derived from keyboarding, dictation and/or voice recognition software. As a result, there may be errors in the script that have gone undetected. Please consider this when interpreting information found in this chart.       PERNELL Snyder D.P.M., F.AIME.JOCELYN.F.A.S.

## 2021-07-26 NOTE — LETTER
7/26/2021         RE: Humberto Esterlla  [address]  [place] [facility] [id number]        Dear Colleague,    Thank you for referring your patient, Humberto Estrella, to the [facility] [place]. Please see a copy of my visit note below.    PATIENT HISTORY:  Humberto Estrella is a 41 year old male with Down syndrome who presents to clinic with his personal care attendant with a chief complaint of bilateral bunions.  The patient relates the pain is primarily located around the great toe joint.  The patient relates that the problem has been going on for several years and is getting worse.  The patient relates trying walking shoes with little relief.    The patient was referred by Carla Brown DO for consultation on the bilateral foot.  Any previous notes and studies that pertain to the patient's condition were reviewed.    Pertinent medical, surgical and family history was reviewed in Epic chart and include morbid obesity, hypothyroidism, Down syndrome    Medications:   Current Outpatient Medications:      calcium polycarbophil (FIBERCON) 625 MG tablet, Take 2 tablets (1,250 mg) by mouth 2 times daily, Disp: 360 tablet, Rfl: 3     cephALEXin (KEFLEX) 500 MG capsule, Take 1 capsule (500 mg) by mouth 4 times daily for 10 days, Disp: 40 capsule, Rfl: 0     divalproex sodium delayed-release (DEPAKOTE SPRINKLE) 125 MG DR capsule, Take 750 mg by mouth 2 times daily Open capsules and sprinkle into pudding/sauce, Disp: , Rfl:      levothyroxine (SYNTHROID/LEVOTHROID) 100 MCG tablet, Take 1 tablet (100 mcg) by mouth daily, Disp: 90 tablet, Rfl: 3     metFORMIN (GLUCOPHAGE) 500 MG tablet, Take 1 tablet (500 mg) by mouth 2 times daily (with meals), Disp: 180 tablet, Rfl: 3     montelukast (SINGULAIR) 10 MG tablet, Take 1 tablet (10 mg) by mouth At Bedtime, Disp: 90 tablet, Rfl: 3     order for DME, Equipment being ordered: CPAP AIRSENSE 10 11 CM H20 AIRFIT F20 [facility] # [id number]  DN# [id number], Disp: ,  Rfl:      risperiDONE (RISPERDAL) 1 MG tablet, Take 1 mg by mouth every morning , Disp: 60 tablet, Rfl:      risperiDONE (RISPERDAL) 2 MG tablet, Take 2 mg by mouth daily at 8:00pm, Disp: , Rfl:      topiramate (TOPAMAX) 100 MG tablet, Take 1.5 tablets (150 mg) by mouth daily, Disp: 135 tablet, Rfl: 2     VITAMIN D3 25 MCG (1000 UT) tablet, TAKE 1 TABLET BY MOUTH ONCE DAILY (Patient taking differently: Take 1 tablet by mouth daily ), Disp: 100 tablet, Rfl: 2     albuterol (PROAIR HFA/PROVENTIL HFA/VENTOLIN HFA) 108 (90 Base) MCG/ACT inhaler, Inhale 2 puffs into the lungs every 6 hours as needed for shortness of breath / dyspnea or wheezing, Disp: 3 Inhaler, Rfl: 1     alum & mag hydroxide-simethicone (MYLANTA/MAALOX) 200-200-20 MG/5ML SUSP suspension, Take 30 mLs by mouth every 4 hours as needed for indigestion (2 tsp for upset stomach) (Patient taking differently: Take 15 mLs by mouth every 4 hours as needed for indigestion (2 tsp for upset stomach) ), Disp: 1 Bottle, Rfl: 3     calcium carbonate (TUMS) 500 MG chewable tablet, Take 1 tablet (500 mg) by mouth every 6 hours as needed for heartburn, Disp: 150 tablet, Rfl: 3     desvenlafaxine (PRISTIQ) 50 MG 24 hr tablet, Take 1 tablet by mouth every morning, Disp: , Rfl:      fluticasone (FLONASE) 50 MCG/ACT nasal spray, Spray 1 spray into both nostrils daily as needed for rhinitis or allergies, Disp: 16 g, Rfl: 1     ibuprofen (ADVIL,MOTRIN) 600 MG tablet, Take 1 tablet (600 mg) by mouth every 6 hours as needed for moderate pain, Disp: 60 tablet, Rfl: 0     loratadine (CLARITIN) 10 MG tablet, Take 1 tablet (10 mg) by mouth daily, Disp: 90 tablet, Rfl: 3     magnesium hydroxide (MILK OF MAGNESIA) 400 MG/5ML suspension, Take 30-60 mLs by mouth daily as needed for constipation or heartburn (If No Bowel Movement in 2 days.) Reported on 4/12/2017 (Patient taking differently: Take 30 mLs by mouth every 48 hours as needed for constipation or heartburn (If No Bowel Movement  "in 2 days.) Reported on 4/12/2017), Disp: 360 mL, Rfl: 1     OLANZapine (ZYPREXA) 5 MG tablet, Take 2.5 mg by mouth 3 times daily as needed , Disp: 30 tablet, Rfl: 1     PAIN & FEVER 325 MG tablet, TAKE 1-2 TABLETS (325-650MG) BY MOUTH EVERY 4 HOURS AS NEEDED *ORDER WHEN LOW* (Patient taking differently: Take 650 mg by mouth every 4 hours as needed for fever or pain ), Disp: 100 tablet, Rfl: 7     phenol (CHLORASEPTIC) 1.4 % spray, Take 1 spray by mouth every hour as needed for sore throat Use as directed for sore throt , Disp: , Rfl:      polyethylene glycol-propylene glycol PF (SYSTANE ULTRA PF) 0.4-0.3 % SOLN opthalmic solution, Place 1-2 drops into both eyes Up to 5 times daily as needed, Disp: , Rfl:      sodium chloride (SALINE MIST) 0.65 % nasal spray, Spray 2 sprays into both nostrils 4 times daily as needed for congestion, Disp: 30 mL, Rfl: 3     SUDOGEST 12 HOUR 120 MG 12 hr tablet, TAKE 1 TABLET BY MOUTH ONCE DAILY AS NEEDED *2 TOTAL FILLS*, Disp: 12 tablet, Rfl: 5     triamcinolone (KENALOG) 0.1 % cream, Apply  topically 3 times daily. Apply sparingly to affected area. (Patient taking differently: Apply topically 3 times daily as needed Apply sparingly to affected area.), Disp: 30 g, Rfl: 1     Allergies:    Allergies   Allergen Reactions     Nkda [No Known Drug Allergies]        Vitals: /76   Pulse 75   Ht 1.702 m (5' 7\")   Wt 105.7 kg (233 lb)   BMI 36.49 kg/m    BMI= Body mass index is 36.49 kg/m .    LOWER EXTREMITY PHYSICAL EXAM    Dermatologic: Skin is intact to right and left lower extremity without significant lesions, rash or abrasion.        Vascular: DP & PT pulses are intact & regular on the right and left.   CFT and skin temperature is normal to the right and left lower extremity.     Neurologic: Lower extremity sensation is intact to light touch.  No evidence of weakness in the right and left lower extremity.        Musculoskeletal: Patient is ambulatory without assistive device " or brace.  No gross ankle deformity noted.  No foot or ankle joint effusion is noted.  Noted moderate to severe bilateral bunion deformities on both feet.  Pain on palpation over the medial aspect of the first metatarsophalangeal joint bilaterally.  Normal range of motion of the first metatarsophalangeal joint bilaterally.    Diagnostics:  Radiographs included three views of the left and right foot demonstrating moderate to severe bunion deformity with an elevated first intermetatarsal angle and hallux abductus angle bilaterally.  No degenerative changes noted to the first metatarsophalangeal joint bilaterally.  No cortical erosions or periosteal elevation.  All joint margins appear stable.  There is no apparent fracture or tumor formation noted.  There is no evidence of foreign body.  The images were independently reviewed by myself along with the patient explaining the findings.      ASSESSMENT / PLAN:     ICD-10-CM    1. Hallux valgus, right  M20.11    2. Hallux valgus, left  M20.12        I have explained to Humberto about the conditions.  We discussed the underlying contributing factors to the condition as well as both conservative and surgical treatment options with associated risks and benefits along with expected length of recovery.  At this time, I am recommending continuing with accommodative shoe wear and avoid surgical reconstruction of the foot if all possible.  The patient will consult with his legal guardian about his options.    Humberto verbalized agreement with and understanding of the rational for the diagnosis and treatment plan.  All questions were answered to best of my ability and the patient's satisfaction. The patient was advised to contact the clinic with any questions that may arise after the clinic visit.      Disclaimer: This note consists of symbols derived from keyboarding, dictation and/or voice recognition software. As a result, there may be errors in the script that have gone  undetected. Please consider this when interpreting information found in this chart.       PERNELL Snyder D.P.M., F.AIME.C.F.A.S.        Again, thank you for allowing me to participate in the care of your patient.        Sincerely,        Artemio Snyder DPM
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
## 2021-07-27 NOTE — DISCHARGE INSTRUCTIONS
Return to the Emergency Room if the following occurs:     Severely worsened pain, expanding redness and swelling, fever >101, or for any concern at anytime.    Or, follow-up with the following provider as we discussed:     Return to the ER if not improved over the next 2 days.    Medications discussed:    No medication changes.    If you received pain-relieving or sedating medication during your time in the ER, avoid alcohol, driving automobiles, or working with machinery.  Also, a responsible adult must stay with you.        Call the Nurse Advice Line at [phone] or [phone] for any concern at anytime.

## 2021-08-05 ENCOUNTER — OFFICE VISIT (OUTPATIENT)
Dept: FAMILY MEDICINE | Facility: CLINIC | Age: 41
End: 2021-08-05
Payer: MEDICARE

## 2021-08-05 ENCOUNTER — TELEPHONE (OUTPATIENT)
Dept: PHARMACY | Facility: CLINIC | Age: 41
End: 2021-08-05

## 2021-08-05 VITALS
OXYGEN SATURATION: 96 % | SYSTOLIC BLOOD PRESSURE: 110 MMHG | DIASTOLIC BLOOD PRESSURE: 64 MMHG | HEART RATE: 79 BPM | WEIGHT: 230 LBS | TEMPERATURE: 97.7 F | BODY MASS INDEX: 40.75 KG/M2 | HEIGHT: 63 IN | RESPIRATION RATE: 16 BRPM

## 2021-08-05 DIAGNOSIS — Z13.220 SCREENING FOR HYPERLIPIDEMIA: ICD-10-CM

## 2021-08-05 DIAGNOSIS — Z13.6 CARDIOVASCULAR SCREENING; LDL GOAL LESS THAN 100: ICD-10-CM

## 2021-08-05 DIAGNOSIS — H10.13 ALLERGIC CONJUNCTIVITIS, BILATERAL: ICD-10-CM

## 2021-08-05 DIAGNOSIS — E03.8 SUBCLINICAL HYPOTHYROIDISM: ICD-10-CM

## 2021-08-05 DIAGNOSIS — J30.1 SEASONAL ALLERGIC RHINITIS DUE TO POLLEN: ICD-10-CM

## 2021-08-05 DIAGNOSIS — J31.0 NONALLERGIC RHINITIS: ICD-10-CM

## 2021-08-05 DIAGNOSIS — Z00.00 ENCOUNTER FOR MEDICARE ANNUAL WELLNESS EXAM: Primary | ICD-10-CM

## 2021-08-05 DIAGNOSIS — E66.09 OBESITY DUE TO EXCESS CALORIES, UNSPECIFIED OBESITY SEVERITY: Chronic | ICD-10-CM

## 2021-08-05 DIAGNOSIS — Z11.59 NEED FOR HEPATITIS C SCREENING TEST: ICD-10-CM

## 2021-08-05 PROCEDURE — 99214 OFFICE O/P EST MOD 30 MIN: CPT | Mod: 25 | Performed by: INTERNAL MEDICINE

## 2021-08-05 PROCEDURE — G0438 PPPS, INITIAL VISIT: HCPCS | Performed by: INTERNAL MEDICINE

## 2021-08-05 RX ORDER — LORATADINE 10 MG/1
10 TABLET ORAL DAILY
Qty: 90 TABLET | Refills: 3 | Status: SHIPPED | OUTPATIENT
Start: 2021-08-05 | End: 2022-01-25 | Stop reason: ALTCHOICE

## 2021-08-05 RX ORDER — MONTELUKAST SODIUM 10 MG/1
10 TABLET ORAL AT BEDTIME
Qty: 90 TABLET | Refills: 3 | Status: SHIPPED | OUTPATIENT
Start: 2021-08-05 | End: 2022-09-13

## 2021-08-05 RX ORDER — LEVOTHYROXINE SODIUM 100 UG/1
100 TABLET ORAL DAILY
Qty: 90 TABLET | Refills: 3 | Status: SHIPPED | OUTPATIENT
Start: 2021-08-05 | End: 2022-09-13

## 2021-08-05 ASSESSMENT — ACTIVITIES OF DAILY LIVING (ADL)
CURRENT_FUNCTION: SHOPPING REQUIRES ASSISTANCE
CURRENT_FUNCTION: TELEPHONE REQUIRES ASSISTANCE
CURRENT_FUNCTION: MONEY MANAGEMENT REQUIRES ASSISTANCE
CURRENT_FUNCTION: TRANSPORTATION REQUIRES ASSISTANCE
CURRENT_FUNCTION: MEDICATION ADMINISTRATION REQUIRES ASSISTANCE
CURRENT_FUNCTION: PREPARING MEALS REQUIRES ASSISTANCE
CURRENT_FUNCTION: BATHING REQUIRES ASSISTANCE
CURRENT_FUNCTION: HOUSEWORK REQUIRES ASSISTANCE
CURRENT_FUNCTION: LAUNDRY REQUIRES ASSISTANCE

## 2021-08-05 ASSESSMENT — MIFFLIN-ST. JEOR: SCORE: 1843.27

## 2021-08-05 NOTE — PROGRESS NOTES
"SUBJECTIVE:   Humberto Estrella is a 41 year old male who presents for Preventive Visit.      Patient has been advised of split billing requirements and indicates understanding: Yes   Are you in the first 12 months of your Medicare coverage?  No    Healthy Habits:    In general, how would you rate your overall health?  Good    Frequency of exercise:  1 day/week    Duration of exercise:  Less than 15 minutes    Do you usually eat at least 4 servings of fruit and vegetables a day, include whole grains    & fiber and avoid regularly eating high fat or \"junk\" foods?  Yes    Taking medications regularly:  Yes    Barriers to taking medications:  None    Medication side effects:  None    Ability to successfully perform activities of daily living:  Telephone requires assistance, transportation requires assistance, shopping requires assistance, preparing meals requires assistance, money management requires assistance, medication administration requires assistance, laundry requires assistance, bathing requires assistance and housework requires assistance    Home Safety:  No safety concerns identified    Hearing Impairment:  No hearing concerns    In the past 6 months, have you been bothered by leaking of urine?  No    In general, how would you rate your overall mental or emotional health?  Good      PHQ-2 Total Score:    Additional concerns today:  Yes         ED/UC Followup:    Facility:  [facility] ED  Date of visit: 7/26/21  Reason for visit: Cellulitis of head except face  Current Status: Feeling okay, there is a scare on the patient forehead.          Do you feel safe in your environment? Yes    Have you ever done Advance Care Planning? (For example, a Health Directive, POLST, or a discussion with a medical provider or your loved ones about your wishes): Yes, advance care planning is on file.       Fall risk       Cognitive Screening Not appropriate due to known dementia/cognitive impairment    Do you have sleep apnea, " excessive snoring or daytime drowsiness?: yes    Reviewed and updated as needed this visit by clinical staff  Tobacco  Allergies  Meds  Problems  Med Hx  Surg Hx  Fam Hx  Soc Hx          Reviewed and updated as needed this visit by Provider     Problems            Social History     Tobacco Use     Smoking status: Former Smoker     Packs/day: 1.00     Years: 1.00     Pack years: 1.00     Types: Cigarettes     Start date: 1999     Quit date: 2000     Years since quittin.2     Smokeless tobacco: Never Used   Substance Use Topics     Alcohol use: No     Alcohol/week: 0.0 standard drinks     If you drink alcohol do you typically have >3 drinks per day or >7 drinks per week? No    Alcohol Use 2017   Prescreen: >3 drinks/day or >7 drinks/week? The patient does not drink >3 drinks per day nor >7 drinks per week.           Current providers sharing in care for this patient include:   Patient Care Team:  Carla Brown DO as PCP - General (Internal Medicine)  Bloch, Lauren Turner, [facility] as Pharmacist (Pharmacist)  Carla Brown DO as Assigned PCP  Chasidy Juarez MD as Assigned Endocrinology Provider  David Peralta MD as Assigned Sleep Provider  Murali Middleton MD as Assigned Musculoskeletal Provider    The following health maintenance items are reviewed in Epic and correct as of today:  Health Maintenance Due   Topic Date Due     ANNUAL REVIEW OF  ORDERS  Never done     HEPATITIS C SCREENING  Never done     LIPID  2021     Current Outpatient Medications   Medication Sig Dispense Refill     calcium polycarbophil (FIBERCON) 625 MG tablet Take 2 tablets (1,250 mg) by mouth 2 times daily 360 tablet 3     divalproex sodium delayed-release (DEPAKOTE SPRINKLE) 125 MG DR capsule Take 750 mg by mouth 2 times daily Open capsules and sprinkle into pudding/sauce       levothyroxine (SYNTHROID/LEVOTHROID) 100 MCG tablet Take 1 tablet (100 mcg) by mouth daily 90 tablet  3     loratadine (CLARITIN) 10 MG tablet Take 1 tablet (10 mg) by mouth daily 90 tablet 3     metFORMIN (GLUCOPHAGE) 500 MG tablet Take 1 tablet (500 mg) by mouth 2 times daily (with meals) 180 tablet 3     montelukast (SINGULAIR) 10 MG tablet Take 1 tablet (10 mg) by mouth At Bedtime 90 tablet 3     order for DME Equipment being ordered: CPAP  AIRSENSE 10  11 CM H20  AIRFIT F20 [facility]# [id number]  DN# [id number]       polyethylene glycol-propylene glycol PF (SYSTANE ULTRA PF) 0.4-0.3 % SOLN opthalmic solution Place 1-2 drops into both eyes Up to 5 times daily as needed       risperiDONE (RISPERDAL) 1 MG tablet Take 1 mg by mouth every morning  60 tablet      risperiDONE (RISPERDAL) 2 MG tablet Take 2 mg by mouth daily at 8:00pm       topiramate (TOPAMAX) 100 MG tablet Take 1.5 tablets (150 mg) by mouth daily 135 tablet 2     VITAMIN D3 25 MCG (1000 UT) tablet TAKE 1 TABLET BY MOUTH ONCE DAILY 100 tablet 2     albuterol (PROAIR HFA/PROVENTIL HFA/VENTOLIN HFA) 108 (90 Base) MCG/ACT inhaler Inhale 2 puffs into the lungs every 6 hours as needed for shortness of breath / dyspnea or wheezing (Patient not taking: Reported on 8/5/2021) 3 Inhaler 1     alum & mag hydroxide-simethicone (MYLANTA/MAALOX) 200-200-20 MG/5ML SUSP suspension Take 30 mLs by mouth every 4 hours as needed for indigestion (2 tsp for upset stomach) (Patient not taking: Reported on 8/5/2021) 1 Bottle 3     calcium carbonate (TUMS) 500 MG chewable tablet Take 1 tablet (500 mg) by mouth every 6 hours as needed for heartburn (Patient not taking: Reported on 8/5/2021) 150 tablet 3     desvenlafaxine (PRISTIQ) 50 MG 24 hr tablet Take 1 tablet by mouth every morning (Patient not taking: Reported on 8/5/2021)       fluticasone (FLONASE) 50 MCG/ACT nasal spray Spray 1 spray into both nostrils daily as needed for rhinitis or allergies (Patient not taking: Reported on 8/5/2021) 16 g 1     ibuprofen (ADVIL,MOTRIN) 600 MG tablet Take 1 tablet (600 mg) by mouth every  "6 hours as needed for moderate pain (Patient not taking: Reported on 8/5/2021) 60 tablet 0     magnesium hydroxide (MILK OF MAGNESIA) 400 MG/5ML suspension Take 30-60 mLs by mouth daily as needed for constipation or heartburn (If No Bowel Movement in 2 days.) Reported on 4/12/2017 (Patient not taking: Reported on 8/5/2021) 360 mL 1     OLANZapine (ZYPREXA) 5 MG tablet Take 2.5 mg by mouth 3 times daily as needed  (Patient not taking: Reported on 8/5/2021) 30 tablet 1     PAIN & FEVER 325 MG tablet TAKE 1-2 TABLETS (325-650MG) BY MOUTH EVERY 4 HOURS AS NEEDED *ORDER WHEN LOW* (Patient not taking: Reported on 8/5/2021) 100 tablet 7     phenol (CHLORASEPTIC) 1.4 % spray Take 1 spray by mouth every hour as needed for sore throat Use as directed for sore throt  (Patient not taking: Reported on 8/5/2021)       sodium chloride (SALINE MIST) 0.65 % nasal spray Spray 2 sprays into both nostrils 4 times daily as needed for congestion (Patient not taking: Reported on 8/5/2021) 30 mL 3     SUDOGEST 12 HOUR 120 MG 12 hr tablet TAKE 1 TABLET BY MOUTH ONCE DAILY AS NEEDED *2 TOTAL FILLS* (Patient not taking: Reported on 8/5/2021) 12 tablet 5     triamcinolone (KENALOG) 0.1 % cream Apply  topically 3 times daily. Apply sparingly to affected area. (Patient not taking: Reported on 8/5/2021) 30 g 1             Review of Systems  Constitutional, HEENT, cardiovascular, pulmonary, GI, , musculoskeletal, neuro, skin, endocrine and psych systems are negative, except as otherwise noted.    OBJECTIVE:   /64   Pulse 79   Temp 97.7  F (36.5  C) (Tympanic)   Resp 16   Ht 1.6 m (5' 2.99\")   Wt 104.3 kg (230 lb)   SpO2 96%   BMI 40.75 kg/m   Estimated body mass index is 40.75 kg/m  as calculated from the following:    Height as of this encounter: 1.6 m (5' 2.99\").    Weight as of this encounter: 104.3 kg (230 lb).  Physical Exam  GENERAL: alert, no distress and obese  EYES: nystagmus bilateral  HENT: ear canals and TM's normal, " "nose and mouth without ulcers or lesions  NECK: no adenopathy, no asymmetry, masses, or scars and thyroid normal to palpation  RESP: lungs clear to auscultation - no rales, rhonchi or wheezes  CV: regular rate and rhythm, normal S1 S2, no S3 or S4, no murmur, click or rub, no peripheral edema and peripheral pulses strong  ABDOMEN: soft, obese, nontender, no hepatosplenomegaly, no masses and bowel sounds normal  MS: no gross musculoskeletal defects noted, no edema  SKIN: Healing abrasion in the central forehead  NEURO: decreased tone of the muscles, generalized weakness  PSYCH: Cognitive impairment, appears at baseline, calm, cooperative, slowed speech and comprehension        ASSESSMENT / PLAN:       ICD-10-CM    1. Encounter for Medicare annual wellness exam  Z00.00    2. Need for hepatitis C screening test  Z11.59 Hepatitis C Screen Reflex to HCV RNA Quant and Genotype   3. Screening for hyperlipidemia  Z13.220    4. Subclinical hypothyroidism  E03.9 levothyroxine (SYNTHROID/LEVOTHROID) 100 MCG tablet     OFFICE/OUTPT VISIT,EST,LEVL IV   5. Seasonal allergic rhinitis due to pollen  J30.1 loratadine (CLARITIN) 10 MG tablet     OFFICE/OUTPT VISIT,EST,LEVL IV   6. Obesity due to excess calories, unspecified obesity severity  E66.09 metFORMIN (GLUCOPHAGE) 500 MG tablet     OFFICE/OUTPT VISIT,EST,LEVL IV   7. Nonallergic rhinitis  J31.0 montelukast (SINGULAIR) 10 MG tablet   8. Allergic conjunctivitis, bilateral  H10.13 montelukast (SINGULAIR) 10 MG tablet   9. CARDIOVASCULAR SCREENING; LDL GOAL LESS THAN 100  Z13.6 Lipid panel reflex to direct LDL Non-fasting       Patient has been advised of split billing requirements and indicates understanding: Yes  COUNSELING:  Reviewed preventive health counseling, as reflected in patient instructions    Estimated body mass index is 40.75 kg/m  as calculated from the following:    Height as of this encounter: 1.6 m (5' 2.99\").    Weight as of this encounter: 104.3 kg (230 " lb).    Weight management plan: Patient referred to endocrine and/or weight management specialty    He reports that he quit smoking about 21 years ago. His smoking use included cigarettes. He started smoking about 22 years ago. He has a 1.00 pack-year smoking history. He has never used smokeless tobacco.      Appropriate preventive services were discussed with this patient, including applicable screening as appropriate for cardiovascular disease, diabetes, osteopenia/osteoporosis, and glaucoma.  As appropriate for age/gender, discussed screening for colorectal cancer, prostate cancer, breast cancer, and cervical cancer. Checklist reviewing preventive services available has been given to the patient.    Reviewed patients plan of care and provided an AVS. The Complex Care Plan (for patients with higher acuity and needing more deliberate coordination of services) for Humberto meets the Care Plan requirement. This Care Plan has been established and reviewed with the Patient and caregiver.    Counseling Resources:  ATP IV Guidelines  Pooled Cohorts Equation Calculator  Breast Cancer Risk Calculator  Breast Cancer: Medication to Reduce Risk  FRAX Risk Assessment  ICSI Preventive Guidelines  Dietary Guidelines for Americans, 2010  [employer]'s MyPlate  ASA Prophylaxis  Lung CA Screening    Carla Brown [facility]    Identified Health Risks:    He is at risk for lack of exercise and has been provided with information to increase physical activity for the benefit of his well-being.  The patient reports that he has difficulty with activities of daily living. I have asked that the patient make a follow up appointment in 52 weeks where this issue will be further evaluated and addressed.  He is at risk for falling and has been provided with information to reduce the risk of falling at home.

## 2021-08-05 NOTE — TELEPHONE ENCOUNTER
Requesting notes from Lauren Bloch MTM PharmD from 5/5/21.  Informed that the appt was cancelled.  Patient seeing Erica 8/11/21.

## 2021-08-05 NOTE — TELEPHONE ENCOUNTER
Reason for call:  Other   Patient called regarding (reason for call): call back  Additional comments: Janey from the [facility] would like a call back from the nurse or care team of MTM Lauren Bloch. She is requesting some forms/paperwork from the patient's last visit in May.     Phone number to reach patient:  Other phone number:  [phone] (Janey at [facility])*    Best Time:  Anytime     Can we leave a detailed message on this number?  YES    Travel screening: Not Applicable

## 2021-08-05 NOTE — PATIENT INSTRUCTIONS
1. Refills sent  2. Non-fasting blood work at next blood draw, nothing urgent.  Patient Education   Personalized Prevention Plan  You are due for the preventive services outlined below.  Your care team is available to assist you in scheduling these services.  If you have already completed any of these items, please share that information with your care team to update in your medical record.  Health Maintenance Due   Topic Date Due     Hepatitis C Screening  Never done     Cholesterol Lab  08/04/2021       Exercise for a Healthier Heart  You may wonder how you can improve the health of your heart. If you re thinking about exercise, you re on the right track. You don t need to become an athlete. But you do need a certain amount of brisk exercise to help strengthen your heart. If you have been diagnosed with a heart condition, your healthcare provider may advise exercise to help stabilize your condition. To help make exercise a habit, choose safe, fun activities.      Exercise with a friend. When activity is fun, you're more likely to stick with it.   Before you start  Check with your healthcare provider before starting an exercise program. This is especially important if you have not been active for a while. It's also important if you have a long-term (chronic) health problem such as heart disease, diabetes, or obesity. Or if you are at high risk for having these problems.   Why exercise?  Exercising regularly offers many healthy rewards. It can help you do all of the following:     Improve your blood cholesterol level to help prevent further heart trouble    Lower your blood pressure to help prevent a stroke or heart attack    Control diabetes, or reduce your risk of getting this disease    Improve your heart and lung function    Reach and stay at a healthy weight    Make your muscles stronger so you can stay active    Prevent falls and fractures by slowing the loss of bone mass (osteoporosis)    Manage stress  better    Reduce your blood pressure    Improve your sense of self and your body image  Exercise tips      Ease into your routine. Set small goals. Then build on them. If you are not sure what your activity level should be, talk with your healthcare provider first before starting an exercise routine.    Exercise on most days. Aim for a total of 150 minutes (2 hours and 30 minutes) or more of moderate-intensity aerobic activity each week. Or 75 minutes (1 hour and 15 minutes) or more of vigorous-intensity aerobic activity each week. Or try for a combination of both. Moderate activity means that you breathe heavier and your heart rate increases but you can still talk. Think about doing 40 minutes of moderate exercise, 3 to 4 times a week. For best results, activity should last for about 40 minutes to lower blood pressure and cholesterol. It's OK to work up to the 40-minute period over time. Examples of moderate-intensity activity are walking 1 mile in 15 minutes. Or doing 30 to 45 minutes of yard work.    Step up your daily activity level.  Along with your exercise program, try being more active the whole day. Walk instead of drive. Or park further away so that you take more steps each day. Do more household tasks or yard work. You may not be able to meet the advised mount of physical activity. But doing some moderate- or vigorous-intensity aerobic activity can help reduce your risk for heart disease. Your healthcare provider can help you figure out what is best for you.    Choose 1 or more activities you enjoy.  Walking is one of the easiest things you can do. You can also try swimming, riding a bike, dancing, or taking an exercise class.    When to call your healthcare provider  Call your healthcare provider if you have any of these:     Chest pain or feel dizzy or lightheaded    Burning, tightness, pressure, or heaviness in your chest, neck, shoulders, back, or arms    Abnormal shortness of breath    More joint or  muscle pain    A very fast or irregular heartbeat (palpitations)  [employer] last reviewed this educational content on 7/1/2019 2000-2021 [employer]. All rights reserved. This information is not intended as a substitute for professional medical care. Always follow your healthcare professional's instructions.        Activities of Daily Living    Your Health Risk Assessment indicates you have difficulties with activities of daily living such as housework, bathing, preparing meals, taking medication, etc. Please make a follow up appointment for us to address this issue in more detail.    Preventing Falls at Home  A person can fall for many reasons. Older adults may fall because reaction time slows down as we age. Your muscles and joints may get stiff, weak, or less flexible because of illness, medicines, or a physical condition.   Other health problems that make falls more likely include:     Arthritis    Dizziness or lightheadedness when you stand up (orthostatic hypotension)    History of a stroke    Dizziness    Anemia    Certain medicines taken for mental illness or to control blood pressure.    Problems with balance or gait    Bladder or urinary problems    History of falling    Changes in vision (vision impairment)    Changes in thinking skills and memory (cognitive impairment)  Injuries from a fall can include serious injuries such as broken bones, dislocated joints, internal bleeding and cuts. Injuries like these can limit your independence.   Prevention tips  To help prevent falls and fall-related injuries, follow the tips below.    Floors  To make floors safer:     Put nonskid pads under area rugs.    Remove small rugs.    Replace worn floor coverings.    Tack carpets firmly to each step on carpeted stairs. Put nonskid strips on the edges of uncarpeted stairs.    Keep floors and stairs free of clutter and cords.    Arrange furniture so there are clear pathways.    Clean up any spills right  away.  Bathrooms    To make bathrooms safer:     Install grab bars in the tub or shower.    Apply nonskid strips or put a nonskid rubber mat in the tub or shower.    Sit on a bath chair to bathe.    Use bathmats with nonskid backing.  Lighting  To improve visibility in your home:      Keep a flashlight in each room. Or put a lamp next to the bed within easy reach.    Put nightlights in the bedrooms, hallways, kitchen, and bathrooms.    Make sure all stairways have good lighting.    Take your time when going up and down stairs.    Put handrails on both sides of stairs and in walkways for more support. To prevent injury to your wrist or arm, don t use handrails to pull yourself up.    Install grab bars to pull yourself up.    Move or rearrange items that you use often. This will make them easier to find or reach.    Look at your home to find any safety hazards. Especially look at doorways, walkways, and the driveway. Remove or repair any safety problems that you find.  Other changes to make    Look around to find any safety hazards. Look closely at doorways, walkways, and the driveway. Remove or repair any safety problems that you find.    Wear shoes that fit well.    Take your time when going up and down stairs.    Put handrails on both sides of stairs and in walkways for more support. To prevent injury to your wrist or arm, don t use handrails to pull yourself up.    Install grab bars wherever needed to pull yourself up.    Arrange items that you use often. This will make them easier to find or reach.    [employer] last reviewed this educational content on 3/1/2020    3330-5999 [employer]. All rights reserved. This information is not intended as a substitute for professional medical care. Always follow your healthcare professional's instructions.
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
## 2021-09-02 ENCOUNTER — VIRTUAL VISIT (OUTPATIENT)
Dept: ENDOCRINOLOGY | Facility: CLINIC | Age: 41
End: 2021-09-02
Payer: MEDICARE

## 2021-09-02 VITALS — WEIGHT: 232 LBS | BODY MASS INDEX: 41.11 KG/M2

## 2021-09-02 DIAGNOSIS — E66.09 OBESITY DUE TO EXCESS CALORIES, UNSPECIFIED OBESITY SEVERITY: Chronic | ICD-10-CM

## 2021-09-02 PROCEDURE — 99442 PR PHYSICIAN TELEPHONE EVALUATION 11-20 MIN: CPT | Mod: 95 | Performed by: INTERNAL MEDICINE

## 2021-09-02 RX ORDER — TOPIRAMATE 100 MG/1
150 TABLET, FILM COATED ORAL DAILY
Qty: 135 TABLET | Refills: 2 | Status: SHIPPED | OUTPATIENT
Start: 2021-09-02 | End: 2021-09-02

## 2021-09-02 RX ORDER — TOPIRAMATE 100 MG/1
150 TABLET, FILM COATED ORAL DAILY
Qty: 135 TABLET | Refills: 2 | Status: SHIPPED | OUTPATIENT
Start: 2021-09-02 | End: 2021-12-27 | Stop reason: DRUGHIGH
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
## 2021-09-02 NOTE — PATIENT INSTRUCTIONS
Topiramate  - will move topiramate around: 100mg before dinner; and 50mg in AM  --->150mg total per day    Metformin:  - One week later, to 2 pills in the AM and 1 pill in the evening (1500mg total)

## 2021-09-03 ENCOUNTER — TELEPHONE (OUTPATIENT)
Dept: PEDIATRICS | Facility: CLINIC | Age: 41
End: 2021-09-03
Payer: MEDICARE

## 2021-09-03 NOTE — TELEPHONE ENCOUNTER
Reason for Call:  Other     Detailed comments: Janey from the group home stated that the after visit summary needs to be faxed over to them. Fax# [phone]    Phone Number Patient can be reached at: Other phone number:  [phone]     Best Time: Anytime     Can we leave a detailed message on this number? YES    Call taken on 9/3/2021 at 12:13 PM by Nohemi Fields

## 2021-09-09 ENCOUNTER — PATIENT OUTREACH (OUTPATIENT)
Dept: ENDOCRINOLOGY | Facility: CLINIC | Age: 41
End: 2021-09-09

## 2021-09-09 NOTE — PROGRESS NOTES
Left message for Evans, patient's guardian.  Received a message that he had questions regarding patient's AVS.

## 2021-09-14 ENCOUNTER — ALLIED HEALTH/NURSE VISIT (OUTPATIENT)
Dept: FAMILY MEDICINE | Facility: CLINIC | Age: 41
End: 2021-09-14
Payer: MEDICARE

## 2021-09-14 DIAGNOSIS — Z23 NEED FOR PROPHYLACTIC VACCINATION AND INOCULATION AGAINST INFLUENZA: Primary | ICD-10-CM

## 2021-09-14 PROCEDURE — 99207 PR NO CHARGE NURSE ONLY: CPT

## 2021-09-14 PROCEDURE — G0008 ADMIN INFLUENZA VIRUS VAC: HCPCS

## 2021-09-14 PROCEDURE — 90686 IIV4 VACC NO PRSV 0.5 ML IM: CPT

## 2021-09-28 ENCOUNTER — VIRTUAL VISIT (OUTPATIENT)
Dept: PHARMACY | Facility: CLINIC | Age: 41
End: 2021-09-28
Payer: MEDICAID

## 2021-09-28 DIAGNOSIS — F60.3 EXPLOSIVE PERSONALITY DISORDER (H): ICD-10-CM

## 2021-09-28 DIAGNOSIS — E66.09 CLASS 2 OBESITY DUE TO EXCESS CALORIES WITH BODY MASS INDEX (BMI) OF 35.0 TO 35.9 IN ADULT, UNSPECIFIED WHETHER SERIOUS COMORBIDITY PRESENT: Primary | ICD-10-CM

## 2021-09-28 DIAGNOSIS — F41.9 ANXIETY: ICD-10-CM

## 2021-09-28 DIAGNOSIS — E66.812 CLASS 2 OBESITY DUE TO EXCESS CALORIES WITH BODY MASS INDEX (BMI) OF 35.0 TO 35.9 IN ADULT, UNSPECIFIED WHETHER SERIOUS COMORBIDITY PRESENT: Primary | ICD-10-CM

## 2021-09-28 DIAGNOSIS — F32.A DEPRESSION, UNSPECIFIED DEPRESSION TYPE: ICD-10-CM

## 2021-09-28 PROCEDURE — 99607 MTMS BY PHARM ADDL 15 MIN: CPT | Performed by: PHARMACIST

## 2021-09-28 PROCEDURE — 99606 MTMS BY PHARM EST 15 MIN: CPT | Performed by: PHARMACIST

## 2021-09-28 RX ORDER — SERTRALINE HYDROCHLORIDE 100 MG/1
200 TABLET, FILM COATED ORAL DAILY
COMMUNITY
Start: 2021-09-10
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
## 2021-09-28 NOTE — Clinical Note
Group home was giving topiramate 150 mg daily, so changed to prescribed 50 mg in AM and 100 mg at dinner. He is having some depression/motivation issues. His psych medications have been switching around the last couple months so believe this is playing a role. We talked about implementing treadmill 10 minutes per day because he has really enjoyed this historically and also compromising with healthy snacks and portions. Thanks! Erica LAMAR

## 2021-09-30 ENCOUNTER — LAB (OUTPATIENT)
Dept: LAB | Facility: CLINIC | Age: 41
End: 2021-09-30
Payer: MEDICARE

## 2021-09-30 DIAGNOSIS — Z13.6 CARDIOVASCULAR SCREENING; LDL GOAL LESS THAN 100: ICD-10-CM

## 2021-09-30 DIAGNOSIS — Z11.59 NEED FOR HEPATITIS C SCREENING TEST: ICD-10-CM

## 2021-09-30 LAB
CHOLEST SERPL-MCNC: 192 MG/DL
FASTING STATUS PATIENT QL REPORTED: NO
HDLC SERPL-MCNC: 47 MG/DL
LDLC SERPL CALC-MCNC: 106 MG/DL
NONHDLC SERPL-MCNC: 145 MG/DL
TRIGL SERPL-MCNC: 195 MG/DL

## 2021-09-30 PROCEDURE — 36415 COLL VENOUS BLD VENIPUNCTURE: CPT

## 2021-09-30 PROCEDURE — 80061 LIPID PANEL: CPT

## 2021-09-30 PROCEDURE — 86803 HEPATITIS C AB TEST: CPT

## 2021-10-01 LAB — HCV AB SERPL QL IA: NONREACTIVE

## 2021-10-05 NOTE — PATIENT INSTRUCTIONS
Recommendations from today's MTM visit:                                                       1. Change topiramate to 50 mg in AM and 100 mg in PM    2. Restart walking on treadmill at least 10 minutes daily      3. Follow up with Dr. Diaz in 6 weeks as scheduled.     4. Follow up with Psychiatry as planned.     Follow-up: 3 months    It was great to speak with you today.  I value your experience and would be very thankful for your time with providing feedback on our clinic survey. You may receive a survey via email or text message in the next few days.       My Clinical Pharmacist's contact information:                                                      Please feel free to contact me with any questions or concerns you have.      Lauren Bloch, PharmD  Medication Therapy Management Pharmacist   [facility] [place]

## 2021-10-06 ENCOUNTER — TELEPHONE (OUTPATIENT)
Dept: PHARMACY | Facility: CLINIC | Age: 41
End: 2021-10-06

## 2021-10-06 NOTE — TELEPHONE ENCOUNTER
Reason for call:  Other   Patient called regarding (reason for call): call back  Additional comments: Staff from [facility] called to inform they are still waiting on patient paperwork from 9/228 visit with MTM Lauren Bloch. They faxed a VHP to be filled out, but never received the fax back. Please fax the paperwork to [phone]. Call with any questions.    Phone number to reach patient:  Other phone number:  [phone] ([facility])*    Best Time:  Anytime     Can we leave a detailed message on this number?  Not Applicable    Travel screening: Not Applicable

## 2021-10-06 NOTE — TELEPHONE ENCOUNTER
Called group [facility]. This was faxed 10/5. [facility] states they did not receive this. Told them that it wou;dn't be until Monday that the form could be faxed again. Group home staff member, Celsa, was fine with this. Gave verbal of the plan/medication changes today.     Lauren Bloch, PharmD  Medication Therapy Management Pharmacist   [facility] [place]

## 2021-10-12 ENCOUNTER — TELEPHONE (OUTPATIENT)
Dept: PHARMACY | Facility: CLINIC | Age: 41
End: 2021-10-12

## 2021-10-12 NOTE — TELEPHONE ENCOUNTER
[facility] group home staff, Janey, says they've not yet rec'd the 'resending' of the fax expected.    FAX: [phone]  Janey: [phone]

## 2021-10-12 NOTE — TELEPHONE ENCOUNTER
See telephone encounter from today. Faxed again today, 10/12/21, at 11:25 AM. Discussed with Janey at group home if they do not receive the form via fax will need to be emailed.     Lauren Bloch, PharmD  Medication Therapy Management Pharmacist   [facility] [place]

## 2021-10-12 NOTE — TELEPHONE ENCOUNTER
Fax was sent yesterday, 10/11/21, as was told to group home. Faxed again today, 10/12/21 at 11:25 AM again. Otherwise will have to email if this continues to not go through.     Lauren Bloch, PharmD  Medication Therapy Management Pharmacist   [facility] [place]

## 2021-10-26 ENCOUNTER — OFFICE VISIT (OUTPATIENT)
Dept: SLEEP MEDICINE | Facility: CLINIC | Age: 41
End: 2021-10-26
Payer: MEDICARE

## 2021-10-26 VITALS
WEIGHT: 234 LBS | BODY MASS INDEX: 43.06 KG/M2 | RESPIRATION RATE: 16 BRPM | HEIGHT: 62 IN | SYSTOLIC BLOOD PRESSURE: 101 MMHG | HEART RATE: 69 BPM | OXYGEN SATURATION: 96 % | DIASTOLIC BLOOD PRESSURE: 58 MMHG

## 2021-10-26 DIAGNOSIS — G47.33 OSA (OBSTRUCTIVE SLEEP APNEA): Primary | ICD-10-CM

## 2021-10-26 PROCEDURE — 99214 OFFICE O/P EST MOD 30 MIN: CPT | Performed by: PHYSICIAN ASSISTANT

## 2021-10-26 ASSESSMENT — MIFFLIN-ST. JEOR: SCORE: 1845.67

## 2021-10-26 NOTE — PATIENT INSTRUCTIONS
Your BMI is Body mass index is 42.8 kg/m .  Weight management is a personal decision.  If you are interested in exploring weight loss strategies, the following discussion covers the approaches that may be successful. Body mass index (BMI) is one way to tell whether you are at a healthy weight, overweight, or obese. It measures your weight in relation to your height.  A BMI of 18.5 to 24.9 is in the healthy range. A person with a BMI of 25 to 29.9 is considered overweight, and someone with a BMI of 30 or greater is considered obese. More than two-thirds of American adults are considered overweight or obese.  Being overweight or obese increases the risk for further weight gain. Excess weight may lead to heart disease and diabetes.  Creating and following plans for healthy eating and physical activity may help you improve your health.  Weight control is part of healthy lifestyle and includes exercise, emotional health, and healthy eating habits. Careful eating habits lifelong are the mainstay of weight control. Though there are significant health benefits from weight loss, long-term weight loss with diet alone may be very difficult to achieve- studies show long-term success with dietary management in less than 10% of people. Attaining a healthy weight may be especially difficult to achieve in those with severe obesity. In some cases, medications, devices and surgical management might be considered.  What can you do?  If you are overweight or obese and are interested in methods for weight loss, you should discuss this with your provider.     Consider reducing daily calorie intake by 500 calories.     Keep a food journal.     Avoiding skipping meals, consider cutting portions instead.    Diet combined with exercise helps maintain muscle while optimizing fat loss. Strength training is particularly important for building and maintaining muscle mass. Exercise helps reduce stress, increase energy, and improves fitness.  Increasing exercise without diet control, however, may not burn enough calories to loose weight.       Start walking three days a week 10-20 minutes at a time    Work towards walking thirty minutes five days a week     Eventually, increase the speed of your walking for 1-2 minutes at time    In addition, we recommend that you review healthy lifestyles and methods for weight loss available through the National Institutes of Health patient information sites:  http://win.niddk.nih.gov/publications/index.htm    And look into health and wellness programs that may be available through your health insurance provider, employer, local community center, or pricilla club.    Weight management plan: Patient was referred to their PCP to discuss a diet and exercise plan.        Your Body mass index is 42.8 kg/m .  Weight management is a personal decision.  If you are interested in exploring weight loss strategies, the following discussion covers the approaches that may be successful. Body mass index (BMI) is one way to tell whether you are at a healthy weight, overweight, or obese. It measures your weight in relation to your height.  A BMI of 18.5 to 24.9 is in the healthy range. A person with a BMI of 25 to 29.9 is considered overweight, and someone with a BMI of 30 or greater is considered obese. More than two-thirds of American adults are considered overweight or obese.  Being overweight or obese increases the risk for further weight gain. Excess weight may lead to heart disease and diabetes.  Creating and following plans for healthy eating and physical activity may help you improve your health.  Weight control is part of healthy lifestyle and includes exercise, emotional health, and healthy eating habits. Careful eating habits lifelong are the mainstay of weight control. Though there are significant health benefits from weight loss, long-term weight loss with diet alone may be very difficult to achieve- studies show long-term  success with dietary management in less than 10% of people. Attaining a healthy weight may be especially difficult to achieve in those with severe obesity. In some cases, medications, devices and surgical management might be considered.  What can you do?  If you are overweight or obese and are interested in methods for weight loss, you should discuss this with your provider.     Consider reducing daily calorie intake by 500 calories.     Keep a food journal.     Avoiding skipping meals, consider cutting portions instead.    Diet combined with exercise helps maintain muscle while optimizing fat loss. Strength training is particularly important for building and maintaining muscle mass. Exercise helps reduce stress, increase energy, and improves fitness. Increasing exercise without diet control, however, may not burn enough calories to loose weight.       Start walking three days a week 10-20 minutes at a time    Work towards walking thirty minutes five days a week     Eventually, increase the speed of your walking for 1-2 minutes at time    In addition, we recommend that you review healthy lifestyles and methods for weight loss available through the National Institutes of Health patient information sites:  http://win.niddk.nih.gov/publications/index.htm    And look into health and wellness programs that may be available through your health insurance provider, employer, local community center, or pricilla club.    Weight management plan: Patient was referred to their PCP to discuss a diet and exercise plan.

## 2021-10-26 NOTE — NURSING NOTE
"Chief Complaint   Patient presents with     CPAP Follow Up       Initial /58   Pulse 67   Resp 16   Ht 1.575 m (5' 2\")   Wt 106.1 kg (234 lb)   SpO2 96%   BMI 42.80 kg/m   Estimated body mass index is 42.8 kg/m  as calculated from the following:    Height as of this encounter: 1.575 m (5' 2\").    Weight as of this encounter: 106.1 kg (234 lb).    Medication Reconciliation: complete   ESS:4    Neck circumference:47 centimeters.    DME: Unsure  Nafisa Johnson CMA      "
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
## 2021-10-26 NOTE — NURSING NOTE
1 year appointment reminder card created and will be mailed when appropriate. DME orders have been automatically faxed to [facility].  Nafisa Johnson, CMA

## 2021-11-02 ENCOUNTER — TELEPHONE (OUTPATIENT)
Dept: FAMILY MEDICINE | Facility: CLINIC | Age: 41
End: 2021-11-02

## 2021-11-02 DIAGNOSIS — Z01.110 ENCOUNTER FOR HEARING SCREENING AFTER FAILED HEARING TEST: Primary | ICD-10-CM

## 2021-11-02 NOTE — TELEPHONE ENCOUNTER
Reason for Call:  Other     Detailed comments: Staff from group home calling to request referral for patient annual hearing check.    Phone Number Patient can be reached at: Other phone number: [phone]    Best Time: any    Can we leave a detailed message on this number? YES    Call taken on 11/2/2021 at 1:11 PM by Carol Pelayo

## 2021-11-03 NOTE — TELEPHONE ENCOUNTER
Dr Brown,    Please see phone call from group home.  Requesting order for pt's annual hearing check.  Please advise.    Lanny Wadsworth RN

## 2021-11-03 NOTE — TELEPHONE ENCOUNTER
Talked with Law at the group home and gave them the number to call.  Kamryn Grossman CSS on 11/3/2021 at 2:13 PM

## 2021-11-13 DIAGNOSIS — J01.10 ACUTE NON-RECURRENT FRONTAL SINUSITIS: ICD-10-CM

## 2021-11-15 ENCOUNTER — DOCUMENTATION ONLY (OUTPATIENT)
Dept: LAB | Facility: CLINIC | Age: 41
End: 2021-11-15
Payer: MEDICARE

## 2021-11-15 DIAGNOSIS — R73.03 PREDIABETES: ICD-10-CM

## 2021-11-15 DIAGNOSIS — Z13.6 CARDIOVASCULAR SCREENING; LDL GOAL LESS THAN 160: ICD-10-CM

## 2021-11-15 DIAGNOSIS — M1A.09X0 IDIOPATHIC CHRONIC GOUT OF MULTIPLE SITES WITHOUT TOPHUS: ICD-10-CM

## 2021-11-15 DIAGNOSIS — E03.8 SUBCLINICAL HYPOTHYROIDISM: ICD-10-CM

## 2021-11-15 DIAGNOSIS — E66.09 OBESITY DUE TO EXCESS CALORIES, UNSPECIFIED OBESITY SEVERITY: Primary | ICD-10-CM

## 2021-11-15 DIAGNOSIS — E66.01 MORBID OBESITY (H): ICD-10-CM

## 2021-11-15 NOTE — TELEPHONE ENCOUNTER
Pending Prescriptions:                       Disp   Refills    DEEP SEA NASAL SPRAY 0.65 % nasal spray [*44 mL  11           Sig: USE 2 SPRAYS IN EACH NOSTRIL FOUR TIMES DAILY AS           NEEDED FOR CONGESTION    Routing refill request to provider for review/approval because:  Drug not on the G refill protocol     Sarthak Robles RN

## 2021-11-16 ENCOUNTER — LAB (OUTPATIENT)
Dept: LAB | Facility: CLINIC | Age: 41
End: 2021-11-16
Payer: MEDICARE

## 2021-11-16 DIAGNOSIS — E03.8 SUBCLINICAL HYPOTHYROIDISM: ICD-10-CM

## 2021-11-16 DIAGNOSIS — M1A.09X0 IDIOPATHIC CHRONIC GOUT OF MULTIPLE SITES WITHOUT TOPHUS: ICD-10-CM

## 2021-11-16 DIAGNOSIS — R73.03 PREDIABETES: ICD-10-CM

## 2021-11-16 DIAGNOSIS — Z13.6 CARDIOVASCULAR SCREENING; LDL GOAL LESS THAN 160: ICD-10-CM

## 2021-11-16 DIAGNOSIS — F63.81 INTERMITTENT EXPLOSIVE DISORDER: Primary | ICD-10-CM

## 2021-11-16 DIAGNOSIS — D72.819 LEUKOPENIA, UNSPECIFIED TYPE: Primary | ICD-10-CM

## 2021-11-16 LAB
ALBUMIN SERPL-MCNC: 2.9 G/DL (ref 3.4–5)
ALP SERPL-CCNC: 57 U/L (ref 40–150)
ALT SERPL W P-5'-P-CCNC: 41 U/L (ref 0–70)
ANION GAP SERPL CALCULATED.3IONS-SCNC: 5 MMOL/L (ref 3–14)
AST SERPL W P-5'-P-CCNC: 23 U/L (ref 0–45)
BILIRUB SERPL-MCNC: 0.4 MG/DL (ref 0.2–1.3)
BUN SERPL-MCNC: 22 MG/DL (ref 7–30)
CALCIUM SERPL-MCNC: 8.9 MG/DL (ref 8.5–10.1)
CHLORIDE BLD-SCNC: 110 MMOL/L (ref 94–109)
CHOLEST SERPL-MCNC: 182 MG/DL
CO2 SERPL-SCNC: 26 MMOL/L (ref 20–32)
CREAT SERPL-MCNC: 1.2 MG/DL (ref 0.66–1.25)
ERYTHROCYTE [DISTWIDTH] IN BLOOD BY AUTOMATED COUNT: 14 % (ref 10–15)
FASTING STATUS PATIENT QL REPORTED: YES
GFR SERPL CREATININE-BSD FRML MDRD: 75 ML/MIN/1.73M2
GLUCOSE BLD-MCNC: 82 MG/DL (ref 70–99)
HBA1C MFR BLD: 5 % (ref 0–5.6)
HCT VFR BLD AUTO: 43.1 % (ref 40–53)
HDLC SERPL-MCNC: 47 MG/DL
HGB BLD-MCNC: 14.2 G/DL (ref 13.3–17.7)
LDLC SERPL CALC-MCNC: 98 MG/DL
MCH RBC QN AUTO: 34.2 PG (ref 26.5–33)
MCHC RBC AUTO-ENTMCNC: 32.9 G/DL (ref 31.5–36.5)
MCV RBC AUTO: 104 FL (ref 78–100)
NONHDLC SERPL-MCNC: 135 MG/DL
PLATELET # BLD AUTO: 157 10E3/UL (ref 150–450)
POTASSIUM BLD-SCNC: 4 MMOL/L (ref 3.4–5.3)
PROT SERPL-MCNC: 6.9 G/DL (ref 6.8–8.8)
RBC # BLD AUTO: 4.15 10E6/UL (ref 4.4–5.9)
SODIUM SERPL-SCNC: 141 MMOL/L (ref 133–144)
T4 FREE SERPL-MCNC: 0.89 NG/DL (ref 0.76–1.46)
TRIGL SERPL-MCNC: 185 MG/DL
TSH SERPL DL<=0.005 MIU/L-ACNC: 5.78 MU/L (ref 0.4–4)
URATE SERPL-MCNC: 7.2 MG/DL (ref 3.5–7.2)
VALPROATE SERPL-MCNC: 71 MG/L
WBC # BLD AUTO: 3.5 10E3/UL (ref 4–11)

## 2021-11-16 PROCEDURE — 80061 LIPID PANEL: CPT

## 2021-11-16 PROCEDURE — 80164 ASSAY DIPROPYLACETIC ACD TOT: CPT

## 2021-11-16 PROCEDURE — 83036 HEMOGLOBIN GLYCOSYLATED A1C: CPT

## 2021-11-16 PROCEDURE — 85027 COMPLETE CBC AUTOMATED: CPT

## 2021-11-16 PROCEDURE — 84443 ASSAY THYROID STIM HORMONE: CPT

## 2021-11-16 PROCEDURE — 36415 COLL VENOUS BLD VENIPUNCTURE: CPT

## 2021-11-16 PROCEDURE — 80053 COMPREHEN METABOLIC PANEL: CPT

## 2021-11-16 PROCEDURE — 84439 ASSAY OF FREE THYROXINE: CPT

## 2021-11-16 PROCEDURE — 84550 ASSAY OF BLOOD/URIC ACID: CPT

## 2021-11-16 RX ORDER — SODIUM CHLORIDE 0.65 %
AEROSOL, SPRAY (ML) NASAL
Qty: 44 ML | Refills: 11 | Status: SHIPPED | OUTPATIENT
Start: 2021-11-16 | End: 2023-09-08

## 2021-11-16 NOTE — RESULT ENCOUNTER NOTE
The albumin is low but has been persistently low for 2+ years.  Electrolytes, liver enzymes, kidney function, blood sugar is all normal.  The uric acid is at the higher end of normal.  The TSH thyroid hormone is slightly above normal.  Would not adjust medication at this time.  The cholesterol is slightly elevated and similar to previous values.    The white blood cell count is very mildly low but similar to previous values seen on and off over the years.  The other blood counts are normal which is reassuring.  We will recheck in 3 to 6 months and if the blood count continues to be abnormal we will pursue further testing

## 2021-11-16 NOTE — LETTER
November 16, 2021      Humberto Estrella  [address] [address] [id number]        Dear ,    We are writing to inform you of your test results.    The albumin is low but has been persistently low for 2+ years.  Electrolytes, liver enzymes, kidney function, blood sugar is all normal.  The uric acid is at the higher end of normal.  The TSH thyroid hormone is slightly above normal.  Would not adjust medication at this time.  The cholesterol is slightly elevated and similar to previous values.     The white blood cell count is very mildly low but similar to previous values seen on and off over the years.  The other blood counts are normal which is reassuring.  We will recheck in 3 to 6 months and if the blood count continues to be abnormal we will pursue further testing.    Resulted Orders   Hemoglobin A1c   Result Value Ref Range    Hemoglobin A1C 5.0 0.0 - 5.6 %      Comment:      Normal <5.7%   Prediabetes 5.7-6.4%    Diabetes 6.5% or higher     Note: Adopted from ADA consensus guidelines.   Uric acid   Result Value Ref Range    Uric Acid 7.2 3.5 - 7.2 mg/dL   Lipid panel reflex to direct LDL Fasting   Result Value Ref Range    Cholesterol 182 <200 mg/dL    Triglycerides 185 (H) <150 mg/dL    Direct Measure HDL 47 >=40 mg/dL    LDL Cholesterol Calculated 98 <=100 mg/dL    Non HDL Cholesterol 135 (H) <130 mg/dL    Patient Fasting > 8hrs? Yes     Narrative    Cholesterol  Desirable:  <200 mg/dL    Triglycerides  Normal:  Less than 150 mg/dL  Borderline High:  150-199 mg/dL  High:  200-499 mg/dL  Very High:  Greater than or equal to 500 mg/dL    Direct Measure HDL  Female:  Greater than or equal to 50 mg/dL   Male:  Greater than or equal to 40 mg/dL    LDL Cholesterol  Desirable:  <100mg/dL  Above Desirable:  100-129 mg/dL   Borderline High:  130-159 mg/dL   High:  160-189 mg/dL   Very High:  >= 190 mg/dL    Non HDL Cholesterol  Desirable:  130 mg/dL  Above Desirable:  130-159 mg/dL  Borderline High:   160-189 mg/dL  High:  190-219 mg/dL  Very High:  Greater than or equal to 220 mg/dL   CBC with platelets   Result Value Ref Range    WBC Count 3.5 (L) 4.0 - 11.0 10e3/uL    RBC Count 4.15 (L) 4.40 - 5.90 10e6/uL    Hemoglobin 14.2 13.3 - 17.7 g/dL    Hematocrit 43.1 40.0 - 53.0 %     (H) 78 - 100 fL    MCH 34.2 (H) 26.5 - 33.0 pg    MCHC 32.9 31.5 - 36.5 g/dL    RDW 14.0 10.0 - 15.0 %    Platelet Count 157 150 - 450 10e3/uL   Comprehensive metabolic panel (BMP + Alb, Alk Phos, ALT, AST, Total. Bili, TP)   Result Value Ref Range    Sodium 141 133 - 144 mmol/L    Potassium 4.0 3.4 - 5.3 mmol/L    Chloride 110 (H) 94 - 109 mmol/L    Carbon Dioxide (CO2) 26 20 - 32 mmol/L    Anion Gap 5 3 - 14 mmol/L    Urea Nitrogen 22 7 - 30 mg/dL    Creatinine 1.20 0.66 - 1.25 mg/dL    Calcium 8.9 8.5 - 10.1 mg/dL    Glucose 82 70 - 99 mg/dL    Alkaline Phosphatase 57 40 - 150 U/L    AST 23 0 - 45 U/L    ALT 41 0 - 70 U/L    Protein Total 6.9 6.8 - 8.8 g/dL    Albumin 2.9 (L) 3.4 - 5.0 g/dL    Bilirubin Total 0.4 0.2 - 1.3 mg/dL    GFR Estimate 75 >60 mL/min/1.73m2      Comment:      As of July 11, 2021, eGFR is calculated by the CKD-EPI creatinine equation, without race adjustment. eGFR can be influenced by muscle mass, exercise, and diet. The reported eGFR is an estimation only and is only applicable if the renal function is stable.   TSH with free T4 reflex   Result Value Ref Range    TSH 5.78 (H) 0.40 - 4.00 mU/L   T4 free   Result Value Ref Range    Free T4 0.89 0.76 - 1.46 ng/dL       If you have any questions or concerns, please call the clinic at the number listed above.       Sincerely,      Carla Brown, DO

## 2021-12-13 ENCOUNTER — OFFICE VISIT (OUTPATIENT)
Dept: FAMILY MEDICINE | Facility: CLINIC | Age: 41
End: 2021-12-13
Payer: MEDICARE

## 2021-12-13 VITALS
DIASTOLIC BLOOD PRESSURE: 70 MMHG | OXYGEN SATURATION: 95 % | BODY MASS INDEX: 43.2 KG/M2 | HEART RATE: 79 BPM | WEIGHT: 236.2 LBS | TEMPERATURE: 96.6 F | SYSTOLIC BLOOD PRESSURE: 120 MMHG

## 2021-12-13 DIAGNOSIS — H10.023 PINK EYE DISEASE OF BOTH EYES: Primary | ICD-10-CM

## 2021-12-13 PROCEDURE — 99213 OFFICE O/P EST LOW 20 MIN: CPT | Performed by: NURSE PRACTITIONER

## 2021-12-13 RX ORDER — TOBRAMYCIN 3 MG/ML
2 SOLUTION/ DROPS OPHTHALMIC EVERY 4 HOURS
Qty: 5 ML | Refills: 0 | Status: SHIPPED | OUTPATIENT
Start: 2021-12-13 | End: 2022-01-24

## 2021-12-13 RX ORDER — TOBRAMYCIN 3 MG/ML
1-2 SOLUTION/ DROPS OPHTHALMIC EVERY 4 HOURS
Qty: 5 ML | Refills: 0 | Status: SHIPPED | OUTPATIENT
Start: 2021-12-13 | End: 2021-12-13

## 2021-12-13 NOTE — PROGRESS NOTES
Assessment & Plan     Pink eye disease of both eyes    - tobramycin (TOBREX) 0.3 % ophthalmic solution; Place 2 drops into both eyes every 4 hours  -off work for 2 days      See Patient Instructions    Return in about 5 days (around 12/18/2021), or if symptoms worsen or fail to improve.    GM Vargas CNP  [facility] BARBARA Paul is a 41 year old who presents for the following health issues     HPI     Eye(s) Problem  Onset/Duration: 3 days   Description:   Location: both eyes   Pain: no  Redness: no  Accompanying Signs & Symptoms:  Discharge/mattering: YES  Swelling: no  Visual changes: no  Fever: no  Nasal Congestion: YES   Bothered by bright lights: no  History:  Trauma: no  Foreign body exposure: no  Wearing contacts: no, wears glasses   Precipitating or alleviating factors: None  Therapies tried and outcome: None        Review of Systems   Constitutional, HEENT, cardiovascular, pulmonary, gi and gu systems are negative, except as otherwise noted.      Objective    /70 (BP Location: Right arm, Patient Position: Sitting, Cuff Size: Adult Large)   Pulse 79   Temp (!) 96.6  F (35.9  C) (Tympanic)   Wt 107.1 kg (236 lb 3.2 oz)   SpO2 95%   BMI 43.20 kg/m    Body mass index is 43.2 kg/m .  Physical Exam   GENERAL: healthy, alert and no distress  EYES: conjunctiva/corneas- conjunctival injection OU  NEURO: Normal strength and tone, mentation intact and speech normal  PSYCH: mentation appears normal, affect normal/bright

## 2021-12-16 ENCOUNTER — VIRTUAL VISIT (OUTPATIENT)
Dept: ENDOCRINOLOGY | Facility: CLINIC | Age: 41
End: 2021-12-16
Payer: MEDICARE

## 2021-12-16 VITALS — HEIGHT: 62 IN | BODY MASS INDEX: 42.36 KG/M2 | WEIGHT: 230.2 LBS

## 2021-12-16 DIAGNOSIS — E66.09 OBESITY DUE TO EXCESS CALORIES, UNSPECIFIED OBESITY SEVERITY: Chronic | ICD-10-CM

## 2021-12-16 PROCEDURE — 99443 PR PHYSICIAN TELEPHONE EVALUATION 21-30 MIN: CPT | Mod: 95 | Performed by: INTERNAL MEDICINE

## 2021-12-16 ASSESSMENT — PAIN SCALES - GENERAL: PAINLEVEL: NO PAIN (0)

## 2021-12-16 ASSESSMENT — MIFFLIN-ST. JEOR: SCORE: 1828.43

## 2021-12-16 NOTE — NURSING NOTE
"Chief Complaint   Patient presents with     RECHECK     Follow-up Weight Management       Vitals:    12/16/21 1559   Weight: 104.4 kg (230 lb 3.2 oz)   Height: 1.575 m (5' 2\")       Body mass index is 42.1 kg/m .                          "
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
## 2021-12-26 ENCOUNTER — NURSE TRIAGE (OUTPATIENT)
Dept: NURSING | Facility: CLINIC | Age: 41
End: 2021-12-26
Payer: MEDICARE

## 2021-12-26 ENCOUNTER — HOSPITAL ENCOUNTER (EMERGENCY)
Facility: CLINIC | Age: 41
Discharge: HOME | End: 2021-12-26
Attending: EMERGENCY MEDICINE | Admitting: EMERGENCY MEDICINE
Payer: MEDICARE

## 2021-12-26 VITALS
WEIGHT: 233 LBS | HEIGHT: 65 IN | DIASTOLIC BLOOD PRESSURE: 73 MMHG | RESPIRATION RATE: 15 BRPM | TEMPERATURE: 97.2 F | SYSTOLIC BLOOD PRESSURE: 103 MMHG | HEART RATE: 68 BPM | BODY MASS INDEX: 38.82 KG/M2 | OXYGEN SATURATION: 94 %

## 2021-12-26 DIAGNOSIS — U07.1 COVID-19 VIRUS INFECTION: ICD-10-CM

## 2021-12-26 DIAGNOSIS — Z78.9 LIVES IN GROUP HOME: ICD-10-CM

## 2021-12-26 DIAGNOSIS — Q90.9 DOWN'S SYNDROME: Chronic | ICD-10-CM

## 2021-12-26 LAB
FLUAV RNA SPEC QL NAA+PROBE: NEGATIVE
FLUBV RNA RESP QL NAA+PROBE: NEGATIVE
SARS-COV-2 RNA RESP QL NAA+PROBE: POSITIVE

## 2021-12-26 PROCEDURE — C9803 HOPD COVID-19 SPEC COLLECT: HCPCS

## 2021-12-26 PROCEDURE — 99282 EMERGENCY DEPT VISIT SF MDM: CPT | Mod: 25 | Performed by: EMERGENCY MEDICINE

## 2021-12-26 PROCEDURE — 99284 EMERGENCY DEPT VISIT MOD MDM: CPT | Mod: 25

## 2021-12-26 PROCEDURE — 87636 SARSCOV2 & INF A&B AMP PRB: CPT | Performed by: EMERGENCY MEDICINE

## 2021-12-26 PROCEDURE — 93010 ELECTROCARDIOGRAM REPORT: CPT | Performed by: EMERGENCY MEDICINE

## 2021-12-26 PROCEDURE — 93005 ELECTROCARDIOGRAM TRACING: CPT

## 2021-12-26 ASSESSMENT — MIFFLIN-ST. JEOR: SCORE: 1888.76

## 2021-12-26 NOTE — ED NOTES
Pt arrives via triage. Pt is from a group home. Pt had chest pain starting today. It has since gone away. Pt does have a known covid exposure from Thursday and is covid vaccinated. However, pt developed a sore throat today.

## 2021-12-26 NOTE — TELEPHONE ENCOUNTER
Subha, staff from [facility], calls with concerns regarding covid-19. Patient was possibly exposed to covid by his brother and one staff member. Patient woke up today with a sore throat, no fever or cough. Patient is complaining of chest discomfort/burning and current oxygen level at rest is 89%.    Go to ED now per protocol. Subha verbalizes understanding and will take patient to the ED. Denies further questions at this time.    Ada Whitlock RN  [facility]        Reason for Disposition    SEVERE or constant chest pain or pressure (Exception: mild central chest pain, present only when coughing)    MODERATE difficulty breathing (e.g., speaks in phrases, SOB even at rest, pulse 100-120)    Additional Information    Negative: SEVERE difficulty breathing (e.g., struggling for each breath, speaks in single words)    Negative: Difficult to awaken or acting confused (e.g., disoriented, slurred speech)    Negative: Bluish (or gray) lips or face now    Negative: Shock suspected (e.g., cold/pale/clammy skin, too weak to stand, low BP, rapid pulse)    Negative: Sounds like a life-threatening emergency to the triager    Negative: [1] COVID-19 exposure AND [2] no symptoms    Negative: COVID-19 vaccine reaction suspected (e.g., fever, headache, muscle aches) occurring 1 to 3 days after getting vaccine    Negative: COVID-19 vaccine, questions about    Negative: [1] Lives with someone known to have influenza (flu test positive) AND [2] flu-like symptoms (e.g., cough, runny nose, sore throat, SOB; with or without fever)    Negative: [1] Adult with possible COVID-19 symptoms AND [2] triager concerned about severity of symptoms or other causes    Negative: COVID-19 and breastfeeding, questions about    Protocols used: CORONAVIRUS (COVID-19) DIAGNOSED OR [id number] 8.25.2021    COVID 19 Nurse Triage Plan/Patient Instructions    Please be aware that novel coronavirus (COVID-19) may be circulating in the  community. If you develop symptoms such as fever, cough, or SOB or if you have concerns about the presence of another infection including coronavirus (COVID-19), please contact your health care provider or visit [email or web address].     Disposition/Instructions    ED Visit recommended. Follow protocol based instructions.     Bring Your Own Device:  Please also bring your smart device(s) (smart phones, tablets, laptops) and their charging cables for your personal use and to communicate with your care team during your visit.    Thank you for taking steps to prevent the spread of this virus.  o Limit your contact with others.  o Wear a simple mask to cover your cough.  o Wash your hands well and often.    Resources    [facility]: About COVID-19: [email or web address]    CDC: What to Do If You're Sick: www.cdc.gov/coronavirus/2019-ncov/about/steps-when-sick.html    CDC: Ending Home Isolation: www.cdc.gov/coronavirus/2019-ncov/hcp/disposition-in-home-patients.html     CDC: Caring for Someone: www.cdc.gov/coronavirus/2019-ncov/if-you-are-sick/care-for-someone.html     [facility]: Interim Guidance for Hospital Discharge to Home: [email or web address]/[email or web address]/hcp/hospdischarge.pdf    [facility] clinical trials (COVID-19 research studies): [email or web address]-clinical-trials     Below are the COVID-19 hotlines at the [facility] ([facility]). Interpreters are available.   o For health questions: Call 902-190-8955 or 1-918.157.7916 (7 a.m. to 7 p.m.)  o For questions about schools and childcare: Call 254-591-8357 or 1-151.790.4943 (7 a.m. to 7 p.m.)
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
## 2021-12-27 ENCOUNTER — PATIENT OUTREACH (OUTPATIENT)
Dept: CARE COORDINATION | Facility: CLINIC | Age: 41
End: 2021-12-27
Payer: MEDICARE

## 2021-12-27 RX ORDER — TOPIRAMATE 50 MG/1
50 TABLET, FILM COATED ORAL 2 TIMES DAILY
Qty: 120 TABLET | Refills: 3 | Status: SHIPPED | OUTPATIENT
Start: 2021-12-27 | End: 2022-03-22

## 2021-12-27 NOTE — TELEPHONE ENCOUNTER
Care Coordination ED Discharge Follow up Note-    Patient referred for Virtual Home Monitoring Program for COVID-19. Called patient's group home staff. Patient is doing well and is feeling much better today. Staff instructed to schedule virtual visit with PCP. Group home has nursing staff both on site and available as needed for emergencies. No concerns for patient per group home staff.     Awilda Barajas RN  [facility], [facility]

## 2021-12-28 ENCOUNTER — TELEPHONE (OUTPATIENT)
Dept: ENDOCRINOLOGY | Facility: CLINIC | Age: 41
End: 2021-12-28
Payer: MEDICARE

## 2021-12-28 NOTE — TELEPHONE ENCOUNTER
Reason for call:  Other   Patient called regarding (reason for call): paperwork  Additional comments: Patient group home faxed over paperwork to be filled out on 12/16/21 from the patient's last visit. They have not heard anything and also tried calling on 12/21/21, and since have had no update. They want to make sure the paperwork was received and also that they will be getting it back, and a possible time frame.      Phone number to reach patient:  Home number on file [phone] (home)    Best Time:  ASAP    Can we leave a detailed message on this number?  YES    Travel screening: Not Applicable

## 2021-12-29 ENCOUNTER — VIRTUAL VISIT (OUTPATIENT)
Dept: FAMILY MEDICINE | Facility: CLINIC | Age: 41
End: 2021-12-29
Payer: MEDICARE

## 2021-12-29 DIAGNOSIS — U07.1 INFECTION DUE TO 2019 NOVEL CORONAVIRUS: Primary | ICD-10-CM

## 2021-12-29 PROCEDURE — 99441 PR PHYSICIAN TELEPHONE EVALUATION 5-10 MIN: CPT | Mod: 95 | Performed by: NURSE PRACTITIONER

## 2021-12-29 NOTE — PATIENT INSTRUCTIONS

## 2021-12-29 NOTE — PROGRESS NOTES
"Humberto is a 41 year old who is being evaluated via a billable telephone visit.      What phone number would you like to be contacted at? [phone]  How would you like to obtain your AVS? MyChart    Assessment & Plan     Infection due to 2019 novel coronavirus  Per group home staff, Humberto is doing well, with only mild congestion. Discussed quarantine guidance, symptomatic care.      Patient Instructions   Discharge Instructions for COVID-19 Patients  You have--or may have--COVID-19. Please follow the instructions listed below.   If you have a weakened immune system, discuss with your doctor any other actions you need to take.  How can I protect others?  If you have symptoms (fever, cough, body aches or trouble breathing):    Stay home and away from others (self-isolate) until:  ? Your other symptoms have resolved (gotten better). And   ? You've had no fever--and no medicine that reduces fever--for 1 full day (24 hours). And   ? At least 10 days have passed since your symptoms started. (You may need to wait 20 days. Follow the advice of your care team.)  If you don't show symptoms, but testing showed that you have COVID-19:    Stay home and away from others (self-isolate) until at least 10 days have passed since the date of your first positive COVID-19 test.  During this time    Stay in your own room, even for meals. Use your own bathroom if you can.    Stay away from others in your home. No hugging, kissing or shaking hands. No visitors.    Don't go to work, school or anywhere else.    Clean \"high touch\" surfaces often (doorknobs, counters, handles). Use household cleaning spray or wipes.    You'll find a full list of  on the EPA website: www.epa.gov/pesticide-registration/list-n-disinfectants-use-against-sars-cov-2.    Cover your mouth and nose with a mask or other face covering to avoid spreading germs.    Wash your hands and face often. Use soap and water.    Caregivers in these groups are at risk for " severe illness due to COVID-19:  ? People 65 years and older  ? People who live in a nursing home or long-term care facility  ? People with chronic disease (lung, heart, cancer, diabetes, kidney, liver, immunologic)  ? People who have a weakened immune system, including those who:    Are in cancer treatment    Take medicine that weakens the immune system, such as corticosteroids    Had a bone marrow or organ transplant    Have an immune deficiency    Have poorly controlled HIV or AIDS    Are obese (body mass index of 40 or higher)    Smoke regularly    Caregivers should wear gloves while washing dishes, handling laundry and cleaning bedrooms and bathrooms.    Use caution when washing and drying laundry: Don't shake dirty laundry and use the warmest water setting that you can.    For more tips on managing your health at home, go to www.cdc.gov/coronavirus/2019-ncov/downloads/10Things.pdf.  How can I take care of myself at home?  1. Get lots of rest. Drink extra fluids (unless a doctor has told you not to).  2. Take Tylenol (acetaminophen) for fever or pain. If you have liver or kidney problems, ask your family doctor if it's okay to take Tylenol.   Adults can take either:   ? 650 mg (two 325 mg pills) every 4 to 6 hours, or   ? 1,000 mg (two 500 mg pills) every 8 hours as needed.  ? Note: Don't take more than 3,000 mg in one day. Acetaminophen is found in many medicines (both prescribed and over-the-counter medicines). Read all labels to be sure you don't take too much.   For children, check the Tylenol bottle for the right dose. The dose is based on the child's age or weight.  3. If you have other health problems (like cancer, heart failure, an organ transplant or severe kidney disease): Call your specialty clinic if you don't feel better in the next 2 days.  4. Know when to call 911. Emergency warning signs include:  ? Trouble breathing or shortness of breath  ? Pain or pressure in the chest that doesn't go  away  ? Feeling confused like you haven't felt before, or not being able to wake up  ? Bluish-colored lips or face  5. Your doctor may have prescribed a blood thinner medicine. Follow their instructions.  Where can I get more information?    [facility] - About COVID-19:   [email or web address]    CDC - What to Do If You're Sick: www.cdc.gov/coronavirus/2019-ncov/about/steps-when-sick.html    CDC - Ending Home Isolation: www.cdc.gov/coronavirus/2019-ncov/hcp/disposition-in-home-patients.html    [facility] - Caring for Someone: www.cdc.gov/coronavirus/2019-ncov/if-you-are-sick/care-for-someone.html    [facility] - Interim Guidance for Hospital Discharge to Home: www.health.[facility].[place]/diseases/coronavirus/hcp/hospdischarge.pdf    Below are the COVID-19 hotlines at the [place] Department of Health ([facility]). Interpreters are available.  ? For health questions: Call 885-843-5882 or 1-288.356.9584 (7 a.m. to 7 p.m.)  ? For questions about schools and childcare: Call 291-209-1403 or 1-446.290.7278 (7 a.m. to 7 p.m.)    For informational purposes only. Not to replace the advice of your health care provider. Clinically reviewed by Dr. Jimmy Amos.   Copyright   2020 [facility]. All rights reserved. [employer] 920794 - REV 01/05/21.          Return in about 1 week (around 1/5/2022) for worsening or continued symptoms.    GM Zelaya CNP  [facility]    Cleve Paul is a 41 year old who presents for the following health issues  accompanied by his staff .    HPI       Concern for COVID-19  About how many days ago did these symptoms start? 12/26/21  Is this your first visit for this illness? No   How would you describe your symptoms since your last visit? My symptoms have stayed the same  In the 14 days before your symptoms started, have you had close contact with someone with COVID-19 (Coronavirus)? Yes, I have been in contact with someone who has  COVID-19/Coronavirus (confirmed by lab test).  Do you have a fever or chills? No  Are you having new or worsening difficulty breathing? No  Do you have new or worsening cough? No  Have you had any new or unexplained body aches? No    Have you experienced any of the following NEW symptoms?    Headache: No    Sore throat: No    Loss of taste or smell: No    Chest pain: YES    Diarrhea: No    Rash: No  What treatments have you tried? Tylenol   Who do you live with? 4 people in group  Home   Are you, or a household member, a healthcare worker or a ? No  Do you live in a nursing home, group home, or shelter? YES  Do you have a way to get food/medications if quarantined? Yes, I have a friend or family member who can help me.              Review of Systems   Constitutional, HEENT, cardiovascular, pulmonary, gi and gu systems are negative, except as otherwise noted.      Objective           Vitals:  No vitals were obtained today due to virtual visit.    Physical Exam     Exam unable to be completed due to virtual visit          Phone call duration: 5 minutes

## 2022-01-04 ENCOUNTER — TELEPHONE (OUTPATIENT)
Dept: PEDIATRICS | Facility: CLINIC | Age: 42
End: 2022-01-04

## 2022-01-05 ENCOUNTER — TELEPHONE (OUTPATIENT)
Dept: PEDIATRICS | Facility: CLINIC | Age: 42
End: 2022-01-05
Payer: MEDICARE

## 2022-01-05 NOTE — TELEPHONE ENCOUNTER
Called and spoke with pharmacy in regards to Metformin rx. Pharmacist states that patient has already been taking Metformin 1000mg in AM and 500mg in PM. Wondering why the rx sent today had titration of 500mg BID for first week. Gave verbal order for patient to take maintenance dose as written on script.

## 2022-01-05 NOTE — TELEPHONE ENCOUNTER
[facility] has questions about Metformin prescription instructions     [phone]

## 2022-01-05 NOTE — PATIENT INSTRUCTIONS
Hello,  It was nice to see Humberto.    Recommendations:  Metformin: increase to a higher dose as tolerated (500mg BID, then 1,000mg in the AM and 500mg in the PM)  -- if he doesn't tolerate 1,500mg total per day, he can go back down to 500mg per day or 1,000mg per day    Topiramate: will reduce dose to 50mg twice daily

## 2022-01-18 ENCOUNTER — VIRTUAL VISIT (OUTPATIENT)
Dept: PHARMACY | Facility: CLINIC | Age: 42
End: 2022-01-18
Payer: MEDICAID

## 2022-01-18 DIAGNOSIS — E03.8 SUBCLINICAL HYPOTHYROIDISM: ICD-10-CM

## 2022-01-18 DIAGNOSIS — J30.2 SEASONAL ALLERGIES: ICD-10-CM

## 2022-01-18 DIAGNOSIS — E66.09 OBESITY DUE TO EXCESS CALORIES, UNSPECIFIED OBESITY SEVERITY: Primary | Chronic | ICD-10-CM

## 2022-01-18 DIAGNOSIS — G44.209 TENSION-TYPE HEADACHE, NOT INTRACTABLE, UNSPECIFIED CHRONICITY PATTERN: ICD-10-CM

## 2022-01-18 DIAGNOSIS — K21.00 GASTROESOPHAGEAL REFLUX DISEASE WITH ESOPHAGITIS WITHOUT HEMORRHAGE: ICD-10-CM

## 2022-01-18 DIAGNOSIS — Z00.00 ROUTINE GENERAL MEDICAL EXAMINATION AT A HEALTH CARE FACILITY: ICD-10-CM

## 2022-01-18 DIAGNOSIS — F41.9 ANXIETY: ICD-10-CM

## 2022-01-18 DIAGNOSIS — K59.00 CONSTIPATION, UNSPECIFIED CONSTIPATION TYPE: ICD-10-CM

## 2022-01-18 DIAGNOSIS — F32.A DEPRESSION, UNSPECIFIED DEPRESSION TYPE: ICD-10-CM

## 2022-01-18 DIAGNOSIS — R21 RASH AND NONSPECIFIC SKIN ERUPTION: ICD-10-CM

## 2022-01-18 DIAGNOSIS — F60.3 EXPLOSIVE PERSONALITY DISORDER (H): ICD-10-CM

## 2022-01-18 PROCEDURE — 99607 MTMS BY PHARM ADDL 15 MIN: CPT | Performed by: PHARMACIST

## 2022-01-18 PROCEDURE — 99605 MTMS BY PHARM NP 15 MIN: CPT | Performed by: PHARMACIST
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
## 2022-01-22 DIAGNOSIS — H10.023 PINK EYE DISEASE OF BOTH EYES: ICD-10-CM

## 2022-01-24 RX ORDER — TOBRAMYCIN 3 MG/ML
SOLUTION/ DROPS OPHTHALMIC
Qty: 10 ML | Refills: 11 | OUTPATIENT
Start: 2022-01-24

## 2022-01-25 RX ORDER — CETIRIZINE HYDROCHLORIDE 10 MG/1
10 TABLET ORAL AT BEDTIME
Qty: 90 TABLET | Refills: 3 | Status: SHIPPED | OUTPATIENT
Start: 2022-01-25 | End: 2023-09-08

## 2022-01-25 NOTE — PATIENT INSTRUCTIONS
Recommendations from today's MTM visit:                                                    MTM (medication therapy management) is a service provided by a clinical pharmacist designed to help you get the most of out of your medicines.   Today we reviewed what your medicines are for, how to know if they are working, that your medicines are safe and how to make your medicine regimen as easy as possible.      1. Stop loratadine. Start cetirizine 10 mg daily at bedtime. Prescription was sent to pharmacy.     2. Pharmacist clarified metformin RX with [facility] and Janey Medication Lead that the directions were continuation of current metformin prescription - metformin ER 1000 mg in AM and 500 mg at dinner - please update this on Visit to Health Professional (VHP) form.     4. Humberto's Health Goals:   -Decrease caffeine to 1 can soda and 2 cups coffee from current.   -Walk on treadmill for 15 minutes daily - if able to do this every day as a reward buy yourself a CD or movie you have been wanting to listen to or watch  -Try to ensure getting protein with breakfast - eggs or yogurt. If still hungry after finishing plate, select a little more salad or fruit     Follow-up: 4 months     It was great to speak with you today.  I value your experience and would be very thankful for your time with providing feedback on our clinic survey. You may receive a survey via email or text message in the next few days.     My Clinical Pharmacist's contact information:                                                      Please feel free to contact me with any questions or concerns you have.      Lauren Bloch, PharmD  Medication Therapy Management Pharmacist   [facility] [place]

## 2022-02-08 ENCOUNTER — VIRTUAL VISIT (OUTPATIENT)
Dept: SLEEP MEDICINE | Facility: CLINIC | Age: 42
End: 2022-02-08
Payer: MEDICARE

## 2022-02-08 VITALS
DIASTOLIC BLOOD PRESSURE: 74 MMHG | BODY MASS INDEX: 38.95 KG/M2 | SYSTOLIC BLOOD PRESSURE: 109 MMHG | HEART RATE: 66 BPM | HEIGHT: 65 IN | WEIGHT: 233.8 LBS

## 2022-02-08 DIAGNOSIS — G47.33 OSA (OBSTRUCTIVE SLEEP APNEA): Primary | ICD-10-CM

## 2022-02-08 PROCEDURE — 99442 PR PHYSICIAN TELEPHONE EVALUATION 11-20 MIN: CPT | Mod: 95 | Performed by: NURSE PRACTITIONER

## 2022-02-08 ASSESSMENT — MIFFLIN-ST. JEOR: SCORE: 1892.39

## 2022-02-08 ASSESSMENT — SLEEP AND FATIGUE QUESTIONNAIRES
HOW LIKELY ARE YOU TO NOD OFF OR FALL ASLEEP WHEN YOU ARE A PASSENGER IN A CAR FOR AN HOUR WITHOUT A BREAK: SLIGHT CHANCE OF DOZING
HOW LIKELY ARE YOU TO NOD OFF OR FALL ASLEEP WHILE SITTING INACTIVE IN A PUBLIC PLACE: SLIGHT CHANCE OF DOZING
HOW LIKELY ARE YOU TO NOD OFF OR FALL ASLEEP WHILE WATCHING TV: MODERATE CHANCE OF DOZING
HOW LIKELY ARE YOU TO NOD OFF OR FALL ASLEEP IN A CAR, WHILE STOPPED FOR A FEW MINUTES IN TRAFFIC: WOULD NEVER DOZE
HOW LIKELY ARE YOU TO NOD OFF OR FALL ASLEEP WHILE SITTING QUIETLY AFTER LUNCH WITHOUT ALCOHOL: SLIGHT CHANCE OF DOZING
HOW LIKELY ARE YOU TO NOD OFF OR FALL ASLEEP WHILE SITTING AND READING: MODERATE CHANCE OF DOZING
HOW LIKELY ARE YOU TO NOD OFF OR FALL ASLEEP WHILE LYING DOWN TO REST IN THE AFTERNOON WHEN CIRCUMSTANCES PERMIT: HIGH CHANCE OF DOZING
HOW LIKELY ARE YOU TO NOD OFF OR FALL ASLEEP WHILE SITTING AND TALKING TO SOMEONE: WOULD NEVER DOZE
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
## 2022-02-08 NOTE — PATIENT INSTRUCTIONS
"MY INFORMATION ON SLEEP APNEA-  Humberto Estrella    DOCTOR : GM Araujo CNP  SLEEP CENTER :      MY CONTACT NUMBER:   [facility]  [phone]  [facility]   [phone]  [facility]   [phone]      [facility]  [phone]  [facility]   [phone]      Merritt Points:  1. What is Obstructive Sleep Apnea (VIDAL)? VIDAL is the most common type of sleep apnea. Apnea literally means, \"without breath.\" It is characterized by repetitive pauses in breathing, despite continued effort to breathe, and is usually associated with a reduction in blood oxygen saturation. Apneas can last 10 to over 60 seconds. It is caused by narrowing or collapse of the upper airway as muscles relax during sleep.   2. What are the consequences of VIDAL? Symptoms include: daytime sleepiness- possibly increasing the risk of falling asleep while driving, unrefreshing/restless sleep, snoring, insomnia, waking frequently to urinate, waking with heartburn or reflux, reduced concentration and memory, and morning headaches. Other health consequences may include development of high blood pressure. Untreated VIDAL also can contribute to heart disease, stroke and diabetes.   3. What are the treatment options? In most situations, sleep apnea is a lifelong disease that must be managed with daily therapy. Continuous Positive Airway (CPAP) is the most reliable treatment. A mouthguard to hold your jaw forward is usually the next most reliable option. Other options include postioning devices (to keep you off your back), nasal valves, tongue retaining device, weight loss, surgery. There is more detail about these options toward the end of this document.  4. What are the most important things to remember about using CPAP?     WHERE CAN I FIND MORE INFORMATION?    American Academy of Sleep Medicine Patient information on sleep " disorders:  http://yoursleep.aasmnet.org    CPAP -  WHY AND HOW?                 Continuous positive airway pressure, or CPAP, is the most effective treatment for obstructive sleep apnea. It works by blowing room air, through a mask, to hold your throat open. A decision to use CPAP is a major step forward in the pursuit of a healthier life. The successful use of CPAP will help you breathe easier, sleep better and live healthier. Using CPAP can be a positive experience if you keep these talbot points in mind:  1. Commitment  CPAP is not a quick fix for your problem. It involves a long-term commitment to improve your sleep and your health.    2. Communication  Stay in close communication with both your sleep doctor and your CPAP supplier. Ask lots of questions and seek help when you need it.    3. Consistency  Use CPAP all night, every night and for every nap. You will receive the maximum health benefits from CPAP when you use it every time that you sleep. This will also make it easier for your body to adjust to the treatment.    4. Correction  The first machine and mask that you try may not be the best ones for you. Work with your sleep doctor and your CPAP supplier to make corrections to your equipment selection. Ask about trying a different type of machine or mask if you have ongoing problems. Make sure that your mask is a good fit and learn to use your equipment properly.    5. Challenge  Tell a family member or close friend to ask you each morning if you used your CPAP the previous night. Have someone to challenge you to give it your best effort.    6. Connection   Your adjustment to CPAP will be easier if you are able to connect with others who use the same treatment. Ask your sleep doctor if there is a support group in your area for people who have sleep apnea, or look for one on the Internet.  7. Comfort   Increase your level of comfort by using a saline spray, decongestant or heated humidifier if CPAP irritates  "your nose, mouth or throat. Use your unit's \"ramp\" setting to slowly get used to the air pressure level. There may be soft pads you can buy that will fit over your mask straps. Look on www.CPAP.com for accessories that can help make CPAP use more comfortable.  8. Cleaning   Clean your mask, tubing and headgear on a regular basis. Put this time in your schedule so that you don't forget to do it. Check and replace the filters for your CPAP unit and humidifier.    9. Completion   Although you are never finished with CPAP therapy, you should reward yourself by celebrating the completion of your first month of treatment. Expect this first month to be your hardest period of adjustment. It will involve some trial and error as you find the machine, mask and pressure settings that are right for you.    10. Continuation  After your first month of treatment, continue to make a daily commitment to use your CPAP all night, every night and for every nap.    CPAP-Tips to starting with success:  Begin using your CPAP for short periods of time during the day while you watch TV or read.    Use CPAP every night and for every nap. Using it less often reduces the health benefits and makes it harder for your body to get used to it.    Newer CPAP models are virtually silent; however, if you find the sound of your CPAP machine to be bothersome, place the unit under your bed to dampen the sound.     Make small adjustments to your mask, tubing, straps and headgear until you get the right fit. Tightening the mask may actually worsen the leak.  If it leaves significant marks on your face or irritates the bridge of your nose, it may not be the best mask for you.  Speak with the person who supplied the mask and consider trying other masks. Insurances will allow you to try different masks during the first month of starting CPAP.  Insurance also covers a new mask, hose and filter about every 6 months.    Use a saline nasal spray to ease mild nasal " congestion. Neti-Pot or saline nasal rinses may also help. Nasal gel sprays can help reduce nasal dryness.  Biotene mouthwash can be helpful to protect your teeth if you experience frequent dry mouth.  Dry mouth may be a sign of air escaping out of your mouth or out of the mask in the case of a full face mask.  Speak with your provider if you expect that is the case.     Take a nasal decongestant to relieve more severe nasal or sinus congestion.  Do not use Afrin (oxymetazoline) nasal spray more than 3 days in a row.  Speak with your sleep doctor if your nasal congestion is chronic.    Use a heated humidifier that fits your CPAP model to enhance your breathing comfort. Adjust the heat setting up if you get a dry nose or throat, down if you get condensation in the hose or mask.  Position the CPAP lower than you so that any condensation in the hose drains back into the machine rather than towards the mask.    Try a system that uses nasal pillows if traditional masks give you problems.    Clean your mask, tubing and headgear once a week. Make sure the equipment dries fully.    Regularly check and replace the filters for your CPAP unit and humidifier.    Work closely with your sleep provider and your CPAP supplier to make sure that you have the machine, mask and air pressure setting that works best for you. It is better to stop using it and call your provider to solve problems than to lay awake all night frustrated with the device.    Weight Loss:    Weight loss decreases severity of sleep apnea in most people with obesity. For those with mild obesity who have developed snoring with weight gain, even 15-30 pound weight loss can improve and occasionally eliminate sleep apnea.  Structured and life-long dietary and health habits are necessary to lose weight and keep healthier weight levels.     Though there are significant health benefits from weight loss, long-term weight loss is very difficult to achieve- studies show  success with dietary management in less than 10% of people. In addition, substantial weight loss may require years of dietary control and may be difficult if patients have severe obesity. In these cases, surgical management may be considered.    If you are interested in methods for weight loss, you should review the options discussed at the National Institutes of Health patient information sites:     http://win.niddk.nih.gov/publications/index.htm  http:/www.health.nih.gov/topic/WeightLossDieting    Bariatric programs offer counseling in all methods of weight loss:    Http:/www.[facility]edic[facility].org/Specialties/WeightLossSurgeryandMedicalMgmt/htm    Your BMI is Body mass index is 38.91 kg/m .    Weight management plan: Patient was referred to their PCP to discuss a diet and exercise plan.    Body mass index (BMI) is one way to tell whether you are at a healthy weight, overweight, or obese. It measures your weight in relation to your height.  A BMI of 18.5 to 24.9 is in the healthy range. A person with a BMI of 25 to 29.9 is considered overweight, and someone with a BMI of 30 or greater is considered obese.  Another way to find out if you are at risk for health problems caused by overweight and obesity is to measure your waist. If you are a woman and your waist is more than 35 inches, or if you are a man and your waist is more than 40 inches, your risk of disease may be higher.  More than two-thirds of American adults are considered overweight or obese. Being overweight or obese increases the risk for further weight gain.  Excess weight may lead to heart disease and diabetes. Creating and following plans for healthy eating and physical activity may help you improve your health.    Methods for maintaining or losing weight.    Weight control is part of healthy lifestyle and includes exercise, emotional health, and healthy eating habits.  Careful eating habits lifelong is the mainstay of weight control.  Though there  are significant health benefits from weight loss, long-term weight loss with diet alone may be very difficult to achieve- studies show long-term success with dietary management in less than 10% of people. Attaining a healthy weight may be especially difficult to achieve in those with severe obesity. In some cases, medications, devices and surgical management might be considered.    What can you do?    If you are overweight or obese and are interested in methods for weight loss, you should discuss this with your provider. In addition, we recommend that you review healthy life styles and methods for weight loss available through the National Institutes of Health patient information sites:     http://win.niddk.nih.gov/publications/index.htm

## 2022-02-10 ENCOUNTER — OFFICE VISIT (OUTPATIENT)
Dept: AUDIOLOGY | Facility: CLINIC | Age: 42
End: 2022-02-10
Attending: INTERNAL MEDICINE
Payer: MEDICARE

## 2022-02-10 DIAGNOSIS — Z01.110 ENCOUNTER FOR HEARING SCREENING AFTER FAILED HEARING TEST: ICD-10-CM

## 2022-02-10 DIAGNOSIS — H90.3 SENSORINEURAL HEARING LOSS, BILATERAL: Primary | ICD-10-CM

## 2022-02-10 PROCEDURE — 92550 TYMPANOMETRY & REFLEX THRESH: CPT | Performed by: AUDIOLOGIST

## 2022-02-10 PROCEDURE — 92557 COMPREHENSIVE HEARING TEST: CPT | Performed by: AUDIOLOGIST

## 2022-02-10 PROCEDURE — 99207 PR NO CHARGE LOS: CPT | Performed by: AUDIOLOGIST

## 2022-02-10 NOTE — PROGRESS NOTES
AUDIOLOGY REPORT    SUBJECTIVE:  Humberto Estrella is a 41 year old male who was seen in the Audiology Clinic [facility] on 2/10/22 for audiologic evaluation, referred by Carla Brown DO.  The patient has been seen previously in this clinic on 11/23/2020 for assessment and results indicated bilateral sensorineural hearing loss. The patient reports no change in hearing and that he is here for his annual group home hearing testing. The patient denies  bilateral tinnitus, bilateral otalgia, bilateral drainage, bilateral aural fullness, family history of hearing loss, history of noise exposure, and dizziness. They were accompanied today by their group home attendant.    OBJECTIVE:    Otoscopic exam indicates ears are clear of cerumen bilaterally     Pure Tone Thresholds assessed using standard techniques  audiometry with good  reliability from 250-8000 Hz bilaterally using insert earphones and circumaural headphones     RIGHT:  normal hearing sensitivity through 2000 Hz then a moderate sensorineural hearing loss    LEFT:    normal and borderline-normal hearing sensitivity through 2000 Hz then a moderate sensorineural hearing loss    NOTE: There is a conductive component at 250-1000 Hz in the left ear. Change in transducers did not merit a change in thresholds.     Tympanogram:    RIGHT: restricted eardrum mobility (Type As)    LEFT:   restricted eardrum mobility (Type As)    Reflexes (reported by stimulus ear): 1000 Hz  RIGHT: Ipsilateral is present at normal levels  RIGHT: Contralateral is absent at frequencies tested  LEFT:   Ipsilateral is absent at frequencies tested  LEFT:   Contralateral is absent at frequencies tested    Speech Reception Threshold:    RIGHT: 20 dB HL    LEFT:   15 dB HL    Word Recognition Score:     RIGHT: 96% at 60 dB HL using NU-6 recorded word list.    LEFT:   96% at 60 dB HL using NU-6 recorded word list.    ASSESSMENT:   Bilateral sensorineural hearing  loss      Compared to patient's previous audiogram dated 11/23/2020, hearing has remained stable. Today s results were discussed with the patient in detail.     PLAN:  Patient was counseled regarding hearing loss and impact on communication. It is recommended that the patient return as medically indicated or sooner if concerns arise. Please call this clinic with questions regarding these results or recommendations.    Perfecto Wagner CCC-A  Licensed Audiologist #[id number]  2/10/2022    CC: Dr. Brown

## 2022-03-17 ENCOUNTER — VIRTUAL VISIT (OUTPATIENT)
Dept: ENDOCRINOLOGY | Facility: CLINIC | Age: 42
End: 2022-03-17
Payer: MEDICARE

## 2022-03-17 VITALS — HEIGHT: 65 IN | BODY MASS INDEX: 38.65 KG/M2 | WEIGHT: 232 LBS

## 2022-03-17 DIAGNOSIS — E66.09 OBESITY DUE TO EXCESS CALORIES, UNSPECIFIED OBESITY SEVERITY: Chronic | ICD-10-CM

## 2022-03-17 PROCEDURE — 99443 PR PHYSICIAN TELEPHONE EVALUATION 21-30 MIN: CPT | Mod: 95 | Performed by: INTERNAL MEDICINE

## 2022-03-17 ASSESSMENT — PAIN SCALES - GENERAL: PAINLEVEL: NO PAIN (0)
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
## 2022-03-17 NOTE — PATIENT INSTRUCTIONS
The drug interactions between semaglutide and olanzipine and semaglutide and semaglutide and risperidone are not safety concerns   -- it is just that olanzipine and risperidone cause diabetes and are felt to make semaglutide less effective at treating diabetes  -- semaglutide could make levels of levothyroxine higher, but we start at very low doses of semaglutide, so as long as he gets his TSH checked started in the next 3 months     The PM doses of metformin and topiramate can be taken early enough to benefit from lower appetite in the evening (I.e. 4pm or 5pm). No need to take them just before bedtime.     Fax: [phone]    Here is information about Wegovy:     WEGOVY (semaglutide)    What is Wegovy?    Wegovy (semaglutide) injection 2.4 mg is an injectable prescription medicine used for adults with obesity (BMI ?30) or overweight (excess weight) (BMI ?27) who also have weight-related medical problems to help them lose weight and keep the weight off.    1.  Start Wegovy (semaglutide) 0.25 mg once weekly for 4 weeks, then if tolerating increase to 0.5 mg weekly for 4 weeks, then if tolerating increase to 1 mg weekly for 4 weeks, then if tolerating increase to 1.7 mg weekly for 4 weeks, then if tolerating increase to 2.4 mg weekly thereafter.    -Each Wegovy pen is a once weekly single-dose prefilled pen with a pen injector already built within the pen. Discard the Wegovy pen after use in sharps container.     2. Storage: make sure that when you get the prescription that you store the prescription in the refrigerator until it is time to use the Wegovy pen.  Once it is time to use the Wegovy pen, you can keep the pen at room temperature and it is good for up to 28 days at room temperature.     3.  Potential common side effects: nausea, headache, diarrhea, stomach upset.  If these become unmanageable or concerning symptoms, please make sure to call or mychart.      Go to site: Wegovy video to learn more and watch  instruction videos.      For any questions or concerns please send a [employer] message to our team or call our weight management call center at [phone] during regular business hours. For questions during evenings or weekends your messages will be addressed during the next business day.  For emergencies please call 911 or seek immediate medical care.
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
## 2022-03-17 NOTE — NURSING NOTE
"Chief Complaint   Patient presents with     RECHECK     Follow-up Weight Managment       Vitals:    03/17/22 1611   Weight: 105.2 kg (232 lb)   Height: 1.651 m (5' 5\")       Body mass index is 38.61 kg/m .                          "

## 2022-03-18 ENCOUNTER — TELEPHONE (OUTPATIENT)
Dept: ENDOCRINOLOGY | Facility: CLINIC | Age: 42
End: 2022-03-18
Payer: MEDICARE

## 2022-03-18 NOTE — TELEPHONE ENCOUNTER
Law from [facility] called regarding some forms.    Please call [phone] if calling back today... or if calling Monday please call Law directly at [phone]

## 2022-03-21 ENCOUNTER — TELEPHONE (OUTPATIENT)
Dept: PEDIATRICS | Facility: CLINIC | Age: 42
End: 2022-03-21
Payer: MEDICARE

## 2022-03-21 ENCOUNTER — TELEPHONE (OUTPATIENT)
Dept: ENDOCRINOLOGY | Facility: CLINIC | Age: 42
End: 2022-03-21
Payer: MEDICARE

## 2022-03-21 DIAGNOSIS — J30.2 SEASONAL ALLERGIC RHINITIS, UNSPECIFIED TRIGGER: ICD-10-CM

## 2022-03-21 RX ORDER — PSEUDOEPHEDRINE HCL 120 MG/1
TABLET, FILM COATED, EXTENDED RELEASE ORAL
Qty: 12 TABLET | Refills: 11 | Status: SHIPPED | OUTPATIENT
Start: 2022-03-21 | End: 2023-09-04

## 2022-03-21 NOTE — TELEPHONE ENCOUNTER
Will call group home  Faxed After Visit summary to [facility] [employer] [phone]  DOS 3/17/22 and 12/1621

## 2022-03-21 NOTE — TELEPHONE ENCOUNTER
Routing refill request to provider for review/approval because:  Drug not on the [facility] refill protocol     Pending Prescriptions:                       Disp   Refills    SUDOGEST 12 HOUR 120 MG 12 hr tablet [Phar*12 tab*11       Sig: TAKE 1 TABLET BY MOUTH ONCE DAILY AS NEEDED *6 TOTAL           FILLS*    Brissa Pichardo RN on 3/21/2022 at 12:24 PM

## 2022-03-21 NOTE — TELEPHONE ENCOUNTER
Received orders from [facility] sent today to Dr Diaz is review and sign also called pt

## 2022-03-22 ENCOUNTER — TELEPHONE (OUTPATIENT)
Dept: ENDOCRINOLOGY | Facility: CLINIC | Age: 42
End: 2022-03-22
Payer: MEDICARE

## 2022-03-22 RX ORDER — TOPIRAMATE 50 MG/1
50 TABLET, FILM COATED ORAL 2 TIMES DAILY
Qty: 120 TABLET | Refills: 3 | Status: SHIPPED | OUTPATIENT
Start: 2022-03-22 | End: 2022-07-07

## 2022-03-22 NOTE — LETTER
March 22, 2022      TO: Humberto Estrella  [address] [facility] [address]       To whom it may concern: Mr. Humberto Estrella,    Please take your Topiramate 50mg tablets as written: Take 1 tablet (50 mg) by mouth once daily in the morning and 1 tablet (50mg) by mouth one hour prior to dinner time (4:00pm).        Sincerely,      Wanda Diaz MD

## 2022-03-22 NOTE — TELEPHONE ENCOUNTER
Called Janey at Group home today she said that the information for the AVS was not enough need additional information from Dr Diaz from DOS 12/16/21 and 3/17/2022 will re-fax forms for Dr Diaz to fill out and return

## 2022-03-22 NOTE — TELEPHONE ENCOUNTER
Received a call from Law at patient's group home in regards to notes/orders and forms they need completed by . Facility needs written and signed orders for Metformin to be taken 1 hour prior to dinner (4pm), as well as Topiramate BID with second dose 1 hour prior to dinner time (4pm). Also requesting written order for Wegovy/Semaglutide to entered and faxed to group home prior to sending to pharmacy. Nursing staff requires training on administration prior to medication being started. Group home also needs guardian approval to start the new medications. Lastly, they need the P form, which is being faxed again, completed, signed, dated and faxed back to [phone]. Sent message to provider to complete.

## 2022-03-22 NOTE — LETTER
March 22, 2022      TO: Humberto Estrella  [address] [facility] [address]       To whom it may concern: Mr. Humberto Estrella,    Please take your Metformin 500mg tabs as written: Take 2 tablets (1,000 mg) by mouth daily (with breakfast) AND 1 tablet (500 mg) daily one hour prior to dinner (4:00pm).       Sincerely,      Wanda Diaz MD

## 2022-03-23 NOTE — TELEPHONE ENCOUNTER
Received completed forms from Dr. Diaz. Printed and faxed back to Humberto Hodges at [phone]. [facility] form scanned into patient's chart.

## 2022-04-05 ENCOUNTER — MEDICAL CORRESPONDENCE (OUTPATIENT)
Dept: HEALTH INFORMATION MANAGEMENT | Facility: CLINIC | Age: 42
End: 2022-04-05
Payer: MEDICARE

## 2022-04-18 ENCOUNTER — TELEPHONE (OUTPATIENT)
Dept: ENDOCRINOLOGY | Facility: CLINIC | Age: 42
End: 2022-04-18
Payer: MEDICARE

## 2022-04-18 DIAGNOSIS — E66.09 OBESITY DUE TO EXCESS CALORIES, UNSPECIFIED OBESITY SEVERITY: Primary | ICD-10-CM

## 2022-04-18 NOTE — TELEPHONE ENCOUNTER
Reason for call:  Other   Patient called regarding (reason for call): prescription  Additional comments: Group home calling to request the script for Wygovy be sent to the patient's pharmacy, [facility]. The group home faxed over a signed form from the patient's guardian, which is what they were waiting on to fill the script. The group home also needs that form signed and faxed back to [phone]. They are requesting a call to confirm the receipt of the signed form from the guardian.     Phone number to reach patient:  Home number on file [phone] (home)    Best Time:  Anytime     Can we leave a detailed message on this number?  YES    Travel screening: Not Applicable

## 2022-04-20 RX ORDER — SEMAGLUTIDE 0.25 MG/.5ML
0.25 INJECTION, SOLUTION SUBCUTANEOUS WEEKLY
Qty: 2 ML | Refills: 0 | Status: SHIPPED | OUTPATIENT
Start: 2022-04-20 | End: 2022-09-13 | Stop reason: ALTCHOICE

## 2022-04-20 RX ORDER — SEMAGLUTIDE 0.5 MG/.5ML
0.5 INJECTION, SOLUTION SUBCUTANEOUS WEEKLY
Qty: 2 ML | Refills: 0 | Status: SHIPPED | OUTPATIENT
Start: 2022-04-20 | End: 2022-09-13 | Stop reason: ALTCHOICE

## 2022-04-20 NOTE — TELEPHONE ENCOUNTER
Paperwork received via right fax. Sent to Dr. Diaz to sign off. Sent rx to [facility] pharmacy as requested. PA started in separate encounter.

## 2022-04-21 ENCOUNTER — TELEPHONE (OUTPATIENT)
Dept: ENDOCRINOLOGY | Facility: CLINIC | Age: 42
End: 2022-04-21
Payer: MEDICARE

## 2022-04-23 NOTE — TELEPHONE ENCOUNTER
Central Prior Authorization Team   Phone: [phone]      PA Initiation    Medication: Wegovy  Insurance Company: [employer] - Phone [phone] Fax [phone]  Pharmacy Filling the Rx: [facility]. - [place], [place] - [address]  Filling Pharmacy Phone: [phone]  Filling Pharmacy Fax:    Start Date: 4/23/2022

## 2022-04-24 NOTE — TELEPHONE ENCOUNTER
PRIOR AUTHORIZATION DENIED    Medication: Wegovy-DENIED    Denial Date: 4/23/2022    Denial Rational:              Appeal Information:

## 2022-04-25 NOTE — TELEPHONE ENCOUNTER
Update to Wegovy. PA is denied. Still waiting on provider signature, which is no longer needed, since medication is not covered. Called and left message for Law Hodges to inform.

## 2022-04-29 ENCOUNTER — IMMUNIZATION (OUTPATIENT)
Dept: FAMILY MEDICINE | Facility: CLINIC | Age: 42
End: 2022-04-29
Payer: MEDICARE

## 2022-04-29 PROCEDURE — 91306 COVID-19,PF,MODERNA (18+ YRS BOOSTER .25ML): CPT

## 2022-04-29 PROCEDURE — 0064A COVID-19,PF,MODERNA (18+ YRS BOOSTER .25ML): CPT

## 2022-05-04 ENCOUNTER — TELEPHONE (OUTPATIENT)
Dept: FAMILY MEDICINE | Facility: CLINIC | Age: 42
End: 2022-05-04
Payer: MEDICARE

## 2022-05-04 NOTE — TELEPHONE ENCOUNTER
Janey from [facility], [phone], called for lab order?  Pt was advised last November to return in 3-6 months for lab recheck.    Orders needed.    Order had  in March.  Extended order for CBC.    Mikki Sloan RN

## 2022-05-09 ENCOUNTER — LAB (OUTPATIENT)
Dept: LAB | Facility: CLINIC | Age: 42
End: 2022-05-09
Payer: MEDICARE

## 2022-05-09 DIAGNOSIS — D72.819 LEUKOPENIA, UNSPECIFIED TYPE: ICD-10-CM

## 2022-05-09 LAB
ERYTHROCYTE [DISTWIDTH] IN BLOOD BY AUTOMATED COUNT: 14.5 % (ref 10–15)
HCT VFR BLD AUTO: 43 % (ref 40–53)
HGB BLD-MCNC: 13.9 G/DL (ref 13.3–17.7)
MCH RBC QN AUTO: 32.7 PG (ref 26.5–33)
MCHC RBC AUTO-ENTMCNC: 32.3 G/DL (ref 31.5–36.5)
MCV RBC AUTO: 101 FL (ref 78–100)
PLATELET # BLD AUTO: 164 10E3/UL (ref 150–450)
RBC # BLD AUTO: 4.25 10E6/UL (ref 4.4–5.9)
WBC # BLD AUTO: 4.3 10E3/UL (ref 4–11)

## 2022-05-09 PROCEDURE — 85027 COMPLETE CBC AUTOMATED: CPT

## 2022-05-09 PROCEDURE — 36415 COLL VENOUS BLD VENIPUNCTURE: CPT

## 2022-05-10 ENCOUNTER — TELEPHONE (OUTPATIENT)
Dept: FAMILY MEDICINE | Facility: CLINIC | Age: 42
End: 2022-05-10
Payer: MEDICARE

## 2022-05-13 ENCOUNTER — TELEPHONE (OUTPATIENT)
Dept: FAMILY MEDICINE | Facility: CLINIC | Age: 42
End: 2022-05-13
Payer: MEDICARE

## 2022-05-13 NOTE — TELEPHONE ENCOUNTER
Need lab result note from 5/9/22 with Dr Brown's comments faxed to [facility], attn Janey.    Printed and faxed.    Mikki Sloan RN

## 2022-05-13 NOTE — TELEPHONE ENCOUNTER
Lab order faxed to Janey [facility], fax # [phone]. Awilda Guadarrama on 5/13/2022 at 10:50 AM

## 2022-05-25 ENCOUNTER — LAB (OUTPATIENT)
Dept: LAB | Facility: CLINIC | Age: 42
End: 2022-05-25
Payer: MEDICARE

## 2022-05-25 ENCOUNTER — DOCUMENTATION ONLY (OUTPATIENT)
Dept: LAB | Facility: CLINIC | Age: 42
End: 2022-05-25

## 2022-05-25 DIAGNOSIS — Z79.899 ENCOUNTER FOR LONG-TERM (CURRENT) USE OF MEDICATIONS: Primary | ICD-10-CM

## 2022-05-25 LAB
ALBUMIN SERPL-MCNC: 3.2 G/DL (ref 3.4–5)
ALP SERPL-CCNC: 57 U/L (ref 40–150)
ALT SERPL W P-5'-P-CCNC: 57 U/L (ref 0–70)
ANION GAP SERPL CALCULATED.3IONS-SCNC: 4 MMOL/L (ref 3–14)
AST SERPL W P-5'-P-CCNC: 36 U/L (ref 0–45)
BILIRUB SERPL-MCNC: 0.4 MG/DL (ref 0.2–1.3)
BUN SERPL-MCNC: 15 MG/DL (ref 7–30)
CALCIUM SERPL-MCNC: 8.8 MG/DL (ref 8.5–10.1)
CHLORIDE BLD-SCNC: 105 MMOL/L (ref 94–109)
CO2 SERPL-SCNC: 30 MMOL/L (ref 20–32)
CREAT SERPL-MCNC: 1.13 MG/DL (ref 0.66–1.25)
ERYTHROCYTE [DISTWIDTH] IN BLOOD BY AUTOMATED COUNT: 14.6 % (ref 10–15)
GFR SERPL CREATININE-BSD FRML MDRD: 83 ML/MIN/1.73M2
GLUCOSE BLD-MCNC: 82 MG/DL (ref 70–99)
HCT VFR BLD AUTO: 44.4 % (ref 40–53)
HGB BLD-MCNC: 14.4 G/DL (ref 13.3–17.7)
MCH RBC QN AUTO: 32.8 PG (ref 26.5–33)
MCHC RBC AUTO-ENTMCNC: 32.4 G/DL (ref 31.5–36.5)
MCV RBC AUTO: 101 FL (ref 78–100)
PLATELET # BLD AUTO: 158 10E3/UL (ref 150–450)
POTASSIUM BLD-SCNC: 4.3 MMOL/L (ref 3.4–5.3)
PROT SERPL-MCNC: 7 G/DL (ref 6.8–8.8)
RBC # BLD AUTO: 4.39 10E6/UL (ref 4.4–5.9)
SODIUM SERPL-SCNC: 139 MMOL/L (ref 133–144)
VALPROATE SERPL-MCNC: 56 MG/L
WBC # BLD AUTO: 3.4 10E3/UL (ref 4–11)

## 2022-05-25 PROCEDURE — 36415 COLL VENOUS BLD VENIPUNCTURE: CPT

## 2022-05-25 PROCEDURE — 85027 COMPLETE CBC AUTOMATED: CPT

## 2022-05-25 PROCEDURE — 80053 COMPREHEN METABOLIC PANEL: CPT

## 2022-05-25 PROCEDURE — 80164 ASSAY DIPROPYLACETIC ACD TOT: CPT

## 2022-07-07 ENCOUNTER — VIRTUAL VISIT (OUTPATIENT)
Dept: ENDOCRINOLOGY | Facility: CLINIC | Age: 42
End: 2022-07-07
Payer: MEDICARE

## 2022-07-07 VITALS — HEIGHT: 63 IN | BODY MASS INDEX: 42.63 KG/M2 | WEIGHT: 240.6 LBS

## 2022-07-07 DIAGNOSIS — E66.09 OBESITY DUE TO EXCESS CALORIES, UNSPECIFIED OBESITY SEVERITY: Chronic | ICD-10-CM

## 2022-07-07 DIAGNOSIS — Q90.9 DOWN'S SYNDROME: ICD-10-CM

## 2022-07-07 DIAGNOSIS — R73.03 PREDIABETES: Primary | ICD-10-CM

## 2022-07-07 DIAGNOSIS — E88.09 HYPOALBUMINEMIA: ICD-10-CM

## 2022-07-07 PROCEDURE — 99443 PR PHYSICIAN TELEPHONE EVALUATION 21-30 MIN: CPT | Mod: 95 | Performed by: INTERNAL MEDICINE

## 2022-07-07 RX ORDER — LIRAGLUTIDE 6 MG/ML
INJECTION SUBCUTANEOUS
Qty: 9 ML | Refills: 4 | Status: SHIPPED | OUTPATIENT
Start: 2022-07-07 | End: 2022-10-19

## 2022-07-07 RX ORDER — RISPERIDONE 0.5 MG/1
1 TABLET ORAL 2 TIMES DAILY
COMMUNITY
End: 2024-09-10

## 2022-07-07 RX ORDER — PEN NEEDLE, DIABETIC 32GX 5/32"
NEEDLE, DISPOSABLE MISCELLANEOUS
Qty: 120 EACH | Refills: 4 | Status: SHIPPED | OUTPATIENT
Start: 2022-07-07

## 2022-07-07 RX ORDER — TOPIRAMATE 25 MG/1
75 TABLET, FILM COATED ORAL 2 TIMES DAILY
Qty: 180 TABLET | Refills: 3 | Status: SHIPPED | OUTPATIENT
Start: 2022-07-07 | End: 2022-10-19

## 2022-07-07 ASSESSMENT — PAIN SCALES - GENERAL: PAINLEVEL: NO PAIN (0)

## 2022-07-07 NOTE — NURSING NOTE
"(   Chief Complaint   Patient presents with     RECHECK     Return MW, 3 month follow up     )    ( Weight: 109.1 kg (240 lb 9.6 oz) (Patient reported) )  ( Height: 160 cm (5' 3\") )  ( BMI (Calculated): 42.62 )  (   )  (   )  (   )  (   )  (   )  (   )    (   )  (   )  (   )  (   )  (   )  (   )  (   )    (   Patient Active Problem List   Diagnosis     TRISOMY 21 (DOWN SYNDROME)     Hearing loss     MYOPIA     LATENT NYSTAGMUS     Headache     post traumatic stress disorder     CARDIOVASCULAR SCREENING; LDL GOAL LESS THAN 160     Cortical, lamellar, or zonular cataract, nonsenile     Nonallergic rhinitis     Obesity due to excess calories, unspecified obesity severity     Subclinical hypothyroidism     VIDAL (obstructive sleep apnea)-AHI 26     Morbid obesity (H)     Anxiety     Explosive personality disorder (H)     Prediabetes     Lives in group home     Suicide attempt (H)    )  (   Current Outpatient Medications   Medication Sig Dispense Refill     albuterol (PROAIR HFA/PROVENTIL HFA/VENTOLIN HFA) 108 (90 Base) MCG/ACT inhaler Inhale 2 puffs into the lungs every 6 hours as needed for shortness of breath / dyspnea or wheezing 3 Inhaler 1     alum & mag hydroxide-simethicone (MYLANTA/MAALOX) 200-200-20 MG/5ML SUSP suspension Take 30 mLs by mouth every 4 hours as needed for indigestion (2 tsp for upset stomach) 1 Bottle 3     calcium carbonate (TUMS) 500 MG chewable tablet Take 1 tablet (500 mg) by mouth every 6 hours as needed for heartburn 150 tablet 3     calcium polycarbophil (FIBERCON) 625 MG tablet Take 2 tablets (1,250 mg) by mouth 2 times daily 360 tablet 3     cetirizine (ZYRTEC) 10 MG tablet Take 1 tablet (10 mg) by mouth At Bedtime Stop loratadine 90 tablet 3     DEEP SEA NASAL SPRAY 0.65 % nasal spray USE 2 SPRAYS IN EACH NOSTRIL FOUR TIMES DAILY AS NEEDED FOR CONGESTION 44 mL 11     divalproex sodium delayed-release (DEPAKOTE SPRINKLE) 125 MG DR capsule Take 750 mg by mouth 2 times daily Open capsules " and sprinkle into pudding/sauce       fluticasone (FLONASE) 50 MCG/ACT nasal spray Spray 1 spray into both nostrils daily as needed for rhinitis or allergies 16 g 1     ibuprofen (ADVIL,MOTRIN) 600 MG tablet Take 1 tablet (600 mg) by mouth every 6 hours as needed for moderate pain 60 tablet 0     levothyroxine (SYNTHROID/LEVOTHROID) 100 MCG tablet Take 1 tablet (100 mcg) by mouth daily 90 tablet 3     magnesium hydroxide (MILK OF MAGNESIA) 400 MG/5ML suspension Take 30-60 mLs by mouth daily as needed for constipation or heartburn (If No Bowel Movement in 2 days.) Reported on 4/12/2017 360 mL 1     metFORMIN (GLUCOPHAGE) 500 MG tablet Take 2 tablets (1,000 mg) by mouth daily (with breakfast) AND 1 tablet (500 mg) daily (with dinner). The PM dose can be taken before dinner (as early as 4pm). 180 tablet 3     montelukast (SINGULAIR) 10 MG tablet Take 1 tablet (10 mg) by mouth At Bedtime 90 tablet 3     OLANZapine (ZYPREXA) 5 MG tablet Take 2.5 mg by mouth 3 times daily as needed  30 tablet 1     order for DME Equipment being ordered: CPAP  AIRSENSE 10  11 CM H20  AIRFIT F20 MEDIUM  SN# [id number]  DN# [id number]       PAIN & FEVER 325 MG tablet TAKE 1-2 TABLETS (325-650MG) BY MOUTH EVERY 4 HOURS AS NEEDED *ORDER WHEN LOW* 100 tablet 7     phenol (CHLORASEPTIC) 1.4 % spray Take 1 spray by mouth every hour as needed for sore throat Use as directed for sore throt       polyethylene glycol-propylene glycol PF (SYSTANE ULTRA PF) 0.4-0.3 % SOLN opthalmic solution Place 1-2 drops into both eyes Up to 5 times daily as needed       risperiDONE (RISPERDAL) 0.5 MG tablet Take 0.5 mg by mouth 2 times daily At noon       risperiDONE (RISPERDAL) 1 MG tablet Take 1 mg by mouth every morning  60 tablet      risperiDONE (RISPERDAL) 2 MG tablet Take 2 mg by mouth daily at 8:00pm       sertraline (ZOLOFT) 100 MG tablet Take 200 mg by mouth daily        SUDOGEST 12 HOUR 120 MG 12 hr tablet TAKE 1 TABLET BY MOUTH ONCE DAILY AS NEEDED *6 TOTAL  FILLS* 12 tablet 11     topiramate (TOPAMAX) 50 MG tablet Take 1 tablet (50 mg) by mouth 2 times daily 120 tablet 3     triamcinolone (KENALOG) 0.1 % cream Apply  topically 3 times daily. Apply sparingly to affected area. 30 g 1     VITAMIN D3 25 MCG (1000 UT) tablet TAKE 1 TABLET BY MOUTH ONCE DAILY 100 tablet 2     Semaglutide-Weight Management (WEGOVY) 0.25 MG/0.5ML SOAJ Inject 0.25 mg Subcutaneous once a week For the first 4 weeks. (Patient not taking: Reported on 7/7/2022) 2 mL 0     Semaglutide-Weight Management (WEGOVY) 0.5 MG/0.5ML SOAJ Inject 0.5 mg Subcutaneous once a week After 4 week 0.25mg course is complete. (Patient not taking: Reported on 7/7/2022) 2 mL 0    )  ( Diabetes Eval:    )    ( Pain Eval:  No Pain (0) )    ( Wound Eval:       )    (   History   Smoking Status     Former Smoker     Packs/day: 1.00     Years: 1.00     Types: Cigarettes     Start date: 5/5/1999     Quit date: 5/5/2000   Smokeless Tobacco     Former User    )    ( Signed By:  Quita Hatch, EMT; July 7, 2022; 1:00 PM )
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
## 2022-07-07 NOTE — PATIENT INSTRUCTIONS
"Try to track what you eat.  -- can write it down  -- can try an ray    You can weigh yourself more often to see if it helps.     Sleep  When the body does not get enough sleep, it increases levels of cortisol and ghrelin.   Both of these hormones make it hard to lose weight, and even make us gain weight.   Removing screens from the bedroom is important for getting enough quality and quantity of sleep  It is important to not watch screens in bed or in the bedroom because the body makes strong space-sleep associations  We want the body to only associate sleeping with the bed, so that when the body gets into bed, it knows \"This is the space where I sleep. I know what do do here, I will just fall asleep.\"   But if the body is used to watching screens in bed, it will have a hard time turning off and falling asleep and staying asleep   You can get a white noise machine if you prefer to have some background noise on as you are falling asleep.   It is OK to read in bed with a low-intensity, soft light (not your phone light).     SAXENDA (liralutide)    We are considering starting a GLP-1 (Glucagon-like Peptide-1) medication called Saxenda. One of the ways it works is by slowing down the rate that food leaves your stomach. You feel polanco and will eat less. It also helps regulate hormones that can help improve your blood sugars.    If you are a patient that checks blood sugars, continue to check as instructed by your doctor. Low blood sugars are rare but can happen if patients are on insulin or other oral agents. If you notice consistent low sugars or high sugars, your medication may need to be adjusted after your appointment. If this is the case, please call RN and provide her your blood sugar record from the last 3-4 days. The RN will get in touch with the doctor and call you back/[employer] message with recommendations. We tolerate high sugars for a bit, so if sugars are running 180-200, this is ok. As weight starts dropping " the blood sugars should too. If readings are consistently over 200 for 1-2 weeks, then you should call the doctor/nurse.    Dosing for this medication:   Week 1- Inject 0.6 mg daily  Week 2- Inject 1.2 mg daily  Week 3- Inject 1.8 mg daily  Week 4- Inject 2.4 mg daily  Week 5 and thereafter- Inject 3.0 mg daily    Side effects of GLP- Medications include: The most common side effects are all GI related and consist of: nausea, constipation, diarrhea, burping, or gassiness. Patients are advised to eat slowly and less, and nausea typically passes if people can stick it out.     The risk of pancreatitis (inflammation of the pancreas) has been associated with this type of medication, but is very rare.  If you have had pancreatitis in the past, this medication may not be for you. Please let us know about any past history of pancreas problems.    Symptoms of pancreatitis include: Pain in your upper stomach area which may travel to your back and be worse after eating. Your stomach area may be tender to the touch.  You may have vomiting or nausea and/or have a fever. If you should develop any of these symptoms, stop the medication and contact your primary care doctor. They will do a blood test to check for pancreatitis.         There is a small chance you may have some low blood sugar after taking the medication.   The signs of low blood sugar are:  Weakness  Shaky   Hungry  Sweating  Confusion      See below for ways to treat low blood sugar without adding in lots of extra calories.      Treating Low Blood Sugar    If you have symptoms of low blood sugar (sweating, shaking, dizzy, confused) eat 15 grams of carbs and wait 15 minutes:    Glucose Tabs are best for sugars under 70 -  Dex4 or BD Glucose tablets are good, you will need to take 3-4 of these to equal 15 grams.     One small box of raisins  4 oz fruit juice box or   cup fruit juice  1 small apple  1 small banana    cup canned fruit in water    English muffin or a  slice of bread with jelly   1 low fat frozen waffle with sugar-free syrup    cup cottage cheese with   cup frozen or fresh blueberries  1 cup skim or low-fat milk    cup whole grain cereal  4-6 crackers such as Triscuits      This medication is usually not covered by insurance and can be quite expensive. Sometimes a prior authorization is required, which may take up to 1-2 weeks for an insurance company to make a decision if they will cover the medication. Please be patient, you will be notified after a decision has been made.    For any questions or concerns please send a [employer] message to our team or call our weight management call center at [phone] during regular business hours. For questions during evenings or weekends your messages will be addressed during the next business day.  For emergencies please call 911 or seek immediate medical care.      (Do not stop taking it if you don't think it's working. For some people it works without them knowing it.)     In order to get refills of this or any medication we prescribe you must be seen in the medical weight mgmt clinic every 2-4 months. Please have your pharmacy fax a refill request to [phone].
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
## 2022-07-08 ENCOUNTER — TELEPHONE (OUTPATIENT)
Dept: PEDIATRICS | Facility: CLINIC | Age: 42
End: 2022-07-08

## 2022-07-08 NOTE — TELEPHONE ENCOUNTER
Reason for Call:  Medication or medication refill:Medication     Do you use a [facility]?  Name of the pharmacy and phone number for the current request: [facility], [facility]. - [place], [place] - [address] (Pharmacy)    Name of the medication requested: liraglutide (VICTOZA) 18 MG/3ML solution    Other request: Carole from the pharmacy calling stating that they cannot fill the Victoza for obesity. Please give the pharmacy a call back and discuss.    Can we leave a detailed message on this number? Not Applicable    Phone number patient can be reached at: Other phone number:  [phone]    Best Time: Anytime     Call taken on 7/8/2022 at 4:19 PM by Nohemi Fields

## 2022-07-11 ENCOUNTER — TELEPHONE (OUTPATIENT)
Dept: ENDOCRINOLOGY | Facility: CLINIC | Age: 42
End: 2022-07-11

## 2022-07-11 NOTE — TELEPHONE ENCOUNTER
Called and spoke with pharmacist in regards to Victoza. Will not be covered without diabetes diagnosis. PA started in separate encounter. Per Dr. Diaz, will submit Saxenda is Victoza comes back denied. No further questions.

## 2022-07-11 NOTE — TELEPHONE ENCOUNTER
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  He is a 42 year old male who I am continuing to see for treatment of obesity related to: Pre-Diabetes, Sleep Apnea, GERD (Reflux).     CURRENT WEIGHT:   240 lbs 9.6 oz    Metformin: taking 1,000mg in AM and 500mg in PM. Discussed moving the PM dose to earlier.   Topiramate: takes 50mg BID. Discussed moving the PM dose to earlier.     Insurance Name:    Insurance ID:        Pharmacy Information (if different than what is on RX)  Name:  [facility], [facility] - [place], [place] - [address]  Phone:  [phone]

## 2022-07-13 NOTE — TELEPHONE ENCOUNTER
Received fax from [facility] pharmacy stating wegovy needed pa. Relayed that wegovy was denied and victoza was prescribed in place of. 7/12/2022 824 pm cover plus 2 pages

## 2022-07-20 PROBLEM — E88.09 HYPOALBUMINEMIA: Status: ACTIVE | Noted: 2022-07-20

## 2022-08-04 ENCOUNTER — OFFICE VISIT (OUTPATIENT)
Dept: FAMILY MEDICINE | Facility: CLINIC | Age: 42
End: 2022-08-04
Payer: MEDICARE

## 2022-08-04 VITALS
RESPIRATION RATE: 20 BRPM | OXYGEN SATURATION: 97 % | HEART RATE: 56 BPM | WEIGHT: 246 LBS | SYSTOLIC BLOOD PRESSURE: 112 MMHG | HEIGHT: 63 IN | TEMPERATURE: 97.1 F | DIASTOLIC BLOOD PRESSURE: 60 MMHG | BODY MASS INDEX: 43.59 KG/M2

## 2022-08-04 DIAGNOSIS — H10.32 ACUTE CONJUNCTIVITIS OF LEFT EYE, UNSPECIFIED ACUTE CONJUNCTIVITIS TYPE: Primary | ICD-10-CM

## 2022-08-04 PROCEDURE — 99213 OFFICE O/P EST LOW 20 MIN: CPT | Performed by: NURSE PRACTITIONER

## 2022-08-04 RX ORDER — POLYMYXIN B SULFATE AND TRIMETHOPRIM 1; 10000 MG/ML; [USP'U]/ML
1-2 SOLUTION OPHTHALMIC EVERY 4 HOURS
Qty: 10 ML | Refills: 0 | Status: SHIPPED | OUTPATIENT
Start: 2022-08-04 | End: 2022-08-05

## 2022-08-04 ASSESSMENT — PAIN SCALES - GENERAL: PAINLEVEL: NO PAIN (0)

## 2022-08-04 NOTE — PROGRESS NOTES
"  Assessment & Plan     Acute conjunctivitis of left eye, unspecified acute conjunctivitis type  Start Polytrim, warm compresses, follow up if not improving.  - trimethoprim-polymyxin b (POLYTRIM) 14859-8.1 UNIT/ML-% ophthalmic solution; Place 1-2 drops Into the left eye every 4 hours for 7 days  [id number]}     BMI:   Estimated body mass index is 43.58 kg/m  as calculated from the following:    Height as of this encounter: 1.6 m (5' 3\").    Weight as of this encounter: 111.6 kg (246 lb).       Patient Instructions   Start eye drops every 4 hours while awake.      Return in about 1 week (around 8/11/2022) for worsening or continued symptoms.    GM Zelaya CNP  [facility]    Cleve Paul is a 42 year old accompanied by his PCA, presenting for the following health issues:  Eye Problem (Left)      HPI     Eye(s) Problem  Onset/Duration: This AM  Description:   Location: YES- LEft EYE   Pain: YES  Redness: YES  Accompanying Signs & Symptoms:  Discharge/mattering: YES  Swelling: YES  Visual changes: YES- blurry   Fever: No  Nasal Congestion: No  Bothered by bright lights: YES  History:  Trauma: No  Foreign body exposure: No  Wearing contacts: No  Precipitating or alleviating factors: None  Therapies tried and outcome: warm pack , no relief    Above HPI reviewed. Additionally, discharge when he woke up this morning, matted shut. Itchy, no pain but irritation. No vision changes. No contact lens use.      Review of Systems   Constitutional, HEENT, cardiovascular, pulmonary, gi and gu systems are negative, except as otherwise noted.      Objective    /60   Pulse 56   Temp 97.1  F (36.2  C) (Tympanic)   Resp 20   Ht 1.6 m (5' 3\")   Wt 111.6 kg (246 lb)   SpO2 97%   BMI 43.58 kg/m    Body mass index is 43.58 kg/m .  Physical Exam  Vitals and nursing note reviewed.   Constitutional:       Appearance: Normal appearance.   HENT:      Head: Normocephalic and atraumatic.      " Mouth/Throat:      Mouth: Mucous membranes are moist.   Eyes:      General:         Left eye: Discharge present.     Conjunctiva/sclera:      Left eye: Left conjunctiva is injected.   Cardiovascular:      Rate and Rhythm: Normal rate.   Pulmonary:      Effort: Pulmonary effort is normal.   Musculoskeletal:      Cervical back: Neck supple.   Skin:     General: Skin is warm and dry.   Neurological:      General: No focal deficit present.      Mental Status: He is alert.   Psychiatric:         Mood and Affect: Mood normal.         Behavior: Behavior normal.                .  ..

## 2022-08-04 NOTE — LETTER
August 4, 2022      Humberto Estrella  [address] [address] [facility] [address]        To Whom It May Concern:    Humberto Estrella  was seen on 8/4/2022.  Please excuse him  until 8/5/2022 due to illness.        Sincerely,        GM Zealya CNP

## 2022-08-05 ENCOUNTER — TELEPHONE (OUTPATIENT)
Dept: FAMILY MEDICINE | Facility: CLINIC | Age: 42
End: 2022-08-05

## 2022-08-05 DIAGNOSIS — H10.32 ACUTE CONJUNCTIVITIS OF LEFT EYE, UNSPECIFIED ACUTE CONJUNCTIVITIS TYPE: ICD-10-CM

## 2022-08-05 RX ORDER — POLYMYXIN B SULFATE AND TRIMETHOPRIM 1; 10000 MG/ML; [USP'U]/ML
1-2 SOLUTION OPHTHALMIC EVERY 4 HOURS
Qty: 10 ML | Refills: 0 | Status: SHIPPED | OUTPATIENT
Start: 2022-08-05 | End: 2023-09-08

## 2022-08-05 NOTE — TELEPHONE ENCOUNTER
[facility] human , was notified of updated order. He requests that a copy of the order be faxed ASAP to [phone], so they can begin the treatment. Awilda HERRERA notified.    Katie Carcamo RN  [facility]

## 2022-08-05 NOTE — TELEPHONE ENCOUNTER
New Medication Request    Contacts       Type Contact Phone/Fax    08/05/2022 01:41 PM CDT Phone (Incoming) [facility] [place] (Emergency Contact) [phone]          What medication are you requesting?: Antibiotic eye drops for right eye    Reason for medication request: Group Home asking for prescription for antibiotic eye drops for right eye, patient pinkeye spread to right eye, so now they need rx for right one. Also need rx faxed to [facility] at [phone]      Have you taken this medication before?: Yes: On it for left eye    Controlled Substance Agreement on file:   CSA -- Patient Level:    CSA: None found at the patient level.         Patient offered an appointment? No    Preferred Pharmacy:   [facility] - [place], [place] - [address]  [address]  [facility] [address]  Phone: [phone] Fax: [phone]      Okay to leave a detailed message?: Yes [phone]

## 2022-09-13 ENCOUNTER — OFFICE VISIT (OUTPATIENT)
Dept: FAMILY MEDICINE | Facility: CLINIC | Age: 42
End: 2022-09-13
Payer: MEDICARE

## 2022-09-13 VITALS
OXYGEN SATURATION: 97 % | SYSTOLIC BLOOD PRESSURE: 120 MMHG | HEART RATE: 69 BPM | WEIGHT: 234.5 LBS | RESPIRATION RATE: 20 BRPM | HEIGHT: 63 IN | BODY MASS INDEX: 41.55 KG/M2 | TEMPERATURE: 97.7 F | DIASTOLIC BLOOD PRESSURE: 82 MMHG

## 2022-09-13 DIAGNOSIS — E03.8 SUBCLINICAL HYPOTHYROIDISM: ICD-10-CM

## 2022-09-13 DIAGNOSIS — R06.02 SOB (SHORTNESS OF BREATH): ICD-10-CM

## 2022-09-13 DIAGNOSIS — H10.13 ALLERGIC CONJUNCTIVITIS, BILATERAL: ICD-10-CM

## 2022-09-13 DIAGNOSIS — E66.01 MORBID OBESITY (H): ICD-10-CM

## 2022-09-13 DIAGNOSIS — G47.33 OSA (OBSTRUCTIVE SLEEP APNEA): ICD-10-CM

## 2022-09-13 DIAGNOSIS — J31.0 NONALLERGIC RHINITIS: ICD-10-CM

## 2022-09-13 DIAGNOSIS — R73.03 PREDIABETES: ICD-10-CM

## 2022-09-13 DIAGNOSIS — R07.89 CHEST TIGHTNESS: ICD-10-CM

## 2022-09-13 DIAGNOSIS — E66.09 OBESITY DUE TO EXCESS CALORIES, UNSPECIFIED OBESITY SEVERITY: Chronic | ICD-10-CM

## 2022-09-13 DIAGNOSIS — Z00.00 ENCOUNTER FOR MEDICARE ANNUAL WELLNESS EXAM: Primary | ICD-10-CM

## 2022-09-13 DIAGNOSIS — Q90.9 DOWN'S SYNDROME: Chronic | ICD-10-CM

## 2022-09-13 LAB
HBA1C MFR BLD: 4.9 % (ref 0–5.6)
T4 FREE SERPL-MCNC: 1.27 NG/DL (ref 0.9–1.7)
TSH SERPL DL<=0.005 MIU/L-ACNC: 5.1 UIU/ML (ref 0.3–4.2)

## 2022-09-13 PROCEDURE — 36415 COLL VENOUS BLD VENIPUNCTURE: CPT | Performed by: INTERNAL MEDICINE

## 2022-09-13 PROCEDURE — 84439 ASSAY OF FREE THYROXINE: CPT | Performed by: INTERNAL MEDICINE

## 2022-09-13 PROCEDURE — 83036 HEMOGLOBIN GLYCOSYLATED A1C: CPT | Performed by: INTERNAL MEDICINE

## 2022-09-13 PROCEDURE — 99214 OFFICE O/P EST MOD 30 MIN: CPT | Mod: 25 | Performed by: INTERNAL MEDICINE

## 2022-09-13 PROCEDURE — G0439 PPPS, SUBSEQ VISIT: HCPCS | Performed by: INTERNAL MEDICINE

## 2022-09-13 PROCEDURE — 84443 ASSAY THYROID STIM HORMONE: CPT | Performed by: INTERNAL MEDICINE

## 2022-09-13 RX ORDER — MONTELUKAST SODIUM 10 MG/1
10 TABLET ORAL AT BEDTIME
Qty: 90 TABLET | Refills: 3 | Status: SHIPPED | OUTPATIENT
Start: 2022-09-13 | End: 2023-09-08

## 2022-09-13 RX ORDER — ALBUTEROL SULFATE 90 UG/1
2 AEROSOL, METERED RESPIRATORY (INHALATION) EVERY 6 HOURS PRN
Qty: 18 G | Refills: 11 | Status: CANCELLED | OUTPATIENT
Start: 2022-09-13

## 2022-09-13 RX ORDER — LEVOTHYROXINE SODIUM 100 UG/1
100 TABLET ORAL DAILY
Qty: 90 TABLET | Refills: 3 | Status: SHIPPED | OUTPATIENT
Start: 2022-09-13 | End: 2023-09-08

## 2022-09-13 ASSESSMENT — ANXIETY QUESTIONNAIRES
GAD7 TOTAL SCORE: 11
2. NOT BEING ABLE TO STOP OR CONTROL WORRYING: SEVERAL DAYS
GAD7 TOTAL SCORE: 11
5. BEING SO RESTLESS THAT IT IS HARD TO SIT STILL: NOT AT ALL
3. WORRYING TOO MUCH ABOUT DIFFERENT THINGS: SEVERAL DAYS
7. FEELING AFRAID AS IF SOMETHING AWFUL MIGHT HAPPEN: MORE THAN HALF THE DAYS
1. FEELING NERVOUS, ANXIOUS, OR ON EDGE: MORE THAN HALF THE DAYS
6. BECOMING EASILY ANNOYED OR IRRITABLE: NEARLY EVERY DAY

## 2022-09-13 ASSESSMENT — PATIENT HEALTH QUESTIONNAIRE - PHQ9
SUM OF ALL RESPONSES TO PHQ QUESTIONS 1-9: 2
5. POOR APPETITE OR OVEREATING: MORE THAN HALF THE DAYS

## 2022-09-13 ASSESSMENT — PAIN SCALES - GENERAL: PAINLEVEL: MODERATE PAIN (4)
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
## 2022-09-13 NOTE — PROGRESS NOTES
SUBJECTIVE:   Humberto is a 42 year old who presents for Preventive Visit.      Patient has been advised of split billing requirements and indicates understanding: Yes  Are you in the first 12 months of your Medicare coverage?  No    HPI     Chief Complaint   Patient presents with     Physical     Cant focus and will shake this is all the time, feels like have anxiety,      Health Maintenance     Thyroid Disease       Hypothyroidism Follow-up      Since last visit, patient describes the following symptoms: Weight stable, no hair loss, no skin changes, no constipation, no loose stools    TSH 11.52 in 2017, normal T4    Shaking/Anxiety:  --he thinks he had asthma attack when not using CPAP - years ago; today he reports feeling like he has been having ongoing asthma attacks (no diagnosis of asthma)   - feels chest tightness, squeezing, associated shortness of breath, associated 'shaking'; symptoms are not daily, intermittent   --he does think symptoms are with exertion;  episodes last hours; never uses albuterol  --he says he gets shaky because he gets nervous about his symptoms   --no cough  --has albuterol inhaler for allergies - 9/30/15 IgE tests all NEGATIVE for environmental allergens.     PFT 2016   The FVC, FEV1 are reduced, but the  FEV1/FVC ratio is within normal limits.  The inspiratory flow rates are reduced.  Lung volumes are reduced.  Following administration of bronchodilators, there is no significant response.   IMPRESSION:   Mild restriction   Unable to complete DLCO   Possible extrathoracic obstruction    CPAP  --he thinks he needs 'stay awake pills' because he is napping too much  --wears CPAP nightly    Incontinence:  --he thinks one of his medications is causing 'accidents' in bowel and bladder; starting a few months ago; it was 1 episode at brother[place]  --staff reports no issues with bowel or bladder; this is news to them  --patient reports he is alerting these accidents to staff; staff deny  this    Weight:  --wonders what he can have for healthy snacks or drink      Today's PHQ-2 Score:   PHQ-2 (  [employer]) 2021   Q1: Little interest or pleasure in doing things 0   Q2: Feeling down, depressed or hopeless 0   PHQ-2 Score 0   PHQ-2 Total Score (12-17 Years)- Positive if 3 or more points; Administer PHQ-A if positive 0   Q1: Little interest or pleasure in doing things -   Q2: Feeling down, depressed or hopeless -   PHQ-2 Score -       Abuse: Current or Past(Physical, Sexual or Emotional)- No  Do you feel safe in your environment? Yes        Social History     Tobacco Use     Smoking status: Former Smoker     Packs/day: 1.00     Years: 1.00     Pack years: 1.00     Types: Cigarettes     Start date: 1999     Quit date: 2000     Years since quittin.3     Smokeless tobacco: Former User   Substance Use Topics     Alcohol use: No     Alcohol/week: 0.0 standard drinks     If you drink alcohol do you typically have >3 drinks per day or >7 drinks per week? No    No flowsheet data found.    Last PSA:   PSA   Date Value Ref Range Status   2018 0.07 0 - 4 ug/L Final     Comment:     Assay Method:  Chemiluminescence using Siemens Vista analyzer       Reviewed orders with patient. Reviewed health maintenance and updated orders accordingly - Yes  Current Outpatient Medications   Medication Sig Dispense Refill     albuterol (PROAIR HFA/PROVENTIL HFA/VENTOLIN HFA) 108 (90 Base) MCG/ACT inhaler Inhale 2 puffs into the lungs every 6 hours as needed for shortness of breath / dyspnea or wheezing 3 Inhaler 1     alum & mag hydroxide-simethicone (MYLANTA/MAALOX) 200-200-20 MG/5ML SUSP suspension Take 30 mLs by mouth every 4 hours as needed for indigestion (2 tsp for upset stomach) 1 Bottle 3     calcium carbonate (TUMS) 500 MG chewable tablet Take 1 tablet (500 mg) by mouth every 6 hours as needed for heartburn 150 tablet 3     calcium polycarbophil (FIBERCON) 625 MG tablet Take 2 tablets (1,250 mg) by  mouth 2 times daily 360 tablet 3     cetirizine (ZYRTEC) 10 MG tablet Take 1 tablet (10 mg) by mouth At Bedtime Stop loratadine 90 tablet 3     DEEP SEA NASAL SPRAY 0.65 % nasal spray USE 2 SPRAYS IN EACH NOSTRIL FOUR TIMES DAILY AS NEEDED FOR CONGESTION 44 mL 11     divalproex sodium delayed-release (DEPAKOTE SPRINKLE) 125 MG DR capsule Take 750 mg by mouth 2 times daily Open capsules and sprinkle into pudding/sauce       fluticasone (FLONASE) 50 MCG/ACT nasal spray Spray 1 spray into both nostrils daily as needed for rhinitis or allergies 16 g 1     ibuprofen (ADVIL,MOTRIN) 600 MG tablet Take 1 tablet (600 mg) by mouth every 6 hours as needed for moderate pain 60 tablet 0     insulin pen needle (BD CANDI U/F) 32G X 4 MM miscellaneous Use 1 pen needle daily with liraglutide. 120 each 4     levothyroxine (SYNTHROID/LEVOTHROID) 100 MCG tablet Take 1 tablet (100 mcg) by mouth daily 90 tablet 3     liraglutide (VICTOZA) 18 MG/3ML solution Take 0.6mg per day for 2 weeks; Take 1.2mg per day for 2 weeks; then take 1.8mg per day. 9 mL 4     magnesium hydroxide (MILK OF MAGNESIA) 400 MG/5ML suspension Take 30-60 mLs by mouth daily as needed for constipation or heartburn (If No Bowel Movement in 2 days.) Reported on 4/12/2017 360 mL 1     metFORMIN (GLUCOPHAGE) 500 MG tablet Take 2 tablets (1,000 mg) by mouth daily (with breakfast) AND 1 tablet (500 mg) daily (with dinner). The PM dose can be taken before dinner (as early as 4pm). 180 tablet 3     montelukast (SINGULAIR) 10 MG tablet Take 1 tablet (10 mg) by mouth At Bedtime 90 tablet 3     OLANZapine (ZYPREXA) 5 MG tablet Take 2.5 mg by mouth 3 times daily as needed  30 tablet 1     order for DME Equipment being ordered: CPAP  AIRSENSE 10  11 CM H20  AIRFIT F20 MEDIUM  SN# [id number]  DN# [id number]       PAIN & FEVER 325 MG tablet TAKE 1-2 TABLETS (325-650MG) BY MOUTH EVERY 4 HOURS AS NEEDED *ORDER WHEN LOW* 100 tablet 7     phenol (CHLORASEPTIC) 1.4 % spray Take 1 spray by mouth  every hour as needed for sore throat Use as directed for sore throt       polyethylene glycol-propylene glycol PF (SYSTANE ULTRA PF) 0.4-0.3 % SOLN opthalmic solution Place 1-2 drops into both eyes Up to 5 times daily as needed       risperiDONE (RISPERDAL) 0.5 MG tablet Take 0.5 mg by mouth 2 times daily At noon       risperiDONE (RISPERDAL) 1 MG tablet Take 1 mg by mouth every morning  60 tablet      risperiDONE (RISPERDAL) 2 MG tablet Take 2 mg by mouth daily at 8:00pm       sertraline (ZOLOFT) 100 MG tablet Take 200 mg by mouth daily        SUDOGEST 12 HOUR 120 MG 12 hr tablet TAKE 1 TABLET BY MOUTH ONCE DAILY AS NEEDED *6 TOTAL FILLS* 12 tablet 11     topiramate (TOPAMAX) 25 MG tablet Take 3 tablets (75 mg) by mouth 2 times daily 180 tablet 3     triamcinolone (KENALOG) 0.1 % cream Apply  topically 3 times daily. Apply sparingly to affected area. 30 g 1     trimethoprim-polymyxin b (POLYTRIM) 73710-7.1 UNIT/ML-% ophthalmic solution Place 1-2 drops into both eyes every 4 hours 10 mL 0     VITAMIN D3 25 MCG (1000 UT) tablet TAKE 1 TABLET BY MOUTH ONCE DAILY 100 tablet 2       Reviewed and updated as needed this visit by clinical staff   Tobacco  Allergies  Meds  Problems  Med Hx  Surg Hx  Fam Hx  Soc   Hx          Reviewed and updated as needed this visit by Provider    Allergies  Meds  Problems                  Review of Systems   Negative except as noted in HPI  CONSTITUTIONAL: NEGATIVE for fever, chills, change in weight  INTEGUMENTARY/SKIN: NEGATIVE for worrisome rashes, moles or lesions  EYES: NEGATIVE for vision changes or irritation  ENT: NEGATIVE for ear, mouth and throat problems  RESP: NEGATIVE for significant cough or SOB  CV: NEGATIVE for chest pain, palpitations or peripheral edema  GI: NEGATIVE for nausea, abdominal pain, heartburn, or change in bowel habits   male: negative for dysuria, hematuria, decreased urinary stream, erectile dysfunction, urethral discharge  MUSCULOSKELETAL:  "NEGATIVE for significant arthralgias or myalgia  NEURO: NEGATIVE for weakness, dizziness or paresthesias  PSYCHIATRIC: NEGATIVE for changes in mood or affect    OBJECTIVE:   /82 (BP Location: Right arm, Patient Position: Sitting, Cuff Size: Adult Regular)   Pulse 69   Temp 97.7  F (36.5  C) (Tympanic)   Resp 20   Ht 1.6 m (5' 3\")   Wt 106.4 kg (234 lb 8 oz)   SpO2 97%   BMI 41.54 kg/m      Physical Exam  GENERAL: alert, no distress and obese  EYES: horizontal nystagmus  HENT: normal cephalic/atraumatic and ear canals and TM's normal  NECK: no adenopathy, no asymmetry, masses, or scars and thyroid normal to palpation  RESP: lungs clear to auscultation - no rales, rhonchi or wheezes  CV: regular rate and rhythm, normal S1 S2, no S3 or S4, no murmur, click or rub, no peripheral edema and peripheral pulses strong  ABDOMEN: soft, nontender, without hepatosplenomegaly or masses, bowel sounds normal and obese  MS: no gross musculoskeletal defects noted, no edema  SKIN: no suspicious lesions or rashes  NEURO: Normal strength and tone and cognitive impairment consistent with diagnosis  PSYCH: affect normal/bright and insight impaired consistent with diagnosis        ASSESSMENT/PLAN:   (Z00.00) Encounter for Medicare annual wellness exam  (primary encounter diagnosis)  Comment:   Plan:     (Q90.9) TRISOMY 21 (DOWN SYNDROME)  Comment: lives in group home  Plan: OFFICE/OUTPT VISIT,EST,LEVL IV            (E66.09) Obesity due to excess calories, unspecified obesity severity  Comment: working with medical wt clinic.;  1 episode of diarrhea with incontinence maybe due to metformin.  Since it only happened once, will not change the medication since it is helping with weight loss.  Patient in agreement  Plan: OFFICE/OUTPT VISIT,EST,LEVL IV            (E66.01) Morbid obesity (H)  Comment: Associated with sleep apnea and prediabetes  Plan: OFFICE/OUTPT VISIT,EST,LEVL IV            (G47.33) VIDAL (obstructive sleep apnea)-AHI " 26  Comment: Wears a CPAP regularly.  Obesity, sedentary lifestyle and sleep apnea contribute to the fatigue.  Discussed that there is no energy pill to help with the daytime fatigue  Plan: OFFICE/OUTPT VISIT,EST,LEVL IV            (E03.8) Subclinical hypothyroidism  Comment: Check blood work today  Plan: OFFICE/OUTPT VISIT,EST,LEVL IV, levothyroxine         (SYNTHROID/LEVOTHROID) 100 MCG tablet, TSH with        free T4 reflex            (R73.03) Prediabetes  Comment: Check A1c today  Plan: OFFICE/OUTPT VISIT,EST,LEVL IV            (R06.02) SOB (shortness of breath)  Comment: With associated chest tightness.  Could be due to mental health, asthma, coronary disease, other.  He had PFTs in 2016 that looks more like obesity related restrictive lung disease.  He has not had methacholine challenge test which is the gold standard for diagnosing asthma.  Get methacholine challenge test.  If normal, get Joselyn scan to rule out cardiac disease.  If that is normal, then is likely related to mental health and advise he follow-up with his mental health provider.  Plan: OFFICE/OUTPT VISIT,EST,LEVL IV, General PFT Lab        (Please always keep checked), Pulmonary         Function Test, NM Lexiscan stress test            (J31.0) Nonallergic rhinitis  Comment: He has not used albuterol inhaler in years.  Unless the methacholine test is positive, will remove the albuterol from his med list  Plan: OFFICE/OUTPT VISIT,EST,LEVL IV, montelukast         (SINGULAIR) 10 MG tablet            (H10.13) Allergic conjunctivitis, bilateral  Comment:  - stable, refill provided  Plan: OFFICE/OUTPT VISIT,EST,LEVL IV, montelukast         (SINGULAIR) 10 MG tablet            (R07.89) Chest tightness  Comment: See above  Plan: General PFT Lab (Please always keep checked),         Pulmonary Function Test, NM Lexiscan stress         test              Patient has been advised of split billing requirements and indicates understanding: Yes    COUNSELING:  "  Reviewed preventive health counseling, as reflected in patient instructions    Estimated body mass index is 41.54 kg/m  as calculated from the following:    Height as of this encounter: 1.6 m (5' 3\").    Weight as of this encounter: 106.4 kg (234 lb 8 oz).     Weight management plan: He follows with medical weight loss clinic    He reports that he quit smoking about 22 years ago. His smoking use included cigarettes. He started smoking about 23 years ago. He has a 1.00 pack-year smoking history. He has quit using smokeless tobacco.      Counseling Resources:  ATP IV Guidelines  Pooled Cohorts Equation Calculator  FRAX Risk Assessment  ICSI Preventive Guidelines  Dietary Guidelines for Americans, 2010  [employer] MyPlate  ASA Prophylaxis  Lung CA Screening    Carla Brown, DO  [facility]    Do you feel safe in your environment? Yes    Have you ever done Advance Care Planning? (For example, a Health Directive, POLST, or a discussion with a medical provider or your loved ones about your wishes): Yes, patient states has an Advance Care Planning document and will bring a copy to the clinic.       Fall risk  Fall Risk Assessment not completed.    Cognitive Screening Not appropriate due to mental handicap    Do you have sleep apnea, excessive snoring or daytime drowsiness?: no    Reviewed and updated as needed this visit by clinical staff   Tobacco  Allergies  Meds  Problems  Med Hx  Surg Hx  Fam Hx  Soc   Hx          Reviewed and updated as needed this visit by Provider    Allergies  Meds  Problems              Social History     Tobacco Use     Smoking status: Former Smoker     Packs/day: 1.00     Years: 1.00     Pack years: 1.00     Types: Cigarettes     Start date: 1999     Quit date: 2000     Years since quittin.3     Smokeless tobacco: Former User   Substance Use Topics     Alcohol use: No     Alcohol/week: 0.0 standard drinks     If you drink alcohol do you typically " have >3 drinks per day or >7 drinks per week? No    No flowsheet data found.            Current providers sharing in care for this patient include:   Patient Care Team:  Carla Brown DO as PCP - General (Internal Medicine)  Bloch, Lauren Turner, RPH as Pharmacist (Pharmacist)  Carla Brown DO as Assigned PCP  Chasidy Juarez MD as Assigned Endocrinology Provider  Artemio Snyder DPM as Assigned Musculoskeletal Provider  David Jordan APRN CNP as Assigned Sleep Provider  Bloch, Lauren Turner, RPH as Assigned MTM Pharmacist    The following health maintenance items are reviewed in Epic and correct as of today:  Health Maintenance   Topic Date Due     COVID-19 Vaccine (4 - Booster for Moderna series) 06/24/2022     INFLUENZA VACCINE (1) 09/01/2022     LIPID  11/16/2022     MEDICARE ANNUAL WELLNESS VISIT  09/13/2023     ANNUAL REVIEW OF HM ORDERS  09/13/2023     ADVANCE CARE PLANNING  08/05/2026     DTAP/TDAP/TD IMMUNIZATION (4 - Td or Tdap) 07/15/2031     HEPATITIS C SCREENING  Completed     Pneumococcal Vaccine: Pediatrics (0 to 5 Years) and At-Risk Patients (6 to 64 Years)  Aged Out     IPV IMMUNIZATION  Aged Out     MENINGITIS IMMUNIZATION  Aged Out     HEPATITIS B IMMUNIZATION  Aged Out     HIV SCREENING  Discontinued               Review of Systems

## 2022-09-14 NOTE — RESULT ENCOUNTER NOTE
The TSH is very mildly elevated but the T4 is normal.  Do not recommend changing the medication at this time.  The hemoglobin A1c is in the normal range which is great

## 2022-09-15 ENCOUNTER — TELEPHONE (OUTPATIENT)
Dept: SLEEP MEDICINE | Facility: CLINIC | Age: 42
End: 2022-09-15

## 2022-09-26 ENCOUNTER — DOCUMENTATION ONLY (OUTPATIENT)
Dept: SLEEP MEDICINE | Facility: CLINIC | Age: 42
End: 2022-09-26

## 2022-09-26 NOTE — PROGRESS NOTES
PT S MACHINE WAS TESTED TO SEE IF WAS BLOWING ACCURATE PRESSURE OF [id number],  AND IT WAS.  I WENT OVER WAITLIST REPLACEMENT PROCESS.  HE RECD  ALL NEW SUPPLIES AND I WENT OVER SUPPLY REPLACEMENT SCHEDULE.

## 2022-10-03 ENCOUNTER — HOSPITAL ENCOUNTER (OUTPATIENT)
Dept: CARDIOLOGY | Facility: CLINIC | Age: 42
Discharge: HOME | End: 2022-10-03
Attending: INTERNAL MEDICINE
Payer: MEDICARE

## 2022-10-03 ENCOUNTER — HOSPITAL ENCOUNTER (OUTPATIENT)
Dept: NUCLEAR MEDICINE | Facility: CLINIC | Age: 42
Setting detail: NUCLEAR MEDICINE
Discharge: HOME | End: 2022-10-03
Attending: INTERNAL MEDICINE
Payer: MEDICARE

## 2022-10-03 VITALS — WEIGHT: 234 LBS | BODY MASS INDEX: 41.46 KG/M2 | HEIGHT: 63 IN

## 2022-10-03 DIAGNOSIS — R06.02 SOB (SHORTNESS OF BREATH): ICD-10-CM

## 2022-10-03 DIAGNOSIS — R07.89 CHEST TIGHTNESS: ICD-10-CM

## 2022-10-03 LAB
CV STRESS MAX HR HE: 112
RATE PRESSURE PRODUCT: NORMAL
STRESS ECHO BASELINE DIASTOLIC HE: 64
STRESS ECHO BASELINE HR: 91 BPM
STRESS ECHO BASELINE SYSTOLIC BP: 98
STRESS ECHO CALCULATED PERCENT HR: 63 %
STRESS ECHO LAST STRESS DIASTOLIC BP: 64
STRESS ECHO LAST STRESS SYSTOLIC BP: 110
STRESS ECHO TARGET HR: 178

## 2022-10-03 PROCEDURE — 93017 CV STRESS TEST TRACING ONLY: CPT

## 2022-10-03 PROCEDURE — A9502 TC99M TETROFOSMIN: HCPCS | Performed by: INTERNAL MEDICINE

## 2022-10-03 PROCEDURE — G1010 CDSM STANSON: HCPCS

## 2022-10-03 PROCEDURE — 250N000011 HC RX IP 250 OP 636: Performed by: INTERNAL MEDICINE

## 2022-10-03 PROCEDURE — 78452 HT MUSCLE IMAGE SPECT MULT: CPT | Mod: 26 | Performed by: INTERNAL MEDICINE

## 2022-10-03 PROCEDURE — 93018 CV STRESS TEST I&R ONLY: CPT | Performed by: INTERNAL MEDICINE

## 2022-10-03 PROCEDURE — 93016 CV STRESS TEST SUPVJ ONLY: CPT | Performed by: INTERNAL MEDICINE

## 2022-10-03 PROCEDURE — 343N000001 HC RX 343: Performed by: INTERNAL MEDICINE

## 2022-10-03 PROCEDURE — G1010 CDSM STANSON: HCPCS | Performed by: INTERNAL MEDICINE

## 2022-10-03 RX ORDER — REGADENOSON 0.08 MG/ML
0.4 INJECTION, SOLUTION INTRAVENOUS ONCE
Status: COMPLETED | OUTPATIENT
Start: 2022-10-03 | End: 2022-10-03

## 2022-10-03 RX ADMIN — TETROFOSMIN 34.5 MCI.: 1.38 INJECTION, POWDER, LYOPHILIZED, FOR SOLUTION INTRAVENOUS at 10:58

## 2022-10-03 RX ADMIN — REGADENOSON 0.4 MG: 0.08 INJECTION, SOLUTION INTRAVENOUS at 10:54

## 2022-10-03 RX ADMIN — TETROFOSMIN 11.3 MCI.: 1.38 INJECTION, POWDER, LYOPHILIZED, FOR SOLUTION INTRAVENOUS at 08:40

## 2022-10-03 NOTE — LETTER
October 4, 2022      Humberto Estrella  [address] [facility] [id number]        Dear ,    We are writing to inform you of your test results.    Fax information to [phone]     Resulted Orders   NM Lexiscan stress test   Result Value Ref Range    Target      Baseline Systolic BP 98     Baseline Diastolic BP 64     Last Stress Systolic      Last Stress Diastolic BP 64     Baseline HR 91 bpm    Max HR  112     Max Predicted HR  63 %    Rate Pressure Product 12,320.0     Narrative       The nuclear stress test is negative for inducible myocardial ischemia   or infarction.     Left ventricular function is normal, EF >65%.     There is no prior study for comparison.         If you have any questions or concerns, please call the clinic at the number listed above.       Sincerely,      Carla Brown, DO

## 2022-10-06 ENCOUNTER — VIRTUAL VISIT (OUTPATIENT)
Dept: ENDOCRINOLOGY | Facility: CLINIC | Age: 42
End: 2022-10-06
Payer: MEDICARE

## 2022-10-06 VITALS — BODY MASS INDEX: 41.45 KG/M2 | HEIGHT: 63 IN

## 2022-10-06 DIAGNOSIS — E66.09 OBESITY DUE TO EXCESS CALORIES, UNSPECIFIED OBESITY SEVERITY: Chronic | ICD-10-CM

## 2022-10-06 DIAGNOSIS — Q90.9 DOWN'S SYNDROME: ICD-10-CM

## 2022-10-06 DIAGNOSIS — R73.03 PREDIABETES: ICD-10-CM

## 2022-10-06 PROCEDURE — 99213 OFFICE O/P EST LOW 20 MIN: CPT | Mod: 95 | Performed by: INTERNAL MEDICINE
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
## 2022-10-11 ENCOUNTER — HOSPITAL ENCOUNTER (OUTPATIENT)
Dept: RESPIRATORY THERAPY | Facility: CLINIC | Age: 42
Discharge: HOME | End: 2022-10-11
Attending: INTERNAL MEDICINE | Admitting: INTERNAL MEDICINE
Payer: MEDICARE

## 2022-10-11 DIAGNOSIS — R07.89 CHEST TIGHTNESS: ICD-10-CM

## 2022-10-11 DIAGNOSIS — R06.02 SOB (SHORTNESS OF BREATH): ICD-10-CM

## 2022-10-11 LAB
ERV-%PRED-PRE: 97 %
ERV-PRE: 0.38 L
ERV-PRED: 0.39 L
EXPTIME-PRE: 5.72 SEC
FEF2575-%PRED-POST: 60 %
FEF2575-%PRED-PRE: 78 %
FEF2575-POST: 2.03 L/SEC
FEF2575-PRE: 2.65 L/SEC
FEF2575-PRED: 3.38 L/SEC
FEFMAX-%PRED-PRE: 51 %
FEFMAX-PRE: 4.5 L/SEC
FEFMAX-PRED: 8.67 L/SEC
FEV1-%PRED-PRE: 80 %
FEV1-PRE: 2.68 L
FEV1FEV6-PRE: 82 %
FEV1FEV6-PRED: 82 %
FEV1FVC-PRE: 82 %
FEV1FVC-PRED: 82 %
FEV1SVC-PRE: 83 %
FEV1SVC-PRED: 79 %
FIFMAX-PRE: 2.19 L/SEC
FVC-%PRED-PRE: 80 %
FVC-PRE: 3.27 L
FVC-PRED: 4.08 L
IC-%PRED-PRE: 74 %
IC-PRE: 2.85 L
IC-PRED: 3.84 L
VC-%PRED-PRE: 76 %
VC-PRE: 3.22 L
VC-PRED: 4.22 L

## 2022-10-11 PROCEDURE — 94060 EVALUATION OF WHEEZING: CPT

## 2022-10-11 PROCEDURE — 250N000009 HC RX 250: Performed by: INTERNAL MEDICINE

## 2022-10-11 RX ORDER — ALBUTEROL SULFATE 0.83 MG/ML
2.5 SOLUTION RESPIRATORY (INHALATION) ONCE
Status: COMPLETED | OUTPATIENT
Start: 2022-10-11 | End: 2022-10-11

## 2022-10-11 RX ADMIN — ALBUTEROL SULFATE 2.5 MG: 2.5 SOLUTION RESPIRATORY (INHALATION) at 08:30

## 2022-10-11 NOTE — RESULT ENCOUNTER NOTE
Brown care team, it looks like I ordered the methacholine challenge test, but he only had basic spirometry done.  Can we call the PFT lab to determine if I ordered it incorrectly or why the methacholine test was not done

## 2022-10-14 ENCOUNTER — LAB (OUTPATIENT)
Dept: LAB | Facility: CLINIC | Age: 42
End: 2022-10-14
Payer: MEDICARE

## 2022-10-14 DIAGNOSIS — Z79.899 NEED FOR PROPHYLACTIC CHEMOTHERAPY: ICD-10-CM

## 2022-10-14 DIAGNOSIS — Z79.899 NEED FOR PROPHYLACTIC CHEMOTHERAPY: Primary | ICD-10-CM

## 2022-10-14 LAB — VALPROATE SERPL-MCNC: 46.7 UG/ML

## 2022-10-14 PROCEDURE — 80164 ASSAY DIPROPYLACETIC ACD TOT: CPT

## 2022-10-14 PROCEDURE — 36415 COLL VENOUS BLD VENIPUNCTURE: CPT

## 2022-10-18 ENCOUNTER — TELEPHONE (OUTPATIENT)
Dept: ENDOCRINOLOGY | Facility: CLINIC | Age: 42
End: 2022-10-18

## 2022-10-18 NOTE — TELEPHONE ENCOUNTER
Patients caregiver needs order faxed to discontinue patients Metformin. Please fax to [phone]    Jacquie [phone]

## 2022-10-18 NOTE — LETTER
October 19, 2022      TO: Humberto Estrella  [address] [facility] [address]         To whom it may concern,    Humberto Estrella was seen by Dr. Diaz in the comprehensive weight management clinic on 10/6/2022. The following plan was discussed:    Oct 2022:   Eating: is eating less, and is less motivated to eat all the time.   Metformin: this is the med that might cause some stomach upset it seems.   Topiramate: has been taking the 75mg BID  GLP-1 receptor agonist: is taking this, and has lost 12 pounds in 8 weeks since getting a GLP-1 RA regularly.   Plan: Discussed continuing to avoid food if not specifically hungry. Continuing to be active when able. Continue all meds with no changes, except will decrease metformin from 3 tablets daily to 2 tablets daily.           Sincerely,      Wanda Diaz MD

## 2022-10-19 ENCOUNTER — TELEPHONE (OUTPATIENT)
Dept: PEDIATRICS | Facility: CLINIC | Age: 42
End: 2022-10-19

## 2022-10-19 DIAGNOSIS — R73.03 PREDIABETES: ICD-10-CM

## 2022-10-19 DIAGNOSIS — E66.09 OBESITY DUE TO EXCESS CALORIES, UNSPECIFIED OBESITY SEVERITY: Chronic | ICD-10-CM

## 2022-10-19 DIAGNOSIS — Q90.9 DOWN'S SYNDROME: ICD-10-CM

## 2022-10-19 RX ORDER — LIRAGLUTIDE 6 MG/ML
INJECTION SUBCUTANEOUS
Qty: 9 ML | Refills: 4 | Status: SHIPPED | OUTPATIENT
Start: 2022-10-19 | End: 2022-10-20

## 2022-10-19 RX ORDER — TOPIRAMATE 25 MG/1
75 TABLET, FILM COATED ORAL 2 TIMES DAILY
Qty: 180 TABLET | Refills: 3 | Status: SHIPPED | OUTPATIENT
Start: 2022-10-19 | End: 2023-03-02

## 2022-10-19 NOTE — TELEPHONE ENCOUNTER
Faxed letter and office notes to patient's group home, fax number: [phone].    Quita Hatch, EMT

## 2022-10-19 NOTE — PATIENT INSTRUCTIONS
It is OK to not eat if not hungry.  Only avoid very long periods of time without eating, to avoid over hunger.   Topiramate: OK to move evening topiramate to earlier in the evening.  Metformin: reduce morning dose to 1 tablet, then 1 tablet in the evening  -- Evening metformin can be earlier in the evening.  Liralgutide: Continue liraglutide no changes

## 2022-10-19 NOTE — TELEPHONE ENCOUNTER
[facility] calling regarding patients liraglutide (VICTOZA) 18 MG/3ML solution prescription. Needing clarification. Is the patient need to titrate up to continue dosing?      [phone] ask for pharmacist

## 2022-10-19 NOTE — TELEPHONE ENCOUNTER
Dr. Diaz finished clinic note. The following plan was made:    Oct 2022:   Eating: is eating less, and is less motivated to eat all the time.   Metformin: this is the med that might cause some stomach upset it seems.   Topiramate: has been taking the 75mg BID  GLP-1 receptor agonist: is taking this, and has lost 12 pounds in 8 weeks since getting a GLP-1 RA regularly.   Plan: Discussed continuing to avoid food if not specifically hungry. Continuing to be active when able. Continue all meds with no changes, except will decrease metformin from 3 tablets daily to 2 tablets daily.     Put note in patient's chart with new medication instructions for Metformin. Routed to clinic staff to fax both appt notes and patient letter to group home.

## 2022-10-19 NOTE — TELEPHONE ENCOUNTER
[facility] pharmacy calling regarding instructions on a medication refill for liraglutide (VICTOZA) 18 MG/3ML solution. Please give pharmacy a call back at [phone]

## 2022-10-20 RX ORDER — LIRAGLUTIDE 6 MG/ML
1.8 INJECTION SUBCUTANEOUS DAILY
Qty: 9 ML | Refills: 4 | Status: SHIPPED | OUTPATIENT
Start: 2022-10-20 | End: 2023-03-02

## 2022-10-20 NOTE — TELEPHONE ENCOUNTER
Instructions for Victoza repeat initial titration dose, though patient has been on it previously. Re-ordered at maintenance dose. Routed to RN for sign off.

## 2022-11-08 ENCOUNTER — IMMUNIZATION (OUTPATIENT)
Dept: FAMILY MEDICINE | Facility: CLINIC | Age: 42
End: 2022-11-08
Payer: MEDICARE

## 2022-11-08 PROCEDURE — 90686 IIV4 VACC NO PRSV 0.5 ML IM: CPT

## 2022-11-08 PROCEDURE — G0008 ADMIN INFLUENZA VIRUS VAC: HCPCS

## 2022-11-16 ENCOUNTER — HOSPITAL ENCOUNTER (OUTPATIENT)
Dept: RESPIRATORY THERAPY | Facility: CLINIC | Age: 42
Discharge: HOME | End: 2022-11-16
Attending: INTERNAL MEDICINE | Admitting: INTERNAL MEDICINE
Payer: MEDICARE

## 2022-11-16 PROCEDURE — 250N000009 HC RX 250: Performed by: INTERNAL MEDICINE

## 2022-11-16 PROCEDURE — 95070 INHLJ BRNCL CHALLENGE TSTG: CPT

## 2022-11-16 PROCEDURE — 94070 EVALUATION OF WHEEZING: CPT

## 2022-11-16 RX ORDER — ALBUTEROL SULFATE 0.83 MG/ML
2.5 SOLUTION RESPIRATORY (INHALATION) ONCE
Status: COMPLETED | OUTPATIENT
Start: 2022-11-16 | End: 2022-11-16

## 2022-11-16 RX ADMIN — METHACHOLINE CHLORIDE 100 MG: 100 POWDER, FOR SOLUTION RESPIRATORY (INHALATION) at 10:30

## 2022-11-16 RX ADMIN — ALBUTEROL SULFATE 2.5 MG: 2.5 SOLUTION RESPIRATORY (INHALATION) at 11:15

## 2022-11-16 NOTE — PROGRESS NOTES
Methacholine Challenge Test completed with no adverse reaction noted at this time.Patient discharged from PFT Lab with care giver, reminded if  shortness of breath or CP should develop later today to report to the nearest ED.

## 2022-11-16 NOTE — LETTER
November 18, 2022      Humberto Estrella  [address] [address] [facility] [address]        Dear ,    We are writing to inform you of your test results.    There is no asthma seen on the breathing test.  No inhalers are needed. The stress test was normal, so no heart problems are the cause of the shortness of breath. The breathing symptoms discussed during Sept visit could be due to anxiety.     Resulted Orders   PFT General Lab Testing - scheduling order   Result Value Ref Range    FVC-Pred 4.08 L    FVC-Pre 3.28 L    FVC-%Pred-Pre 80 %    FEV1-Pre 2.71 L    FEV1-%Pred-Pre 81 %    FEV1FVC-Pred 82 %    FEV1FVC-Pre 83 %    FEFMax-Pred 8.67 L/sec    FEFMax-Pre 4.11 L/sec    FEFMax-%Pred-Pre 47 %    FEF2575-Pred 3.38 L/sec    FEF2575-Pre 2.81 L/sec    SOM9225-%Pred-Pre 83 %    VCO3973-%Pred-Chlg 59 %    VPF5196-%Change-Chlg -28 %    FEF2575-Post 3.21 L/sec    WIS2356-%Pred-Post 94 %    ExpTime-Pre 7.77 sec    ExpTime-Chlg 4.49 sec    ExpTime-%Change-Chlg -42 %    FIFMax-Pre 2.03 L/sec    FIFMax-Chlg 2.55 L/sec    FIFMax-%Change-Chlg 25 %    FEV1FEV6-Pred 82 %    FEV1FEV6-Pre 83 %    Narrative    The FEV1 and FVC and FEV1/FVC ratio is normal.  The inspiratory flow rates are reduced.        IMPRESSION:    Normal spirometry.    Compared with testing from 10/11/2022 there has been no significant change in the FEV1 or FVC.     Negative methacholine challenge test.    Homero Birch MD      This interpretation has been electronically signed:  HOMERO BIRCH 11/17/2022  04:18:44 PM         If you have any questions or concerns, please call the clinic at the number listed above.       Sincerely,      Carla Brown, DO

## 2022-11-17 LAB
EXPTIME-%CHANGE-CHLG: -42 %
EXPTIME-CHLG: 4.49 SEC
EXPTIME-PRE: 7.77 SEC
FEF2575-%CHANGE-CHLG: -28 %
FEF2575-%PRED-CHLG: 59 %
FEF2575-%PRED-POST: 94 %
FEF2575-%PRED-PRE: 83 %
FEF2575-POST: 3.21 L/SEC
FEF2575-PRE: 2.81 L/SEC
FEF2575-PRED: 3.38 L/SEC
FEFMAX-%PRED-PRE: 47 %
FEFMAX-PRE: 4.11 L/SEC
FEFMAX-PRED: 8.67 L/SEC
FEV1-%PRED-PRE: 81 %
FEV1-PRE: 2.71 L
FEV1FEV6-PRE: 83 %
FEV1FEV6-PRED: 82 %
FEV1FVC-PRE: 83 %
FEV1FVC-PRED: 82 %
FIFMAX-%CHANGE-CHLG: 25 %
FIFMAX-CHLG: 2.55 L/SEC
FIFMAX-PRE: 2.03 L/SEC
FVC-%PRED-PRE: 80 %
FVC-PRE: 3.28 L
FVC-PRED: 4.08 L

## 2022-11-18 NOTE — RESULT ENCOUNTER NOTE
There is no asthma seen on the breathing test.  No inhalers are needed. The stress test was normal, so no heart problems are the cause of the shortness of breath. The breathing symptoms discussed during Sept visit could be due to anxiety.

## 2022-11-26 PROBLEM — G47.33 OSA (OBSTRUCTIVE SLEEP APNEA): Chronic | Status: ACTIVE | Noted: 2017-01-25

## 2022-11-28 ENCOUNTER — VIRTUAL VISIT (OUTPATIENT)
Dept: SLEEP MEDICINE | Facility: CLINIC | Age: 42
End: 2022-11-28
Payer: MEDICARE

## 2022-11-28 VITALS
DIASTOLIC BLOOD PRESSURE: 75 MMHG | HEIGHT: 63 IN | WEIGHT: 226 LBS | SYSTOLIC BLOOD PRESSURE: 108 MMHG | BODY MASS INDEX: 40.04 KG/M2

## 2022-11-28 DIAGNOSIS — G47.33 OSA (OBSTRUCTIVE SLEEP APNEA): Primary | ICD-10-CM

## 2022-11-28 PROCEDURE — 99443 PR PHYSICIAN TELEPHONE EVALUATION 21-30 MIN: CPT | Mod: 95 | Performed by: PHYSICIAN ASSISTANT

## 2022-11-28 ASSESSMENT — SLEEP AND FATIGUE QUESTIONNAIRES
HOW LIKELY ARE YOU TO NOD OFF OR FALL ASLEEP WHILE SITTING INACTIVE IN A PUBLIC PLACE: WOULD NEVER DOZE
HOW LIKELY ARE YOU TO NOD OFF OR FALL ASLEEP WHILE SITTING AND READING: WOULD NEVER DOZE
HOW LIKELY ARE YOU TO NOD OFF OR FALL ASLEEP WHILE SITTING AND TALKING TO SOMEONE: WOULD NEVER DOZE
HOW LIKELY ARE YOU TO NOD OFF OR FALL ASLEEP WHILE WATCHING TV: MODERATE CHANCE OF DOZING
HOW LIKELY ARE YOU TO NOD OFF OR FALL ASLEEP WHILE SITTING QUIETLY AFTER LUNCH WITHOUT ALCOHOL: SLIGHT CHANCE OF DOZING
HOW LIKELY ARE YOU TO NOD OFF OR FALL ASLEEP WHILE LYING DOWN TO REST IN THE AFTERNOON WHEN CIRCUMSTANCES PERMIT: MODERATE CHANCE OF DOZING
HOW LIKELY ARE YOU TO NOD OFF OR FALL ASLEEP IN A CAR, WHILE STOPPED FOR A FEW MINUTES IN TRAFFIC: HIGH CHANCE OF DOZING
HOW LIKELY ARE YOU TO NOD OFF OR FALL ASLEEP WHEN YOU ARE A PASSENGER IN A CAR FOR AN HOUR WITHOUT A BREAK: HIGH CHANCE OF DOZING
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
## 2022-11-29 ENCOUNTER — DOCUMENTATION ONLY (OUTPATIENT)
Dept: SLEEP MEDICINE | Facility: CLINIC | Age: 42
End: 2022-11-29

## 2022-11-29 NOTE — PROGRESS NOTES
"Writer received a fax from [place] department of human services. The form is regarding [facility] Operated community services. It asks for a signature from Jonny Rossi PA-C. The form is called \"Visit to the Health Professional (VHP)-[facility].     Jonny Rossi PA-C signed it and writer faxed it to [phone].   Sent form to medical records to be scanned into chart.   "

## 2023-01-03 ENCOUNTER — MEDICAL CORRESPONDENCE (OUTPATIENT)
Dept: HEALTH INFORMATION MANAGEMENT | Facility: CLINIC | Age: 43
End: 2023-01-03

## 2023-01-04 ENCOUNTER — TELEPHONE (OUTPATIENT)
Dept: FAMILY MEDICINE | Facility: CLINIC | Age: 43
End: 2023-01-04

## 2023-01-05 NOTE — TELEPHONE ENCOUNTER
[facility] physician med orders signed by Dr. Brown and mailed in provided envelope to   Attn: Jacquie Gutierrez  [address] [place] [address]    Copy of form sent to scan and copy placed in cabinet.

## 2023-01-06 ENCOUNTER — OFFICE VISIT (OUTPATIENT)
Dept: FAMILY MEDICINE | Facility: CLINIC | Age: 43
End: 2023-01-06
Payer: MEDICARE

## 2023-01-06 VITALS
BODY MASS INDEX: 40.03 KG/M2 | RESPIRATION RATE: 16 BRPM | OXYGEN SATURATION: 97 % | DIASTOLIC BLOOD PRESSURE: 60 MMHG | TEMPERATURE: 97.4 F | WEIGHT: 226 LBS | HEART RATE: 76 BPM | SYSTOLIC BLOOD PRESSURE: 104 MMHG

## 2023-01-06 DIAGNOSIS — H10.33 ACUTE BACTERIAL CONJUNCTIVITIS OF BOTH EYES: Primary | ICD-10-CM

## 2023-01-06 PROCEDURE — 99213 OFFICE O/P EST LOW 20 MIN: CPT | Performed by: NURSE PRACTITIONER

## 2023-01-06 RX ORDER — POLYMYXIN B SULFATE AND TRIMETHOPRIM 1; 10000 MG/ML; [USP'U]/ML
1-2 SOLUTION OPHTHALMIC EVERY 4 HOURS
Qty: 5 ML | Refills: 0 | Status: SHIPPED | OUTPATIENT
Start: 2023-01-06 | End: 2023-01-13

## 2023-01-06 ASSESSMENT — PAIN SCALES - GENERAL: PAINLEVEL: MODERATE PAIN (4)

## 2023-01-06 NOTE — PROGRESS NOTES
Assessment & Plan     Acute bacterial conjunctivitis of both eyes  Start polytrim. Follow up if not improving.  - trimethoprim-polymyxin b (POLYTRIM) 10828-9.1 UNIT/ML-% ophthalmic solution; Place 1-2 drops into both eyes every 4 hours for 7 days      Patient Instructions   Use the eye drops,  Wash hands frequently.      Return in about 1 week (around 1/13/2023).    GM Zelaya CNP  [facility]    Cleve Paul is a 42 year old accompanied by his caretaker (Janina), presenting for the following health issues:  Eye Problem (Possible pink eye/)      HPI     Woke up with some eye discharge, redness, swelling in eyes. He is prone to pink eye.    Above HPI reviewed. Additionally, no pain. No photophobia. No pain with eye movement. Does not use contacts.       Review of Systems   Constitutional, HEENT, cardiovascular, pulmonary, gi and gu systems are negative, except as otherwise noted.      Objective    /60   Pulse 76   Temp 97.4  F (36.3  C) (Tympanic)   Resp 16   Wt 102.5 kg (226 lb)   SpO2 97%   BMI 40.03 kg/m    Body mass index is 40.03 kg/m .  Physical Exam  Vitals and nursing note reviewed.   Constitutional:       Appearance: Normal appearance.   HENT:      Head: Normocephalic and atraumatic.      Mouth/Throat:      Mouth: Mucous membranes are moist.   Eyes:      Conjunctiva/sclera:      Right eye: Right conjunctiva is injected. Exudate present.      Left eye: Left conjunctiva is injected. Exudate present.   Cardiovascular:      Rate and Rhythm: Normal rate.   Pulmonary:      Effort: Pulmonary effort is normal.   Musculoskeletal:      Cervical back: Neck supple.   Skin:     General: Skin is warm and dry.   Neurological:      General: No focal deficit present.      Mental Status: He is alert.   Psychiatric:         Mood and Affect: Mood normal.         Behavior: Behavior normal.

## 2023-01-10 ENCOUNTER — MEDICAL CORRESPONDENCE (OUTPATIENT)
Dept: HEALTH INFORMATION MANAGEMENT | Facility: CLINIC | Age: 43
End: 2023-01-10

## 2023-01-13 ENCOUNTER — VIRTUAL VISIT (OUTPATIENT)
Dept: FAMILY MEDICINE | Facility: CLINIC | Age: 43
End: 2023-01-13
Payer: MEDICARE

## 2023-01-13 DIAGNOSIS — H10.9 BACTERIAL CONJUNCTIVITIS OF BOTH EYES: Primary | ICD-10-CM

## 2023-01-13 DIAGNOSIS — B96.89 BACTERIAL CONJUNCTIVITIS OF BOTH EYES: Primary | ICD-10-CM

## 2023-01-13 PROCEDURE — 99441 PR PHYSICIAN TELEPHONE EVALUATION 5-10 MIN: CPT | Mod: 95 | Performed by: NURSE PRACTITIONER

## 2023-01-13 RX ORDER — DIVALPROEX SODIUM 250 MG/1
TABLET, DELAYED RELEASE ORAL
COMMUNITY
Start: 2023-01-06

## 2023-01-13 RX ORDER — TOPIRAMATE 50 MG/1
TABLET, FILM COATED ORAL
COMMUNITY
Start: 2023-01-06 | End: 2023-08-15

## 2023-01-13 NOTE — PROGRESS NOTES
Humberto is a 42 year old who is being evaluated via a billable telephone visit.      What phone number would you like to be contacted at? [phone]  How would you like to obtain your AVS? Mail a copy  Distant Location (provider location):  On-site    Assessment & Plan     Bacterial conjunctivitis of both eyes  Symptoms resolved on treatment.  He can finish his last drops tonight.  They can keep the drops until they  and call for order if this recurs.  Follow-up as needed.     Return if symptoms worsen or fail to improve.    Valarie Kimble NP  [facility]    Subjective   Humberto is a 42 year old accompanied by his care giver , presenting for the following health issues:  Follow Up (Pink eye /Faxed over sheet to group home attention naldo [phone])    HPI     Per his care team at the group home his eyes are clear.  He has one more dose tonight and treatment is completed.      Review of Systems   EYES: POSITIVE for resolution of pink eye  ROS otherwise negative      Objective           Vitals:  No vitals were obtained today due to virtual visit.    Physical Exam   Not completed due to telephone visit.  Advised by his staff that his eyes have improved and are clear.      Phone call duration: 10 minutes

## 2023-01-31 DIAGNOSIS — Z79.899 ENCOUNTER FOR LONG-TERM (CURRENT) USE OF MEDICATIONS: Primary | ICD-10-CM

## 2023-01-31 DIAGNOSIS — Z13.220 SCREENING FOR HYPERLIPIDEMIA: ICD-10-CM

## 2023-02-02 ENCOUNTER — LAB (OUTPATIENT)
Dept: LAB | Facility: CLINIC | Age: 43
End: 2023-02-02
Payer: MEDICARE

## 2023-02-02 DIAGNOSIS — Z13.220 SCREENING FOR HYPERLIPIDEMIA: ICD-10-CM

## 2023-02-02 DIAGNOSIS — Z79.899 ENCOUNTER FOR LONG-TERM (CURRENT) USE OF MEDICATIONS: ICD-10-CM

## 2023-02-02 LAB
ALT SERPL W P-5'-P-CCNC: 50 U/L (ref 10–50)
AMMONIA PLAS-SCNC: 40 UMOL/L (ref 16–60)
AST SERPL W P-5'-P-CCNC: 50 U/L (ref 10–50)
BASOPHILS # BLD AUTO: 0 10E3/UL (ref 0–0.2)
BASOPHILS NFR BLD AUTO: 1 %
CHOLEST SERPL-MCNC: 196 MG/DL
EOSINOPHIL # BLD AUTO: 0 10E3/UL (ref 0–0.7)
EOSINOPHIL NFR BLD AUTO: 1 %
ERYTHROCYTE [DISTWIDTH] IN BLOOD BY AUTOMATED COUNT: 15.3 % (ref 10–15)
HCT VFR BLD AUTO: 44.5 % (ref 40–53)
HDLC SERPL-MCNC: 46 MG/DL
HGB BLD-MCNC: 14.6 G/DL (ref 13.3–17.7)
LDLC SERPL CALC-MCNC: 107 MG/DL
LYMPHOCYTES # BLD AUTO: 1.1 10E3/UL (ref 0.8–5.3)
LYMPHOCYTES NFR BLD AUTO: 26 %
MCH RBC QN AUTO: 33.6 PG (ref 26.5–33)
MCHC RBC AUTO-ENTMCNC: 32.8 G/DL (ref 31.5–36.5)
MCV RBC AUTO: 103 FL (ref 78–100)
MONOCYTES # BLD AUTO: 0.6 10E3/UL (ref 0–1.3)
MONOCYTES NFR BLD AUTO: 14 %
NEUTROPHILS # BLD AUTO: 2.5 10E3/UL (ref 1.6–8.3)
NEUTROPHILS NFR BLD AUTO: 59 %
NONHDLC SERPL-MCNC: 150 MG/DL
PLATELET # BLD AUTO: 163 10E3/UL (ref 150–450)
RBC # BLD AUTO: 4.34 10E6/UL (ref 4.4–5.9)
TRIGL SERPL-MCNC: 213 MG/DL
VALPROATE SERPL-MCNC: 65.1 UG/ML
WBC # BLD AUTO: 4.3 10E3/UL (ref 4–11)

## 2023-02-02 PROCEDURE — 84460 ALANINE AMINO (ALT) (SGPT): CPT

## 2023-02-02 PROCEDURE — 84450 TRANSFERASE (AST) (SGOT): CPT

## 2023-02-02 PROCEDURE — 36415 COLL VENOUS BLD VENIPUNCTURE: CPT

## 2023-02-02 PROCEDURE — 82140 ASSAY OF AMMONIA: CPT

## 2023-02-02 PROCEDURE — 80164 ASSAY DIPROPYLACETIC ACD TOT: CPT

## 2023-02-02 PROCEDURE — 80061 LIPID PANEL: CPT

## 2023-02-02 PROCEDURE — 85025 COMPLETE CBC W/AUTO DIFF WBC: CPT

## 2023-02-02 NOTE — LETTER
February 3, 2023      Humberto Estrella  [address] [facility] [address]        Dear ,    We are writing to inform you of your test results.    Cholesterol is mildly elevated, similar to 1 year ago.   We will discuss this at your upcoming visit.      Resulted Orders   Lipid panel reflex to direct LDL Non-fasting   Result Value Ref Range    Cholesterol 196 <200 mg/dL    Triglycerides 213 (H) <150 mg/dL    Direct Measure HDL 46 >=40 mg/dL    LDL Cholesterol Calculated 107 (H) <=100 mg/dL    Non HDL Cholesterol 150 (H) <130 mg/dL    Narrative    Cholesterol  Desirable:  <200 mg/dL    Triglycerides  Normal:  Less than 150 mg/dL  Borderline High:  150-199 mg/dL  High:  200-499 mg/dL  Very High:  Greater than or equal to 500 mg/dL    Direct Measure HDL  Female:  Greater than or equal to 50 mg/dL   Male:  Greater than or equal to 40 mg/dL    LDL Cholesterol  Desirable:  <100mg/dL  Above Desirable:  100-129 mg/dL   Borderline High:  130-159 mg/dL   High:  160-189 mg/dL   Very High:  >= 190 mg/dL    Non HDL Cholesterol  Desirable:  130 mg/dL  Above Desirable:  130-159 mg/dL  Borderline High:  160-189 mg/dL  High:  190-219 mg/dL  Very High:  Greater than or equal to 220 mg/dL       If you have any questions or concerns, please call the clinic at the number listed above.     Sincerely,    Carla Brown DO/alysa

## 2023-02-03 NOTE — RESULT ENCOUNTER NOTE
We will discuss more at upcoming visit.  Okay to mail out result note.    Cholesterol is mildly elevated, similar to 1 year ago.

## 2023-02-19 ENCOUNTER — TELEPHONE (OUTPATIENT)
Dept: FAMILY MEDICINE | Facility: CLINIC | Age: 43
End: 2023-02-19
Payer: MEDICARE

## 2023-02-19 NOTE — LETTER
February 20, 2023    Humberto Estrella (Date of birth 1980)  [address] [facility] [facility] [address] [facility] [address]        Medication Permission Form/New Medication Orders        To whom it may concern,      Please let group home know that it is okay to use the polytrim as directed for 7 days.          Sincerely,     Valarie Kimble NP/javier

## 2023-02-19 NOTE — LETTER
[facility]  [address] [address]  [phone]         Medication Permission Form      February 20, 2023    To whom it may concern,          Please let group home know that it is okay to use the polytrim as directed for 7 days.  Valarie Kimble NP on 2/20/2023.        Sincerely,     Valarie Kimble NP/ldh

## 2023-02-20 ENCOUNTER — TELEPHONE (OUTPATIENT)
Dept: FAMILY MEDICINE | Facility: CLINIC | Age: 43
End: 2023-02-20
Payer: MEDICARE

## 2023-03-02 ENCOUNTER — VIRTUAL VISIT (OUTPATIENT)
Dept: ENDOCRINOLOGY | Facility: CLINIC | Age: 43
End: 2023-03-02
Payer: MEDICARE

## 2023-03-02 ENCOUNTER — MEDICAL CORRESPONDENCE (OUTPATIENT)
Dept: HEALTH INFORMATION MANAGEMENT | Facility: CLINIC | Age: 43
End: 2023-03-02

## 2023-03-02 VITALS — WEIGHT: 226 LBS | BODY MASS INDEX: 40.04 KG/M2 | HEIGHT: 63 IN

## 2023-03-02 DIAGNOSIS — R73.03 PREDIABETES: ICD-10-CM

## 2023-03-02 DIAGNOSIS — E66.09 OBESITY DUE TO EXCESS CALORIES, UNSPECIFIED OBESITY SEVERITY: Chronic | ICD-10-CM

## 2023-03-02 DIAGNOSIS — Q90.9 DOWN'S SYNDROME: ICD-10-CM

## 2023-03-02 PROCEDURE — 99442 PR PHYSICIAN TELEPHONE EVALUATION 11-20 MIN: CPT | Mod: 95 | Performed by: INTERNAL MEDICINE

## 2023-03-02 RX ORDER — TOPIRAMATE 25 MG/1
75 TABLET, FILM COATED ORAL 2 TIMES DAILY
Qty: 180 TABLET | Refills: 3 | Status: SHIPPED | OUTPATIENT
Start: 2023-03-02 | End: 2023-08-15

## 2023-03-02 RX ORDER — LIRAGLUTIDE 6 MG/ML
2.4 INJECTION SUBCUTANEOUS DAILY
Qty: 12 ML | Refills: 4 | Status: SHIPPED | OUTPATIENT
Start: 2023-03-02 | End: 2023-03-26

## 2023-03-02 ASSESSMENT — PAIN SCALES - GENERAL: PAINLEVEL: NO PAIN (0)

## 2023-03-02 NOTE — PATIENT INSTRUCTIONS
You have lost 20 pounds!     Metformin: continue no changes  Topiramate: continue no changes  Liraglutide: we will increase the dose to 2.4mg per day  -- we will also adjust the timing.  -- on Work days, take at 8am (currently your work days are Mon, Weds, Fri)  -- on Non-Work days, take at 12pm (currently your non-work days are Tues, Thurs, Sat, Sun)     Continue the healthy eating and activity and sleep that you are doing.
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
## 2023-03-02 NOTE — NURSING NOTE
"(   Chief Complaint   Patient presents with     RECHECK     Return MW    )    ( Weight: 102.5 kg (226 lb) )  ( Height: 160 cm (5' 3\") )  ( BMI (Calculated): 40.03 )  (   )  (   )  (   )  (   )  (   )  (   )    (   )  (   )  (   )  (   )  (   )  (   )  (   )    (   Patient Active Problem List   Diagnosis     TRISOMY 21 (DOWN SYNDROME)     Hearing loss     MYOPIA     LATENT NYSTAGMUS     Headache     post traumatic stress disorder     CARDIOVASCULAR SCREENING; LDL GOAL LESS THAN 160     Cortical, lamellar, or zonular cataract, nonsenile     Nonallergic rhinitis     Obesity due to excess calories, unspecified obesity severity     Subclinical hypothyroidism     VIDAL (obstructive sleep apnea)-AHI 26     Anxiety     Explosive personality disorder (H)     Prediabetes     Lives in group home     Suicide attempt (H)     Hypoalbuminemia     Morbid obesity (H)    )  (   Current Outpatient Medications   Medication Sig Dispense Refill     calcium polycarbophil (FIBERCON) 625 MG tablet Take 2 tablets (1,250 mg) by mouth 2 times daily 360 tablet 3     cetirizine (ZYRTEC) 10 MG tablet Take 1 tablet (10 mg) by mouth At Bedtime Stop loratadine 90 tablet 3     divalproex sodium delayed-release (DEPAKOTE SPRINKLE) 125 MG DR capsule Take 750 mg by mouth 2 times daily Open capsules and sprinkle into pudding/sauce       divalproex sodium delayed-release (DEPAKOTE) 250 MG DR tablet 250 mg 2 times daily 4 capsules po bid       levothyroxine (SYNTHROID/LEVOTHROID) 100 MCG tablet Take 1 tablet (100 mcg) by mouth daily 90 tablet 3     liraglutide (VICTOZA) 18 MG/3ML solution Inject 1.8 mg Subcutaneous daily 9 mL 4     metFORMIN (GLUCOPHAGE) 500 MG tablet Take 1 tablet (500 mg) by mouth 2 times daily (with meals) The PM dose can be taken before dinner (as early as 4pm). 180 tablet 3     montelukast (SINGULAIR) 10 MG tablet Take 1 tablet (10 mg) by mouth At Bedtime 90 tablet 3     risperiDONE (RISPERDAL) 0.5 MG tablet Take 0.5 mg by mouth 2 " times daily At noon       risperiDONE (RISPERDAL) 1 MG tablet Take 1 mg by mouth every morning 60 tablet      risperiDONE (RISPERDAL) 2 MG tablet Take 2 mg by mouth daily at 8:00pm       sertraline (ZOLOFT) 100 MG tablet Take 200 mg by mouth daily        topiramate (TOPAMAX) 25 MG tablet Take 3 tablets (75 mg) by mouth 2 times daily 180 tablet 3     VITAMIN D3 25 MCG (1000 UT) tablet TAKE 1 TABLET BY MOUTH ONCE DAILY 100 tablet 2     alum & mag hydroxide-simethicone (MYLANTA/MAALOX) 200-200-20 MG/5ML SUSP suspension Take 30 mLs by mouth every 4 hours as needed for indigestion (2 tsp for upset stomach) (Patient not taking: Reported on 3/2/2023) 1 Bottle 3     calcium carbonate (TUMS) 500 MG chewable tablet Take 1 tablet (500 mg) by mouth every 6 hours as needed for heartburn (Patient not taking: Reported on 3/2/2023) 150 tablet 3     DEEP SEA NASAL SPRAY 0.65 % nasal spray USE 2 SPRAYS IN EACH NOSTRIL FOUR TIMES DAILY AS NEEDED FOR CONGESTION (Patient not taking: Reported on 3/2/2023) 44 mL 11     fluticasone (FLONASE) 50 MCG/ACT nasal spray Spray 1 spray into both nostrils daily as needed for rhinitis or allergies (Patient not taking: Reported on 3/2/2023) 16 g 1     ibuprofen (ADVIL,MOTRIN) 600 MG tablet Take 1 tablet (600 mg) by mouth every 6 hours as needed for moderate pain (Patient not taking: Reported on 3/2/2023) 60 tablet 0     insulin pen needle (BD CANDI U/F) 32G X 4 MM miscellaneous Use 1 pen needle daily with liraglutide. (Patient not taking: Reported on 3/2/2023) 120 each 4     magnesium hydroxide (MILK OF MAGNESIA) 400 MG/5ML suspension Take 30-60 mLs by mouth daily as needed for constipation or heartburn (If No Bowel Movement in 2 days.) Reported on 4/12/2017 (Patient not taking: Reported on 3/2/2023) 360 mL 1     OLANZapine (ZYPREXA) 5 MG tablet Take 2.5 mg by mouth 3 times daily as needed As neede (Patient not taking: Reported on 3/2/2023) 30 tablet 1     order for DME Equipment being ordered:  CPAP  AIRSENSE 10  11 CM H20  AIRFIT F20 MEDIUM  # [id number]  DN# [id number] (Patient not taking: Reported on 3/2/2023)       PAIN & FEVER 325 MG tablet TAKE 1-2 TABLETS (325-650MG) BY MOUTH EVERY 4 HOURS AS NEEDED *ORDER WHEN LOW* (Patient not taking: Reported on 3/2/2023) 100 tablet 7     phenol (CHLORASEPTIC) 1.4 % spray Take 1 spray by mouth every hour as needed for sore throat Use as directed for sore throt (Patient not taking: Reported on 3/2/2023)       polyethylene glycol-propylene glycol PF (SYSTANE ULTRA PF) 0.4-0.3 % SOLN opthalmic solution Place 1-2 drops into both eyes Up to 5 times daily as needed (Patient not taking: Reported on 3/2/2023)       SUDOGEST 12 HOUR 120 MG 12 hr tablet TAKE 1 TABLET BY MOUTH ONCE DAILY AS NEEDED *6 TOTAL FILLS* (Patient not taking: Reported on 3/2/2023) 12 tablet 11     topiramate (TOPAMAX) 50 MG tablet  (Patient not taking: Reported on 3/2/2023)       triamcinolone (KENALOG) 0.1 % cream Apply  topically 3 times daily. Apply sparingly to affected area. (Patient not taking: Reported on 3/2/2023) 30 g 1     trimethoprim-polymyxin b (POLYTRIM) 20571-0.1 UNIT/ML-% ophthalmic solution Place 1-2 drops into both eyes every 4 hours (Patient not taking: Reported on 1/6/2023) 10 mL 0    )  ( Diabetes Eval:    )    ( Pain Eval:  No Pain (0) )    ( Wound Eval:       )    (   History   Smoking Status     Former     Packs/day: 1.00     Years: 1.00     Types: Cigarettes     Start date: 5/5/1999     Quit date: 5/5/2000   Smokeless Tobacco     Former    )    ( Signed By:  DRAKE Trent; March 2, 2023; 4:00 PM )

## 2023-03-14 ENCOUNTER — OFFICE VISIT (OUTPATIENT)
Dept: FAMILY MEDICINE | Facility: CLINIC | Age: 43
End: 2023-03-14
Payer: MEDICARE

## 2023-03-14 ENCOUNTER — TELEPHONE (OUTPATIENT)
Dept: FAMILY MEDICINE | Facility: CLINIC | Age: 43
End: 2023-03-14

## 2023-03-14 VITALS
WEIGHT: 226.7 LBS | OXYGEN SATURATION: 98 % | HEART RATE: 78 BPM | TEMPERATURE: 97.4 F | SYSTOLIC BLOOD PRESSURE: 108 MMHG | DIASTOLIC BLOOD PRESSURE: 70 MMHG | BODY MASS INDEX: 40.16 KG/M2 | RESPIRATION RATE: 16 BRPM

## 2023-03-14 DIAGNOSIS — M75.82 ROTATOR CUFF TENDINITIS, LEFT: ICD-10-CM

## 2023-03-14 DIAGNOSIS — M75.81 ROTATOR CUFF TENDINITIS, RIGHT: ICD-10-CM

## 2023-03-14 DIAGNOSIS — K21.9 GASTROESOPHAGEAL REFLUX DISEASE WITHOUT ESOPHAGITIS: ICD-10-CM

## 2023-03-14 DIAGNOSIS — E66.09 OBESITY DUE TO EXCESS CALORIES, UNSPECIFIED OBESITY SEVERITY: Primary | Chronic | ICD-10-CM

## 2023-03-14 DIAGNOSIS — R19.7 DIARRHEA, UNSPECIFIED TYPE: ICD-10-CM

## 2023-03-14 PROCEDURE — 99214 OFFICE O/P EST MOD 30 MIN: CPT | Performed by: INTERNAL MEDICINE

## 2023-03-14 RX ORDER — METFORMIN HCL 500 MG
500 TABLET, EXTENDED RELEASE 24 HR ORAL 2 TIMES DAILY WITH MEALS
Qty: 180 TABLET | Refills: 3 | Status: SHIPPED | OUTPATIENT
Start: 2023-03-14 | End: 2023-09-07

## 2023-03-14 ASSESSMENT — PATIENT HEALTH QUESTIONNAIRE - PHQ9: SUM OF ALL RESPONSES TO PHQ QUESTIONS 1-9: 4

## 2023-03-14 ASSESSMENT — PAIN SCALES - GENERAL: PAINLEVEL: MODERATE PAIN (5)

## 2023-03-14 NOTE — TELEPHONE ENCOUNTER
General Call      Reason for Call:  Medication clarification    What are your questions or concerns:  Pharmacy called re: patient has been on immediate release Metform for quite some time. They received a script for Metformin XR and are wanting to make sure that is accurate.    Date of last appointment with provider: 1/23/23    Okay to leave a detailed message?: No

## 2023-03-14 NOTE — Clinical Note
JAMES -I changed his metformin to XR formulation due to concerned about diarrhea.  If I hear from him that the diarrhea is still persisting, I would likely plan to do a 2-4-week trial off metformin to see if the diarrhea resolves

## 2023-03-14 NOTE — PROGRESS NOTES
"  Assessment & Plan     Obesity due to excess calories, unspecified obesity severity -with possible side effects of metformin of diarrhea.  We will change the metformin to the XR formulation as this often helps with the diarrhea.  If the diarrhea persist, would do 2-4-week trial off metformin to see if this will resolve the issue.  - metFORMIN (GLUCOPHAGE XR) 500 MG 24 hr tablet; Take 1 tablet (500 mg) by mouth 2 times daily (with meals)    Gastroesophageal reflux disease without esophagitis -mild and food provoked.  Due to polypharmacy, will not add another medication.  He is on GLP-1 and metformin which can be associated with GERD    Diarrhea, unspecified type -see above    Rotator cuff tendinitis, left -improving.  Physical therapy would be the next best treatment option if symptoms do not resolve or if they worsen.  His ability to participate fully in PT may be limited due to his cognitive impairment.  Steroid shots would be an absolute last resort due to concern about worsening his anxiety/behavioral issues.  Continue conservative care with heat and Tylenol    Rotator cuff tendinitis, right -see above               BMI:   Estimated body mass index is 40.16 kg/m  as calculated from the following:    Height as of 3/2/23: 1.6 m (5' 3\").    Weight as of this encounter: 102.8 kg (226 lb 11.2 oz).       Patient Instructions   Diarrhea  1. Stop metformin  2. Start long acting metformin - lower chance of diarrhea and heartburn  3. If diarrhea persists, would go off the metformin  4. Will communicate to the Bariatric Clinic    Heartburn  1. Try to avoid spicy foods; use Tums  2. If symptoms worsen, may consider a daily medication    Shoulder pain:  1. Sounds like rotator cuff tendonitis or arthritis  2. Because symptoms are improving, continue with heat and Tylenol  3. There is likely mild rotator cuff tendonitis and/or mild arthritis  4. If symptoms were to worsen, would start with physical therapy  5. Referral to " ortho may be considered - steroid shots.  They can worsen mental health issues or raise blood sugar, so would use this treatment option as very last resort        No follow-ups on file.    Carla Brown DO  [facility]    Cleve Paul is a 42 year old accompanied by his caregiver, presenting for the following health issues:  Musculoskeletal Problem and Heartburn      HPI     Pain History:  When did you first notice your pain? - Acute Pain   Have you seen anyone else for your pain? No  Where in your body do you have pain? Musculoskeletal problem/pain  Onset/Duration: 1 month  Description  Location: shoulder - bilateral  Joint Swelling: YES  Redness: No  Pain: YES  Warmth: No  Intensity:  4/10  Progression of Symptoms:  improving  Accompanying signs and symptoms:   Fevers: No  Numbness/tingling/weakness: No  History  Trauma to the area: No  Recent illness:  No  Previous similar problem: No  Previous evaluation:  No  Precipitating or alleviating factors:  Aggravating factors include: sitting, lifting, exercise, overuse and cold or damp weather  Therapies tried and outcome: rest/inactivity, heat, acetaminophen and Ibuprofen still not helping still in pain   --he reports pain is waking him up at night  --staff thinks pain is improving from onset 1 month ago; no PRN pain meds in a few weeks.  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 and did not complain of shoulder pain after this      GERD/Heartburn  Onset/Duration: comes and goes   Description: acid reflix  Intensity: severe  Progression of Symptoms: intermittent  Accompanying Signs & Symptoms:  Does it feel like food gets stuck or trouble  swallowing: No  Nausea: YES  Vomiting (bloody?): YES  Abdominal Pain: YES  Black-Tarry stools: No  Bloody stools: No  History:  Previous similar episodes: only get it when eat spicy foods  Previous ulcers: No  Precipitating factors:   Caffeine use: YES  Alcohol use: No  NSAID/Aspirin use: No  Tobacco use: No  Worse with fatty foods and spicy foods.  Alleviating factors: tums work  Therapies tried and outcome:             Lifestyle changes: None            Medications: tums , Mylanta/Maalox  --has diarrhea occasionally;  He reports this is why he stays home from work;  Staff thinks it was from metformin and desserts; diarrhea is 2-3 x week  --heartburn is not daily; staff is not aware of significant heartburn symptoms   --staff notes he has diarrhea and upset stomach due to anxiety  --had fecal incontinence  Episode at his brother's house and brother was very upset at [place] for this     Current Outpatient Medications   Medication Sig Dispense Refill     alum & mag hydroxide-simethicone (MYLANTA/MAALOX) 200-200-20 MG/5ML SUSP suspension Take 30 mLs by mouth every 4 hours as needed for indigestion (2 tsp for upset stomach) 1 Bottle 3     calcium carbonate (TUMS) 500 MG chewable tablet Take 1 tablet (500 mg) by mouth every 6 hours as needed for heartburn 150 tablet 3     calcium polycarbophil (FIBERCON) 625 MG tablet Take 2 tablets (1,250 mg) by mouth 2 times daily 360 tablet 3     cetirizine (ZYRTEC) 10 MG tablet Take 1 tablet (10 mg) by mouth At Bedtime Stop loratadine 90 tablet 3     DEEP SEA NASAL SPRAY 0.65 % nasal spray USE 2 SPRAYS IN EACH NOSTRIL FOUR TIMES DAILY AS NEEDED FOR CONGESTION 44 mL 11     divalproex sodium delayed-release (DEPAKOTE SPRINKLE) 125 MG DR capsule Take 750 mg by mouth 2 times daily Open capsules and sprinkle into pudding/sauce       divalproex sodium delayed-release (DEPAKOTE) 250 MG DR tablet 250 mg 2 times daily 4 capsules po bid       fluticasone (FLONASE) 50 MCG/ACT nasal spray Spray  1 spray into both nostrils daily as needed for rhinitis or allergies 16 g 1     ibuprofen (ADVIL,MOTRIN) 600 MG tablet Take 1 tablet (600 mg) by mouth every 6 hours as needed for moderate pain 60 tablet 0     insulin pen needle (BD CANDI U/F) 32G X 4 MM miscellaneous Use 1 pen needle daily with liraglutide. 120 each 4     levothyroxine (SYNTHROID/LEVOTHROID) 100 MCG tablet Take 1 tablet (100 mcg) by mouth daily 90 tablet 3     liraglutide (VICTOZA) 18 MG/3ML solution Inject 2.4 mg Subcutaneous daily On work days: take at 8am On non-work days: take at 12pm 12 mL 4     magnesium hydroxide (MILK OF MAGNESIA) 400 MG/5ML suspension Take 30-60 mLs by mouth daily as needed for constipation or heartburn (If No Bowel Movement in 2 days.) Reported on 4/12/2017 360 mL 1     metFORMIN (GLUCOPHAGE) 500 MG tablet Take 1 tablet (500 mg) by mouth 2 times daily (with meals) The PM dose can be taken before dinner (as early as 4pm). 180 tablet 3     montelukast (SINGULAIR) 10 MG tablet Take 1 tablet (10 mg) by mouth At Bedtime 90 tablet 3     OLANZapine (ZYPREXA) 5 MG tablet Take 2.5 mg by mouth 3 times daily as needed As neede 30 tablet 1     order for DME Equipment being ordered: CPAP  AIRSENSE 10  11 CM H20  AIRFIT F20 MEDIUM  SN# [id number]  DN# [id number]       PAIN & FEVER 325 MG tablet TAKE 1-2 TABLETS (325-650MG) BY MOUTH EVERY 4 HOURS AS NEEDED *ORDER WHEN LOW* 100 tablet 7     phenol (CHLORASEPTIC) 1.4 % spray Take 1 spray by mouth every hour as needed for sore throat Use as directed for sore throt       polyethylene glycol-propylene glycol PF (SYSTANE ULTRA PF) 0.4-0.3 % SOLN opthalmic solution Place 1-2 drops into both eyes Up to 5 times daily as needed       risperiDONE (RISPERDAL) 0.5 MG tablet Take 0.5 mg by mouth 2 times daily At noon       risperiDONE (RISPERDAL) 1 MG tablet Take 1 mg by mouth every morning 60 tablet      risperiDONE (RISPERDAL) 2 MG tablet Take 2 mg by mouth daily at 8:00pm       sertraline (ZOLOFT) 100 MG  tablet Take 200 mg by mouth daily        SUDOGEST 12 HOUR 120 MG 12 hr tablet TAKE 1 TABLET BY MOUTH ONCE DAILY AS NEEDED *6 TOTAL FILLS* 12 tablet 11     topiramate (TOPAMAX) 25 MG tablet Take 3 tablets (75 mg) by mouth 2 times daily 180 tablet 3     topiramate (TOPAMAX) 50 MG tablet        triamcinolone (KENALOG) 0.1 % cream Apply  topically 3 times daily. Apply sparingly to affected area. 30 g 1     trimethoprim-polymyxin b (POLYTRIM) 68577-9.1 UNIT/ML-% ophthalmic solution Place 1-2 drops into both eyes every 4 hours 10 mL 0     VITAMIN D3 25 MCG (1000 UT) tablet TAKE 1 TABLET BY MOUTH ONCE DAILY 100 tablet 2         Review of Systems   Constitutional, HEENT, cardiovascular, pulmonary, gi and gu systems are negative, except as otherwise noted.      Objective    /70 (BP Location: Right arm, Patient Position: Sitting, Cuff Size: Adult Regular)   Pulse 78   Temp 97.4  F (36.3  C) (Tympanic)   Resp 16   Wt 102.8 kg (226 lb 11.2 oz)   SpO2 98%   BMI 40.16 kg/m    Body mass index is 40.16 kg/m .  Physical Exam   GENERAL APPEARANCE: alert, no distress and cognitive impairment consistent with known diagnosis  ABDOMEN: Soft, mild diffuse tenderness, obese, normal bowel sounds  ORTHO:   SHOULDER Exam-Bilateral   Inspection: no swelling, no bruising, no discoloration, no obvious deformity, no asymmetry, no glenohumeral joint anterior bulge, no distal clavicle elevation, no muscle atrophy, no scapular winging   Tenderness of: SC joint- no , clavicle(prox-mid)- no , clavicle-(mid-distal)- no , AC joint- no , acromion- no , anterior capsule- YES, prox bicep tendon- YES, greater tuberosity- YES, prox humerus- no , supraspinatous- YES, infraspinatous- YES, superior trapezious- no , rhomboids- no    Range of Motion: Active- forward flexion- normal, abduction- normal, external rotation- normal, internal rotation- normal  Range of Motion: Passive- forward flexion- normal, abduction- normal, external rotation- normal,  internal rotation- normal   Strength: forward flexion- 5/5, abduction- 5/5, internal rotation- 5/5, external rotation- 5/5 and bicep- full   Special tests: Neers- negative, Hedrick(supraspinatous)- negative, Positive painful arc- POSITIVE, GIRD- negative, Speeds- negative and Yergasons- negative

## 2023-03-14 NOTE — PATIENT INSTRUCTIONS
Diarrhea  Stop metformin  Start long acting metformin - lower chance of diarrhea and heartburn  If diarrhea persists, would go off the metformin  Will communicate to the Bariatric Clinic    Heartburn  Try to avoid spicy foods; use Tums  If symptoms worsen, may consider a daily medication    Shoulder pain:  Sounds like rotator cuff tendonitis or arthritis  Because symptoms are improving, continue with heat and Tylenol  There is likely mild rotator cuff tendonitis and/or mild arthritis  If symptoms were to worsen, would start with physical therapy  Referral to ortho may be considered - steroid shots.  They can worsen mental health issues or raise blood sugar, so would use this treatment option as very last resort

## 2023-03-15 ENCOUNTER — TELEPHONE (OUTPATIENT)
Dept: PEDIATRICS | Facility: CLINIC | Age: 43
End: 2023-03-15
Payer: MEDICARE

## 2023-03-15 DIAGNOSIS — Q90.9 DOWN'S SYNDROME: ICD-10-CM

## 2023-03-15 DIAGNOSIS — R73.03 PREDIABETES: ICD-10-CM

## 2023-03-15 DIAGNOSIS — E66.09 OBESITY DUE TO EXCESS CALORIES, UNSPECIFIED OBESITY SEVERITY: Chronic | ICD-10-CM

## 2023-03-15 NOTE — TELEPHONE ENCOUNTER
Law with patients group home calling regarding Prior auth for patients Victoza. Patient only had 8 doses left. Inquiring aboout switching to Saxenda due to higher dose. Caller stated that the pharmacy had Suggested Ozempic or Trulicity but this had been denied previously by insurance. Please fax any new orders to group home at  [phone]    Law [phone]

## 2023-03-17 ENCOUNTER — TELEPHONE (OUTPATIENT)
Dept: ENDOCRINOLOGY | Facility: CLINIC | Age: 43
End: 2023-03-17

## 2023-03-17 ENCOUNTER — TELEPHONE (OUTPATIENT)
Dept: PEDIATRICS | Facility: CLINIC | Age: 43
End: 2023-03-17
Payer: MEDICARE

## 2023-03-17 NOTE — TELEPHONE ENCOUNTER
[facility] pharmacy calling back regarding Ozempic. They have 2 different prescriptions for Ozempic that are conflicting. Please clarify.    [phone]

## 2023-03-17 NOTE — TELEPHONE ENCOUNTER
[facility] pharmacy calling regarding patients Ozempic, stating it is not covered by insurance for weight management.    [phone]

## 2023-03-20 NOTE — TELEPHONE ENCOUNTER
Pharmacy called that Ozempic is not covered by patients insurance. Will inform group rodriguez and Dr Diaz.

## 2023-03-20 NOTE — TELEPHONE ENCOUNTER
Spoke with Law at Patients group home and informed her that Ozempic was denied by insurance. Law would like Dr Diaz to order Victoza at the dose that insurance covered previously (1.8 mg)  Will have Dr Diaz advise on request.

## 2023-03-21 ENCOUNTER — TELEPHONE (OUTPATIENT)
Dept: ENDOCRINOLOGY | Facility: CLINIC | Age: 43
End: 2023-03-21
Payer: MEDICARE

## 2023-03-21 DIAGNOSIS — R73.03 PREDIABETES: ICD-10-CM

## 2023-03-21 DIAGNOSIS — Q90.9 DOWN'S SYNDROME: ICD-10-CM

## 2023-03-21 DIAGNOSIS — E88.810 METABOLIC SYNDROME X: Primary | ICD-10-CM

## 2023-03-21 DIAGNOSIS — E66.09 OBESITY DUE TO EXCESS CALORIES, UNSPECIFIED OBESITY SEVERITY: Chronic | ICD-10-CM

## 2023-03-21 NOTE — TELEPHONE ENCOUNTER
Law from pt's group home called back and stated [facility] d/t the dx code being not billable (E66.9). Law states insurance will approve but pharmacy will not unless they have a different dx code. States the quantity of 12 needs to be requested with a different dx code in order to be approved. **Please note tomorrow is pt's last day of meds** Law would like a call back ASAP with an update on if we can send this new order over. Order can be faxed to [phone].    Law at group home: [phone]

## 2023-03-26 RX ORDER — LIRAGLUTIDE 6 MG/ML
2.4 INJECTION SUBCUTANEOUS DAILY
Qty: 12 ML | Refills: 4 | Status: SHIPPED | OUTPATIENT
Start: 2023-03-26 | End: 2023-03-27 | Stop reason: ALTCHOICE

## 2023-03-27 ENCOUNTER — TELEPHONE (OUTPATIENT)
Dept: ENDOCRINOLOGY | Facility: CLINIC | Age: 43
End: 2023-03-27
Payer: MEDICARE

## 2023-03-27 DIAGNOSIS — R73.03 PREDIABETES: Primary | ICD-10-CM

## 2023-03-27 RX ORDER — LIRAGLUTIDE 6 MG/ML
1.8 INJECTION SUBCUTANEOUS DAILY
Qty: 9 ML | Refills: 3 | Status: SHIPPED | OUTPATIENT
Start: 2023-03-27 | End: 2023-09-08

## 2023-03-27 NOTE — TELEPHONE ENCOUNTER
Reaching out to Dr. Diaz for next steps regarding Victoza coverage issues. Patient does not have coverage for GLP1 agonists on multiple accounts - no weight loss medication GLP1 agonist coverage as he has medicare. Patient doesn't have T2DM so likelihood of Victoza or Ozempic being covered with Medicare is minimal. At one point was covering Victoza 1.8 mg daily under medicare with prediabetes diagnosis but when Victoza was increased to 2.4 mg daily past FDA approved dosing it was flagged so no longer covering in general from what it appears.     Lauren Bloch, PharmD, BCACP   Medication Therapy Management Pharmacist   [facility] [place]

## 2023-03-27 NOTE — TELEPHONE ENCOUNTER
[facility] called and said they rec'd an order for Victoza, but due to dx code on file for rx, pt's insurance will only approve Wegovy as an injectible and will not cover Victoza with said dx code. This was noted in previous TE as well that insurance will not cover Victoza with dx code. Please call pharmacy or send new order.    [phone]

## 2023-03-27 NOTE — TELEPHONE ENCOUNTER
Called group home to explain that Victoza at 1.8 mg daily is to be filled. Group home nursing staff is aware.     Lauren Bloch, PharmD, BCACP   Medication Therapy Management Pharmacist   [facility] [place]

## 2023-03-27 NOTE — TELEPHONE ENCOUNTER
Called [facility] - Filling Victoza at 1.8 mg daily with diagnosis prediabetes. Approved by Dr. Emily kobl. Per Pharmacist at [facility] - Victoza at this dose is going through insurance now without issues, they will fill.     Rx for Victoza that was previously sent in will not be covered for higher dosing at 2.4 mg daily with Medicare as surpassing FDA approved dosing for Victoza. Will not be covered for obesity as primary diagnosis. Pharmacy was halting the RX due to having obesity diagnosis as primary diagnosis through medicare, medicare does not cover weight loss medication(s) and the pharmacy is at risk of auditing issues per pharmacist at [facility].     Lauren Bloch, PharmD, BCACP   Medication Therapy Management Pharmacist   [facility] [place]

## 2023-03-29 ENCOUNTER — TELEPHONE (OUTPATIENT)
Dept: PEDIATRICS | Facility: CLINIC | Age: 43
End: 2023-03-29

## 2023-03-29 NOTE — TELEPHONE ENCOUNTER
[facility] is reaching out again about the Victoza prescription.   [phone]

## 2023-03-29 NOTE — TELEPHONE ENCOUNTER
"Dr. Diaz,     Just got off the phone with [facility] pharmacy and they are stating that they are refusing to refill Victoza at any dose as of right now due to the diagnosis code.  The pharmacy and when I talked to the pharmacy supervisor states because they are in independent pharmacy they are \"cracking down on the approved and unapproved diagnoses for the GLP-1 agonists\" for fear of auditing issues.  Therefore they will no longer fill the prescription with any other diagnosis than what the prescription is FDA approved for.  Therefore unfortunately the patient will not be able to continue on the Victoza if filled at this pharmacy.  The issue is is that the patient's group home only uses this pharmacy so cannot go around and use a different pharmacy that would fill the prescription.  Therefore defer to you for next steps regarding medications for this patient as no longer follow with the patient.    Lauren Bloch, PharmD, BCACP   Medication Therapy Management Pharmacist   [facility] [place]      "

## 2023-03-30 NOTE — TELEPHONE ENCOUNTER
We cannot appeal Saxenda. He has medicare.     Lauren Bloch, PharmD, BCACP   Medication Therapy Management Pharmacist   [facility] [place]

## 2023-04-12 ENCOUNTER — DOCUMENTATION ONLY (OUTPATIENT)
Dept: RESPIRATORY THERAPY | Facility: CLINIC | Age: 43
End: 2023-04-12
Payer: MEDICARE

## 2023-04-12 ENCOUNTER — DOCUMENTATION ONLY (OUTPATIENT)
Dept: SLEEP MEDICINE | Facility: CLINIC | Age: 43
End: 2023-04-12
Payer: MEDICARE

## 2023-04-12 DIAGNOSIS — G47.33 OSA (OBSTRUCTIVE SLEEP APNEA): Primary | ICD-10-CM

## 2023-04-12 NOTE — PROGRESS NOTES
Good Morning!  Humberto's machine failed.  He is eligible for a new machine and will plan to set him up in his home later today.   Thanks,   Koki

## 2023-04-12 NOTE — PROGRESS NOTES
TREATMENT PLAN AND GOALS:  PATIENT INSTRUCTED ON USE AND CARE OF THE PAP EQUIPMENT IN ACCORDANCE WITH THE [facility] PRACTICE GUIDELINES.  MACHINE AND MODE: CPAP ()   PRESSURE SETTING OF: 11 CM H2O   SN: [id number]  DN: [id number]  ENROLL IN STM: Y   REPLACEMENT: Y   IF REPLACEMENT: ENTER DATE OF LAST MACHINE PURCHASED - 2/6/2017    PSG TYPE: SPLIT NIGHT   PSG DATE: 1/17/2017  PSG SCORING: NOT LISTED  AHI: 26.4  RDI (COMMERCIAL & MA ONLY): 35.7  DIAGNOSIS: G47.33  MASK TYPE ON RX: FULL FACE   TUBING TYPE ON RX: HEATED    PATIENT WAS OFFERED CHOICE OF VENDOR AND CHOSE [facility].  PATIENT TO FOLLOW MEDICARE LCD STANDARDS FOR USE REQUIREMENTS AND INSTRUCTED TO FOLLOW UP WITH THEIR PHYSICIAN WITHIN THESE GUIDELINES.  PATIENT WAS PROVIDED CARE SHEET AND RESUPPLY SCHEDULE AND INSTRUCTED TO CALL ONE OF OUR LOCATIONS WITH QUESTIONS AND CONCERNS.  PATIENT WILL BE FOLLOWED UP WITH ACCORDING [facility] PROTOCOL.  I HAVE VERIFIED THE DISPENSED MACHINE IS EITHER IN ORIGINAL PACKAGING FROM THE  OR HAS BEEN CHECKED AND FUNCTIONAL BASED ON THE CERTIFICATION STICKER ON THE OUTER PACKAGING.  THEREFORE, THE EQUIPMENT IS SAFE FOR DISPENSING TO THIS PATIENT.      RX SIGNED BY: BRISEYDA COBOS 4/12/2023  REFERRAL SOURCE: [facility]   DATE RX SIGNED: 4/12/2023  VERITO = 99    DATE PATIENT WAS SETUP ON: 4/12/2023  LOCATION OF SETUP: AT PATIENT GROUP HOME IN [place].   SETUP BY: LES KUMARI MASK CHOICE: PATIENT CURRENTLY USING F20 MEDIUM

## 2023-06-21 DIAGNOSIS — E66.09 OBESITY DUE TO EXCESS CALORIES, UNSPECIFIED OBESITY SEVERITY: Chronic | ICD-10-CM

## 2023-08-02 ENCOUNTER — OFFICE VISIT (OUTPATIENT)
Dept: FAMILY MEDICINE | Facility: CLINIC | Age: 43
End: 2023-08-02
Payer: MEDICARE

## 2023-08-02 VITALS
HEART RATE: 89 BPM | HEIGHT: 64 IN | RESPIRATION RATE: 20 BRPM | TEMPERATURE: 97.2 F | DIASTOLIC BLOOD PRESSURE: 64 MMHG | WEIGHT: 236 LBS | OXYGEN SATURATION: 96 % | SYSTOLIC BLOOD PRESSURE: 126 MMHG | BODY MASS INDEX: 40.29 KG/M2

## 2023-08-02 DIAGNOSIS — H10.13 ALLERGIC CONJUNCTIVITIS, BILATERAL: Primary | ICD-10-CM

## 2023-08-02 PROCEDURE — 99213 OFFICE O/P EST LOW 20 MIN: CPT | Performed by: PHYSICIAN ASSISTANT

## 2023-08-02 RX ORDER — OLOPATADINE HYDROCHLORIDE 2 MG/ML
1 SOLUTION/ DROPS OPHTHALMIC DAILY
Qty: 2.5 ML | Refills: 1 | Status: SHIPPED | OUTPATIENT
Start: 2023-08-02 | End: 2023-09-08

## 2023-08-02 ASSESSMENT — PAIN SCALES - GENERAL: PAINLEVEL: MILD PAIN (2)

## 2023-08-02 NOTE — PROGRESS NOTES
"  Assessment & Plan     Allergic conjunctivitis, bilateral  treat  - olopatadine (PATADAY) 0.2 % ophthalmic solution  Dispense: 2.5 mL; Refill: 1    Patient Instructions   Do not think this is pink eye  No infection, bacterial or viral  Suspect is more allergies or related to smokey air  Try allergy eye drops - olopatadine 0.2% ophthalmic solution - Place 0.05 mLs (1 drop) into both eyes daily In morning X 2 weeks then as needed for extra watery eye, irritated feeling eye, redness/swelling of eyelid   Optional - can also use warm wet washcloth to wipe away eye matter/flaking    See optometry if not improving    Nupur Osei PA-C  [facility]    Cleve Paul is a 43 year old, presenting for the following health issues:  Eye Problem      8/2/2023    11:01 AM   Additional Questions   Roomed by Mickie   Accompanied by Law       History of Present Illness       Reason for visit:  Left eye swollen and red  Symptom onset:  3-7 days ago  Symptoms include:  Swollen in am and gets red  Symptom intensity:  Mild  Symptom progression:  Worsening  Had these symptoms before:  No    He eats 2-3 servings of fruits and vegetables daily.He consumes 2 sweetened beverage(s) daily.He exercises with enough effort to increase his heart rate 9 or less minutes per day.  He exercises with enough effort to increase his heart rate 3 or less days per week.   He is taking medications regularly.     Here with group home staff today.  Eyes always a bit watery but L eye worse lately and with swollen red lower eyelid in mornings.  He itches at it some.  Feels a bit gritty and irritated more than outright pain.        Objective    /64 (BP Location: Left arm)   Pulse 89   Temp 97.2  F (36.2  C) (Tympanic)   Resp 20   Ht 1.626 m (5' 4\")   Wt 107 kg (236 lb)   SpO2 96%   BMI 40.51 kg/m    Body mass index is 40.51 kg/m .  Physical Exam   Bilaterally swollen conjunctiva, no injection.  Slight dried " crusting around eyes, not on eyelashes.  No discharge.

## 2023-08-02 NOTE — PATIENT INSTRUCTIONS
Do not think this is pink eye  No infection, bacterial or viral  Suspect is more allergies or related to smokey air  Try allergy eye drops - olopatadine 0.2% ophthalmic solution - Place 0.05 mLs (1 drop) into both eyes daily In morning X 2 weeks then as needed for extra watery eye, irritated feeling eye, redness/swelling of eyelid   Optional - can also use warm wet washcloth to wipe away eye matter/flaking    See optometry if not improving

## 2023-08-15 ENCOUNTER — DOCUMENTATION ONLY (OUTPATIENT)
Dept: OTHER | Facility: CLINIC | Age: 43
End: 2023-08-15
Payer: MEDICARE

## 2023-08-15 DIAGNOSIS — E66.09 OBESITY DUE TO EXCESS CALORIES, UNSPECIFIED OBESITY SEVERITY: Chronic | ICD-10-CM

## 2023-08-15 RX ORDER — TOPIRAMATE 25 MG/1
25 TABLET, FILM COATED ORAL 2 TIMES DAILY
Qty: 60 TABLET | Refills: 0 | Status: SHIPPED | OUTPATIENT
Start: 2023-08-15 | End: 2023-09-21

## 2023-08-15 RX ORDER — TOPIRAMATE 50 MG/1
50 TABLET, FILM COATED ORAL 2 TIMES DAILY
Qty: 60 TABLET | Refills: 0 | Status: SHIPPED | OUTPATIENT
Start: 2023-08-15 | End: 2023-09-21

## 2023-08-15 NOTE — TELEPHONE ENCOUNTER
Law calling from patients group [place]. Caller stated the patients pharmacy has been sending requests for Topiramate 25 & 50 mg for weeks. Patient has been out of/off of the medication. [facility]. Also please fax to [facility] [phone]    Law [phone]

## 2023-08-15 NOTE — LETTER
August 15, 2023      TO: Humberto Estrella  [id number]  [place] [address]         Dear Mr. Humberto Estrella,    You have been off the Topiramate for a few weeks. We are sending refills of both 25mg and 50mg tabs. Your maintenance dose should be 75mg twice daily. When you get your medication, please start back with one 25mg tablet twice daily for 7 days. Then increase to 50mg twice daily for 7 days. Then increase to 75mg twice daily until you see the weight management clinic again. Please reach out with any questions.       Sincerely,      Wanda Diaz MD

## 2023-08-15 NOTE — TELEPHONE ENCOUNTER
Called and spoke with Law in regards to patient's Topiramate prescription. Verified dose, as last note had conflicting SIG. Law states that Humberto had been taking 75mg BID (one 25mg tab and one 50mg tab, twice daily). Has been out for 3+ weeks. Patient has appointment with Dr. Diaz next month. Law is requesting to have prescription sent to [facility] and faxed to the group home. Routed to RN/VERNON for sign off.

## 2023-09-02 DIAGNOSIS — J30.2 SEASONAL ALLERGIC RHINITIS, UNSPECIFIED TRIGGER: ICD-10-CM

## 2023-09-04 RX ORDER — PSEUDOEPHEDRINE HCL 120 MG/1
1 TABLET, FILM COATED, EXTENDED RELEASE ORAL DAILY PRN
Qty: 12 TABLET | Refills: 11 | Status: SHIPPED | OUTPATIENT
Start: 2023-09-04 | End: 2023-09-08

## 2023-09-05 DIAGNOSIS — E66.09 OBESITY DUE TO EXCESS CALORIES, UNSPECIFIED OBESITY SEVERITY: Chronic | ICD-10-CM

## 2023-09-07 ENCOUNTER — OFFICE VISIT (OUTPATIENT)
Dept: ENDOCRINOLOGY | Facility: CLINIC | Age: 43
End: 2023-09-07
Payer: MEDICARE

## 2023-09-07 VITALS
HEIGHT: 64 IN | OXYGEN SATURATION: 96 % | HEART RATE: 81 BPM | SYSTOLIC BLOOD PRESSURE: 93 MMHG | DIASTOLIC BLOOD PRESSURE: 63 MMHG | BODY MASS INDEX: 41.6 KG/M2 | WEIGHT: 243.7 LBS

## 2023-09-07 DIAGNOSIS — E66.09 OBESITY DUE TO EXCESS CALORIES, UNSPECIFIED OBESITY SEVERITY: Chronic | ICD-10-CM

## 2023-09-07 PROCEDURE — 99213 OFFICE O/P EST LOW 20 MIN: CPT | Performed by: INTERNAL MEDICINE

## 2023-09-07 RX ORDER — METFORMIN HCL 500 MG
TABLET, EXTENDED RELEASE 24 HR ORAL
Qty: 240 TABLET | Refills: 4 | Status: SHIPPED | OUTPATIENT
Start: 2023-09-07 | End: 2024-01-16 | Stop reason: SINTOL

## 2023-09-07 ASSESSMENT — PAIN SCALES - GENERAL: PAINLEVEL: NO PAIN (0)
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
## 2023-09-07 NOTE — NURSING NOTE
"(   Chief Complaint   Patient presents with    Follow Up    )    ( Weight: 243 lb 11.2 oz )  ( Height: 5' 4\" )  ( BMI (Calculated): 41.83 )  (   )  ( Cumulative weight loss (lbs): 6.3 )  ( Last Visits Weight: 226 lb )  ( Wt change since last visit (lbs): 17.7 )  (   )  (   )    ( BP: 93/63 )  (   )  (   )  (   )  ( Pulse: 81 )  (   )  ( SpO2: 96 % )    (   Patient Active Problem List   Diagnosis    TRISOMY 21 (DOWN SYNDROME)    Hearing loss    MYOPIA    LATENT NYSTAGMUS    Headache    post traumatic stress disorder    CARDIOVASCULAR SCREENING; LDL GOAL LESS THAN 160    Cortical, lamellar, or zonular cataract, nonsenile    Nonallergic rhinitis    Obesity due to excess calories, unspecified obesity severity    Subclinical hypothyroidism    VIDAL (obstructive sleep apnea)-AHI 26    Anxiety    Explosive personality disorder (H)    Prediabetes    Lives in group home    Suicide attempt (H)    Hypoalbuminemia    Morbid obesity (H)    )  (   Current Outpatient Medications   Medication Sig Dispense Refill    alum & mag hydroxide-simethicone (MYLANTA/MAALOX) 200-200-20 MG/5ML SUSP suspension Take 30 mLs by mouth every 4 hours as needed for indigestion (2 tsp for upset stomach) 1 Bottle 3    calcium carbonate (TUMS) 500 MG chewable tablet Take 1 tablet (500 mg) by mouth every 6 hours as needed for heartburn 150 tablet 3    calcium polycarbophil (FIBERCON) 625 MG tablet Take 2 tablets (1,250 mg) by mouth 2 times daily 360 tablet 3    cetirizine (ZYRTEC) 10 MG tablet Take 1 tablet (10 mg) by mouth At Bedtime Stop loratadine 90 tablet 3    DEEP SEA NASAL SPRAY 0.65 % nasal spray USE 2 SPRAYS IN EACH NOSTRIL FOUR TIMES DAILY AS NEEDED FOR CONGESTION 44 mL 11    divalproex sodium delayed-release (DEPAKOTE SPRINKLE) 125 MG DR capsule Take 750 mg by mouth 2 times daily Open capsules and sprinkle into pudding/sauce      divalproex sodium delayed-release (DEPAKOTE) 250 MG DR tablet 250 mg 2 times daily 4 capsules po bid      fluticasone " (FLONASE) 50 MCG/ACT nasal spray Spray 1 spray into both nostrils daily as needed for rhinitis or allergies 16 g 1    ibuprofen (ADVIL,MOTRIN) 600 MG tablet Take 1 tablet (600 mg) by mouth every 6 hours as needed for moderate pain 60 tablet 0    insulin pen needle (BD CANDI U/F) 32G X 4 MM miscellaneous Use 1 pen needle daily with liraglutide. 120 each 4    levothyroxine (SYNTHROID/LEVOTHROID) 100 MCG tablet Take 1 tablet (100 mcg) by mouth daily 90 tablet 3    liraglutide (VICTOZA) 18 MG/3ML solution Inject 1.8 mg Subcutaneous daily 9 mL 3    magnesium hydroxide (MILK OF MAGNESIA) 400 MG/5ML suspension Take 30-60 mLs by mouth daily as needed for constipation or heartburn (If No Bowel Movement in 2 days.) Reported on 4/12/2017 360 mL 1    metFORMIN (GLUCOPHAGE XR) 500 MG 24 hr tablet Take 1 tablet (500 mg) by mouth 2 times daily (with meals) 180 tablet 3    metFORMIN (GLUCOPHAGE) 500 MG tablet Take 1 tablet (500 mg) by mouth 2 times daily (with meals) The PM dose can be taken before dinner (as early as 4pm). 180 tablet 3    montelukast (SINGULAIR) 10 MG tablet Take 1 tablet (10 mg) by mouth At Bedtime 90 tablet 3    OLANZapine (ZYPREXA) 5 MG tablet Take 2.5 mg by mouth 3 times daily as needed As neede 30 tablet 1    olopatadine (PATADAY) 0.2 % ophthalmic solution Place 0.05 mLs (1 drop) into both eyes daily In morning X 2 weeks then as needed for extra watery eye, irritated feeling eye, redness/swelling of eyelid. 2.5 mL 1    order for DME Equipment being ordered: CPAP  AIRSENSE 10  11 CM H20  AIRFIT F20 MEDIUM  SN# [id number]  DN# [id number]      PAIN & FEVER 325 MG tablet TAKE 1-2 TABLETS (325-650MG) BY MOUTH EVERY 4 HOURS AS NEEDED *ORDER WHEN LOW* 100 tablet 7    phenol (CHLORASEPTIC) 1.4 % spray Take 1 spray by mouth every hour as needed for sore throat Use as directed for sore throt      polyethylene glycol-propylene glycol PF (SYSTANE ULTRA PF) 0.4-0.3 % SOLN opthalmic solution Place 1-2 drops into both eyes Up to 5  times daily as needed      risperiDONE (RISPERDAL) 0.5 MG tablet Take 0.5 mg by mouth 2 times daily At noon      risperiDONE (RISPERDAL) 1 MG tablet Take 1 mg by mouth every morning 60 tablet     risperiDONE (RISPERDAL) 2 MG tablet Take 2 mg by mouth daily at 8:00pm      sertraline (ZOLOFT) 100 MG tablet Take 200 mg by mouth daily       SUDOGEST 12 HOUR 120 MG 12 hr tablet TAKE 1 TABLET BY MOUTH ONCE DAILY AS NEEDED 12 tablet 11    topiramate (TOPAMAX) 25 MG tablet Take 1 tablet (25 mg) by mouth 2 times daily To be taken with 50mg tabs BID totaling 75mg BID. 60 tablet 0    topiramate (TOPAMAX) 50 MG tablet Take 1 tablet (50 mg) by mouth 2 times daily To be taken with 25mg tabs BID totaling 75mg BID. 60 tablet 0    triamcinolone (KENALOG) 0.1 % cream Apply  topically 3 times daily. Apply sparingly to affected area. 30 g 1    trimethoprim-polymyxin b (POLYTRIM) 66571-8.1 UNIT/ML-% ophthalmic solution Place 1-2 drops into both eyes every 4 hours 10 mL 0    VITAMIN D3 25 MCG (1000 UT) tablet TAKE 1 TABLET BY MOUTH ONCE DAILY 100 tablet 2    )  ( Diabetes Eval:    )    ( Pain Eval:  No Pain (0) )    ( Wound Eval:       )    (   History   Smoking Status    Former    Packs/day: 1.00    Years: 1.00    Types: Cigarettes    Start date: 5/5/1999    Quit date: 5/5/2000   Smokeless Tobacco    Former    )    ( Signed By:  Josseline Hearn; September 7, 2023; 3:57 PM )
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
## 2023-09-07 NOTE — PATIENT INSTRUCTIONS
Talk to the team about phentermine.    We will send you the application information for getting liraglutide or semaglutide from [employer].     Either way we can increase metformin.     -- you could take metformin after lunch and after dinner    It doesn't have to be in the morning. If it is about 5 hours apart, that is plenty.     For 1 week: tae 2 tablets (1,000mg) after lunch and 1 tablet (500mg) after dinner    After 1 week, take 2 tablets (1,000mg) after lunch and 2 tablets (1,000mg) after dinner

## 2023-09-08 ENCOUNTER — OFFICE VISIT (OUTPATIENT)
Dept: FAMILY MEDICINE | Facility: CLINIC | Age: 43
End: 2023-09-08
Payer: MEDICARE

## 2023-09-08 VITALS
BODY MASS INDEX: 40.46 KG/M2 | WEIGHT: 237 LBS | SYSTOLIC BLOOD PRESSURE: 108 MMHG | TEMPERATURE: 96.9 F | HEIGHT: 64 IN | DIASTOLIC BLOOD PRESSURE: 64 MMHG | HEART RATE: 68 BPM | RESPIRATION RATE: 16 BRPM | OXYGEN SATURATION: 98 %

## 2023-09-08 DIAGNOSIS — E03.8 SUBCLINICAL HYPOTHYROIDISM: ICD-10-CM

## 2023-09-08 DIAGNOSIS — J31.0 NONALLERGIC RHINITIS: ICD-10-CM

## 2023-09-08 DIAGNOSIS — E55.9 VITAMIN D DEFICIENCY: ICD-10-CM

## 2023-09-08 DIAGNOSIS — Z00.00 ENCOUNTER FOR MEDICARE ANNUAL WELLNESS EXAM: Primary | ICD-10-CM

## 2023-09-08 DIAGNOSIS — M25.511 CHRONIC PAIN OF BOTH SHOULDERS: ICD-10-CM

## 2023-09-08 DIAGNOSIS — M25.562 BILATERAL ANTERIOR KNEE PAIN: ICD-10-CM

## 2023-09-08 DIAGNOSIS — H10.13 ALLERGIC CONJUNCTIVITIS, BILATERAL: ICD-10-CM

## 2023-09-08 DIAGNOSIS — Z23 NEED FOR VACCINATION: ICD-10-CM

## 2023-09-08 DIAGNOSIS — G89.29 CHRONIC PAIN OF BOTH SHOULDERS: ICD-10-CM

## 2023-09-08 DIAGNOSIS — E03.9 ACQUIRED HYPOTHYROIDISM: Primary | ICD-10-CM

## 2023-09-08 DIAGNOSIS — M25.512 CHRONIC PAIN OF BOTH SHOULDERS: ICD-10-CM

## 2023-09-08 DIAGNOSIS — M25.561 BILATERAL ANTERIOR KNEE PAIN: ICD-10-CM

## 2023-09-08 DIAGNOSIS — E66.09 OBESITY DUE TO EXCESS CALORIES, UNSPECIFIED OBESITY SEVERITY: Chronic | ICD-10-CM

## 2023-09-08 DIAGNOSIS — J30.2 SEASONAL ALLERGIC RHINITIS, UNSPECIFIED TRIGGER: ICD-10-CM

## 2023-09-08 DIAGNOSIS — K59.01 SLOW TRANSIT CONSTIPATION: ICD-10-CM

## 2023-09-08 LAB
ALBUMIN SERPL BCG-MCNC: 3.7 G/DL (ref 3.5–5.2)
ALP SERPL-CCNC: 53 U/L (ref 40–129)
ALT SERPL W P-5'-P-CCNC: 48 U/L (ref 0–70)
ANION GAP SERPL CALCULATED.3IONS-SCNC: 13 MMOL/L (ref 7–15)
AST SERPL W P-5'-P-CCNC: 69 U/L (ref 0–45)
BILIRUB SERPL-MCNC: 0.3 MG/DL
BUN SERPL-MCNC: 20.9 MG/DL (ref 6–20)
CALCIUM SERPL-MCNC: 9.4 MG/DL (ref 8.6–10)
CHLORIDE SERPL-SCNC: 106 MMOL/L (ref 98–107)
CHOLEST SERPL-MCNC: 193 MG/DL
CREAT SERPL-MCNC: 1.28 MG/DL (ref 0.67–1.17)
DEPRECATED HCO3 PLAS-SCNC: 21 MMOL/L (ref 22–29)
EGFRCR SERPLBLD CKD-EPI 2021: 71 ML/MIN/1.73M2
ERYTHROCYTE [DISTWIDTH] IN BLOOD BY AUTOMATED COUNT: 14.7 % (ref 10–15)
GLUCOSE SERPL-MCNC: 89 MG/DL (ref 70–99)
HBA1C MFR BLD: 5 % (ref 0–5.6)
HCT VFR BLD AUTO: 43.3 % (ref 40–53)
HDLC SERPL-MCNC: 43 MG/DL
HGB BLD-MCNC: 14.3 G/DL (ref 13.3–17.7)
LDLC SERPL CALC-MCNC: 114 MG/DL
MCH RBC QN AUTO: 32.9 PG (ref 26.5–33)
MCHC RBC AUTO-ENTMCNC: 33 G/DL (ref 31.5–36.5)
MCV RBC AUTO: 100 FL (ref 78–100)
NONHDLC SERPL-MCNC: 150 MG/DL
PLATELET # BLD AUTO: 167 10E3/UL (ref 150–450)
POTASSIUM SERPL-SCNC: 4.7 MMOL/L (ref 3.4–5.3)
PROT SERPL-MCNC: 6.9 G/DL (ref 6.4–8.3)
RBC # BLD AUTO: 4.34 10E6/UL (ref 4.4–5.9)
SODIUM SERPL-SCNC: 140 MMOL/L (ref 136–145)
T4 FREE SERPL-MCNC: 1.12 NG/DL (ref 0.9–1.7)
TRIGL SERPL-MCNC: 181 MG/DL
TSH SERPL DL<=0.005 MIU/L-ACNC: 8.33 UIU/ML (ref 0.3–4.2)
WBC # BLD AUTO: 3.7 10E3/UL (ref 4–11)

## 2023-09-08 PROCEDURE — 99214 OFFICE O/P EST MOD 30 MIN: CPT | Mod: 25 | Performed by: INTERNAL MEDICINE

## 2023-09-08 PROCEDURE — 85027 COMPLETE CBC AUTOMATED: CPT | Performed by: INTERNAL MEDICINE

## 2023-09-08 PROCEDURE — 80061 LIPID PANEL: CPT | Performed by: INTERNAL MEDICINE

## 2023-09-08 PROCEDURE — 90686 IIV4 VACC NO PRSV 0.5 ML IM: CPT | Performed by: INTERNAL MEDICINE

## 2023-09-08 PROCEDURE — G0439 PPPS, SUBSEQ VISIT: HCPCS | Performed by: INTERNAL MEDICINE

## 2023-09-08 PROCEDURE — 84443 ASSAY THYROID STIM HORMONE: CPT | Performed by: INTERNAL MEDICINE

## 2023-09-08 PROCEDURE — 80053 COMPREHEN METABOLIC PANEL: CPT | Performed by: INTERNAL MEDICINE

## 2023-09-08 PROCEDURE — 83036 HEMOGLOBIN GLYCOSYLATED A1C: CPT | Performed by: INTERNAL MEDICINE

## 2023-09-08 PROCEDURE — 84439 ASSAY OF FREE THYROXINE: CPT | Performed by: INTERNAL MEDICINE

## 2023-09-08 PROCEDURE — G0008 ADMIN INFLUENZA VIRUS VAC: HCPCS | Performed by: INTERNAL MEDICINE

## 2023-09-08 PROCEDURE — 36415 COLL VENOUS BLD VENIPUNCTURE: CPT | Performed by: INTERNAL MEDICINE

## 2023-09-08 RX ORDER — LEVOTHYROXINE SODIUM 112 UG/1
112 TABLET ORAL DAILY
Qty: 90 TABLET | Refills: 3 | Status: SHIPPED | OUTPATIENT
Start: 2023-09-08 | End: 2023-11-14 | Stop reason: DRUGHIGH

## 2023-09-08 RX ORDER — CETIRIZINE HYDROCHLORIDE 10 MG/1
10 TABLET ORAL AT BEDTIME
Qty: 90 TABLET | Refills: 3 | Status: SHIPPED | OUTPATIENT
Start: 2023-09-08 | End: 2024-09-10

## 2023-09-08 RX ORDER — LEVOTHYROXINE SODIUM 100 UG/1
100 TABLET ORAL DAILY
Qty: 90 TABLET | Refills: 3 | Status: SHIPPED | OUTPATIENT
Start: 2023-09-08 | End: 2023-09-08

## 2023-09-08 RX ORDER — PSEUDOEPHEDRINE HCL 120 MG/1
1 TABLET, FILM COATED, EXTENDED RELEASE ORAL DAILY PRN
Qty: 12 TABLET | Refills: 11 | Status: SHIPPED | OUTPATIENT
Start: 2023-09-08

## 2023-09-08 RX ORDER — FLUTICASONE PROPIONATE 50 MCG
1 SPRAY, SUSPENSION (ML) NASAL DAILY PRN
Qty: 16 G | Refills: 11 | Status: SHIPPED | OUTPATIENT
Start: 2023-09-08 | End: 2024-01-16

## 2023-09-08 RX ORDER — CALCIUM POLYCARBOPHIL 625 MG 625 MG/1
2 TABLET ORAL 2 TIMES DAILY
Qty: 360 TABLET | Refills: 3 | Status: SHIPPED | OUTPATIENT
Start: 2023-09-08

## 2023-09-08 RX ORDER — OLOPATADINE HYDROCHLORIDE 2 MG/ML
1 SOLUTION/ DROPS OPHTHALMIC DAILY
Qty: 2.5 ML | Refills: 11 | Status: SHIPPED | OUTPATIENT
Start: 2023-09-08 | End: 2024-03-20

## 2023-09-08 RX ORDER — MONTELUKAST SODIUM 10 MG/1
10 TABLET ORAL AT BEDTIME
Qty: 90 TABLET | Refills: 3 | Status: CANCELLED | OUTPATIENT
Start: 2023-09-08

## 2023-09-08 RX ORDER — VITAMIN B COMPLEX
1 TABLET ORAL DAILY
Qty: 100 TABLET | Refills: 3 | Status: SHIPPED | OUTPATIENT
Start: 2023-09-08

## 2023-09-08 ASSESSMENT — PATIENT HEALTH QUESTIONNAIRE - PHQ9
10. IF YOU CHECKED OFF ANY PROBLEMS, HOW DIFFICULT HAVE THESE PROBLEMS MADE IT FOR YOU TO DO YOUR WORK, TAKE CARE OF THINGS AT HOME, OR GET ALONG WITH OTHER PEOPLE: EXTREMELY DIFFICULT
SUM OF ALL RESPONSES TO PHQ QUESTIONS 1-9: 9
SUM OF ALL RESPONSES TO PHQ QUESTIONS 1-9: 9

## 2023-09-08 ASSESSMENT — ACTIVITIES OF DAILY LIVING (ADL)
CURRENT_FUNCTION: HOUSEWORK REQUIRES ASSISTANCE
CURRENT_FUNCTION: LAUNDRY REQUIRES ASSISTANCE
CURRENT_FUNCTION: TRANSPORTATION REQUIRES ASSISTANCE
CURRENT_FUNCTION: PREPARING MEALS REQUIRES ASSISTANCE
CURRENT_FUNCTION: MEDICATION ADMINISTRATION REQUIRES ASSISTANCE
CURRENT_FUNCTION: MONEY MANAGEMENT REQUIRES ASSISTANCE
CURRENT_FUNCTION: BATHING REQUIRES ASSISTANCE
CURRENT_FUNCTION: SHOPPING REQUIRES ASSISTANCE

## 2023-09-08 ASSESSMENT — ENCOUNTER SYMPTOMS
DIZZINESS: 0
FEVER: 0
MYALGIAS: 1
HEMATOCHEZIA: 0
SHORTNESS OF BREATH: 0
HEMATURIA: 0
CHILLS: 0
COUGH: 0
DYSURIA: 0
WEAKNESS: 0
PALPITATIONS: 0
EYE PAIN: 1
ARTHRALGIAS: 1
NERVOUS/ANXIOUS: 1
ABDOMINAL PAIN: 0
PARESTHESIAS: 0
FREQUENCY: 0
CONSTIPATION: 0
HEADACHES: 0
HEARTBURN: 0
SORE THROAT: 0
JOINT SWELLING: 0
DIARRHEA: 0
NAUSEA: 0

## 2023-09-08 ASSESSMENT — PAIN SCALES - GENERAL: PAINLEVEL: NO PAIN (0)

## 2023-09-08 NOTE — PROGRESS NOTES
"SUBJECTIVE:   Humberto is a 43 year old who presents for Preventive Visit.      9/8/2023    11:31 AM   Additional Questions   Roomed by Jovanna SALOMON   Accompanied by [facility] staff         9/8/2023    11:31 AM   Patient Reported Additional Medications   Patient reports taking the following new medications no new meds       Are you in the first 12 months of your Medicare coverage?  No    Healthy Habits:     In general, how would you rate your overall health?  Good    Frequency of exercise:  1 day/week    Duration of exercise:  Less than 15 minutes    Do you usually eat at least 4 servings of fruit and vegetables a day, include whole grains    & fiber and avoid regularly eating high fat or \"junk\" foods?  Yes    Taking medications regularly:  Yes    Barriers to taking medications:  None    Medication side effects:  No muscle aches and No significant flushing    Ability to successfully perform activities of daily living:  Transportation requires assistance, shopping requires assistance, preparing meals requires assistance, housework requires assistance, bathing requires assistance, laundry requires assistance, medication administration requires assistance and money management requires assistance    Home Safety:  No safety concerns identified    Hearing Impairment:  No hearing concerns    In the past 6 months, have you been bothered by leaking of urine?  No    In general, how would you rate your overall mental or emotional health?  Good    Additional concerns today:  Yes  Ocusoft eyelid scrub added to med list  Discuss singulair.  Legs start hurting while walking fast.  --he is not very active; stopped working; anger issues  --staff is encouraging him to exercise more  --has aching in knees - negative xray in 2021  --anterior knees; moderate level; sometimes interfers with walking  --no limping, no injury    Arms itching   --bilateral forearms    Shoulder pain   --bilateral, anterior;  cannot provide other history "           Have you ever done Advance Care Planning? (For example, a Health Directive, POLST, or a discussion with a medical provider or your loved ones about your wishes): No, advance care planning information given to patient to review.  Patient declined advance care planning discussion at this time.       Fall risk  Fallen 2 or more times in the past year?: No  Any fall with injury in the past year?: No      Cognitive Screening Not appropriate due to known dementia    Do you have sleep apnea, excessive snoring or daytime drowsiness? : yes    Reviewed and updated as needed this visit by clinical staff   Tobacco  Allergies  Meds  Problems             Reviewed and updated as needed this visit by Provider     Meds  Problems            Social History     Tobacco Use    Smoking status: Former     Packs/day: 1.00     Years: 1.00     Pack years: 1.00     Types: Cigarettes     Start date: 1999     Quit date: 2000     Years since quittin.3    Smokeless tobacco: Former   Substance Use Topics    Alcohol use: No     Alcohol/week: 0.0 standard drinks of alcohol             2023    11:33 AM   Alcohol Use   Prescreen: >3 drinks/day or >7 drinks/week? No     Do you have a current opioid prescription? No  Do you use any other controlled substances or medications that are not prescribed by a provider? None              Current providers sharing in care for this patient include:   Patient Care Team:  Carla Brown DO as PCP - General (Internal Medicine)  Bloch, Lauren Turner, [facility] as Pharmacist (Pharmacist)  Carla Brown DO as Assigned PCP    The following health maintenance items are reviewed in Epic and correct as of today:  Health Maintenance   Topic Date Due    HEPATITIS B IMMUNIZATION (3 of 3 - 19+ 3-dose series) 2011    COVID-19 Vaccine (4 - Moderna series) 2022    INFLUENZA VACCINE (1) 2023    TSH W/FREE T4 REFLEX  2023    ANNUAL REVIEW OF HM ORDERS  2023     MEDICARE ANNUAL WELLNESS VISIT  09/08/2024    LIPID  09/08/2024    ADVANCE CARE PLANNING  09/08/2028    DTAP/TDAP/TD IMMUNIZATION (3 - Td or Tdap) 07/15/2031    HEPATITIS C SCREENING  Completed    Pneumococcal Vaccine: Pediatrics (0 to 5 Years) and At-Risk Patients (6 to 64 Years)  Aged Out    IPV IMMUNIZATION  Aged Out    HPV IMMUNIZATION  Aged Out    MENINGITIS IMMUNIZATION  Aged Out    HIV SCREENING  Discontinued     Current Outpatient Medications   Medication Sig Dispense Refill    alum & mag hydroxide-simethicone (MYLANTA/MAALOX) 200-200-20 MG/5ML SUSP suspension Take 30 mLs by mouth every 4 hours as needed for indigestion (2 tsp for upset stomach) 1 Bottle 3    calcium carbonate (TUMS) 500 MG chewable tablet Take 1 tablet (500 mg) by mouth every 6 hours as needed for heartburn 150 tablet 3    calcium polycarbophil (FIBERCON) 625 MG tablet Take 2 tablets (1,250 mg) by mouth 2 times daily 360 tablet 3    cetirizine (ZYRTEC) 10 MG tablet Take 1 tablet (10 mg) by mouth At Bedtime Stop loratadine 90 tablet 3    divalproex sodium delayed-release (DEPAKOTE SPRINKLE) 125 MG DR capsule Take 750 mg by mouth 2 times daily Open capsules and sprinkle into pudding/sauce      divalproex sodium delayed-release (DEPAKOTE) 250 MG DR tablet 250 mg 2 times daily 4 capsules po bid.  1000  in AM      fluticasone (FLONASE) 50 MCG/ACT nasal spray Spray 1 spray into both nostrils daily as needed for rhinitis or allergies 16 g 1    ibuprofen (ADVIL,MOTRIN) 600 MG tablet Take 1 tablet (600 mg) by mouth every 6 hours as needed for moderate pain 60 tablet 0    levothyroxine (SYNTHROID/LEVOTHROID) 100 MCG tablet Take 1 tablet (100 mcg) by mouth daily 90 tablet 3    magnesium hydroxide (MILK OF MAGNESIA) 400 MG/5ML suspension Take 30-60 mLs by mouth daily as needed for constipation or heartburn (If No Bowel Movement in 2 days.) Reported on 4/12/2017 360 mL 1    metFORMIN (GLUCOPHAGE XR) 500 MG 24 hr tablet Take 2 tablets (1,000mg)  after lunch and 1 tablet (500mg) after dinner. After 1 week, take 2 tablets (1,000mg) after lunch and 2 tablets (1,000mg) after dinner 240 tablet 4    OLANZapine (ZYPREXA) 5 MG tablet Take 2.5 mg by mouth 3 times daily as needed As needed, max of 15 30 tablet 1    olopatadine (PATADAY) 0.2 % ophthalmic solution Place 0.05 mLs (1 drop) into both eyes daily In morning X 2 weeks then as needed for extra watery eye, irritated feeling eye, redness/swelling of eyelid. 2.5 mL 1    PAIN & FEVER 325 MG tablet TAKE 1-2 TABLETS (325-650MG) BY MOUTH EVERY 4 HOURS AS NEEDED *ORDER WHEN LOW* 100 tablet 7    phenol (CHLORASEPTIC) 1.4 % spray Take 1 spray by mouth every hour as needed for sore throat Use as directed for sore throt      polyethylene glycol-propylene glycol PF (SYSTANE ULTRA PF) 0.4-0.3 % SOLN opthalmic solution Place 1-2 drops into both eyes Up to 5 times daily as needed      risperiDONE (RISPERDAL) 0.5 MG tablet Take 0.5 mg by mouth 2 times daily At noon      risperiDONE (RISPERDAL) 1 MG tablet Take 1 mg by mouth every morning 60 tablet     risperiDONE (RISPERDAL) 2 MG tablet Take 2 mg by mouth daily at 8:00pm      sertraline (ZOLOFT) 100 MG tablet Take 200 mg by mouth daily       SUDOGEST 12 HOUR 120 MG 12 hr tablet TAKE 1 TABLET BY MOUTH ONCE DAILY AS NEEDED 12 tablet 11    topiramate (TOPAMAX) 25 MG tablet Take 1 tablet (25 mg) by mouth 2 times daily To be taken with 50mg tabs BID totaling 75mg BID. 60 tablet 0    topiramate (TOPAMAX) 50 MG tablet Take 1 tablet (50 mg) by mouth 2 times daily To be taken with 25mg tabs BID totaling 75mg BID. 60 tablet 0    VITAMIN D3 25 MCG (1000 UT) tablet TAKE 1 TABLET BY MOUTH ONCE DAILY 100 tablet 2    insulin pen needle (BD CANDI U/F) 32G X 4 MM miscellaneous Use 1 pen needle daily with liraglutide. 120 each 4    metFORMIN (GLUCOPHAGE) 500 MG tablet Take 1 tablet (500 mg) by mouth 2 times daily (with meals) The PM dose can be taken before dinner (as early as 4pm). 180 tablet  "3    order for DME Equipment being ordered: CPAP  AIRSENSE 10  11 CM H20  AIRFIT F20 MEDIUM  # [id number]  DN# [id number]       Review of Systems   Constitutional:  Negative for chills and fever.   HENT:  Positive for congestion. Negative for ear pain, hearing loss and sore throat.    Eyes:  Positive for pain. Negative for visual disturbance.   Respiratory:  Negative for cough and shortness of breath.    Cardiovascular:  Negative for chest pain, palpitations and peripheral edema.   Gastrointestinal:  Negative for abdominal pain, constipation, diarrhea, heartburn, hematochezia and nausea.   Genitourinary:  Negative for dysuria, frequency, genital sores, hematuria, impotence, penile discharge and urgency.   Musculoskeletal:  Positive for arthralgias and myalgias. Negative for joint swelling.   Skin:  Negative for rash.   Neurological:  Negative for dizziness, weakness, headaches and paresthesias.   Psychiatric/Behavioral:  Positive for mood changes. The patient is nervous/anxious.        OBJECTIVE:   /64   Pulse 68   Temp 96.9  F (36.1  C) (Tympanic)   Resp 16   Ht 1.613 m (5' 3.5\")   Wt 107.5 kg (237 lb)   SpO2 98%   BMI 41.32 kg/m   Estimated body mass index is 41.32 kg/m  as calculated from the following:    Height as of this encounter: 1.613 m (5' 3.5\").    Weight as of this encounter: 107.5 kg (237 lb).  Physical Exam  GENERAL: alert and no distress  EYES: Eyes grossly normal to inspection, PERRL and conjunctivae and sclerae normal  HENT: ear canals and TM's normal, nose and mouth without ulcers or lesions  NECK: no adenopathy, no asymmetry, masses, or scars and thyroid normal to palpation  RESP: lungs clear to auscultation - no rales, rhonchi or wheezes  CV: regular rate and rhythm, normal S1 S2, no S3 or S4, no murmur, click or rub, no peripheral edema and peripheral pulses strong  ABDOMEN: soft, nontender, no hepatosplenomegaly, no masses and bowel sounds normal  MS: No pain with palpation of " bilateral knees.  Good range of motion bilateral knees.  Mild palpation bilateral anterior shoulders.  Slightly reduced range of motion in all direction of bilateral shoulders  SKIN: no suspicious lesions or rashes  NEURO: Normal strength and tone, mentation intact and speech normal        ASSESSMENT / PLAN:   (Z00.00) Encounter for Medicare annual wellness exam  (primary encounter diagnosis)  Comment:   Plan:     (E66.09) Obesity due to excess calories, unspecified obesity severity  Comment: Following with the medical weight loss clinic  Plan: Hemoglobin A1c, Comprehensive metabolic panel         (BMP + Alb, Alk Phos, ALT, AST, Total. Bili,         TP), CBC with platelets, Lipid panel reflex to         direct LDL Fasting, cetirizine (ZYRTEC) 10 MG         tablet, OFFICE/OUTPT VISIT,EST,LEVL IV            (K59.01) Slow transit constipation  Comment:  - stable, refill provided  Plan: calcium polycarbophil (FIBERCON) 625 MG tablet,        OFFICE/OUTPT VISIT,EST,LEVL IV            (J31.0) Nonallergic rhinitis  Comment:  - stable, refill provided  Plan: fluticasone (FLONASE) 50 MCG/ACT nasal spray,         phenol (CHLORASEPTIC) 1.4 % spray, OFFICE/OUTPT        VISIT,EST,LEVL IV            (E03.8) Subclinical hypothyroidism  Comment: check lab, refill med  Plan: TSH WITH FREE T4 REFLEX, levothyroxine         (SYNTHROID/LEVOTHROID) 100 MCG tablet,         OFFICE/OUTPT VISIT,EST,LEVL IV, T4 free            (H10.13) Allergic conjunctivitis, bilateral  Comment:  - stable, refill provided  Plan: olopatadine (PATADAY) 0.2 % ophthalmic         solution, OFFICE/OUTPT VISIT,EST,LEVL IV            (J30.2) Seasonal allergic rhinitis, unspecified trigger  Comment: stop Singulair due to concern that this is impacting his mental health.  If you are to need further treatment for his allergies, would refer to allergist   Plan: pseudoePHEDrine (SUDOGEST 12 HOUR) 120 MG 12 hr        tablet, OFFICE/OUTPT VISIT,EST,LEVL IV         "    (E55.9) Vitamin D deficiency  Comment:  - stable, refill provided  Plan: vitamin D3 25 mcg (1000 units) tablet,         OFFICE/OUTPT VISIT,EST,LEVL IV            (M25.561,  M25.562) Bilateral anterior knee pain  Comment: seems functional.  Encouraged him to be more active to help with his aches and pains  Plan: Conservative care with heat, ice, modified activity, Tylenol    (M25.511,  G89.29,  M25.512) Chronic pain of both shoulders  Comment: Also seems functional.  Encouraged exercise.  Other treatment as discussed above for his knee pain  Plan:     (Z23) Need for vaccination  Comment:   Plan: INFLUENZA VACCINE IM > 6 MONTHS VALENT IIV4         (AFLURIA/FLUZONE), OFFICE/OUTPT VISIT,EST,LEVL         IV            Patient has been advised of split billing requirements and indicates understanding: Yes  COUNSELING:  Reviewed preventive health counseling, as reflected in patient instructions      BMI:   Estimated body mass index is 41.32 kg/m  as calculated from the following:    Height as of this encounter: 1.613 m (5' 3.5\").    Weight as of this encounter: 107.5 kg (237 lb).   Weight management plan: Patient referred to endocrine and/or weight management specialty      He reports that he quit smoking about 23 years ago. His smoking use included cigarettes. He started smoking about 24 years ago. He has a 1.00 pack-year smoking history. He has quit using smokeless tobacco.      Appropriate preventive services were discussed with this patient, including applicable screening as appropriate for cardiovascular disease, diabetes, osteopenia/osteoporosis, and glaucoma.  As appropriate for age/gender, discussed screening for colorectal cancer, prostate cancer, breast cancer, and cervical cancer. Checklist reviewing preventive services available has been given to the patient.    Reviewed patients plan of care and provided an AVS. The Complex Care Plan (for patients with higher acuity and needing more deliberate coordination " of services) for Humberto meets the Care Plan requirement. This Care Plan has been established and reviewed with the Patient and caregiver.          Carla Brown [facility]    Identified Health Risks:  I have reviewed Opioid Use Disorder and Substance Use Disorder risk factors and made any needed referrals. Answers submitted by the patient for this visit:  Patient Health Questionnaire (Submitted on 9/8/2023)  If you checked off any problems, how difficult have these problems made it for you to do your work, take care of things at home, or get along with other people?: Extremely difficult  PHQ9 TOTAL SCORE: 9  He is at risk for lack of exercise and has been provided with information to increase physical activity for the benefit of his well-being.  The patient reports that he has difficulty with activities of daily living. I have asked that the patient make a follow up appointment in 52 weeks where this issue will be further evaluated and addressed.  The patient's PHQ-9 score is consistent with mild depression. He was provided with information regarding depression and was advised to schedule a follow up appointment in 52 weeks to further address this issue.

## 2023-09-08 NOTE — PATIENT INSTRUCTIONS
"Ok to stopDeep Sea nasal spray due to not using   Stop Montelukast due to concern for behavior issues  If allergies become more of an issue, I can refer  you back to allergist  Knee and shoulder pain - nothing serious. Xrays of knees was negative in 2021  Foot pain - wear to good orthotics for flat feet like you have  Arm itching - skin is clear.  May be related to allergies or skin sensitivity       Patient Education   Personalized Prevention Plan  You are due for the preventive services outlined below.  Your care team is available to assist you in scheduling these services.  If you have already completed any of these items, please share that information with your care team to update in your medical record.  Health Maintenance Due   Topic Date Due    Hepatitis B Vaccine (3 of 3 - 19+ 3-dose series) 09/22/2011    COVID-19 Vaccine (4 - Moderna series) 06/24/2022    Flu Vaccine (1) 09/01/2023    Thyroid Function Lab  09/13/2023    ANNUAL REVIEW OF HM ORDERS  09/13/2023     Learning About Being Physically Active  What is physical activity?     Being physically active means doing any kind of activity that gets your body moving.  The types of physical activity that can help you get fit and stay healthy include:  Aerobic or \"cardio\" activities. These make your heart beat faster and make you breathe harder, such as brisk walking, riding a bike, or running. They strengthen your heart and lungs and build up your endurance.  Strength training activities. These make your muscles work against, or \"resist,\" something. Examples include lifting weights or doing push-ups. These activities help tone and strengthen your muscles and bones.  Stretches. These let you move your joints and muscles through their full range of motion. Stretching helps you be more flexible.  Reaching a balance between these three types of physical activity is important because each one contributes to your overall fitness.  What are the benefits of being " "active?  Being active is one of the best things you can do for your health. It helps you to:  Feel stronger and have more energy to do all the things you like to do.  Focus better at school or work.  Feel, think, and sleep better.  Reach and stay at a healthy weight.  Lose fat and build lean muscle.  Lower your risk for serious health problems, including diabetes, heart attack, high blood pressure, and some cancers.  Keep your heart, lungs, bones, muscles, and joints strong and healthy.  How can you make being active part of your life?  Start slowly. Make it your long-term goal to get at least 30 minutes of exercise on most days of the week. Walking is a good choice. You also may want to do other activities, such as running, swimming, cycling, or playing tennis or team sports.  Pick activities that you like--ones that make your heart beat faster, your muscles stronger, and your muscles and joints more flexible. If you find more than one thing you like doing, do them all. You don't have to do the same thing every day.  Get your heart pumping every day. Any activity that makes your heart beat faster and keeps it at that rate for a while counts.  Here are some great ways to get your heart beating faster:  Go for a brisk walk, run, or bike ride.  Go for a hike or swim.  Go in-line skating.  Play a game of touch football, basketball, or soccer.  Ride a bike.  Play tennis or racquetball.  Climb stairs.  Even some household chores can be aerobic--just do them at a faster pace. Vacuuming, raking or mowing the lawn, sweeping the garage, and washing and waxing the car all can help get your heart rate up.  Strengthen your muscles during the week. You don't have to lift heavy weights or grow big, bulky muscles to get stronger. Doing a few simple activities that make your muscles work against, or \"resist,\" something can help you get stronger.  For example, you can:  Do push-ups or sit-ups, which use your own body weight as " "resistance.  Lift weights or dumbbells or use stretch bands at home or in a gym or community center.  Stretch your muscles often. Stretching will help you as you become more active. It can help you stay flexible, loosen tight muscles, and avoid injury. It can also help improve your balance and posture and can be a great way to relax.  Be sure to stretch the muscles you'll be using when you work out. It's best to warm your muscles slightly before you stretch them. Walk or do some other light aerobic activity for a few minutes, and then start stretching.  When you stretch your muscles:  Do it slowly. Stretching is not about going fast or making sudden movements.  Don't push or bounce during a stretch.  Hold each stretch for at least 15 to 30 seconds, if you can. You should feel a stretch in the muscle, but not pain.  Breathe out as you do the stretch. Then breathe in as you hold the stretch. Don't hold your breath.  If you're worried about how more activity might affect your health, have a checkup before you start. Follow any special advice your doctor gives you for getting a smart start.  Where can you learn more?  Go to https://www.[employer].net/patiented  Enter W332 in the search box to learn more about \"Learning About Being Physically Active.\"  Current as of: October 10, 2022               Content Version: 13.7    3913-4334 [employer].   Care instructions adapted under license by your healthcare professional. If you have questions about a medical condition or this instruction, always ask your healthcare professional. [employer] disclaims any warranty or liability for your use of this information.      Activities of Daily Living    Your Health Risk Assessment indicates you have difficulties with activities of daily living such as housework, bathing, preparing meals, taking medication, etc. Please make a follow up appointment for us to address this issue in more detail.  Learning About " Depression Screening  What is depression screening?  Depression screening is a way to see if you have depression symptoms. It may be done by a doctor or counselor. It's often part of a routine checkup. That's because your mental health is just as important as your physical health.  Depression is a mental health condition that affects how you feel, think, and act. You may:  Have less energy.  Lose interest in your daily activities.  Feel sad and grouchy for a long time.  Depression is very common. It affects people of all ages.  Many things can lead to depression. Some people become depressed after they have a stroke or find out they have a major illness like cancer or heart disease. The death of a loved one or a breakup may lead to depression. It can run in families. Most experts believe that a combination of inherited genes and stressful life events can cause it.  What happens during screening?  You may be asked to fill out a form about your depression symptoms. You and the doctor will discuss your answers. The doctor may ask you more questions to learn more about how you think, act, and feel.  What happens after screening?  If you have symptoms of depression, your doctor will talk to you about your options.  Doctors usually treat depression with medicines or counseling. Often, combining the two works best. Many people don't get help because they think that they'll get over the depression on their own. But people with depression may not get better unless they get treatment.  The cause of depression is not well understood. There may be many factors involved. But if you have depression, it's not your fault.  A serious symptom of depression is thinking about death or suicide. If you or someone you care about talks about this or about feeling hopeless, get help right away.  It's important to know that depression can be treated. Medicine, counseling, and self-care may help.  Where can you learn more?  Go to  "https://www.[employer].net/patiented  Enter T185 in the search box to learn more about \"Learning About Depression Screening.\"  Current as of: October 20, 2022               Content Version: 13.7 2006-2023 [employer].   Care instructions adapted under license by your healthcare professional. If you have questions about a medical condition or this instruction, always ask your healthcare professional. [employer], [employer] disclaims any warranty or liability for your use of this information.           "

## 2023-09-08 NOTE — RESULT ENCOUNTER NOTE
The TSH is higher than last check 1 year ago.  Recommend to increase levothyroxine from 100 to 110 mcg.  We should recheck the thyroid in 2 months.    The creatinine (kidney blood value) is slightly higher than last check 1 year ago, but kidney function still remains in the normal range.  1 liver enzyme AST is slightly above the normal range.  The other liver enzymes are normal which is reassuring.  Cholesterol is similar to last check earlier this year.  The white blood cell count is mildly low which has been seen on and off for many years.    No further work-up is needed for these minor abnormalities

## 2023-09-11 ENCOUNTER — TELEPHONE (OUTPATIENT)
Dept: FAMILY MEDICINE | Facility: CLINIC | Age: 43
End: 2023-09-11
Payer: MEDICARE

## 2023-09-11 NOTE — TELEPHONE ENCOUNTER
Pt guardian called back to check on test results from 9/8/2023. He verbalized good understanding, and requested we contact Crow Mobley from [facility] [phone] to verify pharmacy and give new orders for the Levothyroxine.     Left message with Crow to call back to verify new orders.     Jovanna Mclaughlin, RN

## 2023-09-12 NOTE — TELEPHONE ENCOUNTER
SEE NOTE BELOW AND RESULT NOTE!  GROUP HOME # NOT IN SERVICE,   # LEFT YESTERDAY IN PHONE NOTE WAS A FEMALE NAME ON VOICEMAIL.   PLEASE GET UPDATED #'S !!

## 2023-09-13 NOTE — TELEPHONE ENCOUNTER
Grp home contacted at number provided.  Information about increasing synthroid and repeat lab in 2 months given. Jacquie COVARRUBIAS RN

## 2023-09-19 RX ORDER — TOPIRAMATE 50 MG/1
TABLET, FILM COATED ORAL
Qty: 62 TABLET | Refills: 11 | OUTPATIENT
Start: 2023-09-19

## 2023-09-19 RX ORDER — TOPIRAMATE 25 MG/1
TABLET, FILM COATED ORAL
Qty: 62 TABLET | Refills: 11 | OUTPATIENT
Start: 2023-09-19

## 2023-09-21 ENCOUNTER — LAB (OUTPATIENT)
Dept: LAB | Facility: CLINIC | Age: 43
End: 2023-09-21
Payer: MEDICARE

## 2023-09-21 DIAGNOSIS — E66.09 OBESITY DUE TO EXCESS CALORIES, UNSPECIFIED OBESITY SEVERITY: Chronic | ICD-10-CM

## 2023-09-21 DIAGNOSIS — Z79.899 HISTORY OF ONGOING TREATMENT WITH HIGH-RISK MEDICATION: ICD-10-CM

## 2023-09-21 LAB
ALT SERPL W P-5'-P-CCNC: 51 U/L (ref 0–70)
AST SERPL W P-5'-P-CCNC: 41 U/L (ref 0–45)
BASOPHILS # BLD AUTO: 0 10E3/UL (ref 0–0.2)
BASOPHILS NFR BLD AUTO: 0 %
EOSINOPHIL # BLD AUTO: 0.1 10E3/UL (ref 0–0.7)
EOSINOPHIL NFR BLD AUTO: 2 %
ERYTHROCYTE [DISTWIDTH] IN BLOOD BY AUTOMATED COUNT: 14.7 % (ref 10–15)
HCT VFR BLD AUTO: 47 % (ref 40–53)
HGB BLD-MCNC: 15.1 G/DL (ref 13.3–17.7)
IMM GRANULOCYTES # BLD: 0 10E3/UL
IMM GRANULOCYTES NFR BLD: 1 %
LYMPHOCYTES # BLD AUTO: 1 10E3/UL (ref 0.8–5.3)
LYMPHOCYTES NFR BLD AUTO: 32 %
MCH RBC QN AUTO: 32.4 PG (ref 26.5–33)
MCHC RBC AUTO-ENTMCNC: 32.1 G/DL (ref 31.5–36.5)
MCV RBC AUTO: 101 FL (ref 78–100)
MONOCYTES # BLD AUTO: 0.3 10E3/UL (ref 0–1.3)
MONOCYTES NFR BLD AUTO: 9 %
NEUTROPHILS # BLD AUTO: 1.7 10E3/UL (ref 1.6–8.3)
NEUTROPHILS NFR BLD AUTO: 57 %
PLATELET # BLD AUTO: 131 10E3/UL (ref 150–450)
RBC # BLD AUTO: 4.66 10E6/UL (ref 4.4–5.9)
VALPROATE SERPL-MCNC: 65.2 UG/ML
WBC # BLD AUTO: 3 10E3/UL (ref 4–11)

## 2023-09-21 PROCEDURE — 84460 ALANINE AMINO (ALT) (SGPT): CPT

## 2023-09-21 PROCEDURE — 36415 COLL VENOUS BLD VENIPUNCTURE: CPT

## 2023-09-21 PROCEDURE — 85025 COMPLETE CBC W/AUTO DIFF WBC: CPT

## 2023-09-21 PROCEDURE — 84450 TRANSFERASE (AST) (SGOT): CPT

## 2023-09-21 PROCEDURE — 80164 ASSAY DIPROPYLACETIC ACD TOT: CPT

## 2023-09-21 RX ORDER — TOPIRAMATE 25 MG/1
25 TABLET, FILM COATED ORAL 2 TIMES DAILY
Qty: 180 TABLET | Refills: 3 | Status: SHIPPED | OUTPATIENT
Start: 2023-09-21 | End: 2024-02-29

## 2023-09-21 RX ORDER — TOPIRAMATE 50 MG/1
50 TABLET, FILM COATED ORAL 2 TIMES DAILY
Qty: 180 TABLET | Refills: 1 | Status: SHIPPED | OUTPATIENT
Start: 2023-09-21 | End: 2024-02-29

## 2023-09-21 NOTE — TELEPHONE ENCOUNTER
Medication Question or Refill    Contacts         Type Contact Phone/Fax    09/21/2023 01:07 PM CDT Phone (Incoming) [facility] (Emergency Contact) [phone]            What medication are you calling about (include dose and sig)?:     topiramate (TOPAMAX) both 25 and 50    Preferred Pharmacy:  [facility], [facility]. - [place], [place] - [address]  [address] [address] [facility] [address]  Phone: [phone] Fax: [phone]      Controlled Substance Agreement on file:   CSA -- Patient Level:    CSA: None found at the patient level.       Who prescribed the medication?: Bramante    Do you need a refill? Yes    When did you use the medication last? This morning. Now out.    Patient offered an appointment? Yes:  already on books    Do you have any questions or concerns?  Yes:     Please call [facility] [place], Paty Martinezy: [phone]      Okay to leave a detailed message?: Yes at Other phone number:  [phone]

## 2023-09-21 NOTE — TELEPHONE ENCOUNTER
topiramate (TOPAMAX) 25 MG tablet  Last Written Prescription Date:   8/15/2023  Last Fill Quantity: 60,   # refills: 0  Last Office Visit :  9/7/2023  Future Office visit:  1/18/2024  Routing refill request to provider for review/approval because:  Only a 30 day supply sent to pharm last order.  Okay to fill until office visit in January.  Please review and send new order.   Thank you       topiramate (TOPAMAX) 50 MG tablet   Last Written Prescription Date:   8/15/2023  Last Fill Quantity: 60,   # refills: 0  Last Office Visit :  9/7/2023  Future Office visit:  1/18/2024  Routing refill request to provider for review/approval because:  Only a 30 day supply sent to pharm last order.  Okay to fill until office visit in January.  Please review and send new order.   Thank you     Lisa Degroot RN  Central Triage Red Flags/Med Refills

## 2023-09-21 NOTE — TELEPHONE ENCOUNTER
Madeline calling to check on the status of the patients Topirmate, as he will be out in 2 days. Patient needs refill asap    [phone] okay to leave VM

## 2023-10-26 ENCOUNTER — IMMUNIZATION (OUTPATIENT)
Dept: FAMILY MEDICINE | Facility: CLINIC | Age: 43
End: 2023-10-26
Payer: MEDICARE

## 2023-10-26 PROCEDURE — 91320 SARSCV2 VAC 30MCG TRS-SUC IM: CPT

## 2023-10-26 PROCEDURE — 90480 ADMN SARSCOV2 VAC 1/ONLY CMP: CPT

## 2023-11-13 ENCOUNTER — LAB (OUTPATIENT)
Dept: LAB | Facility: CLINIC | Age: 43
End: 2023-11-13
Payer: MEDICARE

## 2023-11-13 DIAGNOSIS — E03.9 ACQUIRED HYPOTHYROIDISM: ICD-10-CM

## 2023-11-13 LAB
T4 FREE SERPL-MCNC: 1.24 NG/DL (ref 0.9–1.7)
TSH SERPL DL<=0.005 MIU/L-ACNC: 7.28 UIU/ML (ref 0.3–4.2)

## 2023-11-13 PROCEDURE — 84443 ASSAY THYROID STIM HORMONE: CPT | Performed by: INTERNAL MEDICINE

## 2023-11-13 PROCEDURE — 36415 COLL VENOUS BLD VENIPUNCTURE: CPT | Performed by: INTERNAL MEDICINE

## 2023-11-13 PROCEDURE — 84439 ASSAY OF FREE THYROXINE: CPT | Performed by: INTERNAL MEDICINE

## 2023-11-14 DIAGNOSIS — E03.8 SUBCLINICAL HYPOTHYROIDISM: Primary | ICD-10-CM

## 2023-11-14 RX ORDER — LEVOTHYROXINE SODIUM 125 UG/1
125 TABLET ORAL DAILY
Qty: 60 TABLET | Refills: 0 | Status: SHIPPED | OUTPATIENT
Start: 2023-11-14 | End: 2024-01-03

## 2023-12-04 RX ORDER — TOPIRAMATE 25 MG/1
TABLET, FILM COATED ORAL
Qty: 62 TABLET | Refills: 11 | OUTPATIENT
Start: 2023-12-04

## 2023-12-04 RX ORDER — TOPIRAMATE 50 MG/1
TABLET, FILM COATED ORAL
Qty: 62 TABLET | Refills: 11 | OUTPATIENT
Start: 2023-12-04

## 2023-12-16 NOTE — TELEPHONE ENCOUNTER
Symptoms    Describe your symptoms: Pink eye    Any pain: No    How long have you been having symptoms: 1 day      Have you been seen for this:  No, not recently    Appointment offered?: Yes: declined appointment, patient frequently gets pink eye and would like a prescription for eye drops sent instead.    Triage offered?: Yes    Home remedies tried: No    Preferred Pharmacy:   [facility], [facility]. - [place], [place] - [address]  [address] [address] [facility] [address]  Phone: [phone] Fax: [phone]      Okay to leave a detailed message?: Yes at Other phone number:  [phone]    
Faxed to the number given below.  Kamryn Grossman PSC on 2/21/2023 at 1:42 PM    
Letter made with orders below, have placed on Valarie Kimble's desk for review and signature, can fax once signed.      ETHAN Patrick    
Please let them know that it is okay to use the polytrim as directed for 7 days.  Valarie Kimble NP on 2/20/2023 at 12:20 PM    
Sent.  Valarie Kimble NP on 2/20/2023 at 3:41 PM    
Valarie Kimble:    Please disregard the previous letter, it was made in error, needs to have the patient's name and date of birth, have pended new letter, please review and sign and send back to care team for faxing.      ETHAN Patrick    
Valarie Kimble:    Received a call from group home staff member Janina and also spoke to Law another care team member, Janina is reporting that the patient has been having redness, swelling in the left eye that started yesterday, says no drainage or fever, vision is slightly cloudy from the irritation. The group home needs a verbal order to use the Polytrim ointment, says you told the group home they can call back if develops again as patient gets pink eye frequently, had a virtual appointment on 1-13-23 with you, they still have the Polytrim left from previous infection, so no new order needed.     Ok to provide verbal orders for this? Please advise directions, the group [place] needs this faxed to [phone].    Please advise.      ETHAN Patrick        
Unable to assess due to medical condition

## 2023-12-24 NOTE — TELEPHONE ENCOUNTER
[facility] - when Dr. Pelaez returns call, please get Dr. Brown for call.    Riya MCCLOUD RN BSN    
Chiropractor, Dr. Elgin alfonso,wants to discuss an MRI order and discuss findings tomorrow. Please call if able to discuss tomorrow, 8/26/20.    
Dr. Elgin Pelaez can be reached at [phone].    Routing to provider.  Riya MCCLOUD RN      
Elgin Muro-chiropactor called wanting to discuss findings with PCP. Wednesday is a good day for call back.     Shanna VILLASENOR  Station     
Left VM  
Please provide a phone number  
RN spoke Dr. Pelaez this morning.  He needs to call back later.    [facility] - please get Dr. Brown when he returns call.    Riya MCCLOUD RN BSN    
This has been resolved.  Patient needs appt for low back pain to determine need for MRI.  Appt was scheduled.  Riya MCCLOUD RN BSN    
Initial (On Arrival)

## 2024-01-16 ENCOUNTER — OFFICE VISIT (OUTPATIENT)
Dept: FAMILY MEDICINE | Facility: CLINIC | Age: 44
End: 2024-01-16
Payer: MEDICARE

## 2024-01-16 VITALS
WEIGHT: 243.7 LBS | RESPIRATION RATE: 16 BRPM | SYSTOLIC BLOOD PRESSURE: 110 MMHG | OXYGEN SATURATION: 94 % | DIASTOLIC BLOOD PRESSURE: 80 MMHG | HEIGHT: 64 IN | TEMPERATURE: 97.8 F | HEART RATE: 74 BPM | BODY MASS INDEX: 41.6 KG/M2

## 2024-01-16 DIAGNOSIS — M21.611 BUNION, RIGHT: ICD-10-CM

## 2024-01-16 DIAGNOSIS — E66.01 MORBID OBESITY (H): ICD-10-CM

## 2024-01-16 DIAGNOSIS — E03.9 ACQUIRED HYPOTHYROIDISM: Primary | ICD-10-CM

## 2024-01-16 DIAGNOSIS — J31.0 NONALLERGIC RHINITIS: ICD-10-CM

## 2024-01-16 DIAGNOSIS — M21.612 BUNION, LEFT: ICD-10-CM

## 2024-01-16 LAB
T4 FREE SERPL-MCNC: 1.2 NG/DL (ref 0.9–1.7)
TSH SERPL DL<=0.005 MIU/L-ACNC: 4.7 UIU/ML (ref 0.3–4.2)

## 2024-01-16 PROCEDURE — 99214 OFFICE O/P EST MOD 30 MIN: CPT | Performed by: INTERNAL MEDICINE

## 2024-01-16 PROCEDURE — 84443 ASSAY THYROID STIM HORMONE: CPT | Performed by: INTERNAL MEDICINE

## 2024-01-16 PROCEDURE — 36415 COLL VENOUS BLD VENIPUNCTURE: CPT | Performed by: INTERNAL MEDICINE

## 2024-01-16 PROCEDURE — 84439 ASSAY OF FREE THYROXINE: CPT | Performed by: INTERNAL MEDICINE

## 2024-01-16 RX ORDER — MONTELUKAST SODIUM 10 MG/1
10 TABLET ORAL AT BEDTIME
Qty: 90 TABLET | Refills: 3 | Status: SHIPPED | OUTPATIENT
Start: 2024-01-16 | End: 2024-07-23

## 2024-01-16 RX ORDER — LEVOTHYROXINE SODIUM 125 UG/1
125 TABLET ORAL DAILY
Qty: 90 TABLET | Refills: 0 | Status: CANCELLED | OUTPATIENT
Start: 2024-01-16

## 2024-01-16 ASSESSMENT — PATIENT HEALTH QUESTIONNAIRE - PHQ9: SUM OF ALL RESPONSES TO PHQ QUESTIONS 1-9: 12

## 2024-01-16 ASSESSMENT — PAIN SCALES - GENERAL: PAINLEVEL: NO PAIN (0)

## 2024-01-16 NOTE — PROGRESS NOTES
Assessment & Plan     Acquired hypothyroidism  Check lab, adjust med if needed, refill when TSH is back    Morbid obesity (H)  Patient strongly prefers to stop metformin due to side effects     Nonallergic rhinitis  No clear worsening of mental health off med, but worsening of allergies, so resume.   - montelukast (SINGULAIR) 10 MG tablet; Take 1 tablet (10 mg) by mouth at bedtime    Bunion, right  Patient prefers to see specialist  - Orthopedic  Referral; Future    Bunion, left  - Orthopedic  Referral; Future       Patient Instructions   Thyroid  Will check blood work today and adjust med if needed    Weight management  Due to side effects of diarrhea, stop metformin  We discussed trying a lower dose, but you preferred to stop it entirely    Allergies  Since your mental health is not clearly worse off the Montelukast, but your allergies are worse, recommend to restart Montelukast  We discussed seeing Allergist instead of starting another medication, but you opted to re-start Montelukast  If mental health seems to be worse with restarting med, let me know    Bunion  You can see a podiatrist, but they may not have anything further to offer;  surgery to correct the bunions is an extensive surgery  You may need a wider toe box.  It appears your shoes are appropriate  Based on your preferences, I placed referral to podiatry.     Carla Brown, [facility]    Cleve Paul is a 43 year old, presenting for the following health issues:  Thyroid Disease and Diabetes (Wants to stop  metformin )        1/16/2024     7:57 AM   Additional Questions   Roomed by adiel rockwell   Accompanied by care giver - Mike         1/16/2024     7:57 AM   Patient Reported Additional Medications   Patient reports taking the following new medications Sudogest 120mg PRN   Triamcinolone 0.1% PRN        HPI     Here today with  staff Mike    Chief Complaint   Patient presents with    Thyroid  Disease    Diabetes     Wants to stop  metformin            Pre-Diabetes Follow-up    How often are you checking your blood sugar? Not at all  What concerns do you have today about your diabetes? None and Other: would like to go off metformin, gives stomach aches , is having loose stools daily    Do you have any of these symptoms? (Select all that apply)  Numbness in feet, Burning in feet, and Excessive thirst  Metformin was started for weight loss; last visit with medical weight clinic was Sept.  10/2022 metformin was decreased due to GI upset  He was briefly on semaglutide, stopped 3/2023 due to cost/insurance coverage  He is having diarrhea and fecal incontinence    Wt Readings from Last 5 Encounters:   01/16/24 110.5 kg (243 lb 11.2 oz)   09/08/23 107.5 kg (237 lb)   09/07/23 110.5 kg (243 lb 11.2 oz)   08/02/23 107 kg (236 lb)   03/14/23 102.8 kg (226 lb 11.2 oz)           BP Readings from Last 2 Encounters:   01/16/24 110/80   09/08/23 108/64     Hemoglobin A1C (%)   Date Value   09/08/2023 5.0   09/13/2022 4.9   02/11/2020 5.5   11/19/2015 5.1     LDL Cholesterol Calculated (mg/dL)   Date Value   09/08/2023 114 (H)   02/02/2023 107 (H)   08/04/2020 89   04/02/2019 92             Hypothyroidism Follow-up    Since last visit, patient describes the following symptoms: anxiety and fatigue  How many servings of fruits and vegetables do you eat daily?  0-1  On average, how many sweetened beverages do you drink each day (Examples: soda, juice, sweet tea, etc.  Do NOT count diet or artificially sweetened beverages)?   1  How many days per week do you exercise enough to make your heart beat faster? 4  How many minutes a day do you exercise enough to make your heart beat faster? 10 - 19  How many days per week do you miss taking your medication? Some times will not take them   What makes it hard for you to take your medications?   Some times will refuse them   Last blood test in November the TSH was still uncontrolled  so his levothyroxine was increased to 125    Foot pain  --has bunions in feet and wants to know what to do for them  --has occ mild pain in both big toes over the bunions     Current Outpatient Medications   Medication Sig Dispense Refill    alum & mag hydroxide-simethicone (MYLANTA/MAALOX) 200-200-20 MG/5ML SUSP suspension Take 30 mLs by mouth every 4 hours as needed for indigestion (2 tsp for upset stomach) 1 Bottle 3    calcium carbonate (TUMS) 500 MG chewable tablet Take 1 tablet (500 mg) by mouth every 6 hours as needed for heartburn 150 tablet 3    calcium polycarbophil (FIBERCON) 625 MG tablet Take 2 tablets (1,250 mg) by mouth 2 times daily 360 tablet 3    cetirizine (ZYRTEC) 10 MG tablet Take 1 tablet (10 mg) by mouth At Bedtime Stop loratadine 90 tablet 3    divalproex sodium delayed-release (DEPAKOTE) 250 MG DR tablet 1000  in AM      ibuprofen (ADVIL,MOTRIN) 600 MG tablet Take 1 tablet (600 mg) by mouth every 6 hours as needed for moderate pain 60 tablet 0    insulin pen needle (BD CANDI U/F) 32G X 4 MM miscellaneous Use 1 pen needle daily with liraglutide. 120 each 4    levothyroxine (SYNTHROID/LEVOTHROID) 125 MCG tablet TAKE 1 TABLET BY MOUTH ONCE DAILY 90 tablet 0    magnesium hydroxide (MILK OF MAGNESIA) 400 MG/5ML suspension Take 30-60 mLs by mouth daily as needed for constipation or heartburn (If No Bowel Movement in 2 days.) Reported on 4/12/2017 360 mL 1    metFORMIN (GLUCOPHAGE XR) 500 MG 24 hr tablet Take 2 tablets (1,000mg) after lunch and 1 tablet (500mg) after dinner. After 1 week, take 2 tablets (1,000mg) after lunch and 2 tablets (1,000mg) after dinner (Patient taking differently: Take 2 tablets (1,000mg) after lunch and 1 tablet (500mg) after dinner. After 1 week, take 2 tablets (1,000mg) after lunch and 2 tablets (1,000mg) after dinner    Taking 2 tabs in AM, 2 tabs at 5 pm) 240 tablet 4    OLANZapine (ZYPREXA) 5 MG tablet Take 2.5 mg by mouth 3 times daily as needed As needed, max  of 15 30 tablet 1    olopatadine (PATADAY) 0.2 % ophthalmic solution Place 0.05 mLs (1 drop) into both eyes daily In morning X 2 weeks then as needed for extra watery eye, irritated feeling eye, redness/swelling of eyelid. 2.5 mL 11    order for DME Equipment being ordered: CPAP  AIRSENSE 10  11 CM H20  AIRFIT F20 MEDIUM  SN# [id number]  DN# [id number]      PAIN & FEVER 325 MG tablet TAKE 1-2 TABLETS (325-650MG) BY MOUTH EVERY 4 HOURS AS NEEDED *ORDER WHEN LOW* 100 tablet 7    polyethylene glycol-propylene glycol PF (SYSTANE ULTRA PF) 0.4-0.3 % SOLN opthalmic solution Place 1-2 drops into both eyes Up to 5 times daily as needed      pseudoePHEDrine (SUDOGEST 12 HOUR) 120 MG 12 hr tablet Take 1 tablet (120 mg) by mouth daily as needed for congestion 12 tablet 11    risperiDONE (RISPERDAL) 0.5 MG tablet Take 0.5 mg by mouth 2 times daily At noon      risperiDONE (RISPERDAL) 1 MG tablet Take 1 mg by mouth every morning 60 tablet     risperiDONE (RISPERDAL) 2 MG tablet Take 2 mg by mouth daily at 8:00pm      sertraline (ZOLOFT) 100 MG tablet Take 200 mg by mouth daily       topiramate (TOPAMAX) 25 MG tablet Take 1 tablet (25 mg) by mouth 2 times daily To be taken with 50mg tabs BID totaling 75mg BID. 180 tablet 3    topiramate (TOPAMAX) 50 MG tablet Take 1 tablet (50 mg) by mouth 2 times daily To be taken with 25mg tabs BID totaling 75mg BID. 180 tablet 1    vitamin D3 25 mcg (1000 units) tablet Take 1 tablet (25 mcg) by mouth daily 100 tablet 3    metFORMIN (GLUCOPHAGE) 500 MG tablet Take 1 tablet (500 mg) by mouth 2 times daily (with meals) The PM dose can be taken before dinner (as early as 4pm). 180 tablet 3           Review of Systems   Constitutional, HEENT, cardiovascular, pulmonary, gi and gu systems are negative, except as otherwise noted.      Objective    /80 (BP Location: Right arm, Patient Position: Sitting, Cuff Size: Adult Regular)   Pulse 74   Temp 97.8  F (36.6  C) (Tympanic)   Resp 16   Ht 1.62 m  "(5' 3.78\")   Wt 110.5 kg (243 lb 11.2 oz)   SpO2 94%   BMI 42.12 kg/m    Body mass index is 42.12 kg/m .  Physical Exam   GENERAL APPEARANCE: healthy, no distress, and over weight  EYES: mild conjunctival injection and upper lid swelling of left eye  MS: bilateral bunions, L>R  PSYCH: mentation/affect consistent with know diagnosis                       "

## 2024-01-16 NOTE — PATIENT INSTRUCTIONS
Thyroid  Will check blood work today and adjust med if needed    Weight management  Due to side effects of diarrhea, stop metformin  We discussed trying a lower dose, but you preferred to stop it entirely    Allergies  Since your mental health is not clearly worse off the Montelukast, but your allergies are worse, recommend to restart Montelukast  We discussed seeing Allergist instead of starting another medication, but you opted to re-start Montelukast  If mental health seems to be worse with restarting med, let me know    Bunion  You can see a podiatrist, but they may not have anything further to offer;  surgery to correct the bunions is an extensive surgery  You may need a wider toe box.  It appears your shoes are appropriate  Based on your preferences, I placed referral to podiatry.

## 2024-01-17 DIAGNOSIS — E03.9 ACQUIRED HYPOTHYROIDISM: ICD-10-CM

## 2024-01-17 RX ORDER — LEVOTHYROXINE SODIUM 125 UG/1
125 TABLET ORAL DAILY
Qty: 90 TABLET | Refills: 3 | Status: SHIPPED | OUTPATIENT
Start: 2024-01-17 | End: 2024-09-20

## 2024-01-17 NOTE — RESULT ENCOUNTER NOTE
The TSH is very slightly elevated but improved compared to 2 months ago.  No changes to the levothyroxine at this time.

## 2024-01-18 ENCOUNTER — VIRTUAL VISIT (OUTPATIENT)
Dept: ENDOCRINOLOGY | Facility: CLINIC | Age: 44
End: 2024-01-18
Payer: MEDICARE

## 2024-01-18 VITALS — HEIGHT: 64 IN | WEIGHT: 239 LBS | BODY MASS INDEX: 40.8 KG/M2

## 2024-01-18 DIAGNOSIS — E66.09 OBESITY DUE TO EXCESS CALORIES, UNSPECIFIED OBESITY SEVERITY: Primary | Chronic | ICD-10-CM

## 2024-01-18 PROCEDURE — 99213 OFFICE O/P EST LOW 20 MIN: CPT | Mod: 95 | Performed by: INTERNAL MEDICINE

## 2024-01-18 ASSESSMENT — PAIN SCALES - GENERAL: PAINLEVEL: NO PAIN (0)
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
## 2024-01-18 NOTE — NURSING NOTE
Is the patient currently in the state of [place]? YES    Visit mode:VIDEO    If the visit is dropped, the patient can be reconnected by: VIDEO VISIT: Text to cell phone:   Telephone Information:   Mobile [phone]       Will anyone else be joining the visit? NO  (If patient encounters technical issues they should call [phone])    How would you like to obtain your AVS? Mail a copy    Are changes needed to the allergy or medication list? No, Pt stated no changes to allergies, and Pt stated no med changes    Reason for visit: RECHECK (MUMTAZ)    eVsna DONOHUE

## 2024-01-18 NOTE — PATIENT INSTRUCTIONS
Please ask his psychiatrist whether it would be OK for humberto to take bupropion and/or naltrexone.     Beverages: please don't buy soda or Gatorade.     Humberto -- please stop buying soda. Please don't drink anything other than water.     Diet: no need to eat more than 1 serving at a time.     Follow-up with a dietician here.

## 2024-01-22 ENCOUNTER — TELEPHONE (OUTPATIENT)
Dept: ENDOCRINOLOGY | Facility: CLINIC | Age: 44
End: 2024-01-22
Payer: MEDICARE

## 2024-01-22 NOTE — TELEPHONE ENCOUNTER
Patient Contacted for the patient to call back and schedule the following:    Appointment type: LUDA PANDYA   Provider: Dr. Diaz  Return date: 04/18//2024 ( 3 mos )   Specialty phone number: [phone]  Additional appointment(s) needed: no   Additonal Notes: Pt is scheduled on 06/06/2024 @ 340pm with Dr. Diaz for a VV

## 2024-01-24 ENCOUNTER — MEDICAL CORRESPONDENCE (OUTPATIENT)
Dept: HEALTH INFORMATION MANAGEMENT | Facility: CLINIC | Age: 44
End: 2024-01-24

## 2024-01-24 ENCOUNTER — VIRTUAL VISIT (OUTPATIENT)
Dept: FAMILY MEDICINE | Facility: OTHER | Age: 44
End: 2024-01-24
Payer: MEDICARE

## 2024-01-24 ENCOUNTER — LAB (OUTPATIENT)
Dept: LAB | Facility: CLINIC | Age: 44
End: 2024-01-24
Payer: MEDICARE

## 2024-01-24 DIAGNOSIS — H10.33 ACUTE BACTERIAL CONJUNCTIVITIS OF BOTH EYES: Primary | ICD-10-CM

## 2024-01-24 DIAGNOSIS — Z79.899 ENCOUNTER FOR LONG-TERM (CURRENT) USE OF MEDICATIONS: ICD-10-CM

## 2024-01-24 DIAGNOSIS — Z79.899 ENCOUNTER FOR LONG-TERM (CURRENT) USE OF MEDICATIONS: Primary | ICD-10-CM

## 2024-01-24 LAB
ALT SERPL W P-5'-P-CCNC: 30 U/L (ref 0–70)
AST SERPL W P-5'-P-CCNC: 31 U/L (ref 0–45)
BASOPHILS # BLD AUTO: 0 10E3/UL (ref 0–0.2)
BASOPHILS NFR BLD AUTO: 0 %
EOSINOPHIL # BLD AUTO: 0 10E3/UL (ref 0–0.7)
EOSINOPHIL NFR BLD AUTO: 0 %
ERYTHROCYTE [DISTWIDTH] IN BLOOD BY AUTOMATED COUNT: 16 % (ref 10–15)
HCT VFR BLD AUTO: 44.1 % (ref 40–53)
HGB BLD-MCNC: 13.8 G/DL (ref 13.3–17.7)
IMM GRANULOCYTES # BLD: 0.1 10E3/UL
IMM GRANULOCYTES NFR BLD: 4 %
LYMPHOCYTES # BLD AUTO: 1.5 10E3/UL (ref 0.8–5.3)
LYMPHOCYTES NFR BLD AUTO: 41 %
MCH RBC QN AUTO: 32.9 PG (ref 26.5–33)
MCHC RBC AUTO-ENTMCNC: 31.3 G/DL (ref 31.5–36.5)
MCV RBC AUTO: 105 FL (ref 78–100)
MONOCYTES # BLD AUTO: 0.4 10E3/UL (ref 0–1.3)
MONOCYTES NFR BLD AUTO: 10 %
NEUTROPHILS # BLD AUTO: 1.7 10E3/UL (ref 1.6–8.3)
NEUTROPHILS NFR BLD AUTO: 45 %
PLATELET # BLD AUTO: 138 10E3/UL (ref 150–450)
RBC # BLD AUTO: 4.19 10E6/UL (ref 4.4–5.9)
VALPROATE SERPL-MCNC: 79.6 UG/ML
WBC # BLD AUTO: 3.7 10E3/UL (ref 4–11)

## 2024-01-24 PROCEDURE — 85025 COMPLETE CBC W/AUTO DIFF WBC: CPT

## 2024-01-24 PROCEDURE — 84460 ALANINE AMINO (ALT) (SGPT): CPT

## 2024-01-24 PROCEDURE — 99213 OFFICE O/P EST LOW 20 MIN: CPT | Mod: 95 | Performed by: PHYSICIAN ASSISTANT

## 2024-01-24 PROCEDURE — 84450 TRANSFERASE (AST) (SGOT): CPT

## 2024-01-24 PROCEDURE — 80164 ASSAY DIPROPYLACETIC ACD TOT: CPT

## 2024-01-24 PROCEDURE — 36415 COLL VENOUS BLD VENIPUNCTURE: CPT

## 2024-01-24 RX ORDER — POLYMYXIN B SULFATE AND TRIMETHOPRIM 1; 10000 MG/ML; [USP'U]/ML
1-2 SOLUTION OPHTHALMIC EVERY 4 HOURS
Qty: 10 ML | Refills: 0 | Status: SHIPPED | OUTPATIENT
Start: 2024-01-24 | End: 2024-01-29

## 2024-01-24 NOTE — PROGRESS NOTES
Humberto is a 43 year old who is being evaluated via a billable video visit.      How would you like to obtain your AVS? Mail a copy  If the video visit is dropped, the invitation should be resent by: Send to e-mail at: [email or web address]  Will anyone else be joining your video visit? No        Assessment & Plan     (H10.33) Acute bacterial conjunctivitis of both eyes  (primary encounter diagnosis)  Comment: Eye exam is consistent with bacterial conjunctivitis. We discussed how important it is for Humberto to make sure he is washing his hands well if he touches his face.  Cool compresses for swelling, warm for discharge.  Start the eye drops and use during the day when awake for 5-7 days.  If not improving or getting worse he needs to follow-up with Ophthalmology.   Plan: polymixin b-trimethoprim (POLYTRIM) 81123-0.1         UNIT/ML-% ophthalmic solution      Options for treatment and follow-up care were reviewed with the patient and/or guardian. Patient and/or guardian engaged in the decision making process and verbalized understanding of the options discussed and agreed with the final plan.     Subjective   Humberto is a 43 year old, presenting for the following health issues:  Conjunctivitis      1/24/2024     1:03 PM   Additional Questions   Roomed by Annabel VAN   Accompanied by Caregiver     Conjunctivitis    History of Present Illness       Reason for visit:  Pink eye  Symptom onset:  1-3 days ago  Symptoms include:  Inflamed and pain ful  Symptom intensity:  Moderate  Symptom progression:  Worsening  Had these symptoms before:  No  What makes it worse:  Na  What makes it better:  Na    He eats 0-1 servings of fruits and vegetables daily.He consumes 0 sweetened beverage(s) daily.He exercises with enough effort to increase his heart rate 9 or less minutes per day.  He exercises with enough effort to increase his heart rate 3 or less days per week.   He is taking medications regularly.     - Presents with staff  from group home.   - He was seen at clinic last week for allergies and they started on Montelukast.   - Symptoms have been going on for over a week. They thought it was due to allergies.   - The discharge has been going on for about a week.   - Has pain of the eyes.   - No treatments tried.   - He has had a runny nose just starting today.    - Vision is a little bit blurry, refusing to wear glasses.   - No fevers or chills.     Review of Systems  Constitutional, HEENT, cardiovascular, pulmonary systems are negative, except as otherwise noted.      Objective           Vitals:  No vitals were obtained today due to virtual visit.    Physical Exam   GENERAL: alert and no distress  EYES: Bilateral eyes show crusting, the conjunctiva and sclera are mildly injected, there is very mild swelling of the upper and lower eyelids without significant erythema.   RESP: No audible wheeze, cough, or visible cyanosis.    SKIN: Visible skin clear. No significant rash, abnormal pigmentation or lesions.  NEURO: Cranial nerves grossly intact.  Mentation and speech appropriate for age.  PSYCH: Appropriate affect, tone, and pace of words      Video-Visit Details    Type of service:  Video Visit   Video Start Time: 2:07 PM  Video End Time:2:13 PM    Originating Location (pt. Location): Home    Distant Location (provider location):  Off-site  Platform used for Video Visit: Juan Jose  Signed Electronically by: Joan Green PA-C

## 2024-01-25 ENCOUNTER — ANCILLARY PROCEDURE (OUTPATIENT)
Dept: GENERAL RADIOLOGY | Facility: CLINIC | Age: 44
End: 2024-01-25
Attending: PODIATRIST
Payer: MEDICARE

## 2024-01-25 ENCOUNTER — TELEPHONE (OUTPATIENT)
Dept: ENDOCRINOLOGY | Facility: CLINIC | Age: 44
End: 2024-01-25

## 2024-01-25 ENCOUNTER — TELEPHONE (OUTPATIENT)
Dept: FAMILY MEDICINE | Facility: CLINIC | Age: 44
End: 2024-01-25

## 2024-01-25 ENCOUNTER — OFFICE VISIT (OUTPATIENT)
Dept: PODIATRY | Facility: CLINIC | Age: 44
End: 2024-01-25
Attending: INTERNAL MEDICINE
Payer: MEDICARE

## 2024-01-25 VITALS — WEIGHT: 239 LBS | BODY MASS INDEX: 42.35 KG/M2 | HEIGHT: 63 IN

## 2024-01-25 DIAGNOSIS — R73.03 PREDIABETES: ICD-10-CM

## 2024-01-25 DIAGNOSIS — E66.09 OBESITY DUE TO EXCESS CALORIES, UNSPECIFIED OBESITY SEVERITY: Primary | ICD-10-CM

## 2024-01-25 DIAGNOSIS — M21.611 BUNION, RIGHT: ICD-10-CM

## 2024-01-25 DIAGNOSIS — Q90.9 DOWN'S SYNDROME: ICD-10-CM

## 2024-01-25 DIAGNOSIS — M20.11 HALLUX VALGUS, RIGHT: ICD-10-CM

## 2024-01-25 DIAGNOSIS — M21.612 BUNION, LEFT: ICD-10-CM

## 2024-01-25 DIAGNOSIS — M20.12 HALLUX VALGUS, LEFT: Primary | ICD-10-CM

## 2024-01-25 DIAGNOSIS — M20.12 HALLUX VALGUS, LEFT: ICD-10-CM

## 2024-01-25 PROCEDURE — 73630 X-RAY EXAM OF FOOT: CPT | Mod: TC | Performed by: RADIOLOGY

## 2024-01-25 PROCEDURE — 99213 OFFICE O/P EST LOW 20 MIN: CPT | Performed by: PODIATRIST

## 2024-01-25 NOTE — LETTER
1/25/2024         RE: Humberto Estrella  [id number]  [place] [address]        Dear Colleague,    Thank you for referring your patient, Humberto Estrella, to the [facility]. Please see a copy of my visit note below.    PATIENT HISTORY:  Humberto Estrella is a 43 year old male who presents to clinic for a painful both foot .  The patient describes the pain as PRINCESS foot pain, Great toe, MTP .  The patient relates the pain level is moderate.  The patient relates pain is located over the big toe joint on the MTP of the foot, medially.  The patient relates the pain has been present for the past few years. Pain waxes/wanes, worse with activity, can be painful at rest.   Icing, resting, foot soak, elevation.  The patient relates pain with ambulation.  The patient has tried wider shoes with little relief.  The patient was sent by Dr.Rebecca Brown for consultation on the bilateral foot.       REVIEW OF SYSTEMS:  Constitutional, HEENT, cardiovascular, pulmonary, GI, , musculoskeletal, neuro, skin, endocrine and psych systems are negative, except as otherwise noted.     PAST MEDICAL HISTORY:   Past Medical History:   Diagnosis Date     Coronary artery disease      Depressive disorder      Diagnostic skin and sensitization tests (aka ALLERGENS) 9/30/15 IgE tests all NEGATIVE for environmental allergens.      Nonallergic rhinitis     9/30/15 IgE tests all NEGATIVE for environmental allergens.      Thyroid disease         PAST SURGICAL HISTORY:   Past Surgical History:   Procedure Laterality Date     PE TUBES      Left     PHACOEMULSIFICATION WITH STANDARD INTRAOCULAR LENS IMPLANT  10/3/2013    Procedure: PHACOEMULSIFICATION WITH STANDARD INTRAOCULAR LENS IMPLANT;  Left Kelman Phacoemulsification with Intraocular Lens Implant;  Surgeon: Luis Barajas MD;  Location: [facility]     PHACOEMULSIFICATION WITH STANDARD INTRAOCULAR LENS IMPLANT  5/1/2014    Procedure:  PHACOEMULSIFICATION WITH STANDARD INTRAOCULAR LENS IMPLANT;  Surgeon: Luis Barajas MD;  Location: [facility] OR        MEDICATIONS:   Current Outpatient Medications:      alum & mag hydroxide-simethicone (MYLANTA/MAALOX) 200-200-20 MG/5ML SUSP suspension, Take 30 mLs by mouth every 4 hours as needed for indigestion (2 tsp for upset stomach), Disp: 1 Bottle, Rfl: 3     calcium carbonate (TUMS) 500 MG chewable tablet, Take 1 tablet (500 mg) by mouth every 6 hours as needed for heartburn, Disp: 150 tablet, Rfl: 3     calcium polycarbophil (FIBERCON) 625 MG tablet, Take 2 tablets (1,250 mg) by mouth 2 times daily, Disp: 360 tablet, Rfl: 3     cetirizine (ZYRTEC) 10 MG tablet, Take 1 tablet (10 mg) by mouth At Bedtime Stop loratadine, Disp: 90 tablet, Rfl: 3     divalproex sodium delayed-release (DEPAKOTE) 250 MG DR tablet, 1000  in AM, Disp: , Rfl:      ibuprofen (ADVIL,MOTRIN) 600 MG tablet, Take 1 tablet (600 mg) by mouth every 6 hours as needed for moderate pain, Disp: 60 tablet, Rfl: 0     insulin pen needle (BD CANDI U/F) 32G X 4 MM miscellaneous, Use 1 pen needle daily with liraglutide., Disp: 120 each, Rfl: 4     levothyroxine (SYNTHROID/LEVOTHROID) 125 MCG tablet, Take 1 tablet (125 mcg) by mouth daily, Disp: 90 tablet, Rfl: 3     magnesium hydroxide (MILK OF MAGNESIA) 400 MG/5ML suspension, Take 30-60 mLs by mouth daily as needed for constipation or heartburn (If No Bowel Movement in 2 days.) Reported on 4/12/2017, Disp: 360 mL, Rfl: 1     montelukast (SINGULAIR) 10 MG tablet, Take 1 tablet (10 mg) by mouth at bedtime, Disp: 90 tablet, Rfl: 3     OLANZapine (ZYPREXA) 5 MG tablet, Take 2.5 mg by mouth 3 times daily as needed As needed, max of 15, Disp: 30 tablet, Rfl: 1     olopatadine (PATADAY) 0.2 % ophthalmic solution, Place 0.05 mLs (1 drop) into both eyes daily In morning X 2 weeks then as needed for extra watery eye, irritated feeling eye, redness/swelling of eyelid., Disp: 2.5 mL, Rfl: 11     order  for DME, Equipment being ordered: CPAP AIRSENSE 10 11 CM H20 AIRFIT F20 MEDIUM # [id number]  DN# [id number], Disp: , Rfl:      PAIN & FEVER 325 MG tablet, TAKE 1-2 TABLETS (325-650MG) BY MOUTH EVERY 4 HOURS AS NEEDED *ORDER WHEN LOW*, Disp: 100 tablet, Rfl: 7     polyethylene glycol-propylene glycol PF (SYSTANE ULTRA PF) 0.4-0.3 % SOLN opthalmic solution, Place 1-2 drops into both eyes Up to 5 times daily as needed, Disp: , Rfl:      polymixin b-trimethoprim (POLYTRIM) 21132-9.1 UNIT/ML-% ophthalmic solution, Place 1-2 drops into both eyes every 4 hours for 5 days, Disp: 10 mL, Rfl: 0     pseudoePHEDrine (SUDOGEST 12 HOUR) 120 MG 12 hr tablet, Take 1 tablet (120 mg) by mouth daily as needed for congestion, Disp: 12 tablet, Rfl: 11     risperiDONE (RISPERDAL) 0.5 MG tablet, Take 0.5 mg by mouth 2 times daily At noon, Disp: , Rfl:      risperiDONE (RISPERDAL) 1 MG tablet, Take 1 mg by mouth every morning, Disp: 60 tablet, Rfl:      risperiDONE (RISPERDAL) 2 MG tablet, Take 2 mg by mouth daily at 8:00pm, Disp: , Rfl:      sertraline (ZOLOFT) 100 MG tablet, Take 200 mg by mouth daily , Disp: , Rfl:      topiramate (TOPAMAX) 25 MG tablet, Take 1 tablet (25 mg) by mouth 2 times daily To be taken with 50mg tabs BID totaling 75mg BID., Disp: 180 tablet, Rfl: 3     topiramate (TOPAMAX) 50 MG tablet, Take 1 tablet (50 mg) by mouth 2 times daily To be taken with 25mg tabs BID totaling 75mg BID., Disp: 180 tablet, Rfl: 1     vitamin D3 25 mcg (1000 units) tablet, Take 1 tablet (25 mcg) by mouth daily, Disp: 100 tablet, Rfl: 3     ALLERGIES:    Allergies   Allergen Reactions     Nkda [No Known Drug Allergy]      Phentermine Anxiety     agitation anxiety          SOCIAL HISTORY:   Social History     Socioeconomic History     Marital status: Single     Spouse name: Not on file     Number of children: 0     Years of education: 12     Highest education level: Not on file   Occupational History     Occupation: Cleaning Services      Comment: [employer]     Employer: UNEMPLOYED     Employer: DISABLED   Tobacco Use     Smoking status: Former     Packs/day: 1.00     Years: 1.00     Additional pack years: 0.00     Total pack years: 1.00     Types: Cigarettes     Start date: 1999     Quit date: 2000     Years since quittin.7     Smokeless tobacco: Former   Vaping Use     Vaping Use: Never used   Substance and Sexual Activity     Alcohol use: No     Alcohol/week: 0.0 standard drinks of alcohol     Drug use: No     Sexual activity: Never   Other Topics Concern     Parent/sibling w/ CABG, MI or angioplasty before 65F 55M? No   Social History Narrative     Not on file     Social Determinants of Health     Financial Resource Strain: Unknown (2024)    Financial Resource Strain      Within the past 12 months, have you or your family members you live with been unable to get utilities (heat, electricity) when it was really needed?: Patient declined   Food Insecurity: Low Risk  (2024)    Food Insecurity      Within the past 12 months, did you worry that your food would run out before you got money to buy more?: No      Within the past 12 months, did the food you bought just not last and you didn t have money to get more?: Patient declined   Transportation Needs: Unknown (2024)    Transportation Needs      Within the past 12 months, has lack of transportation kept you from medical appointments, getting your medicines, non-medical meetings or appointments, work, or from getting things that you need?: Patient declined   Physical Activity: Not on file   Stress: Not on file   Social Connections: Not on file   Interpersonal Safety: Not on file   Housing Stability: Unknown (2024)    Housing Stability      Do you have housing? : Patient declined      Are you worried about losing your housing?: Patient declined        FAMILY HISTORY:   Family History   Problem Relation Age of Onset     Unknown/Adopted Mother       "Other Cancer Mother      Anxiety Disorder Brother      Substance Abuse Brother      Depression Brother      Substance Abuse Father         EXAM:Vitals: Ht 1.6 m (5' 3\")   Wt 108.4 kg (239 lb)   BMI 42.34 kg/m    BMI= Body mass index is 42.34 kg/m .    General appearance: Patient is alert and fully cooperative with history & exam.  No sign of distress is noted during the visit.     Psychiatric: Affect is pleasant & appropriate.  Patient appears motivated to improve health.     Respiratory: Breathing is regular & unlabored while sitting.     HEENT: Hearing is intact to spoken word.  Speech is clear.  No gross evidence of visual impairment that would impact ambulation.     Dermatologic: Skin is intact to right and left lower extremities without significant lesions, rash or abrasion.        Vascular: DP & PT pulses are intact & regular on the right and left.  No significant edema or varicosities noted.  CFT and skin temperature is normal to the right and left lower extremities.     Neurologic: Lower extremity sensation is intact to light touch.  No evidence of weakness or contracture in the right and left lower extremities.        Musculoskeletal: Patient is ambulatory without assistive device or brace.  No gross ankle deformity noted.  No foot or ankle joint effusion is noted.   One notes a moderate bunion deformity with pain on palpation over the medial aspect of the first metatarsophalangeal joint on the right and left foot.  Noted positive tracking of the first metatarsophalangeal joint with noted pain on the right and left foot.   No surrounding erythema or edema noted.     Diagnostics:  Radiographs were evaluated including AP, lateral and medial oblique views of the left foot reveals a moderate bunion deformity with an elevated first intermetatarsal angle of approximately 18 degrees and hallux abductus angle of approximately 36 degrees bilaterally.  No cortical erosions or periosteal elevation.  All joint " margins appear stable.  There is no apparent fracture or tumor formation noted.  There is no evidence of foreign body.      ASSESSMENT / PLAN:     ICD-10-CM    1. Hallux valgus, left  M20.12 XR Foot Bilateral G/E 3 Views     Orthotics and Prosthetics Order Orthotic; Foot Orthotics (functional rigid support); Bilateral      2. Hallux valgus, right  M20.11 XR Foot Bilateral G/E 3 Views     Orthotics and Prosthetics Order Orthotic; Foot Orthotics (functional rigid support); Bilateral          The nature of the patient s condition was discussed at length.  The patient was advised on wearing soft top-cover extrawide shoes to accommodate the bunion deformity.       Humberto verbalized agreement with and understanding of the rational for the diagnosis and treatment plan.  All questions were answered to best of my ability and the patient's satisfaction. The patient was advised to contact the clinic with any questions that may arise after the clinic visit.      Disclaimer: This note consists of symbols derived from keyboarding, dictation and/or voice recognition software. As a result, there may be errors in the script that have gone undetected. Please consider this when interpreting information found in this chart.       GABBY Alonso.P.YAYO., F.A.C.F.A.S.        Again, thank you for allowing me to participate in the care of your patient.        Sincerely,        Artemio Snyder DPM   Was An Eye Clamp Used?: No

## 2024-01-25 NOTE — TELEPHONE ENCOUNTER
Order placed for RD visit with Ashley Preciado, Chelita Davila or Annabel Pettit. Patient has Medicare insurance, so will need to sign ABN form. Called group home to inform them that referral has been placed. Warned of ABN form/potential insurance coverage issues due to Medicare. Will fax form to facility. Gave phone number to call to schedule and fax number to return signed form.     Fax: [phone]  [email or web address]

## 2024-01-25 NOTE — TELEPHONE ENCOUNTER
General Call    Contacts         Type Contact Phone/Fax    01/25/2024 09:40 AM CST Phone (Incoming) [facility] [phone]          Reason for Call:  Belinda, from group home needs to talk about psych appt yesterday where Meds Dr. Diaz wanted were Not prescribed        Okay to leave a detailed message?: Yes at Cell number on file:    Telephone Information:   Mobile [phone]

## 2024-01-25 NOTE — PROGRESS NOTES
PATIENT HISTORY:  Humberto Estrella is a 43 year old male who presents to clinic for a painful both foot .  The patient describes the pain as PRINCESS foot pain, Great toe, MTP .  The patient relates the pain level is moderate.  The patient relates pain is located over the big toe joint on the MTP of the foot, medially.  The patient relates the pain has been present for the past few years. Pain waxes/wanes, worse with activity, can be painful at rest.   Icing, resting, foot soak, elevation.  The patient relates pain with ambulation.  The patient has tried wider shoes with little relief.  The patient was sent by Dr.Rebecca Brown for consultation on the bilateral foot.       REVIEW OF SYSTEMS:  Constitutional, HEENT, cardiovascular, pulmonary, GI, , musculoskeletal, neuro, skin, endocrine and psych systems are negative, except as otherwise noted.     PAST MEDICAL HISTORY:   Past Medical History:   Diagnosis Date    Coronary artery disease     Depressive disorder     Diagnostic skin and sensitization tests (aka ALLERGENS) 9/30/15 IgE tests all NEGATIVE for environmental allergens.     Nonallergic rhinitis     9/30/15 IgE tests all NEGATIVE for environmental allergens.     Thyroid disease         PAST SURGICAL HISTORY:   Past Surgical History:   Procedure Laterality Date    PE TUBES      Left    PHACOEMULSIFICATION WITH STANDARD INTRAOCULAR LENS IMPLANT  10/3/2013    Procedure: PHACOEMULSIFICATION WITH STANDARD INTRAOCULAR LENS IMPLANT;  Left Kelman Phacoemulsification with Intraocular Lens Implant;  Surgeon: Luis Barajas MD;  Location: [facility]    PHACOEMULSIFICATION WITH STANDARD INTRAOCULAR LENS IMPLANT  5/1/2014    Procedure: PHACOEMULSIFICATION WITH STANDARD INTRAOCULAR LENS IMPLANT;  Surgeon: Luis Barajas MD;  Location: [facility] OR        MEDICATIONS:   Current Outpatient Medications:     alum & mag hydroxide-simethicone (MYLANTA/MAALOX) 200-200-20 MG/5ML SUSP suspension, Take 30 mLs by mouth every 4 hours as  needed for indigestion (2 tsp for upset stomach), Disp: 1 Bottle, Rfl: 3    calcium carbonate (TUMS) 500 MG chewable tablet, Take 1 tablet (500 mg) by mouth every 6 hours as needed for heartburn, Disp: 150 tablet, Rfl: 3    calcium polycarbophil (FIBERCON) 625 MG tablet, Take 2 tablets (1,250 mg) by mouth 2 times daily, Disp: 360 tablet, Rfl: 3    cetirizine (ZYRTEC) 10 MG tablet, Take 1 tablet (10 mg) by mouth At Bedtime Stop loratadine, Disp: 90 tablet, Rfl: 3    divalproex sodium delayed-release (DEPAKOTE) 250 MG DR tablet, 1000  in AM, Disp: , Rfl:     ibuprofen (ADVIL,MOTRIN) 600 MG tablet, Take 1 tablet (600 mg) by mouth every 6 hours as needed for moderate pain, Disp: 60 tablet, Rfl: 0    insulin pen needle (BD CANDI U/F) 32G X 4 MM miscellaneous, Use 1 pen needle daily with liraglutide., Disp: 120 each, Rfl: 4    levothyroxine (SYNTHROID/LEVOTHROID) 125 MCG tablet, Take 1 tablet (125 mcg) by mouth daily, Disp: 90 tablet, Rfl: 3    magnesium hydroxide (MILK OF MAGNESIA) 400 MG/5ML suspension, Take 30-60 mLs by mouth daily as needed for constipation or heartburn (If No Bowel Movement in 2 days.) Reported on 4/12/2017, Disp: 360 mL, Rfl: 1    montelukast (SINGULAIR) 10 MG tablet, Take 1 tablet (10 mg) by mouth at bedtime, Disp: 90 tablet, Rfl: 3    OLANZapine (ZYPREXA) 5 MG tablet, Take 2.5 mg by mouth 3 times daily as needed As needed, max of 15, Disp: 30 tablet, Rfl: 1    olopatadine (PATADAY) 0.2 % ophthalmic solution, Place 0.05 mLs (1 drop) into both eyes daily In morning X 2 weeks then as needed for extra watery eye, irritated feeling eye, redness/swelling of eyelid., Disp: 2.5 mL, Rfl: 11    order for DME, Equipment being ordered: CPAP AIRSENSE 10 11 CM H20 AIRFIT F20 MEDIUM # [id number]  DN# [id number], Disp: , Rfl:     PAIN & FEVER 325 MG tablet, TAKE 1-2 TABLETS (325-650MG) BY MOUTH EVERY 4 HOURS AS NEEDED *ORDER WHEN LOW*, Disp: 100 tablet, Rfl: 7    polyethylene glycol-propylene glycol PF (SYSTANE  ULTRA PF) 0.4-0.3 % SOLN opthalmic solution, Place 1-2 drops into both eyes Up to 5 times daily as needed, Disp: , Rfl:     polymixin b-trimethoprim (POLYTRIM) 12607-2.1 UNIT/ML-% ophthalmic solution, Place 1-2 drops into both eyes every 4 hours for 5 days, Disp: 10 mL, Rfl: 0    pseudoePHEDrine (SUDOGEST 12 HOUR) 120 MG 12 hr tablet, Take 1 tablet (120 mg) by mouth daily as needed for congestion, Disp: 12 tablet, Rfl: 11    risperiDONE (RISPERDAL) 0.5 MG tablet, Take 0.5 mg by mouth 2 times daily At noon, Disp: , Rfl:     risperiDONE (RISPERDAL) 1 MG tablet, Take 1 mg by mouth every morning, Disp: 60 tablet, Rfl:     risperiDONE (RISPERDAL) 2 MG tablet, Take 2 mg by mouth daily at 8:00pm, Disp: , Rfl:     sertraline (ZOLOFT) 100 MG tablet, Take 200 mg by mouth daily , Disp: , Rfl:     topiramate (TOPAMAX) 25 MG tablet, Take 1 tablet (25 mg) by mouth 2 times daily To be taken with 50mg tabs BID totaling 75mg BID., Disp: 180 tablet, Rfl: 3    topiramate (TOPAMAX) 50 MG tablet, Take 1 tablet (50 mg) by mouth 2 times daily To be taken with 25mg tabs BID totaling 75mg BID., Disp: 180 tablet, Rfl: 1    vitamin D3 25 mcg (1000 units) tablet, Take 1 tablet (25 mcg) by mouth daily, Disp: 100 tablet, Rfl: 3     ALLERGIES:    Allergies   Allergen Reactions    Nkda [No Known Drug Allergy]     Phentermine Anxiety     agitation anxiety          SOCIAL HISTORY:   Social History     Socioeconomic History    Marital status: Single     Spouse name: Not on file    Number of children: 0    Years of education: 12    Highest education level: Not on file   Occupational History    Occupation: Cleaning Services     Comment: [employer]     Employer: UNEMPLOYED     Employer: DISABLED   Tobacco Use    Smoking status: Former     Packs/day: 1.00     Years: 1.00     Additional pack years: 0.00     Total pack years: 1.00     Types: Cigarettes     Start date: 1999     Quit date: 2000     Years since quittin.7     "Smokeless tobacco: Former   Vaping Use    Vaping Use: Never used   Substance and Sexual Activity    Alcohol use: No     Alcohol/week: 0.0 standard drinks of alcohol    Drug use: No    Sexual activity: Never   Other Topics Concern    Parent/sibling w/ CABG, MI or angioplasty before 65F 55M? No   Social History Narrative    Not on file     Social Determinants of Health     Financial Resource Strain: Unknown (1/24/2024)    Financial Resource Strain     Within the past 12 months, have you or your family members you live with been unable to get utilities (heat, electricity) when it was really needed?: Patient declined   Food Insecurity: Low Risk  (1/24/2024)    Food Insecurity     Within the past 12 months, did you worry that your food would run out before you got money to buy more?: No     Within the past 12 months, did the food you bought just not last and you didn t have money to get more?: Patient declined   Transportation Needs: Unknown (1/24/2024)    Transportation Needs     Within the past 12 months, has lack of transportation kept you from medical appointments, getting your medicines, non-medical meetings or appointments, work, or from getting things that you need?: Patient declined   Physical Activity: Not on file   Stress: Not on file   Social Connections: Not on file   Interpersonal Safety: Not on file   Housing Stability: Unknown (1/24/2024)    Housing Stability     Do you have housing? : Patient declined     Are you worried about losing your housing?: Patient declined        FAMILY HISTORY:   Family History   Problem Relation Age of Onset    Unknown/Adopted Mother     Other Cancer Mother     Anxiety Disorder Brother     Substance Abuse Brother     Depression Brother     Substance Abuse Father         EXAM:Vitals: Ht 1.6 m (5' 3\")   Wt 108.4 kg (239 lb)   BMI 42.34 kg/m    BMI= Body mass index is 42.34 kg/m .    General appearance: Patient is alert and fully cooperative with history & exam.  No sign of " distress is noted during the visit.     Psychiatric: Affect is pleasant & appropriate.  Patient appears motivated to improve health.     Respiratory: Breathing is regular & unlabored while sitting.     HEENT: Hearing is intact to spoken word.  Speech is clear.  No gross evidence of visual impairment that would impact ambulation.     Dermatologic: Skin is intact to right and left lower extremities without significant lesions, rash or abrasion.        Vascular: DP & PT pulses are intact & regular on the right and left.  No significant edema or varicosities noted.  CFT and skin temperature is normal to the right and left lower extremities.     Neurologic: Lower extremity sensation is intact to light touch.  No evidence of weakness or contracture in the right and left lower extremities.        Musculoskeletal: Patient is ambulatory without assistive device or brace.  No gross ankle deformity noted.  No foot or ankle joint effusion is noted.   One notes a moderate bunion deformity with pain on palpation over the medial aspect of the first metatarsophalangeal joint on the right and left foot.  Noted positive tracking of the first metatarsophalangeal joint with noted pain on the right and left foot.   No surrounding erythema or edema noted.     Diagnostics:  Radiographs were evaluated including AP, lateral and medial oblique views of the left foot reveals a moderate bunion deformity with an elevated first intermetatarsal angle of approximately 18 degrees and hallux abductus angle of approximately 36 degrees bilaterally.  No cortical erosions or periosteal elevation.  All joint margins appear stable.  There is no apparent fracture or tumor formation noted.  There is no evidence of foreign body.      ASSESSMENT / PLAN:     ICD-10-CM    1. Hallux valgus, left  M20.12 XR Foot Bilateral G/E 3 Views     Orthotics and Prosthetics Order Orthotic; Foot Orthotics (functional rigid support); Bilateral      2. Hallux valgus, right   M20.11 XR Foot Bilateral G/E 3 Views     Orthotics and Prosthetics Order Orthotic; Foot Orthotics (functional rigid support); Bilateral          The nature of the patient s condition was discussed at length.  The patient was advised on wearing soft top-cover extrawide shoes to accommodate the bunion deformity.       Humberto verbalized agreement with and understanding of the rational for the diagnosis and treatment plan.  All questions were answered to best of my ability and the patient's satisfaction. The patient was advised to contact the clinic with any questions that may arise after the clinic visit.      Disclaimer: This note consists of symbols derived from keyboarding, dictation and/or voice recognition software. As a result, there may be errors in the script that have gone undetected. Please consider this when interpreting information found in this chart.       PERNELL Snyder D.P.M., [email or web address].

## 2024-01-25 NOTE — TELEPHONE ENCOUNTER
General Call    Contacts         Type Contact Phone/Fax    01/25/2024 09:45 AM CST Phone (Incoming) [facility] (Emergency Contact) [phone]          Reason for Call:  they'd like a referral for RD for insurance purposes.  Please call when complete      Okay to leave a detailed message?: Yes at Cell number on file:    Telephone Information:   Mobile [phone]

## 2024-01-25 NOTE — PATIENT INSTRUCTIONS

## 2024-01-25 NOTE — TELEPHONE ENCOUNTER
INCOMING FORMS    Sender: [facility] services    Type of Form, letter or note (What is requested?): order    How was the form received?: Fax    How should forms be returned?:  Fax : [phone]    Form placed in ES bin for review/signature if appropriate.

## 2024-01-26 NOTE — TELEPHONE ENCOUNTER
Form faxed to  [phone]   Form sent to scanning.    Transposition Flap Text: The defect edges were debeveled with a #15 scalpel blade.  Given the location of the defect and the proximity to free margins a transposition flap was deemed most appropriate.  Using a sterile surgical marker, an appropriate transposition flap was drawn incorporating the defect.    The area thus outlined was incised deep to adipose tissue with a #15 scalpel blade.  The skin margins were undermined to an appropriate distance in all directions utilizing iris scissors.

## 2024-02-12 ENCOUNTER — VIRTUAL VISIT (OUTPATIENT)
Dept: SLEEP MEDICINE | Facility: CLINIC | Age: 44
End: 2024-02-12
Payer: MEDICARE

## 2024-02-12 VITALS
DIASTOLIC BLOOD PRESSURE: 88 MMHG | WEIGHT: 240 LBS | SYSTOLIC BLOOD PRESSURE: 128 MMHG | BODY MASS INDEX: 42.52 KG/M2 | HEIGHT: 63 IN

## 2024-02-12 DIAGNOSIS — G47.33 OSA (OBSTRUCTIVE SLEEP APNEA): Primary | ICD-10-CM

## 2024-02-12 PROCEDURE — 99213 OFFICE O/P EST LOW 20 MIN: CPT | Mod: 95 | Performed by: PHYSICIAN ASSISTANT

## 2024-02-12 ASSESSMENT — SLEEP AND FATIGUE QUESTIONNAIRES
HOW LIKELY ARE YOU TO NOD OFF OR FALL ASLEEP WHILE LYING DOWN TO REST IN THE AFTERNOON WHEN CIRCUMSTANCES PERMIT: HIGH CHANCE OF DOZING
HOW LIKELY ARE YOU TO NOD OFF OR FALL ASLEEP WHILE SITTING QUIETLY AFTER LUNCH WITHOUT ALCOHOL: HIGH CHANCE OF DOZING
HOW LIKELY ARE YOU TO NOD OFF OR FALL ASLEEP IN A CAR, WHILE STOPPED FOR A FEW MINUTES IN TRAFFIC: HIGH CHANCE OF DOZING
HOW LIKELY ARE YOU TO NOD OFF OR FALL ASLEEP WHILE SITTING AND READING: HIGH CHANCE OF DOZING
HOW LIKELY ARE YOU TO NOD OFF OR FALL ASLEEP WHILE SITTING INACTIVE IN A PUBLIC PLACE: WOULD NEVER DOZE
HOW LIKELY ARE YOU TO NOD OFF OR FALL ASLEEP WHEN YOU ARE A PASSENGER IN A CAR FOR AN HOUR WITHOUT A BREAK: HIGH CHANCE OF DOZING
HOW LIKELY ARE YOU TO NOD OFF OR FALL ASLEEP WHILE SITTING AND TALKING TO SOMEONE: WOULD NEVER DOZE
HOW LIKELY ARE YOU TO NOD OFF OR FALL ASLEEP WHILE WATCHING TV: HIGH CHANCE OF DOZING

## 2024-02-12 ASSESSMENT — PAIN SCALES - GENERAL: PAINLEVEL: NO PAIN (0)

## 2024-02-12 NOTE — PATIENT INSTRUCTIONS
Your Body mass index is 42.51 kg/m .  Weight management is a personal decision.  If you are interested in exploring weight loss strategies, the following discussion covers the approaches that may be successful. Body mass index (BMI) is one way to tell whether you are at a healthy weight, overweight, or obese. It measures your weight in relation to your height.  A BMI of 18.5 to 24.9 is in the healthy range. A person with a BMI of 25 to 29.9 is considered overweight, and someone with a BMI of 30 or greater is considered obese. More than two-thirds of American adults are considered overweight or obese.  Being overweight or obese increases the risk for further weight gain. Excess weight may lead to heart disease and diabetes.  Creating and following plans for healthy eating and physical activity may help you improve your health.  Weight control is part of healthy lifestyle and includes exercise, emotional health, and healthy eating habits. Careful eating habits lifelong are the mainstay of weight control. Though there are significant health benefits from weight loss, long-term weight loss with diet alone may be very difficult to achieve- studies show long-term success with dietary management in less than 10% of people. Attaining a healthy weight may be especially difficult to achieve in those with severe obesity. In some cases, medications, devices and surgical management might be considered.  What can you do?  If you are overweight or obese and are interested in methods for weight loss, you should discuss this with your provider.   Consider reducing daily calorie intake by 500 calories.   Keep a food journal.   Avoiding skipping meals, consider cutting portions instead.    Diet combined with exercise helps maintain muscle while optimizing fat loss. Strength training is particularly important for building and maintaining muscle mass. Exercise helps reduce stress, increase energy, and improves fitness. Increasing  exercise without diet control, however, may not burn enough calories to loose weight.     Start walking three days a week 10-20 minutes at a time  Work towards walking thirty minutes five days a week   Eventually, increase the speed of your walking for 1-2 minutes at time    In addition, we recommend that you review healthy lifestyles and methods for weight loss available through the National Institutes of Health patient information sites:  http://win.niddk.nih.gov/publications/index.htm    And look into health and wellness programs that may be available through your health insurance provider, employer, local community center, or pricilla club.
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
## 2024-02-12 NOTE — NURSING NOTE
Has patient had flu shot for current/most recent flu season? If so, when? Yes: 9/8/23      Is the patient currently in the state [facility]? YES    Visit mode:VIDEO    If the visit is dropped, the patient can be reconnected by: VIDEO VISIT: Send to e-mail at: [email or web address]    Will anyone else be joining the visit? YES: How would they like to receive their invitation? Text to cell phone: Lead staff - Melody  (If patient encounters technical issues they should call [phone])    How would you like to obtain your AVS? Mail a copy    Are changes needed to the allergy or medication list? Yes - add to medication Ocusoft eyelid scrub pads    Reason for visit: RECHECK    Diane LIEBERMANF

## 2024-02-12 NOTE — PROGRESS NOTES
Virtual Visit Details    Type of service:  Video Visit   Video Start Time: 1:05 PM  Video End Time:1:22 PM    Originating Location (pt. Location): Home    Distant Location (provider location):  On-site  Platform used for Video Visit: Juan Jose

## 2024-02-13 ENCOUNTER — VIRTUAL VISIT (OUTPATIENT)
Dept: FAMILY MEDICINE | Facility: CLINIC | Age: 44
End: 2024-02-13
Payer: MEDICARE

## 2024-02-13 DIAGNOSIS — T14.91XA SUICIDE ATTEMPT (H): ICD-10-CM

## 2024-02-13 DIAGNOSIS — H10.9 BACTERIAL CONJUNCTIVITIS OF LEFT EYE: Primary | ICD-10-CM

## 2024-02-13 DIAGNOSIS — F60.3 EXPLOSIVE PERSONALITY DISORDER (H): ICD-10-CM

## 2024-02-13 PROCEDURE — 99213 OFFICE O/P EST LOW 20 MIN: CPT | Mod: 95 | Performed by: STUDENT IN AN ORGANIZED HEALTH CARE EDUCATION/TRAINING PROGRAM

## 2024-02-13 RX ORDER — NEOMYCIN POLYMYXIN B SULFATES AND DEXAMETHASONE 3.5; 10000; 1 MG/ML; [USP'U]/ML; MG/ML
SUSPENSION/ DROPS OPHTHALMIC
Qty: 10 ML | Refills: 0 | Status: SHIPPED | OUTPATIENT
Start: 2024-02-13

## 2024-02-13 NOTE — PROGRESS NOTES
Humberto is a 43 year old who is being evaluated via a billable video visit.      How would you like to obtain your AVS? Mail a copy  If the video visit is dropped, the invitation should be resent by: Send to e-mail at: [email or web address].  Will anyone else be joining your video visit? No      Assessment & Plan     Bacterial conjunctivitis of left eye  > Patient is experiencing similar symptoms to 1 week ago when he was previously successfully treated for pink eye with Maxitrol  -he does have good hygiene with thorough handwashing, does not use contacts, and is not frequently touching his eyes, unclear how/why he is getting reinfected   -Encouraged supportive care with warm compress, patient already has an order for as needed warm compress for eye condition at his facility    - Adult Eye  Referral; Future for further evaluation of recurrent eye infections  - neomycin-polymixin-dexAMETHasone (MAXITROL) 0.1 % ophthalmic suspension; 2 drops into the conjunctival sac of the affected eye(s) 4 times daily every 6 hours, during waking hours, for 7 days  -Caregiver aware to take patient to be seen in person by a provider if his symptoms do not improve or worsen within 2 days of taking the antibiotic eyedrops    Explosive personality disorder (H)  > has follow-up with psychiatry     Suicide attempt (H)  > denies any thoughts of suicide or plans to end his life       Subjective   Humberto is a 43 year old, presenting for the following health issues:  Conjunctivitis        2/13/2024    12:53 PM   Additional Questions   Roomed by Annabel EUCEDA LPN   Accompanied by Caregiver- Belinda     Forms               YES - facility is faxing                  Paperwork to the clinic      2/13/2024    12:53 PM   Patient Reported Additional Medications   Patient reports taking the following new medications no new meds     History of Present Illness       Reason for visit:  Pink eye  Symptom onset:  1-3 days ago  Symptoms include:  Eye  discharge and crustiness  Symptom intensity:  Mild  Symptom progression:  Worsening  Had these symptoms before:  Yes  Has tried/received treatment for these symptoms:  Yes  Previous treatment was successful:  Yes  Prior treatment description:  Eye drops were prescribed for this and was used but it came back  What makes it worse:  No  What makes it better:  No        Eye(s) Problem  Onset/Duration: had a virtual visit for bilateral conjunctivitis on 1/24/24 - completed polymixin b-trimethoprim (POLYTRIM) 16514-0.1 UNIT/ML-% ophthalmic solution  X7 days and is having symptoms again which started 2/12/24  Description:   Location: Left  Pain: No  Redness: YES  Accompanying Signs & Symptoms:  Discharge/mattering: YES  Swelling: No  Visual changes: No  Fever: No  Nasal Congestion: YES- sometimes  Bothered by bright lights: No  History:  Trauma: No  Foreign body exposure: No  Wearing contacts: No  Precipitating or alleviating factors: None  Therapies tried and outcome: None    Was diagnosed with stye and prescribed Maxitrol, finished the Maxitrol on 2-7-24, but it appears that his symptoms have returned again   No one around him sick with similar symptoms   Not frequently touching eyes   Washes hands well   Doesn't use contacts   No new shampoos, detergents, clothes     Review of Systems  Constitutional, neuro, Neck, Throat, endocrine, pulmonary, cardiac, gastrointestinal, genitourinary, musculoskeletal, integument and psychiatric systems are negative, except as otherwise noted.      Objective    Vitals - Patient Reported  Pain Score: No Pain (0)        Physical Exam   GENERAL: alert and no distress  EYES: sclera doesn't appear red or irritated, however, the surrounding eyelids appear crusty and the lower eyelid does appear slightly erythematous, there is some more injection to the conjunctiva on the left eye compared to the right   RESP: No audible wheeze, cough, or visible cyanosis.    SKIN: Visible skin clear. No  significant rash, abnormal pigmentation or lesions.  NEURO: Cranial nerves grossly intact.  Mentation and speech appropriate for age.  PSYCH: Appropriate affect, tone, and pace of words        Video-Visit Details    Type of service:  Video Visit 19 min and 32 seconds     Originating Location (pt. Location): Assisted Living    Distant Location (provider location):  On-site  Platform used for Video Visit: Juan Jose  Signed Electronically by: AKANKSHA HAGEN MD

## 2024-02-13 NOTE — PATIENT INSTRUCTIONS
Pinkeye: Care Instructions  Overview     Pinkeye is redness and swelling of the eye surface and the conjunctiva (the lining of the eyelid and the covering of the white part of the eye). Pinkeye is also called conjunctivitis. Pinkeye is often caused by infection with bacteria or a virus. Dry air, allergies, smoke, and chemicals are other common causes.  Pinkeye often gets better on its own in 7 to 10 days. Antibiotics only help if the pinkeye is caused by bacteria. Pinkeye caused by infection spreads easily. If an allergy or chemical is causing pinkeye, it will not go away unless you can avoid whatever is causing it.  Follow-up care is a key part of your treatment and safety. Be sure to make and go to all appointments, and call your doctor if you are having problems. It's also a good idea to know your test results and keep a list of the medicines you take.  How can you care for yourself at home?  Wash your hands often. Always wash them before and after you treat pinkeye or touch your eyes or face.  Use moist cotton or a clean, wet cloth to remove crust. Wipe from the inside corner of the eye to the outside. Use a clean part of the cloth for each wipe.  Put cold or warm wet cloths on your eye a few times a day if the eye hurts.  Do not wear contact lenses or eye makeup until the pinkeye is gone. Throw away any eye makeup you were using when you got pinkeye. Clean your contacts and storage case. If you wear disposable contacts, use a new pair when your eye has cleared and it is safe to wear contacts again.  If the doctor gave you antibiotic ointment or eyedrops, use them as directed. Use the medicine for as long as instructed, even if your eye starts looking better soon. Keep the bottle tip clean, and do not let it touch the eye area.  To put in eyedrops or ointment:  Tilt your head back, and pull your lower eyelid down with one finger.  Drop or squirt the medicine inside the lower lid.  Close your eye for 30 to 60  "seconds to let the drops or ointment move around.  Do not touch the ointment or dropper tip to your eyelashes or any other surface.  Do not share towels, pillows, or washcloths while you have pinkeye.  When should you call for help?   Call your doctor now or seek immediate medical care if:    You have pain in your eye, not just irritation on the surface.     You have a change in vision or loss of vision.     You have an increase in discharge from the eye.     Your eye has not started to improve or begins to get worse within 48 hours after you start using antibiotics.     Pinkeye lasts longer than 7 days.   Watch closely for changes in your health, and be sure to contact your doctor if you have any problems.  Where can you learn more?  Go to https://www.[employer].net/patiented  Enter Y392 in the search box to learn more about \"Pinkeye: Care Instructions.\"  Current as of: June 6, 2023               Content Version: 13.8    9644-1831 [employer].   Care instructions adapted under license by your healthcare professional. If you have questions about a medical condition or this instruction, always ask your healthcare professional. [employer] disclaims any warranty or liability for your use of this information.      "

## 2024-02-16 ENCOUNTER — DOCUMENTATION ONLY (OUTPATIENT)
Dept: SLEEP MEDICINE | Facility: CLINIC | Age: 44
End: 2024-02-16
Payer: MEDICARE

## 2024-02-16 NOTE — NURSING NOTE
"Initial /66 (BP Location: Left arm, Patient Position: Chair, Cuff Size: Adult Large)  Pulse 102  Temp 98.3  F (36.8  C) (Tympanic)  Ht 5' 3\" (1.6 m)  Wt 243 lb (110.2 kg)  BMI 43.05 kg/m2 Estimated body mass index is 43.05 kg/(m^2) as calculated from the following:    Height as of this encounter: 5' 3\" (1.6 m).    Weight as of this encounter: 243 lb (110.2 kg). .    Malia Arndt    " no

## 2024-02-16 NOTE — PROGRESS NOTES
[facility] faxed form to provider, Jonny Rossi PA-C, regarding recent Video visit. Provider signed the form and writer faxed form on 2/16/2024.     Form scanned to chart.

## 2024-02-21 NOTE — NURSING NOTE
DME orders have been automatically faxed to [facility]. 1 year appointment reminder will be sent via sleep center  staff.  Nafisa Johnson CMA

## 2024-02-22 ENCOUNTER — DOCUMENTATION ONLY (OUTPATIENT)
Dept: SLEEP MEDICINE | Facility: CLINIC | Age: 44
End: 2024-02-22
Payer: MEDICARE

## 2024-02-22 NOTE — PROGRESS NOTES
Date: 11/22/23   Location of mask fitting: [place]   Patient was offered choice of vendor and chose [facility].   Date: 2/22/24   Location of mask fitting: [place]   Reason for mask fitting: Patient having irritation around neck from current mask.   Discussed/viewed the following masks: F&P Vitera FFM   Mask and size selected: Vitera medium   Mask clarification needed: No   Does patient wear a medical device that will be affected by the respironics or resmed magnetic mask contraindication (if yes, select a mask that does not have magnets)?  No     2/22/24 Patient and staff from [facility] came to [place] showroom to discuss masks. He is having some irritation from his old mask and wanted to know what other options there are. He chose Vitera mask and was fit in the showroom with the new mask. Discussed replacement of supplies and let him know to reach out with any further questions.   Pilar Beard

## 2024-02-29 ENCOUNTER — VIRTUAL VISIT (OUTPATIENT)
Dept: ENDOCRINOLOGY | Facility: CLINIC | Age: 44
End: 2024-02-29
Payer: MEDICARE

## 2024-02-29 VITALS — WEIGHT: 232 LBS | BODY MASS INDEX: 36.41 KG/M2 | HEIGHT: 67 IN

## 2024-02-29 DIAGNOSIS — E66.09 OBESITY DUE TO EXCESS CALORIES, UNSPECIFIED OBESITY SEVERITY: Primary | Chronic | ICD-10-CM

## 2024-02-29 PROCEDURE — 99442 PR PHYSICIAN TELEPHONE EVALUATION 11-20 MIN: CPT | Performed by: INTERNAL MEDICINE

## 2024-02-29 RX ORDER — TOPIRAMATE 50 MG/1
50 TABLET, FILM COATED ORAL 2 TIMES DAILY
Qty: 180 TABLET | Refills: 1 | Status: SHIPPED | OUTPATIENT
Start: 2024-02-29 | End: 2024-06-06

## 2024-02-29 RX ORDER — TOPIRAMATE 25 MG/1
25 TABLET, FILM COATED ORAL 2 TIMES DAILY
Qty: 180 TABLET | Refills: 3 | Status: SHIPPED | OUTPATIENT
Start: 2024-02-29 | End: 2024-06-06

## 2024-02-29 ASSESSMENT — PAIN SCALES - GENERAL: PAINLEVEL: NO PAIN (0)

## 2024-02-29 NOTE — LETTER
2/29/2024      RE: Humberto Estrella  [address]  [place] [address]     Dear Colleague,    Thank you for the opportunity to participate in the care of your patient, Humberto Estrella, at the [facility].     Please be advised that we think that it is safe and appropriate to decrease the serving size that Mr Estrella is served from 1.5 servings to just 1 serving per meal.    His eating can be guided by hunger, and he doesn't need to eat if he is not hungry.        Please do not hesitate to contact me if you have any questions/concerns.     Sincerely,       Wanda Diaz MD    Please fax to: [phone]

## 2024-02-29 NOTE — NURSING NOTE
Is the patient currently in the state of [place]? YES    Visit mode:TELEPHONE    If the visit is dropped, the patient can be reconnected by: TELEPHONE VISIT: Phone number:   Telephone Information:   Mobile [phone]       Will anyone else be joining the visit? NO  (If patient encounters technical issues they should call [phone] :[phone])    How would you like to obtain your AVS? MyChart    Are changes needed to the allergy or medication list? No    Reason for visit: RECHECK    Meghan DONOHUE

## 2024-02-29 NOTE — Clinical Note
"2024       RE: Humberto Estrella  [address]  [place] [address]     Dear Colleague,    Thank you for referring your patient, Humberto Estrella, to the [facility]. Please see a copy of my visit note below.    Virtual Visit Details    Type of service:  Telephone Visit   Phone call duration: *** minutes   Originating Location (pt. Location): {patient location:642029::\"Home\"}  {PROVIDER LOCATION On-site should be selected for visits conducted from your clinic location or adjoining [facility] hospital, academic office, or other nearby [facility] building. Off-site should be selected for all other provider locations, including home:297467}  Distant Location (provider location):  {virtual location provider:050086}    Return Medical Weight Management Note  Humberto Estrella  MRN:  [id number]  :  1980  BROCK:  2024    Dear Carla Brown,    I had the pleasure of seeing your patient Humberto Estrella for follow-up consultation.  He is a 43 year old male who I am continuing to see for treatment of obesity related to:        2019     3:03 PM   --   I have the following health issues associated with obesity Pre-Diabetes    Sleep Apnea    GERD (Reflux)   I have the following symptoms associated with obesity Knee Pain    GERD (Reflux)       INTERVAL HISTORY:  ***    No problems updated.     CURRENT WEIGHT:   232 lbs 0 oz    Wt Readings from Last 4 Encounters:   24 105.2 kg (232 lb)   24 108.9 kg (240 lb)   24 108.4 kg (239 lb)   24 108.4 kg (239 lb)       Height:  5' 7\"  Body Mass Index:  Body mass index is 36.34 kg/m .  Vitals:  B/P: Data Unavailable, P: Data Unavailable, R: Data Unavailable     Starting weight in weight management with Dr. Diaz was: ***        2019     3:03 PM   Diet Recall Review with Patient   Do you typically eat breakfast? Yes   If you do eat " breakfast, what types of food do you eat? eggs and toast   Do you typically eat lunch? Yes   If you do eat lunch, what types of food do you typically eat? hamburger  salad veggies and fruit   Do you typically eat supper? Yes   If you do eat supper, what types of food do you typically eat? salad chicken veggies and a glass of milk   Do you typically eat snacks? Yes   If you do snack, what types of food do you typically eat? fruit and or veggies   How many glasses of juice do you drink in a typical day? 1   How many of glasses of milk do you drink in a typical day? 2   If you do drink milk, what type? 1%   How many 8oz glasses of sugar containing drinks such as Jeff-Aid/sweet tea do you drink in a day? 0   How many cans/bottles of sugar pop/soda/tea/sports drinks do you drink in a day? 0   How many cans/bottles of diet pop/soda/tea or sports drink do you drink in a day? 4   How often do you have a drink of alcohol? Never       MEDICATIONS:   Current Outpatient Medications   Medication     alum & mag hydroxide-simethicone (MYLANTA/MAALOX) 200-200-20 MG/5ML SUSP suspension     calcium carbonate (TUMS) 500 MG chewable tablet     calcium polycarbophil (FIBERCON) 625 MG tablet     cetirizine (ZYRTEC) 10 MG tablet     divalproex sodium delayed-release (DEPAKOTE) 250 MG DR tablet     ibuprofen (ADVIL,MOTRIN) 600 MG tablet     insulin pen needle (BD CANDI U/F) 32G X 4 MM miscellaneous     levothyroxine (SYNTHROID/LEVOTHROID) 125 MCG tablet     magnesium hydroxide (MILK OF MAGNESIA) 400 MG/5ML suspension     montelukast (SINGULAIR) 10 MG tablet     neomycin-polymixin-dexAMETHasone (MAXITROL) 0.1 % ophthalmic suspension     OLANZapine (ZYPREXA) 5 MG tablet     olopatadine (PATADAY) 0.2 % ophthalmic solution     order for DME     PAIN & FEVER 325 MG tablet     polyethylene glycol-propylene glycol PF (SYSTANE ULTRA PF) 0.4-0.3 % SOLN opthalmic solution     pseudoePHEDrine (SUDOGEST 12 HOUR) 120 MG 12 hr tablet     risperiDONE  "(RISPERDAL) 0.5 MG tablet     risperiDONE (RISPERDAL) 1 MG tablet     risperiDONE (RISPERDAL) 2 MG tablet     sertraline (ZOLOFT) 100 MG tablet     topiramate (TOPAMAX) 25 MG tablet     topiramate (TOPAMAX) 50 MG tablet     vitamin D3 25 mcg (1000 units) tablet     No current facility-administered medications for this visit.           2/29/2024     2:53 PM   Weight Loss Medication History Reviewed With Patient   Which weight loss medications are you currently taking on a regular basis? Topamax (topiramate)   Are you having any side effects from the weight loss medication that we have prescribed you? No       LABS:  Hemoglobin A1C   Date Value Ref Range Status   09/08/2023 5.0 0.0 - 5.6 % Final     Comment:     Normal <5.7%   Prediabetes 5.7-6.4%    Diabetes 6.5% or higher     Note: Adopted from ADA consensus guidelines.   09/13/2022 4.9 0.0 - 5.6 % Final     Comment:     Normal <5.7%   Prediabetes 5.7-6.4%    Diabetes 6.5% or higher     Note: Adopted from ADA consensus guidelines.   02/11/2020 5.5 0 - 5.6 % Final     Comment:     Normal <5.7% Prediabetes 5.7-6.4%  Diabetes 6.5% or higher - adopted from ADA   consensus guidelines.     11/19/2015 5.1 4.3 - 6.0 % Final     Cholesterol   Date Value Ref Range Status   09/08/2023 193 <200 mg/dL Final   08/04/2020 173 <200 mg/dL Final     TSH   Date Value Ref Range Status   01/16/2024 4.70 (H) 0.30 - 4.20 uIU/mL Final   11/16/2021 5.78 (H) 0.40 - 4.00 mU/L Final   08/04/2020 0.70 0.40 - 4.00 mU/L Final     Creatinine   Date Value Ref Range Status   09/08/2023 1.28 (H) 0.67 - 1.17 mg/dL Final   03/18/2021 1.12 0.66 - 1.25 mg/dL Final     ALT   Date Value Ref Range Status   01/24/2024 30 0 - 70 U/L Final   03/18/2021 44 0 - 70 U/L Final       ASSESSMENT:  Mr. Estrella is a 43 year old male with history of Class *** obesity with current body mass index is 36.34 kg/m . and {WITH/WITHOUT:111912::\"with\"} current health consequences who presents for weight management follow-up " "consultation. Overall Humberto Estrella ***.     The following diagnoses are relevant to Humberto Estrella's history of obesity:     PLAN:    No problems updated.   No problem-specific Assessment & Plan notes found for this encounter.      No orders of the defined types were placed in this encounter.       With motivational interviewing, Humberto took part in making the following plan:  There are no Patient Instructions on file for this visit.    FOLLOW-UP:    {WT [facility] RTC:970296}.    ***minutes spent on the date of the encounter doing chart review, history and exam, documentation and further activities as noted above.    Thank you for including me in the care of your patient.  Please do not hesitate to call with questions or concerns.    Sincerely,    Wanda Diaz MD MPH  Diplomate, American Board of Obesity Medicine, American Board of Internal Medicine, American Board of Pediatrics    Departments of Internal Medicine and Pediatrics  [facility]        [unfilled]       Virtual Visit Details    Type of service:  Telephone Visit   Phone call duration: *** minutes   Originating Location (pt. Location): {patient location:385128::\"Home\"}  {PROVIDER LOCATION On-site should be selected for visits conducted from your clinic location or adjoining [facility] hospital, academic office, or other nearby [facility] building. Off-site should be selected for all other provider locations, including home:838482}  Distant Location (provider location):  {virtual location provider:576456}      Again, thank you for allowing me to participate in the care of your patient.      Sincerely,    Wanda Diaz MD    "

## 2024-02-29 NOTE — PROGRESS NOTES
"Return Medical Weight Management Note  Humberto Estrella  MRN:  [id number]  :  1980  BROCK:  2024    Dear Kevin, Carla Delgado,    I had the pleasure of seeing your patient Humberto Estrella for follow-up consultation.  He is a 43 year old male who I am continuing to see for treatment of obesity related to:        2019     3:03 PM   --   I have the following health issues associated with obesity Pre-Diabetes    Sleep Apnea    GERD (Reflux)   I have the following symptoms associated with obesity Knee Pain    GERD (Reflux)       INTERVAL HISTORY:    CURRENT WEIGHT:   232 lbs 0 oz    Wt Readings from Last 4 Encounters:   24 105.2 kg (232 lb)   24 108.9 kg (240 lb)   24 108.4 kg (239 lb)   24 108.4 kg (239 lb)       Height:  5' 7\"  Body Mass Index:  Body mass index is 36.34 kg/m .  Vitals:  B/P: Data Unavailable, P: Data Unavailable, R: Data Unavailable     Starting weight in weight management with Dr. Diaz was: 246        2019     3:03 PM   Diet Recall Review with Patient   Do you typically eat breakfast? Yes   If you do eat breakfast, what types of food do you eat? eggs and toast   Do you typically eat lunch? Yes   If you do eat lunch, what types of food do you typically eat? hamburger  salad veggies and fruit   Do you typically eat supper? Yes   If you do eat supper, what types of food do you typically eat? salad chicken veggies and a glass of milk   Do you typically eat snacks? Yes   If you do snack, what types of food do you typically eat? fruit and or veggies   How many glasses of juice do you drink in a typical day? 1   How many of glasses of milk do you drink in a typical day? 2   If you do drink milk, what type? 1%   How many 8oz glasses of sugar containing drinks such as Jeff-Aid/sweet tea do you drink in a day? 0   How many cans/bottles of sugar pop/soda/tea/sports drinks do you drink in a day? 0   How many cans/bottles of diet pop/soda/tea or sports drink do you " drink in a day? 4   How often do you have a drink of alcohol? Never       MEDICATIONS:   Current Outpatient Medications   Medication    topiramate (TOPAMAX) 25 MG tablet    topiramate (TOPAMAX) 50 MG tablet    alum & mag hydroxide-simethicone (MYLANTA/MAALOX) 200-200-20 MG/5ML SUSP suspension    calcium carbonate (TUMS) 500 MG chewable tablet    calcium polycarbophil (FIBERCON) 625 MG tablet    cetirizine (ZYRTEC) 10 MG tablet    divalproex sodium delayed-release (DEPAKOTE) 250 MG DR tablet    ibuprofen (ADVIL,MOTRIN) 600 MG tablet    insulin pen needle (BD CANDI U/F) 32G X 4 MM miscellaneous    levothyroxine (SYNTHROID/LEVOTHROID) 125 MCG tablet    magnesium hydroxide (MILK OF MAGNESIA) 400 MG/5ML suspension    montelukast (SINGULAIR) 10 MG tablet    neomycin-polymixin-dexAMETHasone (MAXITROL) 0.1 % ophthalmic suspension    OLANZapine (ZYPREXA) 5 MG tablet    olopatadine (PATADAY) 0.2 % ophthalmic solution    order for DME    PAIN & FEVER 325 MG tablet    polyethylene glycol-propylene glycol PF (SYSTANE ULTRA PF) 0.4-0.3 % SOLN opthalmic solution    pseudoePHEDrine (SUDOGEST 12 HOUR) 120 MG 12 hr tablet    risperiDONE (RISPERDAL) 0.5 MG tablet    risperiDONE (RISPERDAL) 1 MG tablet    risperiDONE (RISPERDAL) 2 MG tablet    sertraline (ZOLOFT) 100 MG tablet    vitamin D3 25 mcg (1000 units) tablet     No current facility-administered medications for this visit.           2/29/2024     2:53 PM   Weight Loss Medication History Reviewed With Patient   Which weight loss medications are you currently taking on a regular basis? Topamax (topiramate)   Are you having any side effects from the weight loss medication that we have prescribed you? No       LABS:  Hemoglobin A1C   Date Value Ref Range Status   09/08/2023 5.0 0.0 - 5.6 % Final     Comment:     Normal <5.7%   Prediabetes 5.7-6.4%    Diabetes 6.5% or higher     Note: Adopted from ADA consensus guidelines.   09/13/2022 4.9 0.0 - 5.6 % Final     Comment:     Normal  <5.7%   Prediabetes 5.7-6.4%    Diabetes 6.5% or higher     Note: Adopted from ADA consensus guidelines.   02/11/2020 5.5 0 - 5.6 % Final     Comment:     Normal <5.7% Prediabetes 5.7-6.4%  Diabetes 6.5% or higher - adopted from ADA   consensus guidelines.     11/19/2015 5.1 4.3 - 6.0 % Final     Cholesterol   Date Value Ref Range Status   09/08/2023 193 <200 mg/dL Final   08/04/2020 173 <200 mg/dL Final     TSH   Date Value Ref Range Status   01/16/2024 4.70 (H) 0.30 - 4.20 uIU/mL Final   11/16/2021 5.78 (H) 0.40 - 4.00 mU/L Final   08/04/2020 0.70 0.40 - 4.00 mU/L Final     Creatinine   Date Value Ref Range Status   09/08/2023 1.28 (H) 0.67 - 1.17 mg/dL Final   03/18/2021 1.12 0.66 - 1.25 mg/dL Final     ALT   Date Value Ref Range Status   01/24/2024 30 0 - 70 U/L Final   03/18/2021 44 0 - 70 U/L Final       ASSESSMENT:  Mr. Estrella is a 43 year old male with history of Class 2 obesity with current body mass index is 36.34 kg/m . and with current health consequences who presents for weight management follow-up consultation.     The following diagnoses are relevant to Humberto Estrella's history of obesity:     PLAN:    No problems updated.     No problem-specific Assessment & Plan notes found for this encounter.      No orders of the defined types were placed in this encounter.       With motivational interviewing, Humberto took part in making the following plan:  Patient Instructions   Eating:   Please be advised that we think that it is safe and appropriate to decrease the serving size that Mr Estrella is served from 1.5 servings to just 1 serving per meal.    His eating can be guided by hunger, and he doesn't need to eat if he is not hungry.    Bupropion: ask his psychiatrist about taking this in addition to or instead of his sertraline. Bupropion can cause weight loss, so it is good to know if it is an option. Sertraline can also cause weight gain, so we should decrease it if possible.     Overall you are doing  well, we wont' change anything! Keep doing what you are doing.    Try to avoid too many allergy medications or decongestants, as those can cause weight gain sometimes.     FOLLOW-UP:    24 weeks    Thank you for including me in the care of your patient.  Please do not hesitate to call with questions or concerns.    Sincerely,    Wanda Diaz MD MPH  Diplomate, American Board of Obesity Medicine, American Board of Internal Medicine, American Board of Pediatrics    Departments of Internal Medicine and Pediatrics  [facility]        [unfilled]       Virtual Visit Details    Type of service:  Telephone Visit   Phone call duration: 18 minutes   Originating Location (pt. Location): Home    Distant Location (provider location):  On-site

## 2024-02-29 NOTE — PATIENT INSTRUCTIONS
Eating:   Please be advised that we think that it is safe and appropriate to decrease the serving size that Mr Estrella is served from 1.5 servings to just 1 serving per meal.    His eating can be guided by hunger, and he doesn't need to eat if he is not hungry.    Bupropion: ask his psychiatrist about taking this in addition to or instead of his sertraline. Bupropion can cause weight loss, so it is good to know if it is an option. Sertraline can also cause weight gain, so we should decrease it if possible.     Overall you are doing well, we wont' change anything! Keep doing what you are doing.    Try to avoid too many allergy medications or decongestants, as those can cause weight gain sometimes.

## 2024-02-29 NOTE — LETTER
"2024       RE: Humberto Estrella  [address]  [facility] [address]     Dear Colleague,    Thank you for referring your patient, Humberto Estrella, to the [facility] [place] at [facility]. Please see a copy of my visit note below.    Return Medical Weight Management Note  Humberto Estrella  MRN:  [id number]  :  1980  BROCK:  2024    Dear Carla Brown,    I had the pleasure of seeing your patient Humberto Estrella for follow-up consultation.  He is a 43 year old male who I am continuing to see for treatment of obesity related to:        2019     3:03 PM   --   I have the following health issues associated with obesity Pre-Diabetes    Sleep Apnea    GERD (Reflux)   I have the following symptoms associated with obesity Knee Pain    GERD (Reflux)       INTERVAL HISTORY:    CURRENT WEIGHT:   232 lbs 0 oz    Wt Readings from Last 4 Encounters:   24 105.2 kg (232 lb)   24 108.9 kg (240 lb)   24 108.4 kg (239 lb)   24 108.4 kg (239 lb)       Height:  5' 7\"  Body Mass Index:  Body mass index is 36.34 kg/m .  Vitals:  B/P: Data Unavailable, P: Data Unavailable, R: Data Unavailable     Starting weight in weight management with Dr. Diaz was: 246        2019     3:03 PM   Diet Recall Review with Patient   Do you typically eat breakfast? Yes   If you do eat breakfast, what types of food do you eat? eggs and toast   Do you typically eat lunch? Yes   If you do eat lunch, what types of food do you typically eat? hamburger  salad veggies and fruit   Do you typically eat supper? Yes   If you do eat supper, what types of food do you typically eat? salad chicken veggies and a glass of milk   Do you typically eat snacks? Yes   If you do snack, what types of food do you typically eat? fruit and or veggies   How many glasses of juice do you drink in a typical day? 1   How many of glasses of " milk do you drink in a typical day? 2   If you do drink milk, what type? 1%   How many 8oz glasses of sugar containing drinks such as Jeff-Aid/sweet tea do you drink in a day? 0   How many cans/bottles of sugar pop/soda/tea/sports drinks do you drink in a day? 0   How many cans/bottles of diet pop/soda/tea or sports drink do you drink in a day? 4   How often do you have a drink of alcohol? Never       MEDICATIONS:   Current Outpatient Medications   Medication    topiramate (TOPAMAX) 25 MG tablet    topiramate (TOPAMAX) 50 MG tablet    alum & mag hydroxide-simethicone (MYLANTA/MAALOX) 200-200-20 MG/5ML SUSP suspension    calcium carbonate (TUMS) 500 MG chewable tablet    calcium polycarbophil (FIBERCON) 625 MG tablet    cetirizine (ZYRTEC) 10 MG tablet    divalproex sodium delayed-release (DEPAKOTE) 250 MG DR tablet    ibuprofen (ADVIL,MOTRIN) 600 MG tablet    insulin pen needle (BD CANDI U/F) 32G X 4 MM miscellaneous    levothyroxine (SYNTHROID/LEVOTHROID) 125 MCG tablet    magnesium hydroxide (MILK OF MAGNESIA) 400 MG/5ML suspension    montelukast (SINGULAIR) 10 MG tablet    neomycin-polymixin-dexAMETHasone (MAXITROL) 0.1 % ophthalmic suspension    OLANZapine (ZYPREXA) 5 MG tablet    olopatadine (PATADAY) 0.2 % ophthalmic solution    order for DME    PAIN & FEVER 325 MG tablet    polyethylene glycol-propylene glycol PF (SYSTANE ULTRA PF) 0.4-0.3 % SOLN opthalmic solution    pseudoePHEDrine (SUDOGEST 12 HOUR) 120 MG 12 hr tablet    risperiDONE (RISPERDAL) 0.5 MG tablet    risperiDONE (RISPERDAL) 1 MG tablet    risperiDONE (RISPERDAL) 2 MG tablet    sertraline (ZOLOFT) 100 MG tablet    vitamin D3 25 mcg (1000 units) tablet     No current facility-administered medications for this visit.           2/29/2024     2:53 PM   Weight Loss Medication History Reviewed With Patient   Which weight loss medications are you currently taking on a regular basis? Topamax (topiramate)   Are you having any side effects from the weight  loss medication that we have prescribed you? No       LABS:  Hemoglobin A1C   Date Value Ref Range Status   09/08/2023 5.0 0.0 - 5.6 % Final     Comment:     Normal <5.7%   Prediabetes 5.7-6.4%    Diabetes 6.5% or higher     Note: Adopted from ADA consensus guidelines.   09/13/2022 4.9 0.0 - 5.6 % Final     Comment:     Normal <5.7%   Prediabetes 5.7-6.4%    Diabetes 6.5% or higher     Note: Adopted from ADA consensus guidelines.   02/11/2020 5.5 0 - 5.6 % Final     Comment:     Normal <5.7% Prediabetes 5.7-6.4%  Diabetes 6.5% or higher - adopted from ADA   consensus guidelines.     11/19/2015 5.1 4.3 - 6.0 % Final     Cholesterol   Date Value Ref Range Status   09/08/2023 193 <200 mg/dL Final   08/04/2020 173 <200 mg/dL Final     TSH   Date Value Ref Range Status   01/16/2024 4.70 (H) 0.30 - 4.20 uIU/mL Final   11/16/2021 5.78 (H) 0.40 - 4.00 mU/L Final   08/04/2020 0.70 0.40 - 4.00 mU/L Final     Creatinine   Date Value Ref Range Status   09/08/2023 1.28 (H) 0.67 - 1.17 mg/dL Final   03/18/2021 1.12 0.66 - 1.25 mg/dL Final     ALT   Date Value Ref Range Status   01/24/2024 30 0 - 70 U/L Final   03/18/2021 44 0 - 70 U/L Final       ASSESSMENT:  Mr. Estrella is a 43 year old male with history of Class 2 obesity with current body mass index is 36.34 kg/m . and with current health consequences who presents for weight management follow-up consultation.     The following diagnoses are relevant to Humberto Estrella's history of obesity:     PLAN:    No problems updated.     No problem-specific Assessment & Plan notes found for this encounter.      No orders of the defined types were placed in this encounter.       With motivational interviewing, Humberto took part in making the following plan:  Patient Instructions   Eating:   Please be advised that we think that it is safe and appropriate to decrease the serving size that Mr Estrella is served from 1.5 servings to just 1 serving per meal.    His eating can be guided by  hunger, and he doesn't need to eat if he is not hungry.    Bupropion: ask his psychiatrist about taking this in addition to or instead of his sertraline. Bupropion can cause weight loss, so it is good to know if it is an option. Sertraline can also cause weight gain, so we should decrease it if possible.     Overall you are doing well, we wont' change anything! Keep doing what you are doing.    Try to avoid too many allergy medications or decongestants, as those can cause weight gain sometimes.     FOLLOW-UP:    24 weeks    Thank you for including me in the care of your patient.  Please do not hesitate to call with questions or concerns.    Sincerely,    Wanda Diaz MD MPH  Diplomate, American Board of Obesity Medicine, American Board of Internal Medicine, American Board of Pediatrics    Departments of Internal Medicine and Pediatrics  [facility]      Virtual Visit Details    Type of service:  Telephone Visit   Phone call duration: 18 minutes   Originating Location (pt. Location): Home    Distant Location (provider location):  On-site      Again, thank you for allowing me to participate in the care of your patient.      Sincerely,    Wanda Diaz MD

## 2024-03-18 ENCOUNTER — HOSPITAL ENCOUNTER (EMERGENCY)
Facility: CLINIC | Age: 44
Discharge: HOME | End: 2024-03-18
Attending: NURSE PRACTITIONER | Admitting: NURSE PRACTITIONER
Payer: MEDICARE

## 2024-03-18 VITALS
BODY MASS INDEX: 37.59 KG/M2 | OXYGEN SATURATION: 97 % | RESPIRATION RATE: 20 BRPM | HEART RATE: 78 BPM | TEMPERATURE: 97 F | DIASTOLIC BLOOD PRESSURE: 74 MMHG | SYSTOLIC BLOOD PRESSURE: 120 MMHG | WEIGHT: 240 LBS
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
## 2024-03-18 PROCEDURE — G0463 HOSPITAL OUTPT CLINIC VISIT: HCPCS | Performed by: NURSE PRACTITIONER

## 2024-03-18 PROCEDURE — 250N000013 HC RX MED GY IP 250 OP 250 PS 637: Performed by: NURSE PRACTITIONER

## 2024-03-18 PROCEDURE — 99213 OFFICE O/P EST LOW 20 MIN: CPT | Performed by: NURSE PRACTITIONER

## 2024-03-18 RX ADMIN — AMOXICILLIN AND CLAVULANATE POTASSIUM 1 TABLET: 875; 125 TABLET, COATED ORAL at 18:43

## 2024-03-18 ASSESSMENT — ACTIVITIES OF DAILY LIVING (ADL): ADLS_ACUITY_SCORE: 38

## 2024-03-18 ASSESSMENT — COLUMBIA-SUICIDE SEVERITY RATING SCALE - C-SSRS
2. HAVE YOU ACTUALLY HAD ANY THOUGHTS OF KILLING YOURSELF IN THE PAST MONTH?: NO
6. HAVE YOU EVER DONE ANYTHING, STARTED TO DO ANYTHING, OR PREPARED TO DO ANYTHING TO END YOUR LIFE?: NO
1. IN THE PAST MONTH, HAVE YOU WISHED YOU WERE DEAD OR WISHED YOU COULD GO TO SLEEP AND NOT WAKE UP?: NO
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
## 2024-03-18 NOTE — DISCHARGE INSTRUCTIONS
Recommend that he take Augmentin twice daily for 7 days.  First dose is given here in urgent care before leaving recommend that the next dose to be within 12 hours.  Signs and symptoms of infection include increased redness, swelling, pus colored drainage if these occur please have him seen in his primary care clinic or here for further follow-up..
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
## 2024-03-19 ENCOUNTER — HOSPITAL ENCOUNTER (EMERGENCY)
Facility: CLINIC | Age: 44
Discharge: HOME | End: 2024-03-19
Attending: PHYSICIAN ASSISTANT | Admitting: PHYSICIAN ASSISTANT
Payer: MEDICARE

## 2024-03-19 VITALS
OXYGEN SATURATION: 98 % | TEMPERATURE: 98.6 F | DIASTOLIC BLOOD PRESSURE: 57 MMHG | RESPIRATION RATE: 20 BRPM | HEART RATE: 83 BPM | SYSTOLIC BLOOD PRESSURE: 121 MMHG
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
## 2024-03-19 DIAGNOSIS — Z77.21 EXPOSURE TO BLOOD-BORNE PATHOGEN: ICD-10-CM

## 2024-03-19 LAB
HBV CORE AB SERPL QL IA: NONREACTIVE
HBV SURFACE AB SERPL IA-ACNC: 51.4 M[IU]/ML
HBV SURFACE AB SERPL IA-ACNC: REACTIVE M[IU]/ML
HBV SURFACE AG SERPL QL IA: NONREACTIVE
HCV AB SERPL QL IA: NONREACTIVE
HIV 1+2 AB+HIV1 P24 AG SERPL QL IA: NONREACTIVE

## 2024-03-19 PROCEDURE — 87389 HIV-1 AG W/HIV-1&-2 AB AG IA: CPT | Mod: GZ | Performed by: PHYSICIAN ASSISTANT

## 2024-03-19 PROCEDURE — G0463 HOSPITAL OUTPT CLINIC VISIT: HCPCS | Performed by: PHYSICIAN ASSISTANT

## 2024-03-19 PROCEDURE — 87522 HEPATITIS C REVRS TRNSCRPJ: CPT | Performed by: PHYSICIAN ASSISTANT

## 2024-03-19 PROCEDURE — 87340 HEPATITIS B SURFACE AG IA: CPT | Mod: GZ | Performed by: PHYSICIAN ASSISTANT

## 2024-03-19 PROCEDURE — 36415 COLL VENOUS BLD VENIPUNCTURE: CPT | Performed by: PHYSICIAN ASSISTANT

## 2024-03-19 PROCEDURE — 86706 HEP B SURFACE ANTIBODY: CPT | Mod: GZ | Performed by: PHYSICIAN ASSISTANT

## 2024-03-19 PROCEDURE — 86803 HEPATITIS C AB TEST: CPT | Performed by: PHYSICIAN ASSISTANT

## 2024-03-19 PROCEDURE — 99212 OFFICE O/P EST SF 10 MIN: CPT | Performed by: PHYSICIAN ASSISTANT

## 2024-03-19 PROCEDURE — 86704 HEP B CORE ANTIBODY TOTAL: CPT | Mod: GZ | Performed by: PHYSICIAN ASSISTANT

## 2024-03-19 ASSESSMENT — ENCOUNTER SYMPTOMS
NEUROLOGICAL NEGATIVE: 1
CONSTITUTIONAL NEGATIVE: 1

## 2024-03-19 ASSESSMENT — ACTIVITIES OF DAILY LIVING (ADL): ADLS_ACUITY_SCORE: 38
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
## 2024-03-19 NOTE — DISCHARGE INSTRUCTIONS
Pending source patient results, patient should follow-up with his PCP for follow-up testing after today's baseline test (at 6 weeks, 3 months, and 6 months).     Continue taking antibiotics as prescribed.

## 2024-03-20 ENCOUNTER — NURSE TRIAGE (OUTPATIENT)
Dept: FAMILY MEDICINE | Facility: CLINIC | Age: 44
End: 2024-03-20

## 2024-03-20 ENCOUNTER — OFFICE VISIT (OUTPATIENT)
Dept: FAMILY MEDICINE | Facility: CLINIC | Age: 44
End: 2024-03-20
Payer: MEDICARE

## 2024-03-20 VITALS
OXYGEN SATURATION: 92 % | HEIGHT: 67 IN | DIASTOLIC BLOOD PRESSURE: 70 MMHG | TEMPERATURE: 97 F | WEIGHT: 241.6 LBS | HEART RATE: 78 BPM | BODY MASS INDEX: 37.92 KG/M2 | SYSTOLIC BLOOD PRESSURE: 108 MMHG

## 2024-03-20 DIAGNOSIS — K59.01 SLOW TRANSIT CONSTIPATION: ICD-10-CM

## 2024-03-20 DIAGNOSIS — J31.0 NONALLERGIC RHINITIS: Primary | ICD-10-CM

## 2024-03-20 DIAGNOSIS — H10.13 ALLERGIC CONJUNCTIVITIS, BILATERAL: ICD-10-CM

## 2024-03-20 DIAGNOSIS — K64.9 BLEEDING HEMORRHOIDS: ICD-10-CM

## 2024-03-20 PROCEDURE — 99214 OFFICE O/P EST MOD 30 MIN: CPT | Performed by: INTERNAL MEDICINE

## 2024-03-20 RX ORDER — FLUTICASONE PROPIONATE 50 MCG
1 SPRAY, SUSPENSION (ML) NASAL DAILY PRN
Qty: 16 G | Refills: 3 | Status: SHIPPED | OUTPATIENT
Start: 2024-03-20 | End: 2024-08-29

## 2024-03-20 RX ORDER — OLOPATADINE HYDROCHLORIDE 2 MG/ML
SOLUTION/ DROPS OPHTHALMIC
Qty: 2.5 ML | Refills: 11 | Status: SHIPPED | OUTPATIENT
Start: 2024-03-20

## 2024-03-20 RX ORDER — POLYETHYLENE GLYCOL 3350 17 G/17G
1 POWDER, FOR SOLUTION ORAL DAILY
Qty: 116 G | Refills: 11 | Status: SHIPPED | OUTPATIENT
Start: 2024-03-20 | End: 2024-05-02

## 2024-03-20 ASSESSMENT — ANXIETY QUESTIONNAIRES
GAD7 TOTAL SCORE: 19
7. FEELING AFRAID AS IF SOMETHING AWFUL MIGHT HAPPEN: NEARLY EVERY DAY
GAD7 TOTAL SCORE: 19
5. BEING SO RESTLESS THAT IT IS HARD TO SIT STILL: MORE THAN HALF THE DAYS
2. NOT BEING ABLE TO STOP OR CONTROL WORRYING: NEARLY EVERY DAY
6. BECOMING EASILY ANNOYED OR IRRITABLE: NEARLY EVERY DAY
IF YOU CHECKED OFF ANY PROBLEMS ON THIS QUESTIONNAIRE, HOW DIFFICULT HAVE THESE PROBLEMS MADE IT FOR YOU TO DO YOUR WORK, TAKE CARE OF THINGS AT HOME, OR GET ALONG WITH OTHER PEOPLE: NOT DIFFICULT AT ALL
1. FEELING NERVOUS, ANXIOUS, OR ON EDGE: MORE THAN HALF THE DAYS
3. WORRYING TOO MUCH ABOUT DIFFERENT THINGS: NEARLY EVERY DAY

## 2024-03-20 ASSESSMENT — PAIN SCALES - GENERAL: PAINLEVEL: NO PAIN (0)

## 2024-03-20 ASSESSMENT — PATIENT HEALTH QUESTIONNAIRE - PHQ9
5. POOR APPETITE OR OVEREATING: NEARLY EVERY DAY
SUM OF ALL RESPONSES TO PHQ QUESTIONS 1-9: 5

## 2024-03-20 NOTE — TELEPHONE ENCOUNTER
"Nurse Triage SBAR    Is this a 2nd Level Triage? YES, LICENSED PRACTITIONER REVIEW IS REQUIRED    Situation: blood in toilet this morning    Background: pt lives at [facility] & Belinda, [facility] staff called this morning to report seeing toilet stained red this morning. States pt is very private & does not tell them about any issues. She is not sure it is the first time. Pt also has had random bouts of diarrhea where he cannot make it to the BR in time. Has been on/off x 6 mos or more.   Pt not on a blood thinner    Assessment: Pt got on phone & states blood in toilet water, not on/in stool. States stool was brown/black & not hard to pass. Only 1x so far today. Has had this happen a few times within the past few weeks. Said stools are not hard to pass. States stomach \"hurts all over\". No back pain. No n/v. States has dizziness at times. None now. No fever.  Pt states last episode of diarrhea was at his brothers house. Per Belinda, that was around 1 mos ago & he has had a few episodes since then. She thinks last episode was 1-2 weeks ago.  Vital signs: T 97.9, /68, P78 o2 sats 96%    Protocol Recommended Disposition:   Go To ED/UCC Now (Or To Office With PCP Approval)    Recommendation: protocol advises ER/UC/office. [facility] staff had made appt for pt with PCP this afternoon at 3:10.  Routing to provider: DR Brown, would you like this pt seen sooner in UC/ER?    Lanny Wadsworth RN        Routed to provider         Reason for Disposition   Constant abdominal pain lasting > 2 hours    Answer Assessment - Initial Assessment Questions  1. APPEARANCE of BLOOD: \"What color is it?\" \"Is it passed separately, on the surface of the stool, or mixed in with the stool?\"       Bright red blood stained in toilet this morning. Blood in toilet, not on stool. No blood on toilet paper. Brown & black stool. Was not hard to pass  2. AMOUNT: \"How much blood was passed?\"       Answered yes when asked if there was a lot of " "blood in toilet.  3. FREQUENCY: \"How many times has blood been passed with the stools?\"       First time this morning  4. ONSET: \"When was the blood first seen in the stools?\" (Days or weeks)       Last few weeks has had blood in toilet. Happened a few times  5. DIARRHEA: \"Is there also some diarrhea?\" If Yes, ask: \"How many diarrhea stools in the past 24 hours?\"       Yes. Off & on. No diarrhea in last few days. Last diarrhea was at brothers house  6. CONSTIPATION: \"Do you have constipation?\" If Yes, ask: \"How bad is it?\"      no  7. RECURRENT SYMPTOMS: \"Have you had blood in your stools before?\" If Yes, ask: \"When was the last time?\" and \"What happened that time?\"       Blood in toilet before  8. BLOOD THINNERS: \"Do you take any blood thinners?\" (e.g., Coumadin/warfarin, Pradaxa/dabigatran, aspirin)      no  9. OTHER SYMPTOMS: \"Do you have any other symptoms?\"  (e.g., abdomen pain, vomiting, dizziness, fever)      Stomach hurts \"all over\", no back pain. No vomiting or nausea. Dizziness at times  10. PREGNANCY: \"Is there any chance you are pregnant?\" \"When was your last menstrual period?\"        N/a    Protocols used: Rectal Bleeding-A-OH    "

## 2024-03-20 NOTE — PATIENT INSTRUCTIONS
Allergies  Referral to Allergist  Start flonase 1 spray both nostrils daily as needed  Resume Olopataday (Pataday) eye drop daily as needed    Rectal bleeding  There is a small hemorrhoid, due to constipation, straining to push  This is likely the cause of the rectal bleeding  Since the hemorrhoid is small, and treatment is rectal ointment/cream, will defer treatment for now - Humberto did not feel comfortable with staff administering this  Continue fiber  Start Miralax daily  Recommend nurse bowel protocol;  if constipation is improved and rectal bleeding continues, then we will start rectal hydrocortisone suppository x 1 week
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
## 2024-03-21 ENCOUNTER — TELEPHONE (OUTPATIENT)
Dept: ALLERGY | Facility: CLINIC | Age: 44
End: 2024-03-21
Payer: MEDICARE

## 2024-03-21 LAB — HCV RNA SERPL NAA+PROBE-ACNC: NOT DETECTED IU/ML

## 2024-03-21 NOTE — TELEPHONE ENCOUNTER
Called and spoke with Belinda. Informed of below. Agreeable.   No questions.     Samina BOLES RN  Specialty/Allergy Clinics

## 2024-03-21 NOTE — TELEPHONE ENCOUNTER
Pt has consult appointment scheduled for 8/29/24. Do you want him to stop antihistamines prior to his consult? Or would labs be more appropriate?    Please review previous message from schedulers.     Samina BOLES RN  Specialty/Allergy Clinics

## 2024-03-21 NOTE — TELEPHONE ENCOUNTER
[facility] Call Center    Phone Message    May a detailed message be left on voicemail: no     Reason for Call: Medication Question or concern regarding medication   Prescription Clarification  Name of Medication: Allergy medications / Antihistamines  Prescribing Provider: [facility]   Pharmacy: [facility]    What on the order needs clarification? Group Home needs Rx to stop allergy medications / antihistamines. Can be hand written and include date he needs to stop taking them. Fax to: [phone]    Scheduled: 8/29/2024 with Hiro Morse MD at [facility]    Action Taken: Message routed to:  Other: [facility] Morse Care Team [place]    Travel Screening: Not Applicable

## 2024-04-08 ENCOUNTER — TELEPHONE (OUTPATIENT)
Dept: PEDIATRICS | Facility: CLINIC | Age: 44
End: 2024-04-08
Payer: MEDICARE

## 2024-04-08 NOTE — LETTER
April 15, 2024      TO: Humberto Estrella  [address]  [facility] [address]       To whom it may concern:  Dear Mr. Humberto Estrella,    Please take the Topiramate twice daily (morning and evening). Can take the evening dose at 5pm to help assist with after dinner cravings OR at bedtime to help with pill burden. Please reach out with questions.       Sincerely,      Wanda Diaz MD

## 2024-04-08 NOTE — LETTER
April 8, 2024      TO: Humberto Estrella  [address]  [facility] [address]       To whom it may concern:  Dear Mr. Humberto Estrella,    Please take the Topiramate daily dose at bed time, today 4/8/24. Can resume normal dosing tomorrow as usual. Please reach out to the clinic with questions or concerns.       Sincerely,      Wanda Diaz MD

## 2024-04-08 NOTE — TELEPHONE ENCOUNTER
General Call    Contacts         Type Contact Phone/Fax    04/08/2024 12:40 PM CDT Phone (Incoming) [facility] [phone]          Reason for Call:  meds    What are your questions or concerns:  pt is refusing his 5 pm dose and they are wondering if it can be given bedtime instead  The orders would need to be faxed over  [phone], Belinda  Fax [phone]

## 2024-04-08 NOTE — TELEPHONE ENCOUNTER
Reason for Call:  Other prescription    Detailed comments: Belinda with patients group home called regarding changing the patients Topiramate to bedtime permanently, due to patient refusing it at 5pm. Patient feels like he has too many med times per coordinator.    Phone Number Patient can be reached at: Other phone number:  [phone]    Best Time: any    Can we leave a detailed message on this number? YES    Call taken on 4/8/2024 at 4:09 PM by Susan Goodman

## 2024-05-02 ENCOUNTER — TELEPHONE (OUTPATIENT)
Dept: FAMILY MEDICINE | Facility: CLINIC | Age: 44
End: 2024-05-02
Payer: MEDICARE

## 2024-05-02 DIAGNOSIS — K59.01 SLOW TRANSIT CONSTIPATION: ICD-10-CM

## 2024-05-02 RX ORDER — POLYETHYLENE GLYCOL 3350 17 G/17G
1 POWDER, FOR SOLUTION ORAL DAILY PRN
Qty: 116 G | Refills: 11 | Status: SHIPPED | OUTPATIENT
Start: 2024-05-02

## 2024-05-02 NOTE — TELEPHONE ENCOUNTER
Faxed letter to [facility] [phone].  Received confirmation from right fax.    Tanna Anne on 5/2/2024 at 5:31 PM

## 2024-05-02 NOTE — TELEPHONE ENCOUNTER
Belinda, Group home mger for [facility] calling.   States pt was Rx'd Miralax 3/20/24. Currently staff this is scheduled to give before bedtime. Pt refusing to take this.   Belinda is asking if provider will change order to PRN or to discontinue the medication.   After provider reviews will need to fax a copy of the new order to: [phone]    Preferred pharmacy: [facility].    Routing to PCP for review.    Lanny Wadsworth RN

## 2024-05-02 NOTE — LETTER
May 2, 2024      Humberto Estrella  [address] [facility] [address]        To Whom It May Concern,       Humberto can take the MiraLAX 1 scoop at bedtime as needed for constipation          Sincerely,        Carla Brown, DO

## 2024-06-06 ENCOUNTER — TELEPHONE (OUTPATIENT)
Dept: PEDIATRICS | Facility: CLINIC | Age: 44
End: 2024-06-06

## 2024-06-06 ENCOUNTER — VIRTUAL VISIT (OUTPATIENT)
Dept: ENDOCRINOLOGY | Facility: CLINIC | Age: 44
End: 2024-06-06
Payer: MEDICARE

## 2024-06-06 VITALS — HEIGHT: 63 IN | WEIGHT: 237.2 LBS | BODY MASS INDEX: 42.03 KG/M2

## 2024-06-06 DIAGNOSIS — E66.09 OBESITY DUE TO EXCESS CALORIES, UNSPECIFIED OBESITY SEVERITY: Primary | ICD-10-CM

## 2024-06-06 PROCEDURE — G2211 COMPLEX E/M VISIT ADD ON: HCPCS | Mod: 95 | Performed by: INTERNAL MEDICINE

## 2024-06-06 PROCEDURE — 99212 OFFICE O/P EST SF 10 MIN: CPT | Mod: 95 | Performed by: INTERNAL MEDICINE

## 2024-06-06 RX ORDER — TOPIRAMATE 50 MG/1
50 TABLET, FILM COATED ORAL 2 TIMES DAILY
Qty: 180 TABLET | Refills: 1 | Status: SHIPPED | OUTPATIENT
Start: 2024-06-06 | End: 2024-06-14

## 2024-06-06 RX ORDER — TOPIRAMATE 25 MG/1
25 TABLET, FILM COATED ORAL 2 TIMES DAILY
Qty: 180 TABLET | Refills: 3 | Status: SHIPPED | OUTPATIENT
Start: 2024-06-06 | End: 2024-06-14

## 2024-06-06 ASSESSMENT — PAIN SCALES - GENERAL: PAINLEVEL: NO PAIN (0)

## 2024-06-06 NOTE — TELEPHONE ENCOUNTER
PA Initiation - Key: [id number]    Medication: WEGOVY 0.25 MG/0.5ML SC SOAJ  Insurance Company: [place] Medicaid ([facility]) - Phone [phone] Fax [phone]  Pharmacy Filling the Rx: [facility] - [place], [place] - [address]  Filling Pharmacy Phone: [phone]  Filling Pharmacy Fax: [phone]  Start Date: 6/6/2024
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
## 2024-06-06 NOTE — NURSING NOTE
Is the patient currently in the state [facility]? YES    Visit mode:VIDEO    If the visit is dropped, the patient can be reconnected by: VIDEO VISIT: Send to e-mail at: [email or web address].    Will anyone else be joining the visit? YES: How would they like to receive their invitation? Text to cell phone: same email as above  (If patient encounters technical issues they should call [phone])    How would you like to obtain your AVS? Declined    Are changes needed to the allergy or medication list? No    Are refills needed on medications prescribed by this physician? NO    Reason for visit: TERRY DONOHUE

## 2024-06-06 NOTE — LETTER
"2024    To: [place] Medicaid ([facility])    RE: Humberto Estrella  [address]  [facility] [address]  : 1980  MRN: [id number]    To Whom It May Concern,    I am writing on behalf of my patient, Humberto Estrella to document the medical necessity of Wegovy for the treatment of obesity. This letter provides information about the patient's medical history and diagnosis and a statement summarizing my treatment rationale.     Summary of Patient History and Diagnosis  I had the pleasure of seeing your patient Humberto Estrella for follow-up consultation.  He is a 44 year old male who I am continuing to see for treatment of obesity related to: Pre-Diabetes, Sleep Apnea, GERD (Reflux).      Weight loss medication(s) are indicated due to patient having a BMI of at least 30 kg/m2.    Estimated body mass index is 41.52 kg/m  as calculated from the following:    Height as of 24: 1.6 m (5' 3\").    Weight as of 24: 106.3 kg (234 lb 6.4 oz).      INTERVAL HISTORY:  Continues to not benefit from the topiramate. So we will wean off of the topiramate so that he can get semaglutide covered by insurance.       PLAN:   Continue the reduced calorie intake with smaller portions and the increased walking and activity, like you have been doing.           Treatment Rationale      Patient has no contraindications to this medication. Patient has no personal or family history of medullary thyroid disease. Patient has no personal or family history of MENS2. Patient has increased walking and activity. Patient has weaned off Topiramate and is no longer taking. He is not taking any other medications used for weight loss. Wegovy will be the sole therapy for weight loss. Patient has been eating a reduced calorie diet and smaller portions. Therefore, he meets all requirement for approval of Wegovy.       Duration  Up to 12 months.      Summary  In summary, Wegovy is medically necessary for this patient s medical condition. " Please call my office at [phone] if I can provide you with any additional information to approve my request. I look forward to receiving your timely response and approval of this request.     Sincerely,    Wanda Diaz MD

## 2024-06-07 ENCOUNTER — TELEPHONE (OUTPATIENT)
Dept: ENDOCRINOLOGY | Facility: CLINIC | Age: 44
End: 2024-06-07
Payer: MEDICARE

## 2024-06-07 NOTE — TELEPHONE ENCOUNTER
PRIOR AUTHORIZATION DENIED    Medication: WEGOVY 0.25 MG/0.5ML SC SOAJ  Insurance Company: [place] Medicaid ([facility]) - Phone [phone] Fax [phone]  Denial Date: 6/7/2024  Denial Reason(s):      Appeal Information:    Patient Notified:  Clinic to discuss next steps

## 2024-06-07 NOTE — TELEPHONE ENCOUNTER
Medication Question or Refill    Contacts         Type Contact Phone/Fax    06/07/2024 11:38 AM CDT Phone (Incoming) [facility] - [facility] [address] (Pharmacy) [phone]            What medication are you calling about (include dose and sig)?:     Semaglutide-Weight Management (WEGOVY) 0.25 MG/0.5ML pen     Preferred Pharmacy  [facility] [address]  [address]  [facility] [address]  Phone: [phone] Fax: [phone]      Controlled Substance Agreement on file:   CSA -- Patient Level:    CSA: None found at the patient level.       Who prescribed the medication?: Bramante        Do you have any questions or concerns?  Yes:   Pharm called as 1) didn't get script for this starter, only the 0.5 and 1.0, and needs directions    >> please ask to a pharmacist

## 2024-06-11 NOTE — TELEPHONE ENCOUNTER
Patient is scheduled for follow up with MD to discuss further. Will appeal and re-open if Topiramate is stopped.

## 2024-06-13 ENCOUNTER — OFFICE VISIT (OUTPATIENT)
Dept: FAMILY MEDICINE | Facility: CLINIC | Age: 44
End: 2024-06-13
Payer: MEDICARE

## 2024-06-13 ENCOUNTER — TELEPHONE (OUTPATIENT)
Dept: FAMILY MEDICINE | Facility: CLINIC | Age: 44
End: 2024-06-13

## 2024-06-13 VITALS
RESPIRATION RATE: 20 BRPM | OXYGEN SATURATION: 98 % | SYSTOLIC BLOOD PRESSURE: 124 MMHG | HEIGHT: 63 IN | DIASTOLIC BLOOD PRESSURE: 82 MMHG | HEART RATE: 64 BPM | TEMPERATURE: 97 F | WEIGHT: 234.4 LBS | BODY MASS INDEX: 41.53 KG/M2

## 2024-06-13 DIAGNOSIS — H52.13 MYOPIA OF BOTH EYES: ICD-10-CM

## 2024-06-13 DIAGNOSIS — H10.13 ALLERGIC CONJUNCTIVITIS OF BOTH EYES: ICD-10-CM

## 2024-06-13 DIAGNOSIS — H10.32 ACUTE BACTERIAL CONJUNCTIVITIS OF LEFT EYE: Primary | ICD-10-CM

## 2024-06-13 DIAGNOSIS — H10.32 ACUTE BACTERIAL CONJUNCTIVITIS OF LEFT EYE: ICD-10-CM

## 2024-06-13 PROCEDURE — 99213 OFFICE O/P EST LOW 20 MIN: CPT | Performed by: NURSE PRACTITIONER

## 2024-06-13 RX ORDER — POLYMYXIN B SULFATE AND TRIMETHOPRIM 1; 10000 MG/ML; [USP'U]/ML
1 SOLUTION OPHTHALMIC EVERY 6 HOURS
Qty: 10 ML | Refills: 0 | Status: SHIPPED | OUTPATIENT
Start: 2024-06-13 | End: 2024-06-14

## 2024-06-13 ASSESSMENT — ENCOUNTER SYMPTOMS: EYE PAIN: 1

## 2024-06-13 ASSESSMENT — PAIN SCALES - GENERAL: PAINLEVEL: NO PAIN (0)

## 2024-06-13 NOTE — PROGRESS NOTES
"  Assessment & Plan     Acute bacterial conjunctivitis of left eye  Patient has supply of Polytrim already from last infection.  Advised using what they have at group home and refilling if unable to use every 6 hours X 7 days.    Referred to Ophthalmology for recurrent infections.    - polymixin b-trimethoprim (POLYTRIM) 64846-2.1 UNIT/ML-% ophthalmic solution  Dispense: 10 mL; Refill: 0  - Adult Eye  Referral    Myopia of both eyes     - Adult Eye  Referral    Allergic conjunctivitis of both eyes      - Adult Eye  Referral    Pataday previously ordered - advised using this once daily.  Good hygiene and hand washing discussed.         BMI  Estimated body mass index is 41.52 kg/m  as calculated from the following:    Height as of this encounter: 1.6 m (5' 3\").    Weight as of this encounter: 106.3 kg (234 lb 6.4 oz).   Weight management plan: Patient was referred to their PCP to discuss a diet and exercise plan.      See Patient Instructions    Subjective   Humberto is a 44 year old, presenting for the following health issues:  Eye Problem and Conjunctivitis        6/13/2024     3:43 PM   Additional Questions   Roomed by Mari FAJARDO     Eye Problem         Eye(s) Problem  Onset/Duration: 2-3 days   Description:   Location: Left  Pain: YES  Redness: YES  Accompanying Signs & Symptoms:  Discharge/mattering: YES  Swelling: YES  Visual changes: No  Fever: No  Nasal Congestion: YES- allergies   Bothered by bright lights: No  History:  Trauma: No  Foreign body exposure: No  Wearing contacts: No  Precipitating or alleviating factors: None  Therapies tried and outcome: olopatadine has not helped    Recent infection or illness per patient her group home attendant  Group home attendant reports patient has been having frequent recurrent conjunctivitis about once every month  So suffers from daily allergic conjunctivitis and is he is currently using drops for this    Just recently saw an eye doctor to " "reorder glasses but did not address the recurrent infections    No vision changes reported or eye pain    Review of Systems  Constitutional, HEENT, cardiovascular, pulmonary, GI, , musculoskeletal, neuro, skin, endocrine and psych systems are negative, except as otherwise noted.      Objective    /82   Pulse 64   Temp 97  F (36.1  C) (Tympanic)   Resp 20   Ht 1.6 m (5' 3\")   Wt 106.3 kg (234 lb 6.4 oz)   SpO2 98%   BMI 41.52 kg/m    Body mass index is 41.52 kg/m .  Physical Exam   GENERAL: alert and no distress  EYES: Left eye with mild swelling and yellow/crusting discharge on upper and lower lid. Conjunctival injection left.  RESP: lungs clear to auscultation - no rales, rhonchi or wheezes  CV: regular rate and rhythm, normal S1 S2, no S3 or S4, no murmur, click or rub, no peripheral edema   PSYCH: mentation appears normal and affect normal/bright            Signed Electronically by: Erna Aragon DNP    "

## 2024-06-13 NOTE — TELEPHONE ENCOUNTER
"Candido from [facility] called to clarify the directions on the polytrim eye drops prescription that was sent over today.  They received 2 different directions.   \"Place 1 drop Into the left eye every 6 hours 1 drop in affected eye(s) every 3 hrs while awake for 5-7 days until resolved - max 6 doses per day - Left Eye\"    Please clarify.    Flory Nagel RN on 6/13/2024 at 4:30 PM    "

## 2024-06-13 NOTE — PATIENT INSTRUCTIONS
Thank you for choosing us for your care. I have placed an order for a prescription so that you can start treatment. View your full visit summary for details by clicking on the link below. Your pharmacist will able to address any questions you may have about the medication.     If you re not feeling better within 2-3 days, please schedule an appointment.  You can schedule an appointment right here in [facility], or call [phone]  If the visit is for the same symptoms as your eVisit, we ll refund the cost of your eVisit if seen within seven days.    Pinkeye: Care Instructions  Overview     Pinkeye is redness and swelling of the eye surface and the conjunctiva (the lining of the eyelid and the covering of the white part of the eye). Pinkeye is also called conjunctivitis. Pinkeye is often caused by infection with bacteria or a virus. Dry air, allergies, smoke, and chemicals are other common causes.  Pinkeye often gets better on its own in 7 to 10 days. Antibiotics only help if the pinkeye is caused by bacteria. Pinkeye caused by infection spreads easily. If an allergy or chemical is causing pinkeye, it will not go away unless you can avoid whatever is causing it.  Follow-up care is a key part of your treatment and safety. Be sure to make and go to all appointments, and call your doctor if you are having problems. It's also a good idea to know your test results and keep a list of the medicines you take.  How can you care for yourself at home?  Wash your hands often. Always wash them before and after you treat pinkeye or touch your eyes or face.  Use moist cotton or a clean, wet cloth to remove crust. Wipe from the inside corner of the eye to the outside. Use a clean part of the cloth for each wipe.  Put cold or warm wet cloths on your eye a few times a day if the eye hurts.  Do not wear contact lenses or eye makeup until the pinkeye is gone. Throw away any eye makeup you were using when you got pinkeye. Clean your  "contacts and storage case. If you wear disposable contacts, use a new pair when your eye has cleared and it is safe to wear contacts again.  If the doctor gave you antibiotic ointment or eyedrops, use them as directed. Use the medicine for as long as instructed, even if your eye starts looking better soon. Keep the bottle tip clean, and do not let it touch the eye area.  To put in eyedrops or ointment:  Tilt your head back, and pull your lower eyelid down with one finger.  Drop or squirt the medicine inside the lower lid.  Close your eye for 30 to 60 seconds to let the drops or ointment move around.  Do not touch the ointment or dropper tip to your eyelashes or any other surface.  Do not share towels, pillows, or washcloths while you have pinkeye.  When should you call for help?   Call your doctor now or seek immediate medical care if:    You have pain in your eye, not just irritation on the surface.     You have a change in vision or loss of vision.     You have an increase in discharge from the eye.     Your eye has not started to improve or begins to get worse within 48 hours after you start using antibiotics.     Pinkeye lasts longer than 7 days.   Watch closely for changes in your health, and be sure to contact your doctor if you have any problems.  Where can you learn more?  Go to https://www.Fannect.net/patiented  Enter Y392 in the search box to learn more about \"Pinkeye: Care Instructions.\"  Current as of: June 5, 2023               Content Version: 14.0    1856-5868 [email or web address].   Care instructions adapted under license by your healthcare professional. If you have questions about a medical condition or this instruction, always ask your healthcare professional. [email or web address] disclaims any warranty or liability for your use of this information.       Taking Care of Pinkeye at Home (01:30)  Your health professional recommends that you watch this short online health video.  Learn ways " to ease the discomfort of pinkeye and keep the infection from spreading.  Purpose:  Describes basic home care for pinkeye to ease discomfort and prevent the spread of the infection.  Goal:  User will learn home treatment for pinkeye.     How to watch the video    Scan the QR code   OR Visit the website    https://hwi.se/r/[email or web address]   Current as of: June 5, 2023               Content Version: 14.0    7072-1081 [employer].   Care instructions adapted under license by your healthcare professional. If you have questions about a medical condition or this instruction, always ask your healthcare professional. [employer] disclaims any warranty or liability for your use of this information.

## 2024-06-14 ENCOUNTER — TELEPHONE (OUTPATIENT)
Dept: FAMILY MEDICINE | Facility: CLINIC | Age: 44
End: 2024-06-14
Payer: MEDICARE

## 2024-06-14 ENCOUNTER — VIRTUAL VISIT (OUTPATIENT)
Dept: PEDIATRICS | Facility: CLINIC | Age: 44
End: 2024-06-14
Attending: INTERNAL MEDICINE
Payer: MEDICARE

## 2024-06-14 ENCOUNTER — TELEPHONE (OUTPATIENT)
Dept: PEDIATRICS | Facility: CLINIC | Age: 44
End: 2024-06-14

## 2024-06-14 DIAGNOSIS — E66.09 OBESITY DUE TO EXCESS CALORIES, UNSPECIFIED OBESITY SEVERITY: Primary | Chronic | ICD-10-CM

## 2024-06-14 PROCEDURE — 99213 OFFICE O/P EST LOW 20 MIN: CPT | Mod: 95 | Performed by: INTERNAL MEDICINE

## 2024-06-14 RX ORDER — POLYMYXIN B SULFATE AND TRIMETHOPRIM 1; 10000 MG/ML; [USP'U]/ML
1 SOLUTION OPHTHALMIC EVERY 6 HOURS
Qty: 10 ML | Refills: 0 | Status: SHIPPED | OUTPATIENT
Start: 2024-06-14 | End: 2024-09-10

## 2024-06-14 NOTE — PATIENT INSTRUCTIONS
Weaning off the topiramate:   Take 50mg twice per day for 1 week as soon as you can update that in your system  Take 25mg twice per day for 1 week  Take 25mg once per day for 1 week  Then off, no more topiramate     I will not send a new prescription for the topiramate because you have the pills to do the Wean above.    Please tell Jennifer to remove Topiramate from his list of meds.     Semaglutide: we will start with 0.25mg for 4 weeks, then 0.5mg for 4 weeks. Semaglutide is taken once per week by subcutaneous injection, similar to the liraglutide.     Continue the reduced calorie intake with smaller portions and the increased walking and activity, like you have been doing.
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
## 2024-06-14 NOTE — TELEPHONE ENCOUNTER
There were orders that came across to Ricky today for the Toperimate and they did not look like a titrating dosage, instead the order is to discontinue the medication entirely. Is this what dr. Diaz would like to do regarding the Toperimate? Please advise at [phone].

## 2024-06-14 NOTE — TELEPHONE ENCOUNTER
Updated directions - patient dose not need now but will call and request if needed.    Erna Aragon, DNP

## 2024-06-14 NOTE — LETTER
June 17, 2024      TO: Humberto Estrella  [address]  [facility] [address]         Dear Mr. Humberto Estrella,    Weaning off the topiramate:   Take 50mg twice per day for 1 week as soon as you can update that in your system  Take 25mg twice per day for 1 week  Take 25mg once per day for 1 week  Then off, no more topiramate      I will not send a new prescription for the topiramate because you have the pills to do the Wean above.     Please tell Jennifer to remove Topiramate from his list of meds.      Semaglutide: we will start with 0.25mg for 4 weeks, then 0.5mg for 4 weeks. Semaglutide is taken once per week by subcutaneous injection, similar to the liraglutide.      Continue the reduced calorie intake with smaller portions and the increased walking and activity, like you have been doing.       Sincerely,      Wanda Diaz MD

## 2024-06-14 NOTE — TELEPHONE ENCOUNTER
Left provider's detailed message with Candido Correa.  Advised to call back if any further questions.  Mikki Sloan RN

## 2024-06-14 NOTE — TELEPHONE ENCOUNTER
Pharmacy calling annette clarify eye gtt duration, clarified from chart note    Tami Gilliam RN on 6/14/2024 at 8:52 AM

## 2024-06-17 NOTE — TELEPHONE ENCOUNTER
Patient saw Dr. Diaz on Friday 6/14/24. Patient is to wean off Topiramate. Chart notes faxed over to group home.

## 2024-06-17 NOTE — TELEPHONE ENCOUNTER
Called and spoke with Belinda, as writer received voicemail needing orders. Group home did not receive chart notes faxed over this morning. Resent to [phone].

## 2024-06-17 NOTE — TELEPHONE ENCOUNTER
Medication Appeal Request    Please initiate an appeal for the requested medication: WEGOVY 0.25 MG/0.5ML SC SOAJ    Has a letter of medical necessity been completed in EPIC?   yes    Any additional lab values/information to include?     Would you like to include any research articles?               If yes please include the hyperlink(s) below or fax to    [phone] for Specialty/Retail               [phone] for Infusion/Clinic Administered.                Include the patients name and MRN on the fax cover sheet.

## 2024-06-18 ENCOUNTER — TELEPHONE (OUTPATIENT)
Dept: PEDIATRICS | Facility: CLINIC | Age: 44
End: 2024-06-18
Payer: MEDICARE

## 2024-06-18 NOTE — TELEPHONE ENCOUNTER
Two attempts to reach Belinda in regards to patient's Topiramate. Belinda was unavailable both times. Requested that she call back when she is available.

## 2024-06-18 NOTE — TELEPHONE ENCOUNTER
General Call      Reason for Call:  Call back    What are your questions or concerns:  Belinda, staff at [facility], would james a call back regarding Topiramate 25 mgs. States they were not given enough tablets for new prescription  [phone]

## 2024-06-19 NOTE — TELEPHONE ENCOUNTER
Medication Appeal Initiation    Medication: WEGOVY 0.25 MG/0.5ML SC SOAJ  Appeal Start Date:  6/19/2024  Insurance Company: [employer]  Insurance Phone: [phone]  Insurance Fax: [phone]  Comments: Faxed appeal letter and chart notes to [employer] at the fax number listed above. Fax confirmed sent.    Thank you,     Tevin Hodges [facility]  Pharmacy Clinic [facility]  [email or web address]   Phone: [phone]  Fax: [phone]

## 2024-06-20 ENCOUNTER — LAB (OUTPATIENT)
Dept: LAB | Facility: CLINIC | Age: 44
End: 2024-06-20
Payer: MEDICARE

## 2024-06-20 DIAGNOSIS — Z11.59 ENCOUNTER FOR SCREENING FOR OTHER VIRAL DISEASES: ICD-10-CM

## 2024-06-20 DIAGNOSIS — Z11.4 ENCOUNTER FOR SCREENING FOR HUMAN IMMUNODEFICIENCY VIRUS (HIV): ICD-10-CM
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
## 2024-06-20 DIAGNOSIS — Z72.89 OTHER PROBLEMS RELATED TO LIFESTYLE: ICD-10-CM

## 2024-06-20 PROCEDURE — 87340 HEPATITIS B SURFACE AG IA: CPT

## 2024-06-20 PROCEDURE — 36415 COLL VENOUS BLD VENIPUNCTURE: CPT

## 2024-06-20 PROCEDURE — 86704 HEP B CORE ANTIBODY TOTAL: CPT

## 2024-06-20 PROCEDURE — 86803 HEPATITIS C AB TEST: CPT

## 2024-06-20 PROCEDURE — 86706 HEP B SURFACE ANTIBODY: CPT

## 2024-06-20 PROCEDURE — 87389 HIV-1 AG W/HIV-1&-2 AB AG IA: CPT

## 2024-06-20 NOTE — LETTER
June 24, 2024      Humberto Estrella  [address] [facility] [address]        Dear ,    We are writing to inform you of your test results.    Your test results fall within the expected range(s) or remain unchanged from previous results.  Please continue with current treatment plan.    Resulted Orders   HIV Antigen Antibody Combo   Result Value Ref Range    HIV Antigen Antibody Combo Nonreactive Nonreactive      Comment:      Negative HIV-1 p24 antigen and HIV-1/2 antibody screening test results usually indicate the absence of HIV-1 and HIV-2 infection. However, such negative results do not rule-out acute HIV infection.  If acute HIV-1 or HIV-2 infection is suspected, detection of HIV-1 or HIV-2 RNA  is recommended.    Hepatitis B surface antigen   Result Value Ref Range    Hepatitis B Surface Antigen Nonreactive Nonreactive   Hepatitis B Surface Antibody   Result Value Ref Range    Hepatitis B Surface Antibody Reactive       Comment:      A reactive result indicates recovery from acute or chronic hepatitis B virus (HBV) infection or acquired immunity from HBV vaccination. This assay does not differentiate between a vaccine-induced immune response and an immune response induced by infection with HBV. A positive total antihepatitis B core result would indicate that the hepatitis B surface antibody response is due to past HBV infection.    Hepatitis B Surface Antibody Instrument Value 35.60 <8.5 m[IU]/mL   Hepatitis B core antibody   Result Value Ref Range    Hepatitis B Core Antibody Total Nonreactive Nonreactive      Comment:      Nonreactive hepatitis B core antibody test results indicate the absence of exposure to hepatitis B virus and no evidence of recent, past/resolved, or chronic hepatitis B.    Hepatitis C antibody   Result Value Ref Range    Hepatitis C Antibody Nonreactive Nonreactive      Comment:      A nonreactive screening test result does not exclude the possibility of exposure to  or infection with HCV. Nonreactive screening test results in individuals with prior exposure to HCV may be due to antibody levels below the limit of detection of this assay or lack of reactivity to the HCV antigens used in this assay. Patients with recent HCV infections (<3 months from time of exposure) may have false-negative HCV antibody results due to the time needed for seroconversion (average of 8 to 9 weeks).       If you have any questions or concerns, please call the clinic at the number listed above.       Sincerely,      Carla Brown, DO

## 2024-06-20 NOTE — TELEPHONE ENCOUNTER
MEDICATION APPEAL APPROVED    Medication: WEGOVY 0.25 MG/0.5ML SC SOAJ  Authorization Effective Date: 6/6/2024  Authorization Expiration Date: 12/6/2024  Approved Dose/Quantity: 2 ml per 28 days  Reference #: Key: [id number]   Appeal Insurance Company: [employer]  Expected CoPay: $     0  CoPay Card Available: No  Financial Assistance Needed: No  Filling Pharmacy: [facility], [facility]. [address]  Patient Notified: Yes  Comments:                Thank you,     Tevin Hodges [facility]  Pharmacy Clinic [facility]  [email or web address]   Phone: [phone]  Fax: [phone]

## 2024-06-21 LAB
HBV CORE AB SERPL QL IA: NONREACTIVE
HBV SURFACE AB SERPL IA-ACNC: 35.6 M[IU]/ML
HBV SURFACE AB SERPL IA-ACNC: REACTIVE M[IU]/ML
HBV SURFACE AG SERPL QL IA: NONREACTIVE
HCV AB SERPL QL IA: NONREACTIVE
HIV 1+2 AB+HIV1 P24 AG SERPL QL IA: NONREACTIVE

## 2024-06-24 ENCOUNTER — TELEPHONE (OUTPATIENT)
Dept: FAMILY MEDICINE | Facility: CLINIC | Age: 44
End: 2024-06-24

## 2024-06-24 DIAGNOSIS — Z51.81 ADMISSION FOR THERAPEUTIC DRUG MONITORING: Primary | ICD-10-CM

## 2024-06-24 NOTE — TELEPHONE ENCOUNTER
Left message for [facility]. Looking for more information for the reasoning for monitoring with EKG.    Myles ZEE RN  [facility]

## 2024-06-24 NOTE — TELEPHONE ENCOUNTER
Order/Referral Request    Who is requesting: Law is calling from group home due to patient needs an EKG per his outside of [place] Psychiatrist needs EKG completed for medication management. Patient also see Cardiology.     Law is wondering if patient can have EKG ordered at an office visit with Dr. Carla Brown.    Orders being requested: EKG     Reason service is needed/diagnosis: for psychiatric medication monitoring for medication management.    When are orders needed by: every 6 months due by 8/7/24    Has this been discussed with Provider: No    Does patient have an appointment scheduled?: Yes:  RN scheduled an office visit 7/23/24 with PCP.      Okay to leave a detailed message?: Yes at Home number on file [phone] (home)    Orders for EKG from Psychiatry are being faxed to [facility].  Patient needs the results of EKG [facility].   Telephone number: [phone]  Fax: [phone]    Please call group home back to confirm if EKG appointment with Dr. Brown is appropriate. [phone]

## 2024-06-24 NOTE — TELEPHONE ENCOUNTER
Dr. Brown,    Please advise on referral of EKG for medication management. Last OV with you 03/20/24.    Thank you  Myles ZEE RN  [facility]

## 2024-06-25 DIAGNOSIS — Z79.899 ENCOUNTER FOR LONG-TERM (CURRENT) USE OF MEDICATIONS: Primary | ICD-10-CM

## 2024-06-25 NOTE — TELEPHONE ENCOUNTER
This still doesn't clarify WHAT I need to monitor for on the EKG - bradycardia, QT prolongation?  I did call and speak with RN at their clinic and she clarified it is for QT prolongation.  Patient can be scheduled for EKG

## 2024-06-25 NOTE — TELEPHONE ENCOUNTER
[facility] also received a fax with same requests below.     RN reached back out to [facility] and spoke with ETHAN Ruth.  She reviewed Dr. Danie Palomo's notes, which didn't specifically state what he is looking for in the EKG (i.e. history of arrhythmia, etc.).  She will ask him and call us back.    Dea Wilder RN  [facility]

## 2024-06-25 NOTE — TELEPHONE ENCOUNTER
Received call back from [facility] stating that per their policy, they need the EKG because of his meds Risperdal and Olanzapine.    Jacquie Lopez on 6/25/2024 at 1:36 PM

## 2024-06-26 NOTE — TELEPHONE ENCOUNTER
Reached out to patient's group home and spoke with caregiver Belinda, notified of EKG order, and patient is already scheduled with PCP for 7/23/24.     Dea Wilder RN  [facility]

## 2024-07-01 ENCOUNTER — LAB (OUTPATIENT)
Dept: LAB | Facility: CLINIC | Age: 44
End: 2024-07-01
Payer: MEDICARE

## 2024-07-01 DIAGNOSIS — Z79.899 ENCOUNTER FOR LONG-TERM (CURRENT) USE OF MEDICATIONS: Primary | ICD-10-CM

## 2024-07-01 LAB
ALP SERPL-CCNC: 58 U/L (ref 40–150)
ALT SERPL W P-5'-P-CCNC: 32 U/L (ref 0–70)
AST SERPL W P-5'-P-CCNC: 34 U/L (ref 0–45)
BASOPHILS # BLD AUTO: 0 10E3/UL (ref 0–0.2)
BASOPHILS NFR BLD AUTO: 0 %
EOSINOPHIL # BLD AUTO: 0 10E3/UL (ref 0–0.7)
EOSINOPHIL NFR BLD AUTO: 0 %
ERYTHROCYTE [DISTWIDTH] IN BLOOD BY AUTOMATED COUNT: 15.2 % (ref 10–15)
HCT VFR BLD AUTO: 44.9 % (ref 40–53)
HGB BLD-MCNC: 14.5 G/DL (ref 13.3–17.7)
IMM GRANULOCYTES # BLD: 0.1 10E3/UL
IMM GRANULOCYTES NFR BLD: 4 %
LYMPHOCYTES # BLD AUTO: 1.6 10E3/UL (ref 0.8–5.3)
LYMPHOCYTES NFR BLD AUTO: 44 %
MCH RBC QN AUTO: 33.4 PG (ref 26.5–33)
MCHC RBC AUTO-ENTMCNC: 32.3 G/DL (ref 31.5–36.5)
MCV RBC AUTO: 104 FL (ref 78–100)
MONOCYTES # BLD AUTO: 0.5 10E3/UL (ref 0–1.3)
MONOCYTES NFR BLD AUTO: 13 %
NEUTROPHILS # BLD AUTO: 1.5 10E3/UL (ref 1.6–8.3)
NEUTROPHILS NFR BLD AUTO: 39 %
PLATELET # BLD AUTO: 145 10E3/UL (ref 150–450)
RBC # BLD AUTO: 4.34 10E6/UL (ref 4.4–5.9)
VALPROATE SERPL-MCNC: 86.5 UG/ML
WBC # BLD AUTO: 3.7 10E3/UL (ref 4–11)

## 2024-07-01 PROCEDURE — 36415 COLL VENOUS BLD VENIPUNCTURE: CPT

## 2024-07-01 PROCEDURE — 84450 TRANSFERASE (AST) (SGOT): CPT

## 2024-07-01 PROCEDURE — 85025 COMPLETE CBC W/AUTO DIFF WBC: CPT

## 2024-07-01 PROCEDURE — 84075 ASSAY ALKALINE PHOSPHATASE: CPT

## 2024-07-01 PROCEDURE — 80164 ASSAY DIPROPYLACETIC ACD TOT: CPT

## 2024-07-01 PROCEDURE — 84460 ALANINE AMINO (ALT) (SGPT): CPT

## 2024-07-12 ENCOUNTER — TELEPHONE (OUTPATIENT)
Dept: ENDOCRINOLOGY | Facility: CLINIC | Age: 44
End: 2024-07-12
Payer: MEDICARE

## 2024-07-12 NOTE — TELEPHONE ENCOUNTER
Madeline sent a prior authorization request for 0.5 mg dosing of Wegovy, they were just looking to touch base and to see if it had arrived and if there are any updates.Please contact them at [phone].

## 2024-07-15 ENCOUNTER — APPOINTMENT (OUTPATIENT)
Dept: GENERAL RADIOLOGY | Facility: CLINIC | Age: 44
End: 2024-07-15
Attending: FAMILY MEDICINE
Payer: MEDICARE

## 2024-07-15 ENCOUNTER — HOSPITAL ENCOUNTER (EMERGENCY)
Facility: CLINIC | Age: 44
Discharge: HOME | End: 2024-07-15
Attending: FAMILY MEDICINE | Admitting: FAMILY MEDICINE
Payer: MEDICARE

## 2024-07-15 VITALS
HEART RATE: 67 BPM | OXYGEN SATURATION: 90 % | BODY MASS INDEX: 40.75 KG/M2 | RESPIRATION RATE: 30 BRPM | DIASTOLIC BLOOD PRESSURE: 68 MMHG | TEMPERATURE: 97.8 F | WEIGHT: 230 LBS | HEIGHT: 63 IN | SYSTOLIC BLOOD PRESSURE: 120 MMHG

## 2024-07-15 DIAGNOSIS — S29.011A MUSCLE STRAIN OF CHEST WALL, INITIAL ENCOUNTER: ICD-10-CM

## 2024-07-15 LAB
ALBUMIN SERPL BCG-MCNC: 3.5 G/DL (ref 3.5–5.2)
ALP SERPL-CCNC: 76 U/L (ref 40–150)
ALT SERPL W P-5'-P-CCNC: 31 U/L (ref 0–70)
ANION GAP SERPL CALCULATED.3IONS-SCNC: 8 MMOL/L (ref 7–15)
AST SERPL W P-5'-P-CCNC: 43 U/L (ref 0–45)
BASOPHILS # BLD AUTO: 0 10E3/UL (ref 0–0.2)
BASOPHILS NFR BLD AUTO: 1 %
BILIRUB SERPL-MCNC: 0.4 MG/DL
BUN SERPL-MCNC: 15.3 MG/DL (ref 6–20)
CALCIUM SERPL-MCNC: 9 MG/DL (ref 8.6–10)
CHLORIDE SERPL-SCNC: 97 MMOL/L (ref 98–107)
CREAT SERPL-MCNC: 1.17 MG/DL (ref 0.67–1.17)
EGFRCR SERPLBLD CKD-EPI 2021: 79 ML/MIN/1.73M2
EOSINOPHIL # BLD AUTO: 0 10E3/UL (ref 0–0.7)
EOSINOPHIL NFR BLD AUTO: 0 %
ERYTHROCYTE [DISTWIDTH] IN BLOOD BY AUTOMATED COUNT: 15.4 % (ref 10–15)
GLUCOSE SERPL-MCNC: 88 MG/DL (ref 70–99)
HCO3 SERPL-SCNC: 28 MMOL/L (ref 22–29)
HCT VFR BLD AUTO: 41.9 % (ref 40–53)
HGB BLD-MCNC: 14.4 G/DL (ref 13.3–17.7)
IMM GRANULOCYTES # BLD: 0.2 10E3/UL
IMM GRANULOCYTES NFR BLD: 4 %
LYMPHOCYTES # BLD AUTO: 1.4 10E3/UL (ref 0.8–5.3)
LYMPHOCYTES NFR BLD AUTO: 30 %
MCH RBC QN AUTO: 34.5 PG (ref 26.5–33)
MCHC RBC AUTO-ENTMCNC: 34.4 G/DL (ref 31.5–36.5)
MCV RBC AUTO: 101 FL (ref 78–100)
MONOCYTES # BLD AUTO: 0.6 10E3/UL (ref 0–1.3)
MONOCYTES NFR BLD AUTO: 14 %
NEUTROPHILS # BLD AUTO: 2.4 10E3/UL (ref 1.6–8.3)
NEUTROPHILS NFR BLD AUTO: 52 %
NRBC # BLD AUTO: 0 10E3/UL
NRBC BLD AUTO-RTO: 0 /100
PLATELET # BLD AUTO: 184 10E3/UL (ref 150–450)
POTASSIUM SERPL-SCNC: 4.2 MMOL/L (ref 3.4–5.3)
PROT SERPL-MCNC: 7.1 G/DL (ref 6.4–8.3)
RBC # BLD AUTO: 4.17 10E6/UL (ref 4.4–5.9)
SODIUM SERPL-SCNC: 133 MMOL/L (ref 135–145)
TROPONIN T SERPL HS-MCNC: 8 NG/L
WBC # BLD AUTO: 4.6 10E3/UL (ref 4–11)

## 2024-07-15 PROCEDURE — 36415 COLL VENOUS BLD VENIPUNCTURE: CPT | Performed by: FAMILY MEDICINE

## 2024-07-15 PROCEDURE — 93010 ELECTROCARDIOGRAM REPORT: CPT | Performed by: FAMILY MEDICINE

## 2024-07-15 PROCEDURE — 80053 COMPREHEN METABOLIC PANEL: CPT | Performed by: FAMILY MEDICINE

## 2024-07-15 PROCEDURE — 71046 X-RAY EXAM CHEST 2 VIEWS: CPT

## 2024-07-15 PROCEDURE — 99285 EMERGENCY DEPT VISIT HI MDM: CPT | Mod: 25 | Performed by: FAMILY MEDICINE

## 2024-07-15 PROCEDURE — 99284 EMERGENCY DEPT VISIT MOD MDM: CPT | Performed by: FAMILY MEDICINE

## 2024-07-15 PROCEDURE — 93005 ELECTROCARDIOGRAM TRACING: CPT | Performed by: FAMILY MEDICINE

## 2024-07-15 PROCEDURE — 84484 ASSAY OF TROPONIN QUANT: CPT | Performed by: FAMILY MEDICINE

## 2024-07-15 PROCEDURE — 85025 COMPLETE CBC W/AUTO DIFF WBC: CPT | Performed by: FAMILY MEDICINE

## 2024-07-15 ASSESSMENT — COLUMBIA-SUICIDE SEVERITY RATING SCALE - C-SSRS: IS THE PATIENT NOT ABLE TO COMPLETE C-SSRS: UNABLE TO VERBALIZE

## 2024-07-15 ASSESSMENT — ACTIVITIES OF DAILY LIVING (ADL)
ADLS_ACUITY_SCORE: 38

## 2024-07-15 NOTE — ED TRIAGE NOTES
Writer and provider spoke with pt about treatment and diagnostic options in ED vs. UC. Pt agreed to be evaluated in the ED.   Chest pain , shortness of breath , onset 2 hours ago

## 2024-07-15 NOTE — ED NOTES
/60   Pulse 71   Temp 97.8  F (36.6  C) (Tympanic)   Resp 26   SpO2 98%     Humberto Estrella is a 44-year-old male presenting with caregiver with complaints of chest pressure/pain and SOB that began a couple of hours ago.  Has history of anxiety, reports this feels different than that. Given patient's history, I recommended he/she be evaluated in the emergency department.  We discussed that he/she will likely require further testing and/or treatment beyond the capabilities of the current urgent care setting including labs and potential imaging.  Patient expresses understanding of this and agreement with the plan.         Seda Morales PA-C  07/15/24 2902

## 2024-07-15 NOTE — ED TRIAGE NOTES
Midsternal chest pain started a few hours ago, also feels SOA.      Triage Assessment (Adult)       Row Name 07/15/24 5100          Triage Assessment    Airway WDL WDL        Respiratory WDL    Respiratory WDL X;rhythm/pattern     Rhythm/Pattern, Respiratory shortness of breath        Cardiac WDL    Cardiac WDL X;chest pain        Chest Pain Assessment    Chest Pain Location midsternal     Associated Signs/Symptoms dyspnea     Chest Pain Intervention activity minimized;12-lead ECG obtained        Peripheral/Neurovascular WDL    Peripheral Neurovascular WDL WDL        Cognitive/Neuro/Behavioral WDL    Cognitive/Neuro/Behavioral WDL WDL
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
## 2024-07-16 NOTE — DISCHARGE INSTRUCTIONS
RETURN TO THE EMERGENCY ROOM FOR THE FOLLOWING:    Severely worsened pain, worsened breathing, fever greater than 101, or at anytime for any concern.    FOLLOW UP:    With your primary clinic if not improved over the next 2 weeks.    TREATMENT RECOMMENDATIONS:    Ibuprofen (10 mg/kg per dose, maximum 600 mg) alternating with acetaminophen (15 mg/kg per dose, maximum 1000 mg) every four hours as needed for fever and/or pain.  Therefore, you can take ibuprofen and then 4 hours later take acetaminophen and then 4 hours later take ibuprofen, etc.  You should not use these medications for more than five days with this dosing schedule.    NURSE ADVICE LINE:  [phone] or [phone]

## 2024-07-18 ENCOUNTER — TELEPHONE (OUTPATIENT)
Dept: PEDIATRICS | Facility: CLINIC | Age: 44
End: 2024-07-18
Payer: MEDICARE

## 2024-07-18 NOTE — TELEPHONE ENCOUNTER
PA Initiation  Key: [id number]    Medication: WEGOVY 0.5 MG/0.5ML SC SOAJ  Insurance Company: [place] Medicaid ([facility]) - Phone [phone] Fax [phone]  Pharmacy Filling the Rx: [facility] - [place], [place] - [address]  Filling Pharmacy Phone: [phone]  Filling Pharmacy Fax: [phone]  Start Date: 7/18/2024

## 2024-07-18 NOTE — TELEPHONE ENCOUNTER
Medication Question or Refill    Contacts       Contact Date/Time Type Contact Phone/Fax    07/12/2024 04:38 PM CDT Phone (Incoming) "[facility] - [facility] [address] (Pharmacy) [phone]    07/18/2024 01:15 PM CDT Phone (Incoming) "[facility] - [facility] [address] (Pharmacy) [phone]            What medication are you calling about (include dose and sig)?: Wegovy 0.5 mg    Preferred Pharmacy:   "[facility]. [facility] [address]  [address]  [facility] [address]  Phone: [phone] Fax: [phone]      Controlled Substance Agreement on file:   CSA -- Patient Level:    CSA: None found at the patient level.       Who prescribed the medication?: Dr Diaz    Do you need a refill? No    When did you use the medication last? unknown    Patient offered an appointment? No    Do you have any questions or concerns?  Yes: pts pharmacy calling to check on the prior auth. Caller stating they have been waiting since 7/8      Okay to leave a detailed message?: Yes at Other phone number:  [phone]

## 2024-07-22 NOTE — TELEPHONE ENCOUNTER
Prior Authorization Approval    Medication: WEGOVY 0.5 MG/0.5ML SC SOAJ  Authorization Effective Date: 7/18/2024  Authorization Expiration Date: 11/18/2024  Approved Dose/Quantity:    Reference #: Key: [id number]   Insurance Company: [place] Medicaid ([facility]) - Phone [phone] Fax [phone]  Expected CoPay: $    CoPay Card Available: No    Financial Assistance Needed:    Which Pharmacy is filling the prescription: [facility] - [address]  Pharmacy Notified: Y  Patient Notified: ISABELLE

## 2024-07-22 NOTE — TELEPHONE ENCOUNTER
Per pharmacy liaison, Not covered under pt's Part D, and [place] ([place]) requires a new Prior Auth on each strength and only one active at a time. PA started for 0.5mg dose.

## 2024-07-23 ENCOUNTER — OFFICE VISIT (OUTPATIENT)
Dept: FAMILY MEDICINE | Facility: CLINIC | Age: 44
End: 2024-07-23
Payer: MEDICARE

## 2024-07-23 VITALS
DIASTOLIC BLOOD PRESSURE: 84 MMHG | RESPIRATION RATE: 16 BRPM | HEART RATE: 66 BPM | OXYGEN SATURATION: 96 % | SYSTOLIC BLOOD PRESSURE: 118 MMHG | BODY MASS INDEX: 42.45 KG/M2 | TEMPERATURE: 97.1 F | HEIGHT: 63 IN | WEIGHT: 239.6 LBS

## 2024-07-23 DIAGNOSIS — J31.0 NONALLERGIC RHINITIS: ICD-10-CM

## 2024-07-23 DIAGNOSIS — S39.012A STRAIN OF LUMBAR REGION, INITIAL ENCOUNTER: ICD-10-CM

## 2024-07-23 DIAGNOSIS — R07.89 CHEST WALL PAIN: Primary | ICD-10-CM

## 2024-07-23 DIAGNOSIS — F60.3 EXPLOSIVE PERSONALITY DISORDER (H): ICD-10-CM

## 2024-07-23 DIAGNOSIS — Z51.81 ENCOUNTER FOR THERAPEUTIC DRUG MONITORING: ICD-10-CM

## 2024-07-23 PROCEDURE — 99213 OFFICE O/P EST LOW 20 MIN: CPT | Performed by: INTERNAL MEDICINE

## 2024-07-23 PROCEDURE — G2211 COMPLEX E/M VISIT ADD ON: HCPCS | Performed by: INTERNAL MEDICINE

## 2024-07-23 ASSESSMENT — PAIN SCALES - GENERAL: PAINLEVEL: MODERATE PAIN (5)

## 2024-07-23 NOTE — PROGRESS NOTES
"  Assessment & Plan     Chest wall pain  Use Tylenol and NSAIDs more often    Strain of lumbar region, initial encounter  See above    Encounter for therapeutic drug monitoring  EKG normal, QTc normal    Nonallergic rhinitis  Stop singulair due to behavioral health issues.  May consider ipratropium or other therapy    Explosive personality disorder (H)  Contributes to his primary problem - needed restraining causing the pain        Patient Instructions   Stop montelukast due to worsening behaviors  The chest wall pain and back pain are muscular.  Please ask about chest wall and back pain at every shift change and offer acetaminophen or ibuprofen as appropriate  We will fax EKG to psychiatry office    Subjective   Humberto is a 44 year old, presenting for the following health issues:  ER F/U        7/23/2024     7:35 AM   Additional Questions   Roomed by adiel   Accompanied by caregiver         7/23/2024     7:35 AM   Patient Reported Additional Medications   Patient reports taking the following new medications none     HPI       Chief Complaint   Patient presents with    ER F/U           ED/UC Followup:    Facility:  [facility]  Date of visit: 7/15/24  Reason for visit: chest pain   Current Status: chest pain is a 5/ 10, no SOB, no cough   From ER Note 7/15 \"Yesterday, the patient had an outburst where he was throwing objects at staff. He tells me that the pain started in his midline chest shortly after this. The pain has been constant since onset. It is worse when he takes a deep breath. It is worse when he moves. He has tenderness in the midline. No diaphoresis. No nausea or vomiting. He denies heartburn. No cough or congestion reported by the patient or seen by staff. No fever. No leg pain or swelling. No skin rash. He has not had this previously. \"  --EKG, Xray, lab all normal.  --today he reports ongoing constant chest pain; it is worse with pushing over anterior chest wall, moving torso and arms.  Not worse with " deep breathing or exertion  --he and staff report he had a fall;  today chapito states he 'pulled his back muscle' after the fall ~ 2 weeks ago.  He was lunging after a staff member and lost balance and fell  --later he states he needed to be restrained due to his behaviors and thinks this is where his back and chest pain  --he is taking Tylenol and Ibuprofen but he reports it is not helping  --taking 1 x day on his request    Medication monitoring  --psychiatry wants periodic EKG to monitor for QT prolongation - [facility]   Telephone number: [phone]  Fax: [phone]  --his EKG done in ER on 7/15 showed a normal QT interval    Current Outpatient Medications   Medication Sig Dispense Refill    alum & mag hydroxide-simethicone (MYLANTA/MAALOX) 200-200-20 MG/5ML SUSP suspension Take 30 mLs by mouth every 4 hours as needed for indigestion (2 tsp for upset stomach) 1 Bottle 3    calcium carbonate (TUMS) 500 MG chewable tablet Take 1 tablet (500 mg) by mouth every 6 hours as needed for heartburn 150 tablet 3    calcium polycarbophil (FIBERCON) 625 MG tablet Take 2 tablets (1,250 mg) by mouth 2 times daily 360 tablet 3    cetirizine (ZYRTEC) 10 MG tablet Take 1 tablet (10 mg) by mouth At Bedtime Stop loratadine 90 tablet 3    divalproex sodium delayed-release (DEPAKOTE) 250 MG DR tablet 1000  in AM      fluticasone (FLONASE) 50 MCG/ACT nasal spray Spray 1 spray into both nostrils daily as needed for rhinitis or allergies 16 g 3    ibuprofen (ADVIL,MOTRIN) 600 MG tablet Take 1 tablet (600 mg) by mouth every 6 hours as needed for moderate pain 60 tablet 0    insulin pen needle (BD CANDI U/F) 32G X 4 MM miscellaneous Use 1 pen needle daily with liraglutide. 120 each 4    levothyroxine (SYNTHROID/LEVOTHROID) 125 MCG tablet Take 1 tablet (125 mcg) by mouth daily 90 tablet 3    magnesium hydroxide (MILK OF MAGNESIA) 400 MG/5ML suspension Take 30-60 mLs by mouth daily as needed for constipation or  heartburn (If No Bowel Movement in 2 days.) Reported on 4/12/2017 360 mL 1    montelukast (SINGULAIR) 10 MG tablet Take 1 tablet (10 mg) by mouth at bedtime 90 tablet 3    neomycin-polymixin-dexAMETHasone (MAXITROL) 0.1 % ophthalmic suspension 2 drops into the conjunctival sac of the affected eye(s) 4 times daily every 6 hours, during waking hours, for 7 days 10 mL 0    OLANZapine (ZYPREXA) 5 MG tablet Take 2.5 mg by mouth 3 times daily as needed As needed, max of 15 30 tablet 1    olopatadine (PATADAY) 0.2 % ophthalmic solution 1 drop into both eyes daily as needed 2.5 mL 11    order for DME Equipment being ordered: CPAP  AIRSENSE 10  11 CM H20  AIRFIT F20 MEDIUM  SN# [id number]  DN# [id number]      PAIN & FEVER 325 MG tablet TAKE 1-2 TABLETS (325-650MG) BY MOUTH EVERY 4 HOURS AS NEEDED *ORDER WHEN LOW* 100 tablet 7    polyethylene glycol (MIRALAX) 17 GM/Dose powder Take 17 g (1 Capful) by mouth daily as needed for constipation 116 g 11    polyethylene glycol-propylene glycol PF (SYSTANE ULTRA PF) 0.4-0.3 % SOLN opthalmic solution Place 1-2 drops into both eyes Up to 5 times daily as needed      polymixin b-trimethoprim (POLYTRIM) 22335-9.1 UNIT/ML-% ophthalmic solution Place 1 drop Into the left eye every 6 hours 10 mL 0    pseudoePHEDrine (SUDOGEST 12 HOUR) 120 MG 12 hr tablet Take 1 tablet (120 mg) by mouth daily as needed for congestion 12 tablet 11    risperiDONE (RISPERDAL) 0.5 MG tablet Take 0.5 mg by mouth 2 times daily At noon      risperiDONE (RISPERDAL) 1 MG tablet Take 1 mg by mouth every morning 60 tablet     risperiDONE (RISPERDAL) 2 MG tablet Take 2 mg by mouth daily at 8:00pm      Semaglutide-Weight Management (WEGOVY) 0.25 MG/0.5ML pen Inject 0.25 mg Subcutaneous once a week 2 mL 0    Semaglutide-Weight Management (WEGOVY) 0.5 MG/0.5ML pen Inject 0.5 mg Subcutaneous once a week 3 mL 0    Semaglutide-Weight Management (WEGOVY) 1 MG/0.5ML pen Inject 1 mg Subcutaneous once a week 2 mL 2    sertraline  "(ZOLOFT) 100 MG tablet Take 200 mg by mouth daily       vitamin D3 25 mcg (1000 units) tablet Take 1 tablet (25 mcg) by mouth daily 100 tablet 3           Review of Systems  Constitutional, neuro, ENT, endocrine, pulmonary, cardiac, gastrointestinal, genitourinary, musculoskeletal, integument and psychiatric systems are negative, except as otherwise noted.      Objective    /84 (BP Location: Right arm, Patient Position: Sitting, Cuff Size: Adult Regular)   Pulse 66   Temp 97.1  F (36.2  C) (Tympanic)   Resp 16   Ht 1.6 m (5' 3\")   Wt 108.7 kg (239 lb 9.6 oz)   SpO2 96%   BMI 42.44 kg/m    Body mass index is 42.44 kg/m .  Physical Exam   GENERAL: alert and no distress  RESP: lungs clear to auscultation - no rales, rhonchi or wheezes  CV: regular rate and rhythm, normal S1 S2, no S3 or S4, no murmur, click or rub, no peripheral edema   MS: pain with palpation over sternum and diffusely over bilateral lumbar and thoracic paraspinal muscles.  Pain in these areas with moving trunk and arms    EKG - normal sinus rhythm, QTc normal    The longitudinal plan of care for the diagnosis(es)/condition(s) as documented were addressed during this visit. Due to the added complexity in care, I will continue to support Humberto in the subsequent management and with ongoing continuity of care.        Signed Electronically by: Crala Brown DO    "

## 2024-07-23 NOTE — PATIENT INSTRUCTIONS
Stop montelukast due to worsening behaviors  The chest wall pain and back pain are muscular.  Please ask about chest wall and back pain at every shift change and offer acetaminophen or ibuprofen as appropriate  We will fax EKG to psychiatry office

## 2024-07-23 NOTE — LETTER
July 23, 2024      Humberto Estrella  [address] [facility] [address]        To Whom It May Concern,     Stop montelukast due to worsening behaviors          Sincerely,        Carla Brown, DO

## 2024-07-23 NOTE — LETTER
July 23, 2024      Humberto Estrella  [address] [facility] [address]        To Whom It May Concern,       Humberto was seen today for chest wall and back pain, ER follow-up.  The pain appears to be musculoskeletal and I would expect it to resolve in 1-2 weeks.  Please ask if Humberto is having chest or back pain at every shift change and offer Acetaminophen or Ibuprofen if he has pain.  Do this x 1 week.          Sincerely,        Carla Brown, DO

## 2024-08-21 ENCOUNTER — OFFICE VISIT (OUTPATIENT)
Dept: PODIATRY | Facility: CLINIC | Age: 44
End: 2024-08-21
Payer: MEDICARE

## 2024-08-21 VITALS
DIASTOLIC BLOOD PRESSURE: 56 MMHG | HEIGHT: 63 IN | WEIGHT: 239 LBS | BODY MASS INDEX: 42.35 KG/M2 | HEART RATE: 67 BPM | SYSTOLIC BLOOD PRESSURE: 116 MMHG

## 2024-08-21 DIAGNOSIS — M20.11 HALLUX VALGUS, RIGHT: ICD-10-CM

## 2024-08-21 DIAGNOSIS — I87.8 VENOUS STASIS OF BOTH LOWER EXTREMITIES: ICD-10-CM

## 2024-08-21 DIAGNOSIS — M20.12 HALLUX VALGUS, LEFT: Primary | ICD-10-CM

## 2024-08-21 PROCEDURE — 99213 OFFICE O/P EST LOW 20 MIN: CPT | Performed by: PODIATRIST

## 2024-08-21 NOTE — PROGRESS NOTES
"Humberto returns to the office with his caregiver for reevaluation of the right and left foot.  The patient relates that the pain is located around the big toe joints.  The patient relates pain with shoes.  The patient has tried wider shoes with no relief.  The patient relates no other problems.    Vitals: /56   Pulse 67   Ht 1.6 m (5' 3\")   Wt 108.4 kg (239 lb)   BMI 42.34 kg/m    BMI= Body mass index is 42.34 kg/m .    Lower Extremity Physical Exam:      Neurovascular status remains unchanged.  Muscular exam is within normal limits to major muscle groups.  Integument is intact.  Noted lower extremity edema with venous stasis dermatitis noted to both lower legs.    Noted moderate bunion deformities with pain on palpation over the dorsomedial eminence bilaterally.    Diagnostics:  Previous radiograph evaluation including three views of the right and left foot reveals moderate to severe bunion deformity with an elevated first intermetatarsal angle of approximately 18 degrees and hallux abductus angle of approximately 36 degrees bilaterally.  Mild degenerative changes noted to the first metatarsophalangeal joint bilaterally.  I personally evaluated the images as well as reviewed the images with the patient pointing out the findings.      Assessment:     ICD-10-CM    1. Hallux valgus, left  M20.12       2. Hallux valgus, right  M20.11       3. Venous stasis of both lower extremities  I87.8 Vascular Surgery Referral          Plan:    I have explained to Humberto about the progress of the conditions.  At this time, the patient was educated on the importance of offloading supportive shoes and other devices.  I demonstrated to the patient calf stretches to perform every hour daily until symptoms resolve.  After symptoms resolve, the patient was advised to perform the stretches 3 times daily to prevent future recurrence.  The patient was instructed to perform warm soaks with Epson salt after which to also apply " over-the-counter Voltaren gel to deeply massage the injured tissue.  The patient was instructed to do this on a daily basis until symptoms resolve.  The patient was referred to vascular clinic for further evaluation of the lower extremity venous stasis.  The patient may return in four weeks for reevaluation to determine if any further treatment will be needed.      Humberto verbalized agreement with and understanding of the rational for the diagnosis and treatment plan.  All questions were answered to best of my ability and the patient's satisfaction. The patient was advised to contact the clinic with any questions that may arise after the clinic visit.      Disclaimer: This note consists of symbols derived from keyboarding, dictation and/or voice recognition software. As a result, there may be errors in the script that have gone undetected. Please consider this when interpreting information found in this chart.       PERNELL Snyder D.P.M., F.PAULETTE.F.A.S.

## 2024-08-21 NOTE — LETTER
"8/21/2024      Humberto Estrella  [address]  [facility] [address]      Dear Colleague,    Thank you for referring your patient, Humberto Estrella, to the [facility]. Please see a copy of my visit note below.    Humberto returns to the office with his caregiver for reevaluation of the right and left foot.  The patient relates that the pain is located around the big toe joints.  The patient relates pain with shoes.  The patient has tried wider shoes with no relief.  The patient relates no other problems.    Vitals: /56   Pulse 67   Ht 1.6 m (5' 3\")   Wt 108.4 kg (239 lb)   BMI 42.34 kg/m    BMI= Body mass index is 42.34 kg/m .    Lower Extremity Physical Exam:      Neurovascular status remains unchanged.  Muscular exam is within normal limits to major muscle groups.  Integument is intact.  Noted lower extremity edema with venous stasis dermatitis noted to both lower legs.    Noted moderate bunion deformities with pain on palpation over the dorsomedial eminence bilaterally.    Diagnostics:  Previous radiograph evaluation including three views of the right and left foot reveals moderate to severe bunion deformity with an elevated first intermetatarsal angle of approximately 18 degrees and hallux abductus angle of approximately 36 degrees bilaterally.  Mild degenerative changes noted to the first metatarsophalangeal joint bilaterally.  I personally evaluated the images as well as reviewed the images with the patient pointing out the findings.      Assessment:     ICD-10-CM    1. Hallux valgus, left  M20.12       2. Hallux valgus, right  M20.11       3. Venous stasis of both lower extremities  I87.8 Vascular Surgery Referral          Plan:    I have explained to Humberto about the progress of the conditions.  At this time, the patient was educated on the importance of offloading supportive shoes and other devices.  I demonstrated to the patient calf stretches to perform " every hour daily until symptoms resolve.  After symptoms resolve, the patient was advised to perform the stretches 3 times daily to prevent future recurrence.  The patient was instructed to perform warm soaks with Epson salt after which to also apply over-the-counter Voltaren gel to deeply massage the injured tissue.  The patient was instructed to do this on a daily basis until symptoms resolve.  The patient was referred to vascular clinic for further evaluation of the lower extremity venous stasis.  The patient may return in four weeks for reevaluation to determine if any further treatment will be needed.      Humberto verbalized agreement with and understanding of the rational for the diagnosis and treatment plan.  All questions were answered to best of my ability and the patient's satisfaction. The patient was advised to contact the clinic with any questions that may arise after the clinic visit.      Disclaimer: This note consists of symbols derived from keyboarding, dictation and/or voice recognition software. As a result, there may be errors in the script that have gone undetected. Please consider this when interpreting information found in this chart.       GABBY Alonso.PMEERA., F.A.C.F.A.S.      Again, thank you for allowing me to participate in the care of your patient.        Sincerely,        Artemio Snyder DPM

## 2024-08-21 NOTE — NURSING NOTE
"Chief Complaint   Patient presents with    RECHECK     Bilateral bunion, toenails, callus, and reddened veins on legs       Initial /56   Pulse 67   Ht 1.6 m (5' 3\")   Wt 108.4 kg (239 lb)   BMI 42.34 kg/m   Estimated body mass index is 42.34 kg/m  as calculated from the following:    Height as of this encounter: 1.6 m (5' 3\").    Weight as of this encounter: 108.4 kg (239 lb).  Medications and allergies reviewed.      Linnea PIERRE MA    "

## 2024-08-22 ENCOUNTER — TELEPHONE (OUTPATIENT)
Dept: VASCULAR SURGERY | Facility: CLINIC | Age: 44
End: 2024-08-22
Payer: MEDICARE

## 2024-08-22 NOTE — TELEPHONE ENCOUNTER
December 18, 2023        Amber Caballero  [address] [facility] [address]    To Whom It May Concern:    This is to certify Amber Caballero was evaluated on 12/18/23 and is unable to return to work.     CDC guidelines for return to work are as follows:    Amber Caballero should self-isolate for 5 days.  After isolation is completed, she may return to work, wearing a mask while around others.  Perform a home COVID-19 test on days 6 and 8. If both are negative, masking can be discontinued.    **The loss of taste and smell may persist for weeks or months after recovery and do not need to delay the end of isolation.     Per CDC recommendations, employers should not require a COVID-19 test result or a healthcare provider’s note for employees who are sick to validate their illness, qualify for sick leave, or to return to work.  The Coronavirus is a rapidly evolving situation and recommendations are changing regularly to prevent spread of the disease and further loss of life.    Thank you for your understanding during these unusual times.     Electronically signed by:     ALBINO Flores                 [address] [facility] [address]     Vascular Referral Intake    Referred by: Artemio Snyder DPM  for Venous stasis of both lower extremities     Specialty: Vascular Medicine- OK to wait until Vivienne comes back    Specific Provider if Necessary:  MD Mahesh Palafox    Visit Type: New    Time Frame: Next Available    Testing/Imaging Needed Before Consult: none    Appt Note: (to be pasted into appt comments, also add where additional information is located, ie, outside images pushed to PACS, in Epic, sent to HIMS, etc)  The patient was referred to vascular clinic for further evaluation of the lower extremity venous stasis.

## 2024-08-29 ENCOUNTER — OFFICE VISIT (OUTPATIENT)
Dept: ALLERGY | Facility: CLINIC | Age: 44
End: 2024-08-29
Payer: MEDICARE

## 2024-08-29 VITALS
DIASTOLIC BLOOD PRESSURE: 59 MMHG | BODY MASS INDEX: 41.73 KG/M2 | SYSTOLIC BLOOD PRESSURE: 84 MMHG | TEMPERATURE: 97.6 F | WEIGHT: 235.6 LBS | HEART RATE: 79 BPM | OXYGEN SATURATION: 95 %

## 2024-08-29 DIAGNOSIS — J30.0 CHRONIC VASOMOTOR RHINITIS: Primary | ICD-10-CM

## 2024-08-29 PROCEDURE — 99204 OFFICE O/P NEW MOD 45 MIN: CPT | Performed by: ALLERGY & IMMUNOLOGY

## 2024-08-29 PROCEDURE — 36415 COLL VENOUS BLD VENIPUNCTURE: CPT | Performed by: ALLERGY & IMMUNOLOGY

## 2024-08-29 PROCEDURE — 86003 ALLG SPEC IGE CRUDE XTRC EA: CPT | Performed by: ALLERGY & IMMUNOLOGY

## 2024-08-29 PROCEDURE — 82785 ASSAY OF IGE: CPT | Performed by: ALLERGY & IMMUNOLOGY

## 2024-08-29 PROCEDURE — G2211 COMPLEX E/M VISIT ADD ON: HCPCS | Performed by: ALLERGY & IMMUNOLOGY

## 2024-08-29 RX ORDER — AZELASTINE 1 MG/ML
2 SPRAY, METERED NASAL 2 TIMES DAILY PRN
Qty: 30 ML | Refills: 3 | Status: SHIPPED | OUTPATIENT
Start: 2024-08-29

## 2024-08-29 NOTE — LETTER
8/29/2024      Humberto Estrella  [address] [facility] [address]        New orders:  -Discontinue Flonase.  -Use azelastine 2 sprays in each nostril twice a day when necessary.   negativeGet the bloodwork done.              Hiro Morse MD   8/29/2024

## 2024-08-29 NOTE — PROGRESS NOTES
SUBJECTIVE:                                                                   Humberto Estrella is a 44-year-old male who presents today to our Allergy Clinic at [facility]; He is being seen in consultation at the request of Dr. Carla Brown for an evaluation of non allergic rhinitis.  Obtaining a comprehensive history from Humberto is challenging, and the group home staff member, Belinda, accompanies him to provide additional information. Humberto has a lifelong history of nasal congestion, rhinorrhea, postnasal drainage, and itchy/watery eyes. These symptoms are perennial but can be asymptomatic for days or weeks at a time.    He has been prescribed olopatadine eye drops, though the group home staff does not recall the last time he used them. Intranasal fluticasone is used on an as-needed basis, while cetirizine is taken daily. However, it is unclear how effective this regimen has been for him.    In 2015, his total serum IgE levels were within normal limits, and serum IgE testing for aeroallergens was also negative.        Patient Active Problem List   Diagnosis    TRISOMY 21 (DOWN SYNDROME)    Hearing loss    MYOPIA    LATENT NYSTAGMUS    Headache    post traumatic stress disorder    CARDIOVASCULAR SCREENING; LDL GOAL LESS THAN 160    Cortical, lamellar, or zonular cataract, nonsenile    Nonallergic rhinitis    Obesity due to excess calories, unspecified obesity severity    Subclinical hypothyroidism    VIDAL (obstructive sleep apnea)-AHI 26    Anxiety    Explosive personality disorder (H)    Prediabetes    Lives in group home    Suicide attempt (H)    Hypoalbuminemia    Morbid obesity (H)       Past Medical History:   Diagnosis Date    Coronary artery disease     Depressive disorder     Diagnostic skin and sensitization tests (aka ALLERGENS) 9/30/15 IgE tests all NEGATIVE for environmental allergens.     Nonallergic rhinitis     9/30/15 IgE tests all NEGATIVE for environmental  allergens.     Thyroid disease       Problem (# of Occurrences) Relation (Name,Age of Onset)    Anxiety Disorder (1) Brother ([place])    Substance Abuse (2) Brother (Evans), Father    Unknown/Adopted (1) Mother ([place])    Depression (1) Brother ([place]ans)    Other Cancer (1) Mother ([place]land)          Past Surgical History:   Procedure Laterality Date    PE TUBES      Left    PHACOEMULSIFICATION WITH STANDARD INTRAOCULAR LENS IMPLANT  10/3/2013    Procedure: PHACOEMULSIFICATION WITH STANDARD INTRAOCULAR LENS IMPLANT;  Left Kelman Phacoemulsification with Intraocular Lens Implant;  Surgeon: Luis Barajas MD;  Location: [facility]    PHACOEMULSIFICATION WITH STANDARD INTRAOCULAR LENS IMPLANT  2014    Procedure: PHACOEMULSIFICATION WITH STANDARD INTRAOCULAR LENS IMPLANT;  Surgeon: Luis Barajas MD;  Location: [facility] OR     Social History     Socioeconomic History    Marital status: Single     Spouse name: None    Number of children: 0    Years of education: 12    Highest education level: None   Occupational History    Occupation: Cleaning Services     Comment: [employer]     Employer: UNEMPLOYED     Employer: DISABLED   Tobacco Use    Smoking status: Former     Current packs/day: 0.00     Average packs/day: 1 pack/day for 1 year (1.0 ttl pk-yrs)     Types: Cigarettes     Start date: 1999     Quit date: 2000     Years since quittin.3     Passive exposure: Never    Smokeless tobacco: Former    Tobacco comments:     24 - states never smoked   Vaping Use    Vaping status: Never Used   Substance and Sexual Activity    Alcohol use: No     Alcohol/week: 0.0 standard drinks of alcohol    Drug use: No    Sexual activity: Never   Other Topics Concern    Parent/sibling w/ CABG, MI or angioplasty before 65F 55M? No   Social History Narrative    24        ENVIRONMENTAL HISTORY: [place] The home is heated with a forced air. They does have  central air conditioning. The patient's bedroom is furnished with stuffed animals in bed and hard heather in bedroom.  Pets inside the house include 0. There is no history of cockroach or mice infestation. There is/are 0 smokers in the house.  The house does have a damp basement.      Social Determinants of Health     Financial Resource Strain: Unknown (1/24/2024)    Financial Resource Strain     Within the past 12 months, have you or your family members you live with been unable to get utilities (heat, electricity) when it was really needed?: Patient declined   Food Insecurity: Low Risk  (1/24/2024)    Food Insecurity     Within the past 12 months, did you worry that your food would run out before you got money to buy more?: No     Within the past 12 months, did the food you bought just not last and you didn t have money to get more?: Patient declined   Transportation Needs: Unknown (1/24/2024)    Transportation Needs     Within the past 12 months, has lack of transportation kept you from medical appointments, getting your medicines, non-medical meetings or appointments, work, or from getting things that you need?: Patient declined   Interpersonal Safety: Low Risk  (6/13/2024)    Interpersonal Safety     Do you feel physically and emotionally safe where you currently live?: Yes     Within the past 12 months, have you been hit, slapped, kicked or otherwise physically hurt by someone?: No     Within the past 12 months, have you been humiliated or emotionally abused in other ways by your partner or ex-partner?: No   Housing Stability: Unknown (1/24/2024)    Housing Stability     Do you have housing? : Patient declined     Are you worried about losing your housing?: Patient declined               Current Outpatient Medications:     azelastine (ASTELIN) 0.1 % nasal spray, Spray 2 sprays into both nostrils 2 times daily as needed for rhinitis., Disp: 30 mL, Rfl: 3    calcium polycarbophil (FIBERCON) 625 MG tablet, Take  2 tablets (1,250 mg) by mouth 2 times daily, Disp: 360 tablet, Rfl: 3    cetirizine (ZYRTEC) 10 MG tablet, Take 1 tablet (10 mg) by mouth At Bedtime Stop loratadine, Disp: 90 tablet, Rfl: 3    divalproex sodium delayed-release (DEPAKOTE) 250 MG DR tablet, 1000  in AM, Disp: , Rfl:     insulin pen needle (BD CANDI U/F) 32G X 4 MM miscellaneous, Use 1 pen needle daily with liraglutide., Disp: 120 each, Rfl: 4    levothyroxine (SYNTHROID/LEVOTHROID) 125 MCG tablet, Take 1 tablet (125 mcg) by mouth daily, Disp: 90 tablet, Rfl: 3    order for DME, Equipment being ordered: CPAP AIRSENSE 10 11 CM H20 AIRFIT F20 MEDIUM SN# [id number]  DN# [id number], Disp: , Rfl:     risperiDONE (RISPERDAL) 1 MG tablet, Take 1 mg by mouth every morning, Disp: 60 tablet, Rfl:     risperiDONE (RISPERDAL) 2 MG tablet, Take 2 mg by mouth daily at 8:00pm, Disp: , Rfl:     Semaglutide-Weight Management (WEGOVY) 0.25 MG/0.5ML pen, Inject 0.25 mg Subcutaneous once a week, Disp: 2 mL, Rfl: 0    sertraline (ZOLOFT) 100 MG tablet, Take 200 mg by mouth daily , Disp: , Rfl:     vitamin D3 25 mcg (1000 units) tablet, Take 1 tablet (25 mcg) by mouth daily, Disp: 100 tablet, Rfl: 3    alum & mag hydroxide-simethicone (MYLANTA/MAALOX) 200-200-20 MG/5ML SUSP suspension, Take 30 mLs by mouth every 4 hours as needed for indigestion (2 tsp for upset stomach) (Patient not taking: Reported on 8/29/2024), Disp: 1 Bottle, Rfl: 3    calcium carbonate (TUMS) 500 MG chewable tablet, Take 1 tablet (500 mg) by mouth every 6 hours as needed for heartburn (Patient not taking: Reported on 8/29/2024), Disp: 150 tablet, Rfl: 3    ibuprofen (ADVIL,MOTRIN) 600 MG tablet, Take 1 tablet (600 mg) by mouth every 6 hours as needed for moderate pain (Patient not taking: Reported on 8/29/2024), Disp: 60 tablet, Rfl: 0    magnesium hydroxide (MILK OF MAGNESIA) 400 MG/5ML suspension, Take 30-60 mLs by mouth daily as needed for constipation or heartburn (If No Bowel Movement in 2 days.)  Reported on 4/12/2017 (Patient not taking: Reported on 8/29/2024), Disp: 360 mL, Rfl: 1    neomycin-polymixin-dexAMETHasone (MAXITROL) 0.1 % ophthalmic suspension, 2 drops into the conjunctival sac of the affected eye(s) 4 times daily every 6 hours, during waking hours, for 7 days (Patient not taking: Reported on 8/29/2024), Disp: 10 mL, Rfl: 0    OLANZapine (ZYPREXA) 5 MG tablet, Take 2.5 mg by mouth 3 times daily as needed As needed, max of 15 (Patient not taking: Reported on 8/29/2024), Disp: 30 tablet, Rfl: 1    olopatadine (PATADAY) 0.2 % ophthalmic solution, 1 drop into both eyes daily as needed (Patient not taking: Reported on 8/29/2024), Disp: 2.5 mL, Rfl: 11    PAIN & FEVER 325 MG tablet, TAKE 1-2 TABLETS (325-650MG) BY MOUTH EVERY 4 HOURS AS NEEDED *ORDER WHEN LOW* (Patient not taking: Reported on 8/29/2024), Disp: 100 tablet, Rfl: 7    polyethylene glycol (MIRALAX) 17 GM/Dose powder, Take 17 g (1 Capful) by mouth daily as needed for constipation (Patient not taking: Reported on 8/29/2024), Disp: 116 g, Rfl: 11    polyethylene glycol-propylene glycol PF (SYSTANE ULTRA PF) 0.4-0.3 % SOLN opthalmic solution, Place 1-2 drops into both eyes Up to 5 times daily as needed (Patient not taking: Reported on 8/29/2024), Disp: , Rfl:     polymixin b-trimethoprim (POLYTRIM) 47749-0.1 UNIT/ML-% ophthalmic solution, Place 1 drop Into the left eye every 6 hours (Patient not taking: Reported on 8/29/2024), Disp: 10 mL, Rfl: 0    pseudoePHEDrine (SUDOGEST 12 HOUR) 120 MG 12 hr tablet, Take 1 tablet (120 mg) by mouth daily as needed for congestion (Patient not taking: Reported on 8/29/2024), Disp: 12 tablet, Rfl: 11    risperiDONE (RISPERDAL) 0.5 MG tablet, Take 1 mg by mouth 2 times daily. At noon (Patient not taking: Reported on 8/29/2024), Disp: , Rfl:     Semaglutide-Weight Management (WEGOVY) 0.5 MG/0.5ML pen, Inject 0.5 mg Subcutaneous once a week (Patient not taking: Reported on 8/29/2024), Disp: 3 mL, Rfl: 0     Semaglutide-Weight Management (WEGOVY) 1 MG/0.5ML pen, Inject 1 mg Subcutaneous once a week (Patient not taking: Reported on 8/29/2024), Disp: 2 mL, Rfl: 2  Immunization History   Administered Date(s) Administered    COVID-19 12+ (2023-24) (Pfizer) 10/26/2023    COVID-19 Monovalent 18+ (Moderna) 01/27/2021, 02/24/2021    COVID-19 Monovalent Booster 18+ (Moderna) 04/29/2022    HEPA 06/21/2006, 07/28/2011    HepB 06/21/2006, 07/28/2011    Influenza (IIV3) PF 11/24/2000, 11/23/2001, 12/08/2003, 01/01/2004, 11/14/2007, 01/19/2009, 10/26/2009, 11/19/2010, 11/12/2011, 10/30/2012    Influenza Vaccine >6 months,quad, PF 10/30/2013, 09/25/2014, 10/08/2015, 09/20/2016, 09/27/2017, 09/10/2018, 09/12/2019, 09/24/2020, 09/14/2021, 11/08/2022, 09/08/2023    Mantoux Tuberculin Skin Test 04/09/1999, 04/13/2001, 02/19/2002, 02/13/2003, 02/10/2004, 04/06/2005, 06/21/2006, 06/11/2007, 06/17/2008, 06/10/2009, 07/21/2015    TD,PF 7+ (Tenivac) 06/21/2006, 07/15/2021    TDAP Vaccine (Adacel) 07/28/2011     Allergies   Allergen Reactions    Nkda [No Known Drug Allergy]     Phentermine Anxiety     agitation anxiety       OBJECTIVE:                                                                 BP (!) 84/59 (BP Location: Left arm, Patient Position: Sitting, Cuff Size: Adult Large)   Pulse 79   Temp 97.6  F (36.4  C) (Tympanic)   Wt 106.9 kg (235 lb 9.6 oz)   SpO2 95%   BMI 41.73 kg/m          Physical Exam  Vitals and nursing note reviewed.   Constitutional:       General: He is not in acute distress.     Appearance: He is not diaphoretic.   HENT:      Head: Normocephalic and atraumatic.      Right Ear: Tympanic membrane, ear canal and external ear normal.      Left Ear: Tympanic membrane, ear canal and external ear normal.      Nose: No mucosal edema or rhinorrhea.      Right Turbinates: Not enlarged, swollen or pale.      Left Turbinates: Not enlarged, swollen or pale.      Mouth/Throat:      Lips: Pink.      Mouth: Mucous membranes  are moist.      Pharynx: Oropharynx is clear. No pharyngeal swelling, oropharyngeal exudate or posterior oropharyngeal erythema.   Eyes:      General:         Right eye: No discharge.         Left eye: No discharge.      Conjunctiva/sclera: Conjunctivae normal.   Pulmonary:      Effort: Pulmonary effort is normal. No respiratory distress.      Breath sounds: Normal breath sounds and air entry. No stridor, decreased air movement or transmitted upper airway sounds. No decreased breath sounds, wheezing, rhonchi or rales.   Neurological:      Mental Status: He is alert.   Psychiatric:         Mood and Affect: Mood normal.         Behavior: Behavior normal.               ASSESSMENT/PLAN:           Chronic vasomotor rhinitis  Ordered serum IgE testing for the regional aeroallergen panel to assess the current atopy status.  If the test results are negative, I suggest discontinuing daily cetirizine.  Recommend switching from as-needed intranasal fluticasone to azelastine, 2 sprays in each nostril twice daily as needed.  Suggest discontinuing intranasal fluticasone.    - IgE  - Allergen cat epithellium IgE  - Allergen dog epithelium IgE  - Allergen Marino grass IgE  - Allergen orchard grass IgE  - Allergen fito IgE  - Allergen D farinae IgE  - Allergen D pteronyssinus IgE  - Allergen alternaria alternata IgE  - Allergen aspergillus fumigatus IgE  - Allergen cladosporium herbarum IgE  - Allergen Epicoccum purpurascens IgE  - Allergen penicillium notatum IgE  - Allergen rober white IgE  - Allergen Cedar IgE  - Allergen cottonwood IgE  - Allergen elm IgE  - Allergen maple box elder IgE  - Allergen oak white IgE  - Allergen [place] IgE  - Allergen silver  birch IgE  - Allergen Tree [place] IgE  - Allergen Argillite Tree  - Allergen white pine IgE  - Allergen English plantain IgE  - Allergen giant ragweed IgE  - Allergen lamb's quarter IgE  - Allergen Mugwort IgE  - Allergen ragweed short IgE  - Allergen Sagebrush  Wormwood IgE  - Allergen Sheep Sorrel IgE  - Allergen thistle Russian IgE  - Allergen Weed Nettle IgE  - Allergen, Kochia/Firebush  - azelastine (ASTELIN) 0.1 % nasal spray  Dispense: 30 mL; Refill: 3         Follow up in 2 months or sooner if needed.     Thank you for allowing us to participate in the care of this patient. Please feel free to contact us if there are any questions or concerns about the patient.    Disclaimer: This note consists of symbols derived from keyboarding, dictation and/or voice recognition software. As a result, there may be errors in the script that have gone undetected. Please consider this when interpreting information found in this chart.    Hiro Morse MD, FAAAAI, FACAAI  Allergy and Asthma     [facility]

## 2024-08-29 NOTE — LETTER
8/29/2024      Humberto Estrella  [address]  [facility] [address]      Dear Colleague,    Thank you for referring your patient, Humberto Estrella, to the [facility]. Please see a copy of my visit note below.    SUBJECTIVE:                                                                   Humberto Estrella is a 44-year-old male who presents today to our Allergy Clinic at [facility]; He is being seen in consultation at the request of Dr. Carla Brown for an evaluation of non allergic rhinitis.  Obtaining a comprehensive history from Humberto is challenging, and the group home staff member, Belinda, accompanies him to provide additional information. Humberto has a lifelong history of nasal congestion, rhinorrhea, postnasal drainage, and itchy/watery eyes. These symptoms are perennial but can be asymptomatic for days or weeks at a time.    He has been prescribed olopatadine eye drops, though the [facility] staff does not recall the last time he used them. Intranasal fluticasone is used on an as-needed basis, while cetirizine is taken daily. However, it is unclear how effective this regimen has been for him.    In 2015, his total serum IgE levels were within normal limits, and serum IgE testing for aeroallergens was also negative.        Patient Active Problem List   Diagnosis     TRISOMY 21 (DOWN SYNDROME)     Hearing loss     MYOPIA     LATENT NYSTAGMUS     Headache     post traumatic stress disorder     CARDIOVASCULAR SCREENING; LDL GOAL LESS THAN 160     Cortical, lamellar, or zonular cataract, nonsenile     Nonallergic rhinitis     Obesity due to excess calories, unspecified obesity severity     Subclinical hypothyroidism     VIDAL (obstructive sleep apnea)-AHI 26     Anxiety     Explosive personality disorder (H)     Prediabetes     Lives in group home     Suicide attempt (H)     Hypoalbuminemia     Morbid obesity (H)       Past Medical History:   Diagnosis  Date     Coronary artery disease      Depressive disorder      Diagnostic skin and sensitization tests (aka ALLERGENS) 9/30/15 IgE tests all NEGATIVE for environmental allergens.      Nonallergic rhinitis     9/30/15 IgE tests all NEGATIVE for environmental allergens.      Thyroid disease       Problem (# of Occurrences) Relation (Name,Age of Onset)    Anxiety Disorder (1) Brother (Evans)    Substance Abuse (2) Brother (Evans), Father    Unknown/Adopted (1) Mother (leland)    Depression (1) Brother (Evans)    Other Cancer (1) Mother (leland)          Past Surgical History:   Procedure Laterality Date     PE TUBES      Left     PHACOEMULSIFICATION WITH STANDARD INTRAOCULAR LENS IMPLANT  10/3/2013    Procedure: PHACOEMULSIFICATION WITH STANDARD INTRAOCULAR LENS IMPLANT;  Left Kelman Phacoemulsification with Intraocular Lens Implant;  Surgeon: Luis Barajas MD;  Location: [facility]     PHACOEMULSIFICATION WITH STANDARD INTRAOCULAR LENS IMPLANT  2014    Procedure: PHACOEMULSIFICATION WITH STANDARD INTRAOCULAR LENS IMPLANT;  Surgeon: Luis Barajas MD;  Location: [facility] OR     Social History     Socioeconomic History     Marital status: Single     Spouse name: None     Number of children: 0     Years of education: 12     Highest education level: None   Occupational History     Occupation: Cleaning Services     Comment: [employer]     Employer: UNEMPLOYED     Employer: DISABLED   Tobacco Use     Smoking status: Former     Current packs/day: 0.00     Average packs/day: 1 pack/day for 1 year (1.0 ttl pk-yrs)     Types: Cigarettes     Start date: 1999     Quit date: 2000     Years since quittin.3     Passive exposure: Never     Smokeless tobacco: Former     Tobacco comments:     24 - states never smoked   Vaping Use     Vaping status: Never Used   Substance and Sexual Activity     Alcohol use: No     Alcohol/week: 0.0 standard drinks of alcohol     Drug use: No     Sexual activity:  Never   Other Topics Concern     Parent/sibling w/ CABG, MI or angioplasty before 65F 55M? No   Social History Narrative    08/29/24        ENVIRONMENTAL HISTORY: [place] The home is heated with a forced air. They does have central air conditioning. The patient's bedroom is furnished with stuffed animals in bed and hard heather in bedroom.  Pets inside the house include 0. There is no history of cockroach or mice infestation. There is/are 0 smokers in the house.  The house does have a damp basement.      Social Determinants of Health     Financial Resource Strain: Unknown (1/24/2024)    Financial Resource Strain      Within the past 12 months, have you or your family members you live with been unable to get utilities (heat, electricity) when it was really needed?: Patient declined   Food Insecurity: Low Risk  (1/24/2024)    Food Insecurity      Within the past 12 months, did you worry that your food would run out before you got money to buy more?: No      Within the past 12 months, did the food you bought just not last and you didn t have money to get more?: Patient declined   Transportation Needs: Unknown (1/24/2024)    Transportation Needs      Within the past 12 months, has lack of transportation kept you from medical appointments, getting your medicines, non-medical meetings or appointments, work, or from getting things that you need?: Patient declined   Interpersonal Safety: Low Risk  (6/13/2024)    Interpersonal Safety      Do you feel physically and emotionally safe where you currently live?: Yes      Within the past 12 months, have you been hit, slapped, kicked or otherwise physically hurt by someone?: No      Within the past 12 months, have you been humiliated or emotionally abused in other ways by your partner or ex-partner?: No   Housing Stability: Unknown (1/24/2024)    Housing Stability      Do you have housing? : Patient declined      Are you worried  about losing your housing?: Patient declined               Current Outpatient Medications:      azelastine (ASTELIN) 0.1 % nasal spray, Spray 2 sprays into both nostrils 2 times daily as needed for rhinitis., Disp: 30 mL, Rfl: 3     calcium polycarbophil (FIBERCON) 625 MG tablet, Take 2 tablets (1,250 mg) by mouth 2 times daily, Disp: 360 tablet, Rfl: 3     cetirizine (ZYRTEC) 10 MG tablet, Take 1 tablet (10 mg) by mouth At Bedtime Stop loratadine, Disp: 90 tablet, Rfl: 3     divalproex sodium delayed-release (DEPAKOTE) 250 MG DR tablet, 1000  in AM, Disp: , Rfl:      insulin pen needle (BD CANDI U/F) 32G X 4 MM miscellaneous, Use 1 pen needle daily with liraglutide., Disp: 120 each, Rfl: 4     levothyroxine (SYNTHROID/LEVOTHROID) 125 MCG tablet, Take 1 tablet (125 mcg) by mouth daily, Disp: 90 tablet, Rfl: 3     order for DME, Equipment being ordered: CPAP AIRSENSE 10 11 CM H20 AIRFIT F20 MEDIUM SN# [id number]  DN# [id number], Disp: , Rfl:      risperiDONE (RISPERDAL) 1 MG tablet, Take 1 mg by mouth every morning, Disp: 60 tablet, Rfl:      risperiDONE (RISPERDAL) 2 MG tablet, Take 2 mg by mouth daily at 8:00pm, Disp: , Rfl:      Semaglutide-Weight Management (WEGOVY) 0.25 MG/0.5ML pen, Inject 0.25 mg Subcutaneous once a week, Disp: 2 mL, Rfl: 0     sertraline (ZOLOFT) 100 MG tablet, Take 200 mg by mouth daily , Disp: , Rfl:      vitamin D3 25 mcg (1000 units) tablet, Take 1 tablet (25 mcg) by mouth daily, Disp: 100 tablet, Rfl: 3     alum & mag hydroxide-simethicone (MYLANTA/MAALOX) 200-200-20 MG/5ML SUSP suspension, Take 30 mLs by mouth every 4 hours as needed for indigestion (2 tsp for upset stomach) (Patient not taking: Reported on 8/29/2024), Disp: 1 Bottle, Rfl: 3     calcium carbonate (TUMS) 500 MG chewable tablet, Take 1 tablet (500 mg) by mouth every 6 hours as needed for heartburn (Patient not taking: Reported on 8/29/2024), Disp: 150 tablet, Rfl: 3     ibuprofen (ADVIL,MOTRIN) 600 MG tablet, Take 1 tablet  (600 mg) by mouth every 6 hours as needed for moderate pain (Patient not taking: Reported on 8/29/2024), Disp: 60 tablet, Rfl: 0     magnesium hydroxide (MILK OF MAGNESIA) 400 MG/5ML suspension, Take 30-60 mLs by mouth daily as needed for constipation or heartburn (If No Bowel Movement in 2 days.) Reported on 4/12/2017 (Patient not taking: Reported on 8/29/2024), Disp: 360 mL, Rfl: 1     neomycin-polymixin-dexAMETHasone (MAXITROL) 0.1 % ophthalmic suspension, 2 drops into the conjunctival sac of the affected eye(s) 4 times daily every 6 hours, during waking hours, for 7 days (Patient not taking: Reported on 8/29/2024), Disp: 10 mL, Rfl: 0     OLANZapine (ZYPREXA) 5 MG tablet, Take 2.5 mg by mouth 3 times daily as needed As needed, max of 15 (Patient not taking: Reported on 8/29/2024), Disp: 30 tablet, Rfl: 1     olopatadine (PATADAY) 0.2 % ophthalmic solution, 1 drop into both eyes daily as needed (Patient not taking: Reported on 8/29/2024), Disp: 2.5 mL, Rfl: 11     PAIN & FEVER 325 MG tablet, TAKE 1-2 TABLETS (325-650MG) BY MOUTH EVERY 4 HOURS AS NEEDED *ORDER WHEN LOW* (Patient not taking: Reported on 8/29/2024), Disp: 100 tablet, Rfl: 7     polyethylene glycol (MIRALAX) 17 GM/Dose powder, Take 17 g (1 Capful) by mouth daily as needed for constipation (Patient not taking: Reported on 8/29/2024), Disp: 116 g, Rfl: 11     polyethylene glycol-propylene glycol PF (SYSTANE ULTRA PF) 0.4-0.3 % SOLN opthalmic solution, Place 1-2 drops into both eyes Up to 5 times daily as needed (Patient not taking: Reported on 8/29/2024), Disp: , Rfl:      polymixin b-trimethoprim (POLYTRIM) 29011-5.1 UNIT/ML-% ophthalmic solution, Place 1 drop Into the left eye every 6 hours (Patient not taking: Reported on 8/29/2024), Disp: 10 mL, Rfl: 0     pseudoePHEDrine (SUDOGEST 12 HOUR) 120 MG 12 hr tablet, Take 1 tablet (120 mg) by mouth daily as needed for congestion (Patient not taking: Reported on 8/29/2024), Disp: 12 tablet, Rfl: 11      risperiDONE (RISPERDAL) 0.5 MG tablet, Take 1 mg by mouth 2 times daily. At noon (Patient not taking: Reported on 8/29/2024), Disp: , Rfl:      Semaglutide-Weight Management (WEGOVY) 0.5 MG/0.5ML pen, Inject 0.5 mg Subcutaneous once a week (Patient not taking: Reported on 8/29/2024), Disp: 3 mL, Rfl: 0     Semaglutide-Weight Management (WEGOVY) 1 MG/0.5ML pen, Inject 1 mg Subcutaneous once a week (Patient not taking: Reported on 8/29/2024), Disp: 2 mL, Rfl: 2  Immunization History   Administered Date(s) Administered     COVID-19 12+ (2023-24) (Pfizer) 10/26/2023     COVID-19 Monovalent 18+ (Moderna) 01/27/2021, 02/24/2021     COVID-19 Monovalent Booster 18+ (Moderna) 04/29/2022     HEPA 06/21/2006, 07/28/2011     HepB 06/21/2006, 07/28/2011     Influenza (IIV3) PF 11/24/2000, 11/23/2001, 12/08/2003, 01/01/2004, 11/14/2007, 01/19/2009, 10/26/2009, 11/19/2010, 11/12/2011, 10/30/2012     Influenza Vaccine >6 months,quad, PF 10/30/2013, 09/25/2014, 10/08/2015, 09/20/2016, 09/27/2017, 09/10/2018, 09/12/2019, 09/24/2020, 09/14/2021, 11/08/2022, 09/08/2023     Mantoux Tuberculin Skin Test 04/09/1999, 04/13/2001, 02/19/2002, 02/13/2003, 02/10/2004, 04/06/2005, 06/21/2006, 06/11/2007, 06/17/2008, 06/10/2009, 07/21/2015     TD,PF 7+ (Tenivac) 06/21/2006, 07/15/2021     TDAP Vaccine (Adacel) 07/28/2011     Allergies   Allergen Reactions     Nkda [No Known Drug Allergy]      Phentermine Anxiety     agitation anxiety       OBJECTIVE:                                                                 BP (!) 84/59 (BP Location: Left arm, Patient Position: Sitting, Cuff Size: Adult Large)   Pulse 79   Temp 97.6  F (36.4  C) (Tympanic)   Wt 106.9 kg (235 lb 9.6 oz)   SpO2 95%   BMI 41.73 kg/m          Physical Exam  Vitals and nursing note reviewed.   Constitutional:       General: He is not in acute distress.     Appearance: He is not diaphoretic.   HENT:      Head: Normocephalic and atraumatic.      Right Ear: Tympanic membrane,  ear canal and external ear normal.      Left Ear: Tympanic membrane, ear canal and external ear normal.      Nose: No mucosal edema or rhinorrhea.      Right Turbinates: Not enlarged, swollen or pale.      Left Turbinates: Not enlarged, swollen or pale.      Mouth/Throat:      Lips: Pink.      Mouth: Mucous membranes are moist.      Pharynx: Oropharynx is clear. No pharyngeal swelling, oropharyngeal exudate or posterior oropharyngeal erythema.   Eyes:      General:         Right eye: No discharge.         Left eye: No discharge.      Conjunctiva/sclera: Conjunctivae normal.   Pulmonary:      Effort: Pulmonary effort is normal. No respiratory distress.      Breath sounds: Normal breath sounds and air entry. No stridor, decreased air movement or transmitted upper airway sounds. No decreased breath sounds, wheezing, rhonchi or rales.   Neurological:      Mental Status: He is alert.   Psychiatric:         Mood and Affect: Mood normal.         Behavior: Behavior normal.               ASSESSMENT/PLAN:           Chronic vasomotor rhinitis  Ordered serum IgE testing for the regional aeroallergen panel to assess the current atopy status.  If the test results are negative, I suggest discontinuing daily cetirizine.  Recommend switching from as-needed intranasal fluticasone to azelastine, 2 sprays in each nostril twice daily as needed.  Suggest discontinuing intranasal fluticasone.    - IgE  - Allergen cat epithellium IgE  - Allergen dog epithelium IgE  - Allergen Marino grass IgE  - Allergen orchard grass IgE  - Allergen fito IgE  - Allergen D farinae IgE  - Allergen D pteronyssinus IgE  - Allergen alternaria alternata IgE  - Allergen aspergillus fumigatus IgE  - Allergen cladosporium herbarum IgE  - Allergen Epicoccum purpurascens IgE  - Allergen penicillium notatum IgE  - Allergen rober white IgE  - Allergen Cedar IgE  - Allergen cottonwood IgE  - Allergen elm IgE  - Allergen maple box elder IgE  - Allergen oak white  IgE  - Allergen Red [place] IgE  - Allergen silver  birch IgE  - Allergen Tree White [place] IgE  - Allergen Rayland Tree  - Allergen white pine IgE  - Allergen English plantain IgE  - Allergen giant ragweed IgE  - Allergen lamb's quarter IgE  - Allergen Mugwort IgE  - Allergen ragweed short IgE  - Allergen Sagebrush Wormwood IgE  - Allergen Sheep Sorrel IgE  - Allergen thistle Russian IgE  - Allergen Weed Nettle IgE  - Allergen, Kochia/Firebush  - azelastine (ASTELIN) 0.1 % nasal spray  Dispense: 30 mL; Refill: 3         Follow up in 2 months or sooner if needed.     Thank you for allowing us to participate in the care of this patient. Please feel free to contact us if there are any questions or concerns about the patient.    Disclaimer: This note consists of symbols derived from keyboarding, dictation and/or voice recognition software. As a result, there may be errors in the script that have gone undetected. Please consider this when interpreting information found in this chart.    Hiro Morse MD, FAAAAI, FACAAI  Allergy and Asthma     [facility]        Again, thank you for allowing me to participate in the care of your patient.        Sincerely,        Hiro Morse MD

## 2024-08-29 NOTE — PATIENT INSTRUCTIONS
Stop Flonase (intranasal fluticasone).  -Use azelastine 2 sprays in each nostril twice a day when necessary.     Get the bloodwork done for aeroallergens.     Follow up in 2 months or sooner if needed.     Prescription Assistance  If you need assistance with your prescriptions (cost, coverage, etc) please contact: [place] [facility] [phone]           If labs have been ordered/completed, please allow 7-14 business days for final interpretation of results to be sent on My Chart, phone or mail. Some lab results can take up to 28 days for results.         Allergy Staff Appt Hours Shot Hours Locations    Physician      Hiro Morse MD         Support Staff      Samina Bueno MA     Tuesday:   [place] :  [place]: :         :  [facility] 7-3     [place]        Tuesday: :20        Wednesday: :: 7-4:10        Tuesday: :10        Thursday: 7-3:10     [facility]       Tues & Wed: :10       Thurs: 12-4:10       Fri:             [facility]  [address] [place] [address]  Appt Line: [phone]        [facility]  [address] [place] [address]  Appt Line: [phone]     Pulmonary Function Scheduling:  [place]: [phone]  [place]: [phone]  [phone]

## 2024-08-30 LAB
A ALTERNATA IGE QN: <0.1 KU(A)/L
A FUMIGATUS IGE QN: <0.1 KU(A)/L
C HERBARUM IGE QN: <0.1 KU(A)/L
CALIF WALNUT POLN IGE QN: <0.1 KU(A)/L
CAT DANDER IGG QN: <0.1 KU(A)/L
CEDAR IGE QN: <0.1 KU(A)/L
COCKSFOOT IGE QN: <0.1 KU(A)/L
COMMON RAGWEED IGE QN: <0.1 KU(A)/L
COTTONWOOD IGE QN: <0.1 KU(A)/L
D FARINAE IGE QN: <0.1 KU(A)/L
D PTERONYSS IGE QN: <0.1 KU(A)/L
DOG DANDER+EPITH IGE QN: <0.1 KU(A)/L
E PURPURASCENS IGE QN: <0.1 KU(A)/L
EAST WHITE PINE IGE QN: <0.1 KU(A)/L
ENGL PLANTAIN IGE QN: <0.1 KU(A)/L
FIREBUSH IGE QN: <0.1 KU(A)/L
GIANT RAGWEED IGE QN: <0.1 KU(A)/L
GOOSEFOOT IGE QN: <0.1 KU(A)/L
IGE SERPL-ACNC: 10 KU/L (ref 0–114)
JOHNSON GRASS IGE QN: <0.1 KU(A)/L
MAPLE IGE QN: <0.1 KU(A)/L
MUGWORT IGE QN: <0.1 KU(A)/L
NETTLE IGE QN: <0.1 KU(A)/L
P NOTATUM IGE QN: <0.1 KU(A)/L
RED MULBERRY IGE QN: <0.1 KU(A)/L
SALTWORT IGE QN: <0.1 KU(A)/L
SHEEP SORREL IGE QN: <0.1 KU(A)/L
SILVER BIRCH IGE QN: <0.1 KU(A)/L
TIMOTHY IGE QN: <0.1 KU(A)/L
WHITE ASH IGE QN: <0.1 KU(A)/L
WHITE ELM IGE QN: <0.1 KU(A)/L
WHITE MULBERRY IGE QN: <0.1 KU(A)/L
WHITE OAK IGE QN: <0.1 KU(A)/L
WORMWOOD IGE QN: <0.1 KU(A)/L

## 2024-09-02 NOTE — RESULT ENCOUNTER NOTE
CodeGuard message sent:   Hello,    I reviewed the results of the recent lab work:    Total Serum IgE: Within normal limits.  Regional Aeroallergen Panel: Negative, indicating a lack of sensitivity to the tested environmental and seasonal allergens.  Recommendation: Based on these results, I am unable to recommend avoidance measures or allergen immunotherapy. I suggest discontinuing the daily use of cetirizine.  Please feel free to reach out if you have any questions or need further guidance.    Best regards,  Hiro Morse MD

## 2024-09-10 ENCOUNTER — OFFICE VISIT (OUTPATIENT)
Dept: FAMILY MEDICINE | Facility: CLINIC | Age: 44
End: 2024-09-10
Payer: MEDICARE

## 2024-09-10 VITALS
HEIGHT: 64 IN | OXYGEN SATURATION: 92 % | TEMPERATURE: 97.3 F | HEART RATE: 72 BPM | WEIGHT: 237 LBS | DIASTOLIC BLOOD PRESSURE: 68 MMHG | RESPIRATION RATE: 12 BRPM | SYSTOLIC BLOOD PRESSURE: 92 MMHG | BODY MASS INDEX: 40.46 KG/M2

## 2024-09-10 DIAGNOSIS — Z00.00 ENCOUNTER FOR MEDICARE ANNUAL WELLNESS EXAM: Primary | ICD-10-CM

## 2024-09-10 DIAGNOSIS — R19.7 DIARRHEA, UNSPECIFIED TYPE: ICD-10-CM

## 2024-09-10 DIAGNOSIS — E66.09 OBESITY DUE TO EXCESS CALORIES, UNSPECIFIED OBESITY SEVERITY: ICD-10-CM

## 2024-09-10 DIAGNOSIS — F60.3 EXPLOSIVE PERSONALITY DISORDER (H): ICD-10-CM

## 2024-09-10 DIAGNOSIS — Z13.6 CARDIOVASCULAR SCREENING; LDL GOAL LESS THAN 100: ICD-10-CM

## 2024-09-10 PROCEDURE — 99214 OFFICE O/P EST MOD 30 MIN: CPT | Mod: 25 | Performed by: INTERNAL MEDICINE

## 2024-09-10 PROCEDURE — 90656 IIV3 VACC NO PRSV 0.5 ML IM: CPT | Performed by: INTERNAL MEDICINE

## 2024-09-10 PROCEDURE — G0439 PPPS, SUBSEQ VISIT: HCPCS | Performed by: INTERNAL MEDICINE

## 2024-09-10 PROCEDURE — G0008 ADMIN INFLUENZA VIRUS VAC: HCPCS | Performed by: INTERNAL MEDICINE

## 2024-09-10 ASSESSMENT — PAIN SCALES - GENERAL: PAINLEVEL: NO PAIN (0)

## 2024-09-10 ASSESSMENT — SOCIAL DETERMINANTS OF HEALTH (SDOH): HOW OFTEN DO YOU GET TOGETHER WITH FRIENDS OR RELATIVES?: ONCE A WEEK

## 2024-09-10 ASSESSMENT — PATIENT HEALTH QUESTIONNAIRE - PHQ9: SUM OF ALL RESPONSES TO PHQ QUESTIONS 1-9: 0

## 2024-09-10 NOTE — LETTER
September 10, 2024      Humberto CALDERON Del[place]  [address] [address]        To Whom It May Concern,         Humberto should have 1.0 portion size for meals.  He can have seconds of fruits and vegetables. As a guidance, use the My Plate method to determine appropriate portion size.          Sincerely,        Carla Brown, DO

## 2024-09-10 NOTE — PROGRESS NOTES
Preventive Care Visit  [facility]  Carla Brown DO, Internal Medicine  Sep 10, 2024      Assessment & Plan     Encounter for Medicare annual wellness exam    Obesity due to excess calories, unspecified obesity severity  Refill medication until patient's appointment with the weight loss clinic. Group home does not think it would be able to do strict calorie counting. Will decrease portion size from 1.5 to 1, and continue unlimited fruits and vegetables.  Discussed increasing physical activity  - Semaglutide-Weight Management (WEGOVY) 1 MG/0.5ML pen; Inject 1 mg subcutaneously once a week.  - Comprehensive metabolic panel (BMP + Alb, Alk Phos, ALT, AST, Total. Bili, TP); Future    Diarrhea, unspecified type  Due to lack of fiber?  Microscopic colitis from sertraline? Left VM for his mental health provider to discuss in detail - maybe switch sertraline.  Would like to  avoid colonoscopy due to underlying health  - Tissue transglutaminase skyler IgA and IgG; Future  - TSH with free T4 reflex; Future  - CBC with platelets and differential; Future    Explosive personality disorder (H)  Known issue that I take into account for their medical decisions, no current exacerbations or new concerns    CARDIOVASCULAR SCREENING; LDL GOAL LESS THAN 100  - Lipid panel reflex to direct LDL Fasting; Future    Patient has been advised of split billing requirements and indicates understanding: Yes        Counseling  Appropriate preventive services were addressed with this patient via screening, questionnaire, or discussion as appropriate for fall prevention, nutrition, physical activity, Tobacco-use cessation, social engagement, weight loss and cognition.  Checklist reviewing preventive services available has been given to the patient.  Reviewed patient's diet, addressing concerns and/or questions.   He is at risk for lack of exercise and has been provided with information to increase physical activity  for the benefit of his well-being.   He is at risk for psychosocial distress and has been provided with information to reduce risk.   Updated plan of care.  Patient reported difficulty with activities of daily living were addressed today.      Diet'  Obesity  Will write for 1.0 portion size,   He can have unlimited fruits and vegetables and 1 tall glass of milk twice weekly.    For weight loss -  aim for calorie goal of 0909-2897 calories per day.  Review the My Plate method for estimating portion size   Will check kidney, liver, blood sugar, cholesterol.  Schedule fasting blood work    Redness of legs  Due to venous stasis - skin changes due to chronic leg swelling.  Leg swelling is from obesity and sedentary. Agree with vascular consultation in Dec as scheduled, non-urgent.  Weight loss and walking will help    Diarrhea  Will check blood work to rule out thyroid or celiac disease   I will discuss possibly switching sertraline to something else.  We may need to consider colonoscopy      Subjective   Humberto is a 44 year old, presenting for the following:  AWV, Recheck Medication (Semaglutide-Weight Management (WEGOVY) 0.5 MG/0.5ML pen ), and Health Maintenance (Flu shot)        9/10/2024    10:38 AM   Additional Questions   Roomed by adiel   Accompanied by Group Home care giver         9/10/2024    10:38 AM   Patient Reported Additional Medications   Patient reports taking the following new medications none         Health Care Directive  Patient has a Health Care Directive on file  Advance care planning document is on file and is current.    HPI      Chief Complaint   Patient presents with    AWV    Recheck Medication     Semaglutide-Weight Management (WEGOVY) 0.5 MG/0.5ML pen     Health Maintenance     Flu shot     GI  --he has intermittent incontinence and looser stools for many months  --he had an episode of blood on the tissue with wiping  --no constipation  --no nocturnal bowel movement   --occ stomach cramps.  " No N/V  --has diarrhea episodes a few times/month      Medication Followup of Semaglutide-Weight Management (WEGOVY) 0.5 MG/0.5ML pen   Taking Medication as prescribed: yes  Side Effects:  None  Medication Helping Symptoms:  yes   He was following with the medical weight loss clinic  Weight loss clinic wants PCP to 'revise his diet plan'.  One diet plan states 1.5 maindish plus all you can fruits/veggies, the second states 'reduced calorie intake with smaller portions and increased walking and activity\"    To get to BMI of 30 (wt 180) over 360 days, his calorie daily goal should be ~8670-5837 flash/day  --staff doesn't think staff could adherere to strict calorie counting.        9/10/2024   General Health   How would you rate your overall physical health? Good   Feel stress (tense, anxious, or unable to sleep) Only a little      (!) STRESS CONCERN      9/10/2024   Nutrition   Diet: Regular (no restrictions)    I don't know       Multiple values from one day are sorted in reverse-chronological order         9/10/2024   Exercise   Days per week of moderate/strenous exercise 1 day      (!) EXERCISE CONCERN      9/10/2024   Social Factors   Frequency of gathering with friends or relatives Once a week   Worry food won't last until get money to buy more No   Food not last or not have enough money for food? No   Do you have housing? (Housing is defined as stable permanent housing and does not include staying ouside in a car, in a tent, in an abandoned building, in an overnight shelter, or couch-surfing.) Yes   Are you worried about losing your housing? No   Lack of transportation? No   Unable to get utilities (heat,electricity)? No            9/10/2024   Fall Risk   Fallen 2 or more times in the past year? No   Trouble with walking or balance? No             9/10/2024   Activities of Daily Living- Home Safety   Needs help with the following daily activites Transportation    Shopping    Preparing meals    Laundry    " Medication administration    Money management   Safety concerns in the home None of the above       Multiple values from one day are sorted in reverse-chronological order         9/10/2024   Dental   Dentist two times every year? Yes            9/10/2024   Hearing Screening   Hearing concerns? None of the above            9/10/2024   Driving Risk Screening   Patient/family members have concerns about driving (!) DECLINE            9/10/2024   General Alertness/Fatigue Screening   Have you been more tired than usual lately? No            9/10/2024   Urinary Incontinence Screening   Bothered by leaking urine in past 6 months No            9/10/2024   TB Screening   Were you born outside of the [place]? No          Today's PHQ-9 Score:       9/10/2024    10:52 AM   PHQ-9 SCORE   PHQ-9 Total Score 0         9/10/2024   Substance Use   Alcohol more than 3/day or more than 7/wk Not Applicable   Do you have a current opioid prescription? No   How severe/bad is pain from 1 to 10? 0/10 (No Pain)   Do you use any other substances recreationally? No        Social History     Tobacco Use    Smoking status: Former     Current packs/day: 0.00     Average packs/day: 1 pack/day for 1 year (1.0 ttl pk-yrs)     Types: Cigarettes     Start date: 1999     Quit date: 2000     Years since quittin.3     Passive exposure: Never    Smokeless tobacco: Former    Tobacco comments:     24 - states never smoked   Vaping Use    Vaping status: Never Used   Substance Use Topics    Alcohol use: No     Alcohol/week: 0.0 standard drinks of alcohol    Drug use: No       ASCVD Risk   The 10-year ASCVD risk score (Jessy NGUYEN, et al., 2019) is: 1.2%    Values used to calculate the score:      Age: 44 years      Sex: Male      Is Non- : No      Diabetic: No      Tobacco smoker: No      Systolic Blood Pressure: 92 mmHg      Is BP treated: No      HDL Cholesterol: 43 mg/dL      Total Cholesterol: 193 mg/dL         9/10/2024   Contraception/Family Planning   Questions about contraception or family planning No            Reviewed and updated as needed this visit by Provider     Meds  Problems               Current Outpatient Medications   Medication Sig Dispense Refill    alum & mag hydroxide-simethicone (MYLANTA/MAALOX) 200-200-20 MG/5ML SUSP suspension Take 30 mLs by mouth every 4 hours as needed for indigestion (2 tsp for upset stomach) 1 Bottle 3    azelastine (ASTELIN) 0.1 % nasal spray Spray 2 sprays into both nostrils 2 times daily as needed for rhinitis. 30 mL 3    calcium carbonate (TUMS) 500 MG chewable tablet Take 1 tablet (500 mg) by mouth every 6 hours as needed for heartburn 150 tablet 3    calcium polycarbophil (FIBERCON) 625 MG tablet Take 2 tablets (1,250 mg) by mouth 2 times daily 360 tablet 3    divalproex sodium delayed-release (DEPAKOTE) 250 MG DR tablet 1000  in AM      ibuprofen (ADVIL,MOTRIN) 600 MG tablet Take 1 tablet (600 mg) by mouth every 6 hours as needed for moderate pain 60 tablet 0    insulin pen needle (BD CANDI U/F) 32G X 4 MM miscellaneous Use 1 pen needle daily with liraglutide. 120 each 4    levothyroxine (SYNTHROID/LEVOTHROID) 125 MCG tablet Take 1 tablet (125 mcg) by mouth daily 90 tablet 3    magnesium hydroxide (MILK OF MAGNESIA) 400 MG/5ML suspension Take 30-60 mLs by mouth daily as needed for constipation or heartburn (If No Bowel Movement in 2 days.) Reported on 4/12/2017 360 mL 1    neomycin-polymixin-dexAMETHasone (MAXITROL) 0.1 % ophthalmic suspension 2 drops into the conjunctival sac of the affected eye(s) 4 times daily every 6 hours, during waking hours, for 7 days 10 mL 0    OLANZapine (ZYPREXA) 5 MG tablet Take 2.5 mg by mouth 3 times daily as needed. As needed, max of 15 30 tablet 1    olopatadine (PATADAY) 0.2 % ophthalmic solution 1 drop into both eyes daily as needed 2.5 mL 11    order for DME Equipment being ordered: CPAP  AIRSENSE 10  11 CM H20  AIRFIT F20  MEDIUM  SN# [id number]  DN# [id number]      PAIN & FEVER 325 MG tablet TAKE 1-2 TABLETS (325-650MG) BY MOUTH EVERY 4 HOURS AS NEEDED *ORDER WHEN LOW* 100 tablet 7    polyethylene glycol (MIRALAX) 17 GM/Dose powder Take 17 g (1 Capful) by mouth daily as needed for constipation 116 g 11    polyethylene glycol-propylene glycol PF (SYSTANE ULTRA PF) 0.4-0.3 % SOLN opthalmic solution Place 1-2 drops into both eyes. Up to 5 times daily as needed      pseudoePHEDrine (SUDOGEST 12 HOUR) 120 MG 12 hr tablet Take 1 tablet (120 mg) by mouth daily as needed for congestion 12 tablet 11    risperiDONE (RISPERDAL) 1 MG tablet Take 1 mg by mouth every morning. Twice daily AM and noon 60 tablet     risperiDONE (RISPERDAL) 2 MG tablet Take 2 mg by mouth daily at 8:00pm      Semaglutide-Weight Management (WEGOVY) 1 MG/0.5ML pen Inject 1 mg Subcutaneous once a week 2 mL 2    sertraline (ZOLOFT) 100 MG tablet Take 200 mg by mouth daily       vitamin D3 25 mcg (1000 units) tablet Take 1 tablet (25 mcg) by mouth daily 100 tablet 3     Current providers sharing in care for this patient include:  Patient Care Team:  Carla Brown DO as PCP - General (Internal Medicine)  Bloch, Lauren Turner, [facility] as Pharmacist (Pharmacist)  Carla Brown DO as Assigned PCP  Artemio Snyder DPM as Assigned Surgical Provider  Marlene Romero MD as Referring Physician (Family Medicine)  Dre Hodges MD as MD (Ophthalmology)  Hiro Morse MD as MD (Allergy & Immunology)    The following health maintenance items are reviewed in Epic and correct as of today:  Health Maintenance   Topic Date Due    HEPATITIS B IMMUNIZATION (2 of 3 - 19+ 3-dose series) 08/25/2011    LIPID  09/08/2024    ANNUAL REVIEW OF HM ORDERS  09/08/2024    TSH W/FREE T4 REFLEX  01/16/2025    MEDICARE ANNUAL WELLNESS VISIT  09/10/2025    GLUCOSE  07/15/2027    ADVANCE CARE PLANNING  09/08/2028    DTAP/TDAP/TD IMMUNIZATION (3 - Td or Tdap) 07/15/2031    HEPATITIS C  "SCREENING  Completed    INFLUENZA VACCINE  Completed    COVID-19 Vaccine  Completed    Pneumococcal Vaccine: Pediatrics (0 to 5 Years) and At-Risk Patients (6 to 64 Years)  Aged Out    HPV IMMUNIZATION  Aged Out    MENINGITIS IMMUNIZATION  Aged Out    RSV MONOCLONAL ANTIBODY  Aged Out    HIV SCREENING  Discontinued       Review of Systems  Constitutional, neuro, ENT, endocrine, pulmonary, cardiac, gastrointestinal, genitourinary, musculoskeletal, integument and psychiatric systems are negative, except as otherwise noted.     Objective    Exam  BP 92/68 (BP Location: Right arm, Patient Position: Sitting, Cuff Size: Adult Large)   Pulse 72   Temp 97.3  F (36.3  C) (Tympanic)   Resp 12   Ht 1.626 m (5' 4\")   Wt 107.5 kg (237 lb)   SpO2 92%   BMI 40.68 kg/m     Estimated body mass index is 40.68 kg/m  as calculated from the following:    Height as of this encounter: 1.626 m (5' 4\").    Weight as of this encounter: 107.5 kg (237 lb).    Physical Exam  GENERAL: alert and no distress  EYES: Eyes grossly normal to inspection, PERRL and conjunctivae and sclerae normal  HENT: ear canals and TM's normal, nose and mouth without ulcers or lesions  NECK: no adenopathy, no asymmetry, masses, or scars  RESP: lungs clear to auscultation - no rales, rhonchi or wheezes  CV: regular rate and rhythm, normal S1 S2, no S3 or S4, no murmur, click or rub, no peripheral edema  ABDOMEN: soft, nontender, no hepatosplenomegaly, no masses and bowel sounds normal  MS: no gross musculoskeletal defects noted, no edema  SKIN: no suspicious lesions or rashes  NEURO: Normal strength and tone, mentation intact and speech normal  PSYCH: Cognitive impairment consistent with known diagnosis, affect flat         9/10/2024   Mini Cog   Clock Draw Score 0 Abnormal    2 Normal   3 Item Recall 3 objects recalled    3 objects recalled   Mini Cog Total Score 3    5       Multiple values from one day are sorted in reverse-chronological order          "     Signed Electronically by: Carla Brown DO

## 2024-09-10 NOTE — PATIENT INSTRUCTIONS
Diet'  Obesity  Will write for 1.0 portion size,   He can have unlimited fruits and vegetables and 1 tall glass of milk twice weekly.    For weight loss -  aim for calorie goal of 2410-2018 calories per day.  Review the My Plate method for estimating portion size   Will check kidney, liver, blood sugar, cholesterol.  Schedule fasting blood work    Redness of legs  Due to venous stasis - skin changes due to chronic leg swelling.  Leg swelling is from obesity and sedentary. Agree with vascular consultation in Dec as scheduled, non-urgent.  Weight loss and walking will help    Diarrhea  Will check blood work to rule out thyroid or celiac disease   I will discuss possibly switching sertraline to something else.  We may need to consider colonoscopy    Patient Education   Preventive Care Advice   This is general advice given by our system to help you stay healthy. However, your care team may have specific advice just for you. Please talk to your care team about your preventive care needs.  Nutrition  Eat 5 or more servings of fruits and vegetables each day.  Try wheat bread, brown rice and whole grain pasta (instead of white bread, rice, and pasta).  Get enough calcium and vitamin D. Check the label on foods and aim for 100% of the RDA (recommended daily allowance).  Lifestyle  Exercise at least 150 minutes each week  (30 minutes a day, 5 days a week).  Do muscle strengthening activities 2 days a week. These help control your weight and prevent disease.  No smoking.  Wear sunscreen to prevent skin cancer.  Have a dental exam and cleaning every 6 months.  Yearly exams  See your health care team every year to talk about:  Any changes in your health.  Any medicines your care team has prescribed.  Preventive care, family planning, and ways to prevent chronic diseases.  Shots (vaccines)   HPV shots (up to age 26), if you've never had them before.  Hepatitis B shots (up to age 59), if you've never had them before.  COVID-19  shot: Get this shot when it's due.  Flu shot: Get a flu shot every year.  Tetanus shot: Get a tetanus shot every 10 years.  Pneumococcal, hepatitis A, and RSV shots: Ask your care team if you need these based on your risk.  Shingles shot (for age 50 and up)  General health tests  Diabetes screening:  Starting at age 35, Get screened for diabetes at least every 3 years.  If you are younger than age 35, ask your care team if you should be screened for diabetes.  Cholesterol test: At age 39, start having a cholesterol test every 5 years, or more often if advised.  Bone density scan (DEXA): At age 50, ask your care team if you should have this scan for osteoporosis (brittle bones).  Hepatitis C: Get tested at least once in your life.  STIs (sexually transmitted infections)  Before age 24: Ask your care team if you should be screened for STIs.  After age 24: Get screened for STIs if you're at risk. You are at risk for STIs (including HIV) if:  You are sexually active with more than one person.  You don't use condoms every time.  You or a partner was diagnosed with a sexually transmitted infection.  If you are at risk for HIV, ask about PrEP medicine to prevent HIV.  Get tested for HIV at least once in your life, whether you are at risk for HIV or not.  Cancer screening tests  Cervical cancer screening: If you have a cervix, begin getting regular cervical cancer screening tests starting at age 21.  Breast cancer scan (mammogram): If you've ever had breasts, begin having regular mammograms starting at age 40. This is a scan to check for breast cancer.  Colon cancer screening: It is important to start screening for colon cancer at age 45.  Have a colonoscopy test every 10 years (or more often if you're at risk) Or, ask your provider about stool tests like a FIT test every year or Cologuard test every 3 years.  To learn more about your testing options, visit:   .  For help making a decision, visit:    [email or web address] [email or web address].  Prostate cancer screening test: If you have a prostate, ask your care team if a prostate cancer screening test (PSA) at age 55 is right for you.  Lung cancer screening: If you are a current or former smoker ages 50 to 80, ask your care team if ongoing lung cancer screenings are right for you.  For informational purposes only. Not to replace the advice of your health care provider. Copyright   2023 [facility]. All rights reserved. Clinically reviewed by the [facility]. [employer] 944034 - REV 01/24.

## 2024-09-19 ENCOUNTER — LAB (OUTPATIENT)
Dept: LAB | Facility: CLINIC | Age: 44
End: 2024-09-19
Payer: MEDICARE

## 2024-09-19 DIAGNOSIS — Z72.89 OTHER PROBLEMS RELATED TO LIFESTYLE: ICD-10-CM

## 2024-09-19 DIAGNOSIS — R19.7 DIARRHEA, UNSPECIFIED TYPE: ICD-10-CM
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
## 2024-09-19 DIAGNOSIS — Z11.59 ENCOUNTER FOR SCREENING FOR OTHER VIRAL DISEASES: ICD-10-CM

## 2024-09-19 DIAGNOSIS — Z11.4 ENCOUNTER FOR SCREENING FOR HUMAN IMMUNODEFICIENCY VIRUS (HIV): ICD-10-CM

## 2024-09-19 DIAGNOSIS — E66.09 OBESITY DUE TO EXCESS CALORIES, UNSPECIFIED OBESITY SEVERITY: ICD-10-CM

## 2024-09-19 LAB
ALBUMIN SERPL BCG-MCNC: 3.5 G/DL (ref 3.5–5.2)
ALP SERPL-CCNC: 85 U/L (ref 40–150)
ALT SERPL W P-5'-P-CCNC: 34 U/L (ref 0–70)
ANION GAP SERPL CALCULATED.3IONS-SCNC: 8 MMOL/L (ref 7–15)
AST SERPL W P-5'-P-CCNC: 36 U/L (ref 0–45)
BASOPHILS # BLD AUTO: 0 10E3/UL (ref 0–0.2)
BASOPHILS NFR BLD AUTO: 1 %
BILIRUB SERPL-MCNC: 0.3 MG/DL
BUN SERPL-MCNC: 13.4 MG/DL (ref 6–20)
CALCIUM SERPL-MCNC: 9.6 MG/DL (ref 8.8–10.4)
CHLORIDE SERPL-SCNC: 100 MMOL/L (ref 98–107)
CREAT SERPL-MCNC: 1.23 MG/DL (ref 0.67–1.17)
EGFRCR SERPLBLD CKD-EPI 2021: 74 ML/MIN/1.73M2
EOSINOPHIL # BLD AUTO: 0 10E3/UL (ref 0–0.7)
EOSINOPHIL NFR BLD AUTO: 0 %
ERYTHROCYTE [DISTWIDTH] IN BLOOD BY AUTOMATED COUNT: 15.7 % (ref 10–15)
GLUCOSE SERPL-MCNC: 76 MG/DL (ref 70–99)
HBV CORE AB SERPL QL IA: NONREACTIVE
HBV SURFACE AB SERPL IA-ACNC: 30.6 M[IU]/ML
HBV SURFACE AB SERPL IA-ACNC: REACTIVE M[IU]/ML
HBV SURFACE AG SERPL QL IA: NONREACTIVE
HCO3 SERPL-SCNC: 29 MMOL/L (ref 22–29)
HCT VFR BLD AUTO: 39.4 % (ref 40–53)
HCV AB SERPL QL IA: NONREACTIVE
HGB BLD-MCNC: 14 G/DL (ref 13.3–17.7)
HIV 1+2 AB+HIV1 P24 AG SERPL QL IA: NONREACTIVE
IMM GRANULOCYTES # BLD: 0.2 10E3/UL
IMM GRANULOCYTES NFR BLD: 5 %
LYMPHOCYTES # BLD AUTO: 1.8 10E3/UL (ref 0.8–5.3)
LYMPHOCYTES NFR BLD AUTO: 37 %
MCH RBC QN AUTO: 36.3 PG (ref 26.5–33)
MCHC RBC AUTO-ENTMCNC: 35.5 G/DL (ref 31.5–36.5)
MCV RBC AUTO: 102 FL (ref 78–100)
MONOCYTES # BLD AUTO: 0.5 10E3/UL (ref 0–1.3)
MONOCYTES NFR BLD AUTO: 11 %
NEUTROPHILS # BLD AUTO: 2.1 10E3/UL (ref 1.6–8.3)
NEUTROPHILS NFR BLD AUTO: 45 %
NRBC # BLD AUTO: 0 10E3/UL
NRBC BLD AUTO-RTO: 0 /100
PLATELET # BLD AUTO: 186 10E3/UL (ref 150–450)
POTASSIUM SERPL-SCNC: 4.7 MMOL/L (ref 3.4–5.3)
PROT SERPL-MCNC: 7.1 G/DL (ref 6.4–8.3)
RBC # BLD AUTO: 3.86 10E6/UL (ref 4.4–5.9)
SODIUM SERPL-SCNC: 137 MMOL/L (ref 135–145)
T4 FREE SERPL-MCNC: 1.57 NG/DL (ref 0.9–1.7)
TSH SERPL DL<=0.005 MIU/L-ACNC: 8.86 UIU/ML (ref 0.3–4.2)
WBC # BLD AUTO: 4.7 10E3/UL (ref 4–11)

## 2024-09-19 PROCEDURE — 86803 HEPATITIS C AB TEST: CPT

## 2024-09-19 PROCEDURE — 84443 ASSAY THYROID STIM HORMONE: CPT

## 2024-09-19 PROCEDURE — 86364 TISS TRNSGLTMNASE EA IG CLAS: CPT

## 2024-09-19 PROCEDURE — 86706 HEP B SURFACE ANTIBODY: CPT

## 2024-09-19 PROCEDURE — 86704 HEP B CORE ANTIBODY TOTAL: CPT

## 2024-09-19 PROCEDURE — 84439 ASSAY OF FREE THYROXINE: CPT

## 2024-09-19 PROCEDURE — 87340 HEPATITIS B SURFACE AG IA: CPT

## 2024-09-19 PROCEDURE — 85025 COMPLETE CBC W/AUTO DIFF WBC: CPT

## 2024-09-19 PROCEDURE — 87389 HIV-1 AG W/HIV-1&-2 AB AG IA: CPT

## 2024-09-19 PROCEDURE — 80053 COMPREHEN METABOLIC PANEL: CPT

## 2024-09-19 PROCEDURE — 36415 COLL VENOUS BLD VENIPUNCTURE: CPT

## 2024-09-20 DIAGNOSIS — E03.9 ACQUIRED HYPOTHYROIDISM: ICD-10-CM

## 2024-09-20 LAB
TTG IGA SER-ACNC: 2.2 U/ML
TTG IGG SER-ACNC: 1.1 U/ML

## 2024-09-20 RX ORDER — LEVOTHYROXINE SODIUM 137 UG/1
137 TABLET ORAL DAILY
Qty: 90 TABLET | Refills: 1 | Status: SHIPPED | OUTPATIENT
Start: 2024-09-20

## 2024-10-01 ENCOUNTER — LAB (OUTPATIENT)
Dept: LAB | Facility: CLINIC | Age: 44
End: 2024-10-01
Payer: MEDICARE

## 2024-10-01 DIAGNOSIS — Z13.6 CARDIOVASCULAR SCREENING; LDL GOAL LESS THAN 100: ICD-10-CM

## 2024-10-01 LAB
CHOLEST SERPL-MCNC: 187 MG/DL
FASTING STATUS PATIENT QL REPORTED: YES
HDLC SERPL-MCNC: 47 MG/DL
LDLC SERPL CALC-MCNC: 121 MG/DL
NONHDLC SERPL-MCNC: 140 MG/DL
TRIGL SERPL-MCNC: 96 MG/DL

## 2024-10-01 PROCEDURE — 36415 COLL VENOUS BLD VENIPUNCTURE: CPT

## 2024-10-01 PROCEDURE — 80061 LIPID PANEL: CPT

## 2024-10-24 ENCOUNTER — OFFICE VISIT (OUTPATIENT)
Dept: PODIATRY | Facility: CLINIC | Age: 44
End: 2024-10-24
Payer: MEDICARE

## 2024-10-24 VITALS
WEIGHT: 230 LBS | DIASTOLIC BLOOD PRESSURE: 62 MMHG | HEART RATE: 61 BPM | BODY MASS INDEX: 39.48 KG/M2 | SYSTOLIC BLOOD PRESSURE: 102 MMHG

## 2024-10-24 DIAGNOSIS — M20.12 HALLUX VALGUS, LEFT: Primary | ICD-10-CM

## 2024-10-24 DIAGNOSIS — I87.8 VENOUS STASIS OF BOTH LOWER EXTREMITIES: ICD-10-CM

## 2024-10-24 PROCEDURE — 99213 OFFICE O/P EST LOW 20 MIN: CPT | Performed by: PODIATRIST

## 2024-10-24 NOTE — PROGRESS NOTES
Humberto returns to the office for reevaluation of the left foot.  The patient relates following the instructions given at the last visit with noted overall more pain and less improvement in function of the left foot.   The patient relates no other problems.    Vitals: /62   Pulse 61   Wt 104.3 kg (230 lb)   BMI 39.48 kg/m    BMI= Body mass index is 39.48 kg/m .    Lower Extremity Physical Exam:      Neurovascular status remains unchanged.  Muscular exam is within normal limits to major muscle groups.  Integument is intact.      Noted hyperkeratotic tissue buildup on the plantar medial aspect of the first metatarsophalangeal joint on the left foot.  Noted severe bunion deformity.  No surrounding erythema edema or drainage noted.         Assessment:     ICD-10-CM    1. Hallux valgus, left  M20.12       2. Venous stasis of both lower extremities  I87.8           Plan:    I have explained to Humberto about the progress of the conditions.  At this time, the hyperkeratotic tissue was sharply debrided with a #10 blade down to healthy epidermis.  No bleeding noted.  The patient was instructed to wear silicone shoe insert to better offload the pressure preventing further callus formation to occur.    Humberto verbalized agreement with and understanding of the rational for the diagnosis and treatment plan.  All questions were answered to best of my ability and the patient's satisfaction. The patient was advised to contact the clinic with any questions that may arise after the clinic visit.      Disclaimer: This note consists of symbols derived from keyboarding, dictation and/or voice recognition software. As a result, there may be errors in the script that have gone undetected. Please consider this when interpreting information found in this chart.       PERNELL Snyder D.P.M., FGINGER.F.A.S.

## 2024-10-24 NOTE — LETTER
10/24/2024      Humberto Estrella  [id number]  [facility] [address]      Dear Colleague,    Thank you for referring your patient, Humberto Estrella, to the [facility]. Please see a copy of my visit note below.    Humberto returns to the office for reevaluation of the left foot.  The patient relates following the instructions given at the last visit with noted overall more pain and less improvement in function of the left foot.   The patient relates no other problems.    Vitals: /62   Pulse 61   Wt 104.3 kg (230 lb)   BMI 39.48 kg/m    BMI= Body mass index is 39.48 kg/m .    Lower Extremity Physical Exam:      Neurovascular status remains unchanged.  Muscular exam is within normal limits to major muscle groups.  Integument is intact.      Noted hyperkeratotic tissue buildup on the plantar medial aspect of the first metatarsophalangeal joint on the left foot.  Noted severe bunion deformity.  No surrounding erythema edema or drainage noted.         Assessment:     ICD-10-CM    1. Hallux valgus, left  M20.12       2. Venous stasis of both lower extremities  I87.8           Plan:    I have explained to Humberto about the progress of the conditions.  At this time, the hyperkeratotic tissue was sharply debrided with a #10 blade down to healthy epidermis.  No bleeding noted.  The patient was instructed to wear silicone shoe insert to better offload the pressure preventing further callus formation to occur.    Humberto verbalized agreement with and understanding of the rational for the diagnosis and treatment plan.  All questions were answered to best of my ability and the patient's satisfaction. The patient was advised to contact the clinic with any questions that may arise after the clinic visit.      Disclaimer: This note consists of symbols derived from keyboarding, dictation and/or voice recognition software. As a result, there may be errors in the script that have gone  undetected. Please consider this when interpreting information found in this chart.       PERNELL Snyder D.P.M., F.AIME.C.F.A.S.      Again, thank you for allowing me to participate in the care of your patient.        Sincerely,        Artemio Snyder DPM

## 2024-10-24 NOTE — PATIENT INSTRUCTIONS
Dry Skin Treatment Recommendations    Apply to dry skin daily.  Best time to apply creams is right after a shower or bath.  For thick, dry, cracked skin  First, apply a Vitamin D cream  Maxasorb D3 cream  Second, apply a keratolytic cream to help break down the thick, hard skin  CeraVe Moisturizing Cream for Psoriasis Treatment  8 Oz  With Salicylic Acid & Urea  PurSources Urea 40% Foot Cream 4 oz.  For dry itchy skin on the lower legs  CeraVe Moisturizing Cream for Itch Relief  12 Ounce  Dry Skin Itch Relief Cream with Pramoxine Hydrochloride  Fragrance Free    Calluses of the Foot    I.  Calluses on the toes   A.  Tips of the toes    1.  Due to pressure on the tips of the toes when wearing shoes, sandals or barefoot.    2.  Related to hammertoe deformity of the toes.    3.  Treated with wearing soft cushioned toe caps or shoe liners to better offload the pressure to the tips of the toes    4.  Correcting the deformity of the toe is another option if cushions do not help   B.  Top of sides of the knuckles of the toes    1.  Due to pressure from adjacent toes or shoes on the knuckles of the toes.    2.  Can be related to hammertoe deformities    3.  Treated with wearing roomy shoes with soft top-cover material in order not to rub on the skin    4.  Silicone toe caps can also be used to protect rubbing from the knuckles of the adjacent toes.    5.  Correcting the deformity of the toe is another option if offloading measures do not work.  II. Calluses on the bottom of the foot   A.   Pressure points    1.  Caused by direct pressure overlying prominent bones such as metatarsal head on the balls of the foot.    2.  Can be related to either hammertoe deformities or fat pad atrophy    3.  Can be treated with additional cushion utilizing silicone shoe liners to better offload the pressure and supplement for fat pad atrophy.    4.  Surgical correction of hammertoe deformities is another option if offloading cushion  treatment does not work.   B.   Shear forces    1.  Caused by sliding forces along the skin on the bottom of the forefoot including the great toe.    2.  This is not due to a rotational force as the foot pushes off against the ground.    3.  Treated with wearing a silicone shoe liner that can move with the skin reducing the amount of shear force causing the callus formation.   C.   IPK lesions or porokeratosis    1.  These are small hard callus lesions that are related to plugged sweat ducts.    2.  These ducts travel through the epidermis and dermis residing in the subcutaneous tissue    3.  When the ducts get clogged, they can harden up resulting in callused skin around them.      4.  Treatment includes sharp excavation of the hyperkeratotic callus tissue core as far as can be tolerated, preferably without bleeding.    5.   Other treatment options   A.  Application of salicylic acid to soften the hyperkeratotic skin   B.  Cantharone which causes a blister in attempt to pull off the hyperkeratotic skin lesion.    Helpful products:         Soles  Silicone Shoe Inserts    Amazon: [email or web address] [email or web address]-[email or web address]-Orthopedic-Comfortable-[email or web address]

## 2024-10-24 NOTE — NURSING NOTE
"Chief Complaint   Patient presents with    RECHECK     Left foot callus       Initial /62   Pulse 61   Wt 104.3 kg (230 lb)   BMI 39.48 kg/m   Estimated body mass index is 39.48 kg/m  as calculated from the following:    Height as of 9/10/24: 1.626 m (5' 4\").    Weight as of this encounter: 104.3 kg (230 lb).  Medications and allergies reviewed.      Linnea PIERRE MA    "

## 2024-10-31 ENCOUNTER — OFFICE VISIT (OUTPATIENT)
Dept: ALLERGY | Facility: CLINIC | Age: 44
End: 2024-10-31
Payer: MEDICARE

## 2024-10-31 VITALS
WEIGHT: 241.6 LBS | SYSTOLIC BLOOD PRESSURE: 120 MMHG | DIASTOLIC BLOOD PRESSURE: 79 MMHG | OXYGEN SATURATION: 96 % | TEMPERATURE: 96.9 F | BODY MASS INDEX: 41.47 KG/M2 | HEART RATE: 84 BPM

## 2024-10-31 DIAGNOSIS — H10.89 OTHER CONJUNCTIVITIS OF BOTH EYES: ICD-10-CM

## 2024-10-31 DIAGNOSIS — J31.0 CHRONIC RHINITIS: Primary | ICD-10-CM

## 2024-10-31 PROCEDURE — 99214 OFFICE O/P EST MOD 30 MIN: CPT | Performed by: ALLERGY & IMMUNOLOGY

## 2024-10-31 RX ORDER — FLUOXETINE 20 MG/1
20 TABLET, FILM COATED ORAL DAILY
COMMUNITY
Start: 2024-10-20

## 2024-10-31 NOTE — PROGRESS NOTES
SUBJECTIVE:                                                                   Humberto Estrella presents today to our Allergy Clinic at [facility] for a follow up visit. He is a 44 year old male with a history of chronic rhinitis.  The staff, Prabhu accompanies the patient and helps to provide history.  Obtaining history from Humberto is challenging.    Lifelong history of nasal congestion, rhinorrhea, postnasal drainage and itchy/watery eyes.  Perennial pattern, but can be asymptomatic for days or weeks at a time.  The patient was taking cetirizine daily and intranasal fluticasone as needed.  In August 2024, serum IgE for the regional aeroallergen panel was negative.    The patient frequently experiences episodes of pinkeye, even when others around him are unaffected. During these episodes, they tend to rely more on olopatadine and less on Systane eyedrops, attributing his symptoms to seasonal or environmental allergies. While olopatadine sometimes alleviates the symptoms, he occasionally continues to experience ocular discomfort.    There is some conflicting information regarding his chronic rhinitis symptoms. Humberto reports frequent runny nose and sneezing, stating that nasal sprays have not been effective. However, Prabhu disagrees, noting that they use azelastine between 0 and 5 times per week, with symptom improvement documented each time in his chart. They do not have copies of these notes with them.    Patient Active Problem List   Diagnosis    TRISOMY 21 (DOWN SYNDROME)    Hearing loss    MYOPIA    LATENT NYSTAGMUS    Headache    post traumatic stress disorder    CARDIOVASCULAR SCREENING; LDL GOAL LESS THAN 160    Cortical, lamellar, or zonular cataract, nonsenile    Nonallergic rhinitis    Obesity due to excess calories, unspecified obesity severity    Subclinical hypothyroidism    VIDAL (obstructive sleep apnea)-AHI 26    Anxiety    Explosive personality disorder (H)     Prediabetes    Lives in group home    Suicide attempt (H)    Hypoalbuminemia    Morbid obesity (H)       Past Medical History:   Diagnosis Date    Coronary artery disease     Depressive disorder     Diagnostic skin and sensitization tests (aka ALLERGENS) 9/30/15 IgE tests all NEGATIVE for environmental allergens.     Nonallergic rhinitis     9/30/15 IgE tests all NEGATIVE for environmental allergens.     Thyroid disease       Problem (# of Occurrences) Relation (Name,Age of Onset)    Anxiety Disorder (1) Brother ([place])    Substance Abuse (2) Brother (Evans), Father    Unknown/Adopted (1) Mother ([place])    Depression (1) Brother ([place]ans)    Other Cancer (1) Mother ([place])          Past Surgical History:   Procedure Laterality Date    PE TUBES      Left    PHACOEMULSIFICATION WITH STANDARD INTRAOCULAR LENS IMPLANT  10/3/2013    Procedure: PHACOEMULSIFICATION WITH STANDARD INTRAOCULAR LENS IMPLANT;  Left Kelman Phacoemulsification with Intraocular Lens Implant;  Surgeon: Luis Barajas MD;  Location: [facility]    PHACOEMULSIFICATION WITH STANDARD INTRAOCULAR LENS IMPLANT  2014    Procedure: PHACOEMULSIFICATION WITH STANDARD INTRAOCULAR LENS IMPLANT;  Surgeon: Luis Barajas MD;  Location: [facility] OR     Social History     Socioeconomic History    Marital status: Single     Spouse name: None    Number of children: 0    Years of education: 12    Highest education level: None   Occupational History    Occupation: Cleaning Services     Comment: [employer]     Employer: UNEMPLOYED     Employer: DISABLED   Tobacco Use    Smoking status: Former     Current packs/day: 0.00     Average packs/day: 1 pack/day for 1 year (1.0 ttl pk-yrs)     Types: Cigarettes     Start date: 1999     Quit date: 2000     Years since quittin.5     Passive exposure: Never    Smokeless tobacco: Former    Tobacco comments:     24 - states never smoked   Vaping Use    Vaping status: Never Used   Substance and  Sexual Activity    Alcohol use: No     Alcohol/week: 0.0 standard drinks of alcohol    Drug use: No    Sexual activity: Never   Other Topics Concern    Parent/sibling w/ CABG, MI or angioplasty before 65F 55M? No   Social History Narrative    10/31/24        ENVIRONMENTAL HISTORY: [place] The home is heated with a forced air. They does have central air conditioning. The patient's bedroom is furnished with stuffed animals in bed and hard heather in bedroom.  Pets inside the house include 1 cat. There is no history of cockroach or mice infestation. There is/are 0 smokers in the house.  The house does have a damp basement.      Social Drivers of Health     Financial Resource Strain: Low Risk  (9/10/2024)    Financial Resource Strain     Within the past 12 months, have you or your family members you live with been unable to get utilities (heat, electricity) when it was really needed?: No   Food Insecurity: Low Risk  (9/10/2024)    Food Insecurity     Within the past 12 months, did you worry that your food would run out before you got money to buy more?: No     Within the past 12 months, did the food you bought just not last and you didn t have money to get more?: No   Transportation Needs: Low Risk  (9/10/2024)    Transportation Needs     Within the past 12 months, has lack of transportation kept you from medical appointments, getting your medicines, non-medical meetings or appointments, work, or from getting things that you need?: No   Physical Activity: Unknown (9/10/2024)    Exercise Vital Sign     Days of Exercise per Week: 1 day   Stress: No Stress Concern Present (9/10/2024)    [employer] [place] [employer] - Occupational Stress Questionnaire     Feeling of Stress : Only a little   Social Connections: Unknown (9/10/2024)    Social Connection and Isolation Panel [NHANES]     Frequency of Social Gatherings with Friends and Family: Once a week    Interpersonal Safety: Low Risk  (6/13/2024)    Interpersonal Safety     Do you feel physically and emotionally safe where you currently live?: Yes     Within the past 12 months, have you been hit, slapped, kicked or otherwise physically hurt by someone?: No     Within the past 12 months, have you been humiliated or emotionally abused in other ways by your partner or ex-partner?: No   Housing Stability: Low Risk  (9/10/2024)    Housing Stability     Do you have housing? : Yes     Are you worried about losing your housing?: No             Current Outpatient Medications:     alum & mag hydroxide-simethicone (MYLANTA/MAALOX) 200-200-20 MG/5ML SUSP suspension, Take 30 mLs by mouth every 4 hours as needed for indigestion (2 tsp for upset stomach), Disp: 1 Bottle, Rfl: 3    azelastine (ASTELIN) 0.1 % nasal spray, Spray 2 sprays into both nostrils 2 times daily as needed for rhinitis., Disp: 30 mL, Rfl: 3    calcium carbonate (TUMS) 500 MG chewable tablet, Take 1 tablet (500 mg) by mouth every 6 hours as needed for heartburn, Disp: 150 tablet, Rfl: 3    calcium polycarbophil (FIBERCON) 625 MG tablet, Take 2 tablets (1,250 mg) by mouth 2 times daily, Disp: 360 tablet, Rfl: 3    divalproex sodium delayed-release (DEPAKOTE) 250 MG DR tablet, 1000  in AM, Disp: , Rfl:     FLUoxetine 20 MG tablet, Take 20 mg by mouth daily., Disp: , Rfl:     ibuprofen (ADVIL,MOTRIN) 600 MG tablet, Take 1 tablet (600 mg) by mouth every 6 hours as needed for moderate pain, Disp: 60 tablet, Rfl: 0    insulin pen needle (BD CANDI U/F) 32G X 4 MM miscellaneous, Use 1 pen needle daily with liraglutide., Disp: 120 each, Rfl: 4    levothyroxine (SYNTHROID/LEVOTHROID) 137 MCG tablet, Take 1 tablet (137 mcg) by mouth daily., Disp: 90 tablet, Rfl: 1    magnesium hydroxide (MILK OF MAGNESIA) 400 MG/5ML suspension, Take 30-60 mLs by mouth daily as needed for constipation or heartburn (If No Bowel Movement in 2 days.) Reported on 4/12/2017, Disp: 360  mL, Rfl: 1    OLANZapine (ZYPREXA) 5 MG tablet, Take 2.5 mg by mouth 3 times daily as needed. As needed, max of 15, Disp: 30 tablet, Rfl: 1    olopatadine (PATADAY) 0.2 % ophthalmic solution, 1 drop into both eyes daily as needed, Disp: 2.5 mL, Rfl: 11    order for DME, Equipment being ordered: CPAP AIRSENSE 10 11 CM H20 AIRFIT F20 MEDIUM SN# [id number]  DN# [id number], Disp: , Rfl:     PAIN & FEVER 325 MG tablet, TAKE 1-2 TABLETS (325-650MG) BY MOUTH EVERY 4 HOURS AS NEEDED *ORDER WHEN LOW*, Disp: 100 tablet, Rfl: 7    polyethylene glycol (MIRALAX) 17 GM/Dose powder, Take 17 g (1 Capful) by mouth daily as needed for constipation, Disp: 116 g, Rfl: 11    pseudoePHEDrine (SUDOGEST 12 HOUR) 120 MG 12 hr tablet, Take 1 tablet (120 mg) by mouth daily as needed for congestion, Disp: 12 tablet, Rfl: 11    risperiDONE (RISPERDAL) 1 MG tablet, Take 1 mg by mouth every morning. Twice daily AM and noon, Disp: 60 tablet, Rfl:     risperiDONE (RISPERDAL) 2 MG tablet, Take 2 mg by mouth daily at 8:00pm, Disp: , Rfl:     Semaglutide-Weight Management (WEGOVY) 1 MG/0.5ML pen, Inject 1 mg subcutaneously once a week., Disp: 2 mL, Rfl: 2    vitamin D3 25 mcg (1000 units) tablet, Take 1 tablet (25 mcg) by mouth daily, Disp: 100 tablet, Rfl: 3    neomycin-polymixin-dexAMETHasone (MAXITROL) 0.1 % ophthalmic suspension, 2 drops into the conjunctival sac of the affected eye(s) 4 times daily every 6 hours, during waking hours, for 7 days, Disp: 10 mL, Rfl: 0    polyethylene glycol-propylene glycol PF (SYSTANE ULTRA PF) 0.4-0.3 % SOLN opthalmic solution, Place 1-2 drops into both eyes. Up to 5 times daily as needed (Patient not taking: Reported on 10/31/2024), Disp: , Rfl:     sertraline (ZOLOFT) 100 MG tablet, Take 200 mg by mouth daily , Disp: , Rfl:   Immunization History   Administered Date(s) Administered    COVID-19 12+ (Pfizer) 10/26/2023    COVID-19 Monovalent 18+ (Moderna) 01/27/2021, 02/24/2021    COVID-19 Monovalent Booster 18+  (Moderna) 04/29/2022    HEPA 06/21/2006, 07/28/2011    HepB 06/21/2006, 07/28/2011    Influenza (IIV3) PF 11/24/2000, 11/23/2001, 12/08/2003, 01/01/2004, 11/14/2007, 01/19/2009, 10/26/2009, 11/19/2010, 11/12/2011, 10/30/2012    Influenza Vaccine >6 months,quad, PF 10/30/2013, 09/25/2014, 10/08/2015, 09/20/2016, 09/27/2017, 09/10/2018, 09/12/2019, 09/24/2020, 09/14/2021, 11/08/2022, 09/08/2023    Influenza, Split Virus, Trivalent, Pf (Fluzone\Fluarix) 09/10/2024    Mantoux Tuberculin Skin Test 04/09/1999, 04/13/2001, 02/19/2002, 02/13/2003, 02/10/2004, 04/06/2005, 06/21/2006, 06/11/2007, 06/17/2008, 06/10/2009, 07/21/2015    TD,PF 7+ (Tenivac) 06/21/2006, 07/15/2021    TDAP Vaccine (Adacel) 07/28/2011     Allergies   Allergen Reactions    Nkda [No Known Drug Allergy]     Phentermine Anxiety     agitation anxiety       OBJECTIVE:                                                                 /79 (BP Location: Left arm, Patient Position: Sitting, Cuff Size: Adult Regular)   Pulse 84   Temp 96.9  F (36.1  C) (Tympanic)   Wt 109.6 kg (241 lb 9.6 oz)   SpO2 96%   BMI 41.47 kg/m          Physical Exam  Vitals and nursing note reviewed.   Constitutional:       General: He is not in acute distress.     Appearance: He is not diaphoretic.   HENT:      Head: Normocephalic and atraumatic.      Right Ear: Tympanic membrane, ear canal and external ear normal.      Left Ear: Tympanic membrane, ear canal and external ear normal.      Nose: No mucosal edema or rhinorrhea.      Right Turbinates: Not enlarged, swollen or pale.      Left Turbinates: Not enlarged, swollen or pale.      Mouth/Throat:      Lips: Pink.      Mouth: Mucous membranes are moist.      Pharynx: Oropharynx is clear. No pharyngeal swelling, oropharyngeal exudate or posterior oropharyngeal erythema.   Eyes:      General:         Right eye: No discharge.         Left eye: No discharge.      Conjunctiva/sclera: Conjunctivae normal.   Pulmonary:       Effort: Pulmonary effort is normal. No respiratory distress.      Breath sounds: Normal breath sounds and air entry. No stridor, decreased air movement or transmitted upper airway sounds. No decreased breath sounds, wheezing, rhonchi or rales.   Neurological:      Mental Status: He is alert and oriented to person, place, and time.   Psychiatric:         Mood and Affect: Mood normal.         Behavior: Behavior normal.                   ASSESSMENT/PLAN:       Chronic rhinitis  Given the conflicting information, I recommend continuing with azelastine, 2 sprays in each nostril twice daily as needed.  I advised the staff to document each administration and monitor for symptom improvement.  I also requested that she bring a copy of these notes to the next appointment to better assess his symptom control.    Other conjunctivitis of both eyes  Patients with trisomy 21 have a higher risk of eye issues, including keratoconus, blepharitis, and dry eyes.  Given his previous negative lab results for aeroallergens, I recommend reducing olopatadine use and increasing Systane to enhance eye lubrication.  Depending on symptom control, closer monitoring by his ophthalmologist may be necessary.       Follow up in 2 months or sooner if needed.    Thank you for allowing us to participate in the care of this patient. Please feel free to contact us if there are any questions or concerns about the patient.    Disclaimer: This note consists of symbols derived from keyboarding, dictation and/or voice recognition software. As a result, there may be errors in the script that have gone undetected. Please consider this when interpreting information found in this chart.    40 minutes spent on the date of the encounter during chart review, history and exam, documentation, and further activities as noted above.     Hiro Morse MD, FAAAAI, FACAAI  Allergy, Asthma and Immunology     [facility] [place]  [facility]

## 2024-10-31 NOTE — PATIENT INSTRUCTIONS
Prescription Assistance  If you need assistance with your prescriptions (cost, coverage, etc) please contact: [place] [facility] [phone]           If labs have been ordered/completed, please allow 7-14 business days for final interpretation of results to be sent on My Chart, phone or mail. Some lab results can take up to 28 days for results.         Allergy Staff Appt Hours Shot Hours Locations    Physician      Hiro Morse MD         Support Staff      Samina Bueno MA     Tuesday:   [place] :  [place]: :         :  [place] 7-3     [place]        Tuesday: : : :10        Tuesday: :10        Thursday: 7[facility]3:10     [facility]       Tues & Wed: :10       Thurs: 12:10       Fri:             [facility]  [address] [place] [address]  Appt Line: [phone]        [facility]  [address] [place] [address]  Appt Line: [phone]     Pulmonary Function Scheduling:  [place]: [phone]  [place]: [phone]  [phone]

## 2024-10-31 NOTE — LETTER
10/31/2024      Humberto Estrella  [address]  [facility] [address]      Dear Colleague,    Thank you for referring your patient, Humberto Estrella, to the [facility]. Please see a copy of my visit note below.    SUBJECTIVE:                                                                   Humberto Estrella presents today to our Allergy Clinic at [facility] for a follow up visit. He is a 44 year old male with a history of chronic rhinitis.  The staff, Prabhu accompanies the patient and helps to provide history.  Obtaining history from Humberto is challenging.    Lifelong history of nasal congestion, rhinorrhea, postnasal drainage and itchy/watery eyes.  Perennial pattern, but can be asymptomatic for days or weeks at a time.  The patient was taking cetirizine daily and intranasal fluticasone as needed.  In August 2024, serum IgE for the regional aeroallergen panel was negative.    The patient frequently experiences episodes of pinkeye, even when others around him are unaffected. During these episodes, they tend to rely more on olopatadine and less on Systane eyedrops, attributing his symptoms to seasonal or environmental allergies. While olopatadine sometimes alleviates the symptoms, he occasionally continues to experience ocular discomfort.    There is some conflicting information regarding his chronic rhinitis symptoms. Humberto reports frequent runny nose and sneezing, stating that nasal sprays have not been effective. However, Prabhu disagrees, noting that they use azelastine between 0 and 5 times per week, with symptom improvement documented each time in his chart. They do not have copies of these notes with them.    Patient Active Problem List   Diagnosis     TRISOMY 21 (DOWN SYNDROME)     Hearing loss     MYOPIA     LATENT NYSTAGMUS     Headache     post traumatic stress disorder     CARDIOVASCULAR SCREENING; LDL GOAL LESS THAN 160     Cortical,  lamellar, or zonular cataract, nonsenile     Nonallergic rhinitis     Obesity due to excess calories, unspecified obesity severity     Subclinical hypothyroidism     VIDAL (obstructive sleep apnea)-AHI 26     Anxiety     Explosive personality disorder (H)     Prediabetes     Lives in group home     Suicide attempt (H)     Hypoalbuminemia     Morbid obesity (H)       Past Medical History:   Diagnosis Date     Coronary artery disease      Depressive disorder      Diagnostic skin and sensitization tests (aka ALLERGENS) 9/30/15 IgE tests all NEGATIVE for environmental allergens.      Nonallergic rhinitis     9/30/15 IgE tests all NEGATIVE for environmental allergens.      Thyroid disease       Problem (# of Occurrences) Relation (Name,Age of Onset)    Anxiety Disorder (1) Brother (Evans)    Substance Abuse (2) Brother (Evans), Father    Unknown/Adopted (1) Mother ([place])    Depression (1) Brother (Evans)    Other Cancer (1) Mother (leland)          Past Surgical History:   Procedure Laterality Date     PE TUBES      Left     PHACOEMULSIFICATION WITH STANDARD INTRAOCULAR LENS IMPLANT  10/3/2013    Procedure: PHACOEMULSIFICATION WITH STANDARD INTRAOCULAR LENS IMPLANT;  Left Kelman Phacoemulsification with Intraocular Lens Implant;  Surgeon: Luis Barajas MD;  Location: [facility] OR     PHACOEMULSIFICATION WITH STANDARD INTRAOCULAR LENS IMPLANT  5/1/2014    Procedure: PHACOEMULSIFICATION WITH STANDARD INTRAOCULAR LENS IMPLANT;  Surgeon: Luis Barajas MD;  Location: [facility] OR     Social History     Socioeconomic History     Marital status: Single     Spouse name: None     Number of children: 0     Years of education: 12     Highest education level: None   Occupational History     Occupation: Cleaning Services     Comment: [employer]     Employer: UNEMPLOYED     Employer: DISABLED   Tobacco Use     Smoking status: Former     Current packs/day: 0.00     Average packs/day: 1 pack/day for 1 year (1.0 ttl pk-yrs)      Types: Cigarettes     Start date: 1999     Quit date: 2000     Years since quittin.5     Passive exposure: Never     Smokeless tobacco: Former     Tobacco comments:     24 - states never smoked   Vaping Use     Vaping status: Never Used   Substance and Sexual Activity     Alcohol use: No     Alcohol/week: 0.0 standard drinks of alcohol     Drug use: No     Sexual activity: Never   Other Topics Concern     Parent/sibling w/ CABG, MI or angioplasty before 65F 55M? No   Social History Narrative    10/31/24        ENVIRONMENTAL HISTORY: [place] The home is heated with a forced air. They does have central air conditioning. The patient's bedroom is furnished with stuffed animals in bed and hard heather in bedroom.  Pets inside the house include 1 cat. There is no history of cockroach or mice infestation. There is/are 0 smokers in the house.  The house does have a damp basement.      Social Drivers of Health     Financial Resource Strain: Low Risk  (9/10/2024)    Financial Resource Strain      Within the past 12 months, have you or your family members you live with been unable to get utilities (heat, electricity) when it was really needed?: No   Food Insecurity: Low Risk  (9/10/2024)    Food Insecurity      Within the past 12 months, did you worry that your food would run out before you got money to buy more?: No      Within the past 12 months, did the food you bought just not last and you didn t have money to get more?: No   Transportation Needs: Low Risk  (9/10/2024)    Transportation Needs      Within the past 12 months, has lack of transportation kept you from medical appointments, getting your medicines, non-medical meetings or appointments, work, or from getting things that you need?: No   Physical Activity: Unknown (9/10/2024)    Exercise Vital Sign      Days of Exercise per Week: 1 day   Stress: No Stress Concern Present (9/10/2024)    [place]  [place] [employer] - Occupational Stress Questionnaire      Feeling of Stress : Only a little   Social Connections: Unknown (9/10/2024)    Social Connection and Isolation Panel [NHANES]      Frequency of Social Gatherings with Friends and Family: Once a week   Interpersonal Safety: Low Risk  (6/13/2024)    Interpersonal Safety      Do you feel physically and emotionally safe where you currently live?: Yes      Within the past 12 months, have you been hit, slapped, kicked or otherwise physically hurt by someone?: No      Within the past 12 months, have you been humiliated or emotionally abused in other ways by your partner or ex-partner?: No   Housing Stability: Low Risk  (9/10/2024)    Housing Stability      Do you have housing? : Yes      Are you worried about losing your housing?: No             Current Outpatient Medications:      alum & mag hydroxide-simethicone (MYLANTA/MAALOX) 200-200-20 MG/5ML SUSP suspension, Take 30 mLs by mouth every 4 hours as needed for indigestion (2 tsp for upset stomach), Disp: 1 Bottle, Rfl: 3     azelastine (ASTELIN) 0.1 % nasal spray, Spray 2 sprays into both nostrils 2 times daily as needed for rhinitis., Disp: 30 mL, Rfl: 3     calcium carbonate (TUMS) 500 MG chewable tablet, Take 1 tablet (500 mg) by mouth every 6 hours as needed for heartburn, Disp: 150 tablet, Rfl: 3     calcium polycarbophil (FIBERCON) 625 MG tablet, Take 2 tablets (1,250 mg) by mouth 2 times daily, Disp: 360 tablet, Rfl: 3     divalproex sodium delayed-release (DEPAKOTE) 250 MG DR tablet, 1000  in AM, Disp: , Rfl:      FLUoxetine 20 MG tablet, Take 20 mg by mouth daily., Disp: , Rfl:      ibuprofen (ADVIL,MOTRIN) 600 MG tablet, Take 1 tablet (600 mg) by mouth every 6 hours as needed for moderate pain, Disp: 60 tablet, Rfl: 0     insulin pen needle (BD CANDI U/F) 32G X 4 MM miscellaneous, Use 1 pen needle daily with liraglutide., Disp: 120 each, Rfl: 4     levothyroxine  (SYNTHROID/LEVOTHROID) 137 MCG tablet, Take 1 tablet (137 mcg) by mouth daily., Disp: 90 tablet, Rfl: 1     magnesium hydroxide (MILK OF MAGNESIA) 400 MG/5ML suspension, Take 30-60 mLs by mouth daily as needed for constipation or heartburn (If No Bowel Movement in 2 days.) Reported on 4/12/2017, Disp: 360 mL, Rfl: 1     OLANZapine (ZYPREXA) 5 MG tablet, Take 2.5 mg by mouth 3 times daily as needed. As needed, max of 15, Disp: 30 tablet, Rfl: 1     olopatadine (PATADAY) 0.2 % ophthalmic solution, 1 drop into both eyes daily as needed, Disp: 2.5 mL, Rfl: 11     order for DME, Equipment being ordered: CPAP AIRSENSE 10 11 CM H20 AIRFIT F20 MEDIUM # [id number]  DN# [id number], Disp: , Rfl:      PAIN & FEVER 325 MG tablet, TAKE 1-2 TABLETS (325-650MG) BY MOUTH EVERY 4 HOURS AS NEEDED *ORDER WHEN LOW*, Disp: 100 tablet, Rfl: 7     polyethylene glycol (MIRALAX) 17 GM/Dose powder, Take 17 g (1 Capful) by mouth daily as needed for constipation, Disp: 116 g, Rfl: 11     pseudoePHEDrine (SUDOGEST 12 HOUR) 120 MG 12 hr tablet, Take 1 tablet (120 mg) by mouth daily as needed for congestion, Disp: 12 tablet, Rfl: 11     risperiDONE (RISPERDAL) 1 MG tablet, Take 1 mg by mouth every morning. Twice daily AM and noon, Disp: 60 tablet, Rfl:      risperiDONE (RISPERDAL) 2 MG tablet, Take 2 mg by mouth daily at 8:00pm, Disp: , Rfl:      Semaglutide-Weight Management (WEGOVY) 1 MG/0.5ML pen, Inject 1 mg subcutaneously once a week., Disp: 2 mL, Rfl: 2     vitamin D3 25 mcg (1000 units) tablet, Take 1 tablet (25 mcg) by mouth daily, Disp: 100 tablet, Rfl: 3     neomycin-polymixin-dexAMETHasone (MAXITROL) 0.1 % ophthalmic suspension, 2 drops into the conjunctival sac of the affected eye(s) 4 times daily every 6 hours, during waking hours, for 7 days, Disp: 10 mL, Rfl: 0     polyethylene glycol-propylene glycol PF (SYSTANE ULTRA PF) 0.4-0.3 % SOLN opthalmic solution, Place 1-2 drops into both eyes. Up to 5 times daily as needed (Patient not  taking: Reported on 10/31/2024), Disp: , Rfl:      sertraline (ZOLOFT) 100 MG tablet, Take 200 mg by mouth daily , Disp: , Rfl:   Immunization History   Administered Date(s) Administered     COVID-19 12+ (Pfizer) 10/26/2023     COVID-19 Monovalent 18+ (Moderna) 01/27/2021, 02/24/2021     COVID-19 Monovalent Booster 18+ (Moderna) 04/29/2022     HEPA 06/21/2006, 07/28/2011     HepB 06/21/2006, 07/28/2011     Influenza (IIV3) PF 11/24/2000, 11/23/2001, 12/08/2003, 01/01/2004, 11/14/2007, 01/19/2009, 10/26/2009, 11/19/2010, 11/12/2011, 10/30/2012     Influenza Vaccine >6 months,quad, PF 10/30/2013, 09/25/2014, 10/08/2015, 09/20/2016, 09/27/2017, 09/10/2018, 09/12/2019, 09/24/2020, 09/14/2021, 11/08/2022, 09/08/2023     Influenza, Split Virus, Trivalent, Pf (Fluzone\Fluarix) 09/10/2024     Mantoux Tuberculin Skin Test 04/09/1999, 04/13/2001, 02/19/2002, 02/13/2003, 02/10/2004, 04/06/2005, 06/21/2006, 06/11/2007, 06/17/2008, 06/10/2009, 07/21/2015     TD,PF 7+ (Tenivac) 06/21/2006, 07/15/2021     TDAP Vaccine (Adacel) 07/28/2011     Allergies   Allergen Reactions     Nkda [No Known Drug Allergy]      Phentermine Anxiety     agitation anxiety       OBJECTIVE:                                                                 /79 (BP Location: Left arm, Patient Position: Sitting, Cuff Size: Adult Regular)   Pulse 84   Temp 96.9  F (36.1  C) (Tympanic)   Wt 109.6 kg (241 lb 9.6 oz)   SpO2 96%   BMI 41.47 kg/m          Physical Exam  Vitals and nursing note reviewed.   Constitutional:       General: He is not in acute distress.     Appearance: He is not diaphoretic.   HENT:      Head: Normocephalic and atraumatic.      Right Ear: Tympanic membrane, ear canal and external ear normal.      Left Ear: Tympanic membrane, ear canal and external ear normal.      Nose: No mucosal edema or rhinorrhea.      Right Turbinates: Not enlarged, swollen or pale.      Left Turbinates: Not enlarged, swollen or pale.      Mouth/Throat:       Lips: Pink.      Mouth: Mucous membranes are moist.      Pharynx: Oropharynx is clear. No pharyngeal swelling, oropharyngeal exudate or posterior oropharyngeal erythema.   Eyes:      General:         Right eye: No discharge.         Left eye: No discharge.      Conjunctiva/sclera: Conjunctivae normal.   Pulmonary:      Effort: Pulmonary effort is normal. No respiratory distress.      Breath sounds: Normal breath sounds and air entry. No stridor, decreased air movement or transmitted upper airway sounds. No decreased breath sounds, wheezing, rhonchi or rales.   Neurological:      Mental Status: He is alert and oriented to person, place, and time.   Psychiatric:         Mood and Affect: Mood normal.         Behavior: Behavior normal.                   ASSESSMENT/PLAN:       Chronic rhinitis  Given the conflicting information, I recommend continuing with azelastine, 2 sprays in each nostril twice daily as needed.  I advised the staff to document each administration and monitor for symptom improvement.  I also requested that she bring a copy of these notes to the next appointment to better assess his symptom control.    Other conjunctivitis of both eyes  Patients with trisomy 21 have a higher risk of eye issues, including keratoconus, blepharitis, and dry eyes.  Given his previous negative lab results for aeroallergens, I recommend reducing olopatadine use and increasing Systane to enhance eye lubrication.  Depending on symptom control, closer monitoring by his ophthalmologist may be necessary.       Follow up in 2 months or sooner if needed.    Thank you for allowing us to participate in the care of this patient. Please feel free to contact us if there are any questions or concerns about the patient.    Disclaimer: This note consists of symbols derived from keyboarding, dictation and/or voice recognition software. As a result, there may be errors in the script that have gone undetected. Please consider this when  interpreting information found in this chart.    40 minutes spent on the date of the encounter during chart review, history and exam, documentation, and further activities as noted above.     Hrio Morse MD, FAAELEONORA, MARLEYI  Allergy, Asthma and Immunology     [facility]        Again, thank you for allowing me to participate in the care of your patient.        Sincerely,        Hiro Morse MD

## 2024-11-04 ENCOUNTER — TELEPHONE (OUTPATIENT)
Dept: PEDIATRICS | Facility: CLINIC | Age: 44
End: 2024-11-04
Payer: MEDICARE

## 2024-11-04 NOTE — TELEPHONE ENCOUNTER
General Call    Contacts       Contact Date/Time Type Contact Phone/Fax    11/04/2024 11:32 AM CST Phone (Incoming) [facility] [phone] (H)          Reason for Call:  wegovy increase    What are your questions or concerns:  pt has not been losing any weight and they would like to know if it can be increased    [phone]

## 2024-11-19 ENCOUNTER — LAB (OUTPATIENT)
Dept: LAB | Facility: CLINIC | Age: 44
End: 2024-11-19
Payer: MEDICARE

## 2024-11-19 DIAGNOSIS — E03.9 ACQUIRED HYPOTHYROIDISM: ICD-10-CM

## 2024-11-19 LAB
T4 FREE SERPL-MCNC: 1.71 NG/DL (ref 0.9–1.7)
TSH SERPL DL<=0.005 MIU/L-ACNC: 5.73 UIU/ML (ref 0.3–4.2)

## 2024-11-19 PROCEDURE — 84439 ASSAY OF FREE THYROXINE: CPT

## 2024-11-19 PROCEDURE — 84443 ASSAY THYROID STIM HORMONE: CPT

## 2024-11-19 PROCEDURE — 36415 COLL VENOUS BLD VENIPUNCTURE: CPT

## 2024-11-20 DIAGNOSIS — E03.8 SUBCLINICAL HYPOTHYROIDISM: Primary | ICD-10-CM

## 2024-11-20 DIAGNOSIS — E03.9 ACQUIRED HYPOTHYROIDISM: ICD-10-CM

## 2024-11-25 ENCOUNTER — HOSPITAL ENCOUNTER (EMERGENCY)
Facility: CLINIC | Age: 44
Discharge: HOME | End: 2024-11-25
Attending: NURSE PRACTITIONER | Admitting: NURSE PRACTITIONER
Payer: MEDICARE

## 2024-11-25 VITALS
HEART RATE: 70 BPM | DIASTOLIC BLOOD PRESSURE: 57 MMHG | TEMPERATURE: 97.3 F | OXYGEN SATURATION: 95 % | RESPIRATION RATE: 16 BRPM | SYSTOLIC BLOOD PRESSURE: 92 MMHG

## 2024-11-25 DIAGNOSIS — H01.00B BLEPHARITIS OF UPPER AND LOWER EYELIDS OF BOTH EYES, UNSPECIFIED TYPE: ICD-10-CM

## 2024-11-25 DIAGNOSIS — H10.9 CONJUNCTIVITIS OF BOTH EYES, UNSPECIFIED CONJUNCTIVITIS TYPE: ICD-10-CM

## 2024-11-25 DIAGNOSIS — H01.00A BLEPHARITIS OF UPPER AND LOWER EYELIDS OF BOTH EYES, UNSPECIFIED TYPE: ICD-10-CM

## 2024-11-25 PROCEDURE — 99213 OFFICE O/P EST LOW 20 MIN: CPT | Performed by: NURSE PRACTITIONER

## 2024-11-25 PROCEDURE — G0463 HOSPITAL OUTPT CLINIC VISIT: HCPCS | Performed by: NURSE PRACTITIONER

## 2024-11-25 RX ORDER — ERYTHROMYCIN 5 MG/G
0.5 OINTMENT OPHTHALMIC 3 TIMES DAILY
Qty: 1 G | Refills: 0 | Status: SHIPPED | OUTPATIENT
Start: 2024-11-25 | End: 2024-12-02

## 2024-11-25 ASSESSMENT — COLUMBIA-SUICIDE SEVERITY RATING SCALE - C-SSRS
1. IN THE PAST MONTH, HAVE YOU WISHED YOU WERE DEAD OR WISHED YOU COULD GO TO SLEEP AND NOT WAKE UP?: NO
2. HAVE YOU ACTUALLY HAD ANY THOUGHTS OF KILLING YOURSELF IN THE PAST MONTH?: NO
6. HAVE YOU EVER DONE ANYTHING, STARTED TO DO ANYTHING, OR PREPARED TO DO ANYTHING TO END YOUR LIFE?: NO

## 2024-11-25 ASSESSMENT — ACTIVITIES OF DAILY LIVING (ADL): ADLS_ACUITY_SCORE: 46

## 2024-11-25 NOTE — ED PROVIDER NOTES
Chief Complaint:   No chief complaint on file.        HPI:   Humberto Estrella is a 44 year old male who presents to the UC/ED with a 2 day history of eye itching and drainage from both eyes.   He denies copious nasal discharge, cough, diarrhea, ear pressure, fever, nasal congestion, nausea, and vomiting.  He has been exposed to ill contacts. Predisposing factors include None.  Denies any vision alterations or acuity changes.      Meds:   Current Outpatient Medications   Medication Sig Dispense Refill    erythromycin (ROMYCIN) 5 MG/GM ophthalmic ointment Place 0.5 inches into both eyes 3 times daily for 7 days. 1 g 0    alum & mag hydroxide-simethicone (MYLANTA/MAALOX) 200-200-20 MG/5ML SUSP suspension Take 30 mLs by mouth every 4 hours as needed for indigestion (2 tsp for upset stomach) 1 Bottle 3    azelastine (ASTELIN) 0.1 % nasal spray Spray 2 sprays into both nostrils 2 times daily as needed for rhinitis. 30 mL 3    calcium carbonate (TUMS) 500 MG chewable tablet Take 1 tablet (500 mg) by mouth every 6 hours as needed for heartburn 150 tablet 3    calcium polycarbophil (FIBERCON) 625 MG tablet Take 2 tablets (1,250 mg) by mouth 2 times daily 360 tablet 3    divalproex sodium delayed-release (DEPAKOTE) 250 MG DR tablet 1000  in AM      FLUoxetine 20 MG tablet Take 20 mg by mouth daily.      ibuprofen (ADVIL,MOTRIN) 600 MG tablet Take 1 tablet (600 mg) by mouth every 6 hours as needed for moderate pain 60 tablet 0    insulin pen needle (BD CANDI U/F) 32G X 4 MM miscellaneous Use 1 pen needle daily with liraglutide. 120 each 4    levothyroxine (SYNTHROID/LEVOTHROID) 137 MCG tablet Take 1 tablet (137 mcg) by mouth daily. 90 tablet 1    magnesium hydroxide (MILK OF MAGNESIA) 400 MG/5ML suspension Take 30-60 mLs by mouth daily as needed for constipation or heartburn (If No Bowel Movement in 2 days.) Reported on 4/12/2017 360 mL 1    neomycin-polymixin-dexAMETHasone (MAXITROL) 0.1 % ophthalmic suspension 2 drops  into the conjunctival sac of the affected eye(s) 4 times daily every 6 hours, during waking hours, for 7 days 10 mL 0    OLANZapine (ZYPREXA) 5 MG tablet Take 2.5 mg by mouth 3 times daily as needed. As needed, max of 15 30 tablet 1    olopatadine (PATADAY) 0.2 % ophthalmic solution 1 drop into both eyes daily as needed 2.5 mL 11    order for DME Equipment being ordered: CPAP  AIRSENSE 10  11 CM H20  AIRFIT F20 MEDIUM  SN# [id number]  DN# [id number]      PAIN & FEVER 325 MG tablet TAKE 1-2 TABLETS (325-650MG) BY MOUTH EVERY 4 HOURS AS NEEDED *ORDER WHEN LOW* 100 tablet 7    polyethylene glycol (MIRALAX) 17 GM/Dose powder Take 17 g (1 Capful) by mouth daily as needed for constipation 116 g 11    polyethylene glycol-propylene glycol PF (SYSTANE ULTRA PF) 0.4-0.3 % SOLN opthalmic solution Place 1-2 drops into both eyes. Up to 5 times daily as needed (Patient not taking: Reported on 10/31/2024)      pseudoePHEDrine (SUDOGEST 12 HOUR) 120 MG 12 hr tablet Take 1 tablet (120 mg) by mouth daily as needed for congestion 12 tablet 11    risperiDONE (RISPERDAL) 1 MG tablet Take 1 mg by mouth every morning. Twice daily AM and noon 60 tablet     risperiDONE (RISPERDAL) 2 MG tablet Take 2 mg by mouth daily at 8:00pm      Semaglutide-Weight Management (WEGOVY) 1 MG/0.5ML pen Inject 1 mg subcutaneously once a week. 2 mL 2    sertraline (ZOLOFT) 100 MG tablet Take 200 mg by mouth daily       vitamin D3 25 mcg (1000 units) tablet Take 1 tablet (25 mcg) by mouth daily 100 tablet 3       Allergies:   Allergies   Allergen Reactions    Nkda [No Known Drug Allergy]     Phentermine Anxiety     agitation anxiety         Medications updated and reviewed.  Past, family and surgical history is updated and reviewed in the record.     Review of Systems:  General: see HPI  HEENT: see HPI  Respiratory: see HPI    Physical Exam:   BP 92/57   Pulse 70   Temp 97.3  F (36.3  C) (Tympanic)   Resp 16   SpO2 95%      General: No acute distress on  arrival  Head: normocephalic, non-traumatic.  Eyes: Non-reddened conjunctiva, no icterus, noninjected, normal pupillary response to light accommodation bilaterally.  Ears: Left ear: TM intact, middle ear is non-erythemic, no purulence, canal is non-erythemic, patent. Right ear: TM intact, middle ear non-erythemic without purulence, canal non-reddened and patent.  Nose: Non-erythemic, no purulence present no edema, patent nostrils.  Throat: Non-erythemic, midline uvula non enlarged tonsils or exudates present.  No cervical adenopathy present..  CV: Regular rate and rhythm, no cyanosis.  Respiratory: Nonlabored, CTA bilateral throughout.   Abdomen: NT, ND, normal bowel sounds present  Skin: No rashes, lesions, normal color.  Neuro: Normal, active, age-appropriate.  Normal response to verbal stimuli.    Disclaimer: This note consists of symbols derived from keyboarding, dictation, and/or voice recognition software. As a result, there may be errors in the script that have gone undetected.  Please consider this when interpreting information found in the chart.        Medical Decision Making:  Eye irritation, drainage bilateral with Normal vitals.      No results found for this or any previous visit (from the past 48 hours).    Assessment:  Acute bilateral conjunctivitis, bilateral blepharitis    Plan:   Ordered erythromycin ointment 3 times daily for 7 days, if symptoms are not improving this may also be a viral source of conjunctivitis.    Reassurance given regarding diagnosis and recommendations.  Discussed home treatment with erythromycin ointment prescription .  Recommend follow up in primary care as needed, or sooner if symptoms persist. Return to the ED with fever, trouble swallowing or breathing, or any other concerns.     Patient verbalized agreement with and understanding of the rational for the diagnosis and treatment plan.  All questions were answered to best of my ability and the patient's satisfaction. The  patient was advised to contact or return to their primary clinic or UC/ED with any questions that may arise after this visit.      Condition on disposition: Stable    Disclaimer: This note consists of symbols derived from keyboarding, dictation, and/or voice recognition software. As a result, there may be errors in the script that have gone undetected.  Please consider this when interpreting information found in the chart.       Nataly Munguia, GM CNP  11/25/24 [id number]

## 2024-11-25 NOTE — DISCHARGE INSTRUCTIONS
I have ordered the erythromycin ointment to both eyes as both lids are swollen on top and bottom this is 3 times daily for 7 days recommend follow-up in his primary clinic if his symptoms are not resolving.  Symptoms are not resolving this may be a viral source of conjunctivitis.

## 2024-12-02 ENCOUNTER — IMMUNIZATION (OUTPATIENT)
Dept: FAMILY MEDICINE | Facility: CLINIC | Age: 44
End: 2024-12-02
Payer: MEDICARE

## 2024-12-02 DIAGNOSIS — E66.09 OBESITY DUE TO EXCESS CALORIES, UNSPECIFIED OBESITY SEVERITY: ICD-10-CM

## 2024-12-02 PROCEDURE — 91320 SARSCV2 VAC 30MCG TRS-SUC IM: CPT

## 2024-12-02 PROCEDURE — 90480 ADMN SARSCOV2 VAC 1/ONLY CMP: CPT

## 2024-12-02 NOTE — TELEPHONE ENCOUNTER
This med is managed by the medical weight loss clinic.  Refill requests should be directed to that provider

## 2024-12-05 RX ORDER — SEMAGLUTIDE 1 MG/.5ML
INJECTION, SOLUTION SUBCUTANEOUS
Qty: 2 ML | Refills: 11 | Status: SHIPPED | OUTPATIENT
Start: 2024-12-05

## 2024-12-11 ENCOUNTER — OFFICE VISIT (OUTPATIENT)
Dept: VASCULAR SURGERY | Facility: CLINIC | Age: 44
End: 2024-12-11
Attending: PODIATRIST
Payer: MEDICARE

## 2024-12-11 VITALS — OXYGEN SATURATION: 96 % | HEART RATE: 66 BPM | DIASTOLIC BLOOD PRESSURE: 89 MMHG | SYSTOLIC BLOOD PRESSURE: 139 MMHG

## 2024-12-11 DIAGNOSIS — R60.0 LOWER EXTREMITY EDEMA: ICD-10-CM

## 2024-12-11 DIAGNOSIS — R21 RASH AND NONSPECIFIC SKIN ERUPTION: ICD-10-CM

## 2024-12-11 DIAGNOSIS — I87.8 VENOUS STASIS OF BOTH LOWER EXTREMITIES: Primary | ICD-10-CM

## 2024-12-11 DIAGNOSIS — R60.9 EDEMA, UNSPECIFIED: ICD-10-CM

## 2024-12-11 PROCEDURE — G0463 HOSPITAL OUTPT CLINIC VISIT: HCPCS | Performed by: HOSPITALIST

## 2024-12-11 PROCEDURE — 99203 OFFICE O/P NEW LOW 30 MIN: CPT | Performed by: HOSPITALIST

## 2024-12-11 ASSESSMENT — PAIN SCALES - GENERAL: PAINLEVEL_OUTOF10: MILD PAIN (2)

## 2024-12-11 NOTE — PATIENT INSTRUCTIONS
Shahbaz Paul,    Thank you for entrusting your care with us today. After your visit today with MD Mahesh Palafox this is the plan that was discussed at your appointment.    Wear graduated compression stockings 20-30mmHg daily.  See below for more information on use.     See dermatology to discuss color changes in your legs. If you do not hear from them in the next couple of business days, please call them to schedule.     Follow up as scheduled with Dr. Palafox and an ultrasound of your legs prior.     I am including additional information on these things and our contact information if you have any questions or concerns.   Please do not hesitate to reach out to us if you felt we did not answer your questions or you are unsure of the treatment plan after your visit today. Our number is [phone].Thank you for trusting us with your care.         Again thank you for your time.     Wear your compression stockings every day; put them on first thing in the morning and remove at bedtime    Replace your compression stockings every 6 months; these garments will lose their elasticity and become ineffective    Hang your stockings, do not put them in the dryer.  This will help prolong the life of your compressions stockings.     Elevate your legs periodically throughout the day, 30-60 minutes 1-3 times per day    Do calf pumping exercises periodically throughout the day.

## 2024-12-11 NOTE — PROGRESS NOTES
Problem: At Risk for Falls  Goal: # Patient does not fall  Outcome: Outcome Met, Continue evaluating goal progress toward completion     Problem: At Risk for Injury Due to Fall  Goal: # Patient does not fall  Outcome: Outcome Met, Continue evaluating goal progress toward completion     Problem: Diabetes  Goal: Verbalizes/demonstrates understanding of Diabetes  Description: Document on Patient Education Activity  Outcome: Outcome Met, Continue evaluating goal progress toward completion      [facility]        Patient is here for a consult to discuss bilateral lower extremity swelling/discoloration/pain/heaviness. Pt has not worn compression before.  Denies history of wounds on LE.    Pt is currently taking no meds that would impact our treatment plan.    /89   Pulse 66   SpO2 96%     The provider has been notified that the patient has what to do for pain.     Questions patient would like addressed today are: N/A.    Refills are needed: No    Has homecare services and agency name:  No

## 2024-12-24 ENCOUNTER — MYC MEDICAL ADVICE (OUTPATIENT)
Dept: FAMILY MEDICINE | Facility: CLINIC | Age: 44
End: 2024-12-24
Payer: MEDICARE

## 2024-12-24 DIAGNOSIS — H91.90 HEARING LOSS, UNSPECIFIED HEARING LOSS TYPE, UNSPECIFIED LATERALITY: Primary | ICD-10-CM

## 2024-12-24 NOTE — PROGRESS NOTES
VASCULAR MEDICINE CONSULT NOTE          LOCATION:  [facility]       Date of Service: 12/11/2024      Primary Care Provider: Carla Brown  Referring provider;  Artemio Snyder      Reason for the visit/chief complaint:   LE swelling skin changes on bilateral lower extremities      HPI:  Humberto Estrella is a pleasant 44 year old male who presents to our Vascular Medicine clinic for the above mentioned reason.  He is accompanied by his mother.    Humberto has past medical history significant for Down syndrome, VIDAL depression and obesity BMI 39.4.    History was patient and his mom.  Humberto is not a very good historian.  It does appear that he has been noticing lower extremity swelling as well as discoloration affecting both lower extremities but he is unsure about time of onset.  Believes that the discoloration started around the ankle and has been moving up to his mid leg. There is no pain associated with it.  Sometimes itchy. Denies any skin wounds.  There might be some heaviness in his legs but he was unsure.  He is mostly sedentary with no exercising and mostly spending the majority of his day on a recliner.    Denies previous history of DVT, varicose veins or superficial vein thrombosis to both lower extremities.  Denies complicated pelvic surgeries, radiation or lymph node removal.  Family history is unknown.  No current smoking.      REVIEW OF SYSTEMS:    A 12 point ROS was reviewed and is negative except what is mentioned in HPI.       Past medical history, surgical history, medications, family history, social history and allergies were reviewed. Pertinent points mentioned under HPI.        OBJECTIVE:    Vital signs:  /89   Pulse 66   SpO2 96%   Wt Readings from Last 1 Encounters:   12/13/24 237 lb (107.5 kg)     There is no height or weight on file to calculate BMI.    Physical exam:  General appearance: Pleasant male in no apparent distress.    HEENT: NC/AT.     Neck: Carotids +2/2 bilaterally without bruits.  No jugular venous distension.   Heart: RRR. Normal S1, S2.   Chest: Clear to auscultation bilaterally.   Extremities and skin: Bilateral lower extremities with mild pedal edema.  Negative Stemmer sign bilaterally.  There is violaceous brownish skin rash on bilateral ankle and lower legs that appears reticular.  Nonblanching.  No wounds.  No varicose veins.  Palpable DP and PT 2/2.  Neurological: Alert, awake and oriented           DIAGNOSTIC STUDIES:   Labs and diagnostics reviewed including outside records. Pertinent points are mentioned under HPI and assessment and plan sections.        ASSESSMENT AND PLAN:    Lower extremity skin rash  Lower extremity swelling with likely chronic venous insufficiency and/or acquired lymphedema  Trisomy 21/Down syndrome  Obesity class II BMI 39.4  Sedentary lifestyle    Humberto likely has venous stasis /chronic venous insufficiency and/or early stage lymphedema contributing to his lower extremity swelling and symptoms.  Unfortunately, he is a poor historian and we were unable to get some of his symptoms or the timeline of his skin rash.  With regards to the skin rash, although could be stasis dermatitis, it has somewhat reticular in appearance which is not very typical.  I suggested he gets evaluated by dermatology and we will make referral.    We went through the fact that people with trisomy 21, are at higher risk of venous insufficiency and lymphedema due to   multiple reasons including generalized connective tissue laxity leading to weakened vein walls and valve dysfunction as well as impairing lymphatic function.  The hypotonia associated with the disease could also contribute to weaker calf muscle.  Additionally, his obesity and sedentary lifestyle are both risk factors for venous insufficiency and lymphedema.    We discussed the importance of conservative management including compression stocking use, leg elevation, calf  muscle pump exercise and weight loss.      Recommendations:  Start wearing knee-high 20-30 mmHg graduated compression stockings.  Education given and prescription given.  Recommend dermatology evaluation for his lower extremity skin rash with some atypical features for venous stasis dermatitis. Referral made.  Follow-up with us in couple months with venous insufficiency ultrasound prior.  Encouraged leg elevation, increasing activity, exercising and weight loss.      It was a pleasure meeting Humberto and his mother in our clinic today.      Mahesh Palafox MD, [facility]  Vascular Medicine  December 11, 2024

## 2024-12-26 NOTE — TELEPHONE ENCOUNTER
Routing to PCP for consideration  See Nliesh  Last visit was 9/10/24 for wellness check.  Hx of hearing loss  Referral pended for consideration.    Naz DODD RN  [facility]

## 2024-12-31 ENCOUNTER — TELEPHONE (OUTPATIENT)
Dept: ENDOCRINOLOGY | Facility: CLINIC | Age: 44
End: 2024-12-31
Payer: MEDICARE

## 2024-12-31 NOTE — TELEPHONE ENCOUNTER
Detail Level: Detailed PA RENEWAL Initiation    Medication: WEGOVY 1.7 MG/0.75ML SC SOAJ  Insurance Company: [employer] - Phone [phone] Fax [phone]  Pharmacy Filling the Rx:    Filling Pharmacy Phone:    Filling Pharmacy Fax:    Start Date: 12/31/2024    [id number]       Render Post-Care Instructions In Note?: no Consent was obtained and risks were reviewed including but not limited to scarring, infection, bleeding, scabbing, incomplete removal, and allergy to anesthesia. Prep Text (Optional): Prior to removal the treatment areas were prepped in the usual fashion. Extraction Method: 11 blade and comedo extractor Post-Care Instructions: I reviewed with the patient in detail post-care instructions. Patient is to keep the treatment areas dry overnight, and then apply bacitracin twice daily until healed. Patient may apply hydrogen peroxide soaks to remove any crusting. Acne Type: Comedonal Lesions

## 2025-01-02 ENCOUNTER — VIRTUAL VISIT (OUTPATIENT)
Dept: ENDOCRINOLOGY | Facility: CLINIC | Age: 45
End: 2025-01-02
Payer: MEDICARE

## 2025-01-02 VITALS — HEIGHT: 64 IN | WEIGHT: 234 LBS | BODY MASS INDEX: 39.95 KG/M2

## 2025-01-02 DIAGNOSIS — Z91.89 CARDIOVASCULAR EVENT RISK: Primary | ICD-10-CM

## 2025-01-02 DIAGNOSIS — E66.09 OBESITY DUE TO EXCESS CALORIES, UNSPECIFIED OBESITY SEVERITY: Chronic | ICD-10-CM

## 2025-01-02 DIAGNOSIS — I87.8 VENOUS STASIS: ICD-10-CM

## 2025-01-02 ASSESSMENT — PAIN SCALES - GENERAL: PAINLEVEL_OUTOF10: NO PAIN (0)

## 2025-01-02 NOTE — PROGRESS NOTES
Return Medical Weight Management Note     Humberto Estrella  MRN:  [id number]  :  1980  BROCK:  2025    Dear Carla Brown DO,    I had the pleasure of seeing your patient Humberto Estrella. He is a 44 year old male who I am continuing to see for treatment of obesity related to:        2019     3:03 PM   --   I have the following health issues associated with obesity Pre-Diabetes    Sleep Apnea    GERD (Reflux)   I have the following symptoms associated with obesity Knee Pain    GERD (Reflux)     CURRENT WEIGHT:   234 lbs 0 oz  Wt Readings from Last 4 Encounters:   25 106.1 kg (234 lb)   24 107.5 kg (237 lb)   10/31/24 109.6 kg (241 lb 9.6 oz)   10/24/24 104.3 kg (230 lb)       MEDICATIONS:   Current Outpatient Medications   Medication Sig Dispense Refill    alum & mag hydroxide-simethicone (MYLANTA/MAALOX) 200-200-20 MG/5ML SUSP suspension Take 30 mLs by mouth every 4 hours as needed for indigestion (2 tsp for upset stomach) (Patient not taking: Reported on 2024) 1 Bottle 3    azelastine (ASTELIN) 0.1 % nasal spray Spray 2 sprays into both nostrils 2 times daily as needed for rhinitis. 30 mL 3    calcium carbonate (TUMS) 500 MG chewable tablet Take 1 tablet (500 mg) by mouth every 6 hours as needed for heartburn 150 tablet 3    calcium polycarbophil (FIBERCON) 625 MG tablet Take 2 tablets (1,250 mg) by mouth 2 times daily 360 tablet 3    divalproex sodium delayed-release (DEPAKOTE) 250 MG DR tablet Take by mouth 2 times daily. 4 capsules po BID      Eyelid Cleansers (OCUSOFT LID SCRUB EX) Externally apply topically daily. Daily (in the morning) both eyes      FLUoxetine 20 MG tablet Take 20 mg by mouth daily.      ibuprofen (ADVIL,MOTRIN) 600 MG tablet Take 1 tablet (600 mg) by mouth every 6 hours as needed for moderate pain 60 tablet 0    levothyroxine (SYNTHROID/LEVOTHROID) 137 MCG tablet Take 1 tablet (137 mcg) by mouth daily. 90 tablet 1    magnesium hydroxide (MILK OF  MAGNESIA) 400 MG/5ML suspension Take 30-60 mLs by mouth daily as needed for constipation or heartburn (If No Bowel Movement in 2 days.) Reported on 4/12/2017 360 mL 1    OLANZapine (ZYPREXA) 5 MG tablet Take 2.5 mg by mouth 3 times daily as needed. As needed, max of 15 30 tablet 1    olopatadine (PATADAY) 0.2 % ophthalmic solution 1 drop into both eyes daily as needed 2.5 mL 11    order for DME Equipment being ordered: CPAP  AIRSENSE 10  11 CM H20  AIRFIT F20 MEDIUM  SN# [id number]  DN# [id number]      PAIN & FEVER 325 MG tablet TAKE 1-2 TABLETS (325-650MG) BY MOUTH EVERY 4 HOURS AS NEEDED *ORDER WHEN LOW* 100 tablet 7    polyethylene glycol (MIRALAX) 17 GM/Dose powder Take 17 g (1 Capful) by mouth daily as needed for constipation 116 g 11    polyethylene glycol-propylene glycol PF (SYSTANE ULTRA PF) 0.4-0.3 % SOLN opthalmic solution Place 1-2 drops into both eyes. Up to 5 times daily as needed      pseudoePHEDrine (SUDOGEST 12 HOUR) 120 MG 12 hr tablet Take 1 tablet (120 mg) by mouth daily as needed for congestion 12 tablet 11    risperiDONE (RISPERDAL) 1 MG tablet Take 1 mg by mouth every morning. Twice daily AM and noon 60 tablet     risperiDONE (RISPERDAL) 2 MG tablet Take 2 mg by mouth daily at 8:00pm      Semaglutide-Weight Management (WEGOVY) 1.7 MG/0.75ML pen Inject 1.7 mg subcutaneously once a week. 3 mL 5    sertraline (ZOLOFT) 100 MG tablet Take 200 mg by mouth daily  (Patient not taking: Reported on 12/23/2024)      triamcinolone (KENALOG) 0.1 % external cream Apply topically as needed for irritation. Apply to affected areas of skin TID      vitamin D3 25 mcg (1000 units) tablet Take 1 tablet (25 mcg) by mouth daily 100 tablet 3           1/2/2025     2:54 PM   Weight Loss Medication History Reviewed With Patient   Which weight loss medications are you currently taking on a regular basis? Wegovy   Are you having any side effects from the weight loss medication that we have prescribed you? Yes   If you are  "having side effects please describe: fatigue and Deariah     PHYSICAL EXAM:  Objective    Ht 1.626 m (5' 4\")   Wt 106.1 kg (234 lb)   BMI 40.17 kg/m      GENERAL: alert and no distress  EYES: Eyes grossly normal to inspection.  No discharge or erythema, or obvious scleral/conjunctival abnormalities.  RESP: No audible wheeze, cough, or visible cyanosis.    SKIN: Visible skin clear. No significant rash, abnormal pigmentation or lesions.  NEURO: Cranial nerves grossly intact.  Mentation and speech appropriate for age.  PSYCH: Appropriate affect, tone, and pace of words    ASSESSMENT/PLAN:    It will be important for Humberto to continue the wegovy 1.7mg and for him to see our dietician for help with his venous stasis.     Patient Instructions   Seeing the dietician is the priority over seeing me or the health . You can see them about once per month for the next few months.     Switch your reward meals to be smoothies and not actual meals, this will help avoid extra calories and high-fat foods.     For the diarrhea - I think that avoiding the high-fat restaurant meals will help to decrease the number of times you have diarrhea. You can try taking more or less of the fiber to see if it helps.    If the wegovy is contributing to the diarrhea, it should get less now that you have been on the 1.7mg dose for 3 weeks.     Continue the wegovy 1.7mg dose. Keep adding in steps when you can.     Fax or email back the [facility] form, AVS        Sincerely,    Wanda Diaz MD      No LOS data to display   Time spent by me today doing chart review, history and exam, documentation and further activities per the note      Virtual Visit Details    Type of service:  Video Visit   Video Start Time: 3:10 PM  Video End Time: 3:32    Originating Location (pt. Location): Home    Distant Location (provider location):  On-site  Platform used for Video Visit: Juan Jose"

## 2025-01-02 NOTE — NURSING NOTE
Is the patient currently in the state [facility]? YES    Visit mode:VIDEO    If the visit is dropped, the patient can be reconnected by: VIDEO VISIT: Send to e-mail at: [email or web address].    Will anyone else be joining the visit? NO  (If patient encounters technical issues they should call [phone]    How would you like to obtain your AVS? MyChart    Are changes needed to the allergy or medication list? No    Are refills needed on medications prescribed by this physician? NO    Reason for visit: TERRY DONOHUE

## 2025-01-02 NOTE — PATIENT INSTRUCTIONS
Seeing the dietician is the priority over seeing me or the health . You can see them about once per month for the next few months.     Switch your reward meals to be smoothies and not actual meals, this will help avoid extra calories and high-fat foods.     For the diarrhea - I think that avoiding the high-fat restaurant meals will help to decrease the number of times you have diarrhea. You can try taking more or less of the fiber to see if it helps.    If the wegovy is contributing to the diarrhea, it should get less now that you have been on the 1.7mg dose for 3 weeks.     Continue the wegovy 1.7mg dose. Keep adding in steps when you can.     Fax or email back the [facility] healthcare form, AVS

## 2025-01-02 NOTE — TELEPHONE ENCOUNTER
PRIOR AUTHORIZATION RENEWAL DENIED    Medication: WEGOVY 1.7 MG/0.75ML SC SOAJ  Insurance Company: [employer] - Phone [phone] Fax [phone]  Denial Date: 12/31/2024  Denial Reason(s):   Appeal Information:   Patient Notified:

## 2025-01-02 NOTE — TELEPHONE ENCOUNTER
Patient does [place] typically will ask for a rejection from primary before submitting [place] to [place]. But after initiating [place] to [place] MED[place] they won't let me initiate. I will wait until pharmacy requests for a PA.    Thank You!    Clary Guardado [facility] Pharmacy Liaison  [facility]  [email or web address]  Phone: [phone]  Fax: [phone]    PA Initiation for [place] MEDICA[place]:

## 2025-01-02 NOTE — LETTER
2025       RE: Humberto Estrella  [address]  [place] [address]     Dear Colleague,    Thank you for referring your patient, Humberto Estrella, to the [facility] WE[facility] MANAGEMENT [facility] [place] at [facility]. Please see a copy of my visit note below.      Return Medical Weight Management Note     Humberto Estrella  MRN:  [id number]  :  1980  BROCK:  2025    Dear Carla Brown DO,    I had the pleasure of seeing your patient Humberto Estrella. He is a 44 year old male who I am continuing to see for treatment of obesity related to:        2019     3:03 PM   --   I have the following health issues associated with obesity Pre-Diabetes    Sleep Apnea    GERD (Reflux)   I have the following symptoms associated with obesity Knee Pain    GERD (Reflux)     CURRENT WEIGHT:   234 lbs 0 oz  Wt Readings from Last 4 Encounters:   25 106.1 kg (234 lb)   24 107.5 kg (237 lb)   10/31/24 109.6 kg (241 lb 9.6 oz)   10/24/24 104.3 kg (230 lb)       MEDICATIONS:   Current Outpatient Medications   Medication Sig Dispense Refill     alum & mag hydroxide-simethicone (MYLANTA/MAALOX) 200-200-20 MG/5ML SUSP suspension Take 30 mLs by mouth every 4 hours as needed for indigestion (2 tsp for upset stomach) (Patient not taking: Reported on 2024) 1 Bottle 3     azelastine (ASTELIN) 0.1 % nasal spray Spray 2 sprays into both nostrils 2 times daily as needed for rhinitis. 30 mL 3     calcium carbonate (TUMS) 500 MG chewable tablet Take 1 tablet (500 mg) by mouth every 6 hours as needed for heartburn 150 tablet 3     calcium polycarbophil (FIBERCON) 625 MG tablet Take 2 tablets (1,250 mg) by mouth 2 times daily 360 tablet 3     divalproex sodium delayed-release (DEPAKOTE) 250 MG DR tablet Take by mouth 2 times daily. 4 capsules po BID       Eyelid Cleansers (OCUSOFT LID SCRUB EX) Externally apply topically daily. Daily (in the morning)  both eyes       FLUoxetine 20 MG tablet Take 20 mg by mouth daily.       ibuprofen (ADVIL,MOTRIN) 600 MG tablet Take 1 tablet (600 mg) by mouth every 6 hours as needed for moderate pain 60 tablet 0     levothyroxine (SYNTHROID/LEVOTHROID) 137 MCG tablet Take 1 tablet (137 mcg) by mouth daily. 90 tablet 1     magnesium hydroxide (MILK OF MAGNESIA) 400 MG/5ML suspension Take 30-60 mLs by mouth daily as needed for constipation or heartburn (If No Bowel Movement in 2 days.) Reported on 4/12/2017 360 mL 1     OLANZapine (ZYPREXA) 5 MG tablet Take 2.5 mg by mouth 3 times daily as needed. As needed, max of 15 30 tablet 1     olopatadine (PATADAY) 0.2 % ophthalmic solution 1 drop into both eyes daily as needed 2.5 mL 11     order for DME Equipment being ordered: CPAP  AIRSENSE 10  11 CM H20  AIRFIT F20 MEDIUM  SN# [id number]  DN# [id number]       PAIN & FEVER 325 MG tablet TAKE 1-2 TABLETS (325-650MG) BY MOUTH EVERY 4 HOURS AS NEEDED *ORDER WHEN LOW* 100 tablet 7     polyethylene glycol (MIRALAX) 17 GM/Dose powder Take 17 g (1 Capful) by mouth daily as needed for constipation 116 g 11     polyethylene glycol-propylene glycol PF (SYSTANE ULTRA PF) 0.4-0.3 % SOLN opthalmic solution Place 1-2 drops into both eyes. Up to 5 times daily as needed       pseudoePHEDrine (SUDOGEST 12 HOUR) 120 MG 12 hr tablet Take 1 tablet (120 mg) by mouth daily as needed for congestion 12 tablet 11     risperiDONE (RISPERDAL) 1 MG tablet Take 1 mg by mouth every morning. Twice daily AM and noon 60 tablet      risperiDONE (RISPERDAL) 2 MG tablet Take 2 mg by mouth daily at 8:00pm       Semaglutide-Weight Management (WEGOVY) 1.7 MG/0.75ML pen Inject 1.7 mg subcutaneously once a week. 3 mL 5     sertraline (ZOLOFT) 100 MG tablet Take 200 mg by mouth daily  (Patient not taking: Reported on 12/23/2024)       triamcinolone (KENALOG) 0.1 % external cream Apply topically as needed for irritation. Apply to affected areas of skin TID       vitamin D3 25 mcg (1000  "units) tablet Take 1 tablet (25 mcg) by mouth daily 100 tablet 3           1/2/2025     2:54 PM   Weight Loss Medication History Reviewed With Patient   Which weight loss medications are you currently taking on a regular basis? Wegovy   Are you having any side effects from the weight loss medication that we have prescribed you? Yes   If you are having side effects please describe: fatigue and Deariah     PHYSICAL EXAM:  Objective   Ht 1.626 m (5' 4\")   Wt 106.1 kg (234 lb)   BMI 40.17 kg/m      GENERAL: alert and no distress  EYES: Eyes grossly normal to inspection.  No discharge or erythema, or obvious scleral/conjunctival abnormalities.  RESP: No audible wheeze, cough, or visible cyanosis.    SKIN: Visible skin clear. No significant rash, abnormal pigmentation or lesions.  NEURO: Cranial nerves grossly intact.  Mentation and speech appropriate for age.  PSYCH: Appropriate affect, tone, and pace of words    ASSESSMENT/PLAN:    It will be important for Humberto to continue the wegovy 1.7mg and for him to see our dietician for help with his venous stasis.     Patient Instructions   Seeing the dietician is the priority over seeing me or the health . You can see them about once per month for the next few months.     Switch your reward meals to be smoothies and not actual meals, this will help avoid extra calories and high-fat foods.     For the diarrhea - I think that avoiding the high-fat restaurant meals will help to decrease the number of times you have diarrhea. You can try taking more or less of the fiber to see if it helps.    If the wegovy is contributing to the diarrhea, it should get less now that you have been on the 1.7mg dose for 3 weeks.     Continue the wegovy 1.7mg dose. Keep adding in steps when you can.     Fax or email back the [facility] form, AVS        Sincerely,    Wanda Diaz MD      No LOS data to display   Time spent by me today doing chart review, history and exam, " documentation and further activities per the note      Virtual Visit Details    Type of service:  Video Visit   Video Start Time: 3:10 PM  Video End Time: 3:32    Originating Location (pt. Location): Home    Distant Location (provider location):  On-site  Platform used for Video Visit: Juan Jose      Again, thank you for allowing me to participate in the care of your patient.      Sincerely,    Wanda Diaz MD

## 2025-01-07 ENCOUNTER — MYC MEDICAL ADVICE (OUTPATIENT)
Dept: ALLERGY | Facility: CLINIC | Age: 45
End: 2025-01-07
Payer: MEDICARE

## 2025-01-07 NOTE — TELEPHONE ENCOUNTER
"Changeto the following order:  \"Use Systane 1 drop in both eyes + 1 to 2 drops in affected eyes as needed, up to 4 times a day.\"    Note: This is a temporary plan to provide symptom relief.    As the symptoms are primarily nonallergic in nature, additional clarification and long-term recommendations should be provided following evaluation by his ophthalmologist.      Hiro Morse MD    "

## 2025-01-07 NOTE — TELEPHONE ENCOUNTER
[facility] note with orders faxed to [phone].  Confirmed receipt via RightFax.    Sandy Reyna MSN, RN   [facility], 1/7/2025 1:09 PM

## 2025-01-21 ENCOUNTER — TELEPHONE (OUTPATIENT)
Dept: ENDOCRINOLOGY | Facility: CLINIC | Age: 45
End: 2025-01-21
Payer: MEDICARE

## 2025-01-21 NOTE — TELEPHONE ENCOUNTER
Prior Authorization Approval    Medication: WEGOVY 1.7 MG/0.75ML SC SOAJ  Authorization Effective Date: 1/21/2025  Authorization Expiration Date: 1/21/2026  Approved Dose/Quantity: 3ml per 28 days  Reference #: [id number]   Insurance Company: [employer] for [place] Medicaid Phone [phone] Fax [phone]  Expected CoPay: $ 0  CoPay Card Available:      Financial Assistance Needed:   Which Pharmacy is filling the prescription:    Pharmacy Notified:   Patient Notified:          PA Initiation    Medication: WEGOVY 1.7 MG/0.75ML SC SOAJ  Insurance Company: [employer] for [place] Medicaid Phone [phone] Fax [phone]  Pharmacy Filling the Rx:    Filling Pharmacy Phone:    Filling Pharmacy Fax:    Start Date: 1/21/2025    [id number]

## 2025-02-13 ENCOUNTER — LAB (OUTPATIENT)
Dept: LAB | Facility: CLINIC | Age: 45
End: 2025-02-13
Payer: MEDICARE

## 2025-02-13 DIAGNOSIS — Z79.899 HIGH RISK MEDICATION USE: Primary | ICD-10-CM

## 2025-02-13 DIAGNOSIS — Z79.899 HIGH RISK MEDICATION USE: ICD-10-CM

## 2025-02-13 LAB
ALT SERPL W P-5'-P-CCNC: 26 U/L (ref 0–70)
AST SERPL W P-5'-P-CCNC: 36 U/L (ref 0–45)
BASOPHILS # BLD AUTO: 0 10E3/UL (ref 0–0.2)
BASOPHILS NFR BLD AUTO: 1 %
CHOLEST SERPL-MCNC: 147 MG/DL
EOSINOPHIL # BLD AUTO: 0 10E3/UL (ref 0–0.7)
EOSINOPHIL NFR BLD AUTO: 1 %
ERYTHROCYTE [DISTWIDTH] IN BLOOD BY AUTOMATED COUNT: 15 % (ref 10–15)
FASTING STATUS PATIENT QL REPORTED: NO
FASTING STATUS PATIENT QL REPORTED: NO
GLUCOSE SERPL-MCNC: 88 MG/DL (ref 70–99)
HCT VFR BLD AUTO: 41 % (ref 40–53)
HDLC SERPL-MCNC: 41 MG/DL
HGB BLD-MCNC: 14.1 G/DL (ref 13.3–17.7)
IMM GRANULOCYTES # BLD: 0.1 10E3/UL
IMM GRANULOCYTES NFR BLD: 2 %
LDLC SERPL CALC-MCNC: 88 MG/DL
LYMPHOCYTES # BLD AUTO: 0.9 10E3/UL (ref 0.8–5.3)
LYMPHOCYTES NFR BLD AUTO: 32 %
MCH RBC QN AUTO: 35.2 PG (ref 26.5–33)
MCHC RBC AUTO-ENTMCNC: 34.4 G/DL (ref 31.5–36.5)
MCV RBC AUTO: 102 FL (ref 78–100)
MONOCYTES # BLD AUTO: 0.6 10E3/UL (ref 0–1.3)
MONOCYTES NFR BLD AUTO: 22 %
NEUTROPHILS # BLD AUTO: 1.2 10E3/UL (ref 1.6–8.3)
NEUTROPHILS NFR BLD AUTO: 42 %
NONHDLC SERPL-MCNC: 106 MG/DL
NRBC # BLD AUTO: 0 10E3/UL
NRBC BLD AUTO-RTO: 0 /100
PLATELET # BLD AUTO: 169 10E3/UL (ref 150–450)
RBC # BLD AUTO: 4.01 10E6/UL (ref 4.4–5.9)
TRIGL SERPL-MCNC: 89 MG/DL
VALPROATE SERPL-MCNC: 51.7 UG/ML
WBC # BLD AUTO: 2.8 10E3/UL (ref 4–11)

## 2025-02-24 ENCOUNTER — MYC MEDICAL ADVICE (OUTPATIENT)
Dept: FAMILY MEDICINE | Facility: CLINIC | Age: 45
End: 2025-02-24
Payer: MEDICARE

## 2025-02-24 NOTE — LETTER
February 25, 2025      Humberto Estrella  [address]  [place] [address]        To Whom It May Concern,     Please stop 600mg-1tab PO Q6h PRN for moderate pain (back/shoulder)           Sincerely,        Carla Brown, DO    Electronically signed

## 2025-02-25 ENCOUNTER — ANCILLARY PROCEDURE (OUTPATIENT)
Dept: VASCULAR ULTRASOUND | Facility: CLINIC | Age: 45
End: 2025-02-25
Attending: HOSPITALIST
Payer: MEDICARE

## 2025-02-25 ENCOUNTER — OFFICE VISIT (OUTPATIENT)
Dept: VASCULAR SURGERY | Facility: CLINIC | Age: 45
End: 2025-02-25
Attending: HOSPITALIST
Payer: MEDICARE

## 2025-02-25 VITALS
HEART RATE: 72 BPM | RESPIRATION RATE: 16 BRPM | TEMPERATURE: 98 F | OXYGEN SATURATION: 95 % | DIASTOLIC BLOOD PRESSURE: 75 MMHG | WEIGHT: 228.2 LBS | SYSTOLIC BLOOD PRESSURE: 114 MMHG | BODY MASS INDEX: 38.96 KG/M2 | HEIGHT: 64 IN

## 2025-02-25 DIAGNOSIS — I87.2 VENOUS INCOMPETENCE: Primary | ICD-10-CM

## 2025-02-25 DIAGNOSIS — I87.8 VENOUS STASIS OF BOTH LOWER EXTREMITIES: ICD-10-CM

## 2025-02-25 DIAGNOSIS — R60.0 LOWER EXTREMITY EDEMA: ICD-10-CM

## 2025-02-25 PROCEDURE — 93970 EXTREMITY STUDY: CPT

## 2025-02-25 PROCEDURE — G0463 HOSPITAL OUTPT CLINIC VISIT: HCPCS | Performed by: HOSPITALIST

## 2025-02-25 ASSESSMENT — PAIN SCALES - GENERAL: PAINLEVEL_OUTOF10: NO PAIN (0)

## 2025-02-25 NOTE — PROGRESS NOTES
"[facility]        Patient is here for a 6-8 week follow up  to discuss BLE swelling. The patient states he/she does  wear compressions, however only wears them about a third fo the days. Swelling is observed in both lower extremities.    Pt is currently taking no meds that would impact our treatment plan.    /75   Pulse 72   Temp 98  F (36.7  C)   Resp 16   Ht 5' 4\" (1.626 m)   Wt 228 lb 3.2 oz (103.5 kg)   SpO2 95%   BMI 39.17 kg/m      The provider has been notified that the patient has no concerns.     Questions patient would like addressed today are: N/A.    Refills are needed: No    Has homecare services and agency name:  No           "

## 2025-02-25 NOTE — PATIENT INSTRUCTIONS
Shahbaz Paul,    Thank you for entrusting your care with us today. After your visit today with Dr. Mahesh Palafox this is the plan that was discussed at your appointment.    Continue to wear graduated compression stockings 20-30mmHg daily.  See below for more information on use.      Follow up as needed if your swelling or other symptoms worsen and they do not improve with proper daily compression and elevation.       I am including additional information on these things and our contact information if you have any questions or concerns.   Please do not hesitate to reach out to us if you felt we did not answer your questions or you are unsure of the treatment plan after your visit today. Our number is [phone].Thank you for trusting us with your care.         Again thank you for your time.     Wear your compression stockings every day; put them on first thing in the morning and remove at bedtime    Replace your compression stockings every 6 months; these garments will lose their elasticity and become ineffective    Hang your stockings, do not put them in the dryer.  This will help prolong the life of your compressions stockings.     Elevate your legs periodically throughout the day, 30-60 minutes 1-3 times per day    Do calf pumping exercises periodically throughout the day.        ===========================================================    Your provider has recommended you start wearing compression stockings. You may get these at any [facility] supply store. You may also purchase stockings online through outside vendors. If you order online, be sure you are purchasing the correct compression stockings based on the prescription from your doctor. If you are unsure what to order, please call our clinic at [phone] and ask to speak with a nurse or send us a [employer] message.    Online vendors  [employer]: www.[employer]  [employer]: [email or web address]  [employer]:  [email or web address]    ===========================================================  Learning About Using Compression Stockings  Compression stockings help prevent blood and fluid from pooling in the legs. They may be used for problems like varicose veins, skin ulcers, and deep vein thrombosis (blood clot in the leg). There are different types of stockings, and they need to fit right. Your doctor will recommend what you need.  5 tips for putting on compression stockings    If your stockings are new, wash them in cold water.  This can make them easier to put on.     Put on your stockings early in the morning, if you can.  This is when you have the least swelling in your legs.     Wear them every day while you're awake, especially while you're on your feet.       Try putting silicone lotion or cornstarch on your legs.  This can make it easier to slide the stockings on.     To help your , try using rubber gloves.  Ask your doctor about other tools to help if it's hard to put on the stockings.   How to put on compression stockings  It can be a little tricky to put on compression stockings at first. But after you practice a few times, it may get easier. Here are the details.    Sit on a firm surface where your feet can touch the ground.   Hold the top of the stocking with one hand. Then with your other hand, reach in and grab the heel.     When you have a firm  on the heel, pull your hand back up through the stocking, turning it inside out as far as the heel.   Put your toes in as far as they will go. Then center your heel in the stocking and pull it up slightly, just around your heel.     Use both hands to grasp the folded part of the stocking about 2 inches below the fold. Pull that section up over your ankle.   Next, from above your ankle, grasp the folded part of the stocking about 2 inches below the fold. Pull that section up.     Continue pulling the stocking up in short sections until it is in its final  position. The final position may be below your knee. Or it might be above your knee.   Run your hands over the stocking to smooth it out.

## 2025-02-25 NOTE — TELEPHONE ENCOUNTER
See [email or web address] message, routing to PCP for recommendaiton. Only active order for ibuprofen on pt's med list here is as follows (last ordered on 9/24/14):     Disp Refills Start End [facility]   ibuprofen (ADVIL,MOTRIN) 600 MG tablet 60 tablet 0 9/24/2014 -- --   Sig - Route: Take 1 tablet (600 mg) by mouth every 6 hours as needed for moderate pain - Aldo Carcamo RN  [facility]

## 2025-02-25 NOTE — PROGRESS NOTES
"VASCULAR MEDICINE PROGRESS NOTE          LOCATION:  [facility]       Date of Service: 2/25/2025      Primary Care Provider: Carla Brown      Reason for the visit/chief complaint:   Follow up on lower extremity heaviness and swelling      Subjective:  Humberto Estrella is a pleasant 44 year old male who presents to our Vascular Medicine clinic to follow up.  He is accompanied by his group home personnel.    Lisa reports that he has been trying to wear his compression stockings.  They bring papers from his group home where they have been charting days that Humberto wear his compression stockings, elevated his legs and exercise his calf muscle.  As I am looking into that, he has been refusing many days to do the above and specifically wear his compression stocking.  Humberto tells me that he tries his best to elevate his lower extremities.    No other complaints.      Past medical history, surgical history, medications, family history, social history and allergies were reviewed. Pertinent points mentioned under HPI.        OBJECTIVE:    Vital signs:  /75   Pulse 72   Temp 98  F (36.7  C)   Resp 16   Ht 5' 4\" (1.626 m)   Wt 228 lb 3.2 oz (103.5 kg)   SpO2 95%   BMI 39.17 kg/m    Wt Readings from Last 1 Encounters:   02/25/25 228 lb 3.2 oz (103.5 kg)     Body mass index is 39.17 kg/m .      Physical exam:  General appearance: Pleasant male in no apparent distress.    Extremities and skin: Bilateral lower extremities with no swelling today.  Negative Stemmer sign bilaterally.  There is brownish skin rash on bilateral ankle and lower legs that appears reticular.  Nonblanching.  No wounds.  No varicose veins.  Palpable DP and PT 2/2.              DIAGNOSTIC STUDIES:   Labs and diagnostics reviewed including outside records. Pertinent points are mentioned under HPI and assessment and plan sections.        ASSESSMENT AND PLAN:    Venous insufficiency with right AASV " incompetence  Lower extremity skin rash  Trisomy 21/Down syndrome  Obesity class II BMI 39.4  Sedentary lifestyle    We reviewed his venous competency study completed today.  This only showed right AASV incompetence with mid right calf varicose vein measuring 3.2 mm and refluxes 2186 ms.  Other than that, no deep or superficial incompetency seen on the right or left lower extremity.    History remains slightly challenging but we discussed with Humberto and his group home provider that he should continue with conservative management for now including compression stocking, leg elevation, exercising and weight loss.      Recommendations:  Continue conservative management with knee-high 20-30 mmHg graduated compression stockings, lower extremity elevation, calf muscle pump exercises and weight loss.  They have dermatology consult pending for his lower extremity skin rash.  Education given and prescription given.  Follow-up as needed.     It has been a pleasure meeting Humberto in our vascular medicine clinic.      Mahesh Palafox MD, [facility]  Vascular Medicine  February 25, 2025    The longitudinal plan of care for the diagnosis(es)/condition(s) as documented were addressed during this visit. Due to the added complexity in care, I will continue to support Humberto in the subsequent management and with ongoing continuity of care.

## 2025-03-11 ENCOUNTER — TELEPHONE (OUTPATIENT)
Dept: ENDOCRINOLOGY | Facility: CLINIC | Age: 45
End: 2025-03-11
Payer: MEDICARE

## 2025-03-11 NOTE — TELEPHONE ENCOUNTER
Received call from Belinda at [facility]. Informed clinic that patient passed away on 3/7/25.    +abdominal pain, chills

## (undated) RX ORDER — REGADENOSON 0.08 MG/ML
INJECTION, SOLUTION INTRAVENOUS
Status: DISPENSED
Start: 2022-10-03